# Patient Record
Sex: FEMALE | Race: WHITE | NOT HISPANIC OR LATINO | Employment: UNEMPLOYED | URBAN - METROPOLITAN AREA
[De-identification: names, ages, dates, MRNs, and addresses within clinical notes are randomized per-mention and may not be internally consistent; named-entity substitution may affect disease eponyms.]

---

## 2022-02-07 ENCOUNTER — HOSPITAL ENCOUNTER (INPATIENT)
Facility: HOSPITAL | Age: 62
LOS: 4 days | Discharge: HOME | DRG: 418 | End: 2022-02-11
Attending: EMERGENCY MEDICINE | Admitting: INTERNAL MEDICINE
Payer: COMMERCIAL

## 2022-02-07 ENCOUNTER — APPOINTMENT (EMERGENCY)
Dept: CT IMAGING | Facility: HOSPITAL | Age: 62
DRG: 418 | End: 2022-02-07
Payer: COMMERCIAL

## 2022-02-07 ENCOUNTER — OFFICE VISIT (OUTPATIENT)
Dept: FAMILY MEDICINE CLINIC | Facility: CLINIC | Age: 62
End: 2022-02-07
Payer: COMMERCIAL

## 2022-02-07 ENCOUNTER — APPOINTMENT (EMERGENCY)
Dept: ULTRASOUND IMAGING | Facility: HOSPITAL | Age: 62
DRG: 418 | End: 2022-02-07
Payer: COMMERCIAL

## 2022-02-07 VITALS
TEMPERATURE: 96.8 F | DIASTOLIC BLOOD PRESSURE: 90 MMHG | SYSTOLIC BLOOD PRESSURE: 130 MMHG | BODY MASS INDEX: 27.5 KG/M2 | HEART RATE: 76 BPM | WEIGHT: 175.2 LBS | OXYGEN SATURATION: 98 % | HEIGHT: 67 IN

## 2022-02-07 DIAGNOSIS — K80.50 CHOLEDOCHOLITHIASIS: Primary | ICD-10-CM

## 2022-02-07 DIAGNOSIS — R10.11 RIGHT UPPER QUADRANT PAIN: Primary | ICD-10-CM

## 2022-02-07 PROBLEM — M54.50 CHRONIC LOW BACK PAIN: Status: ACTIVE | Noted: 2022-02-07

## 2022-02-07 PROBLEM — I10 HYPERTENSION: Status: ACTIVE | Noted: 2022-02-07

## 2022-02-07 PROBLEM — G89.29 CHRONIC LOW BACK PAIN: Status: ACTIVE | Noted: 2022-02-07

## 2022-02-07 PROBLEM — F11.20 OPIOID DEPENDENCE (HCC): Status: ACTIVE | Noted: 2022-02-07

## 2022-02-07 LAB
ALBUMIN SERPL BCP-MCNC: 3.8 G/DL (ref 3.5–5)
ALP SERPL-CCNC: 92 U/L (ref 46–116)
ALT SERPL W P-5'-P-CCNC: 19 U/L (ref 12–78)
ANION GAP SERPL CALCULATED.3IONS-SCNC: 7 MMOL/L (ref 4–13)
AST SERPL W P-5'-P-CCNC: 17 U/L (ref 5–45)
BACTERIA UR QL AUTO: NORMAL /HPF
BASOPHILS # BLD AUTO: 0.06 THOUSANDS/ΜL (ref 0–0.1)
BASOPHILS NFR BLD AUTO: 1 % (ref 0–1)
BILIRUB SERPL-MCNC: 0.66 MG/DL (ref 0.2–1)
BILIRUB UR QL STRIP: NEGATIVE
BUN SERPL-MCNC: 5 MG/DL (ref 5–25)
CALCIUM SERPL-MCNC: 9.5 MG/DL (ref 8.3–10.1)
CHLORIDE SERPL-SCNC: 105 MMOL/L (ref 100–108)
CLARITY UR: CLEAR
CO2 SERPL-SCNC: 29 MMOL/L (ref 21–32)
COLOR UR: ABNORMAL
CREAT SERPL-MCNC: 1.05 MG/DL (ref 0.6–1.3)
EOSINOPHIL # BLD AUTO: 0.09 THOUSAND/ΜL (ref 0–0.61)
EOSINOPHIL NFR BLD AUTO: 1 % (ref 0–6)
ERYTHROCYTE [DISTWIDTH] IN BLOOD BY AUTOMATED COUNT: 12.1 % (ref 11.6–15.1)
FLUAV RNA RESP QL NAA+PROBE: NEGATIVE
FLUBV RNA RESP QL NAA+PROBE: NEGATIVE
GFR SERPL CREATININE-BSD FRML MDRD: 58 ML/MIN/1.73SQ M
GLUCOSE SERPL-MCNC: 100 MG/DL (ref 65–140)
GLUCOSE UR STRIP-MCNC: NEGATIVE MG/DL
HCT VFR BLD AUTO: 43 % (ref 34.8–46.1)
HGB BLD-MCNC: 14 G/DL (ref 11.5–15.4)
HGB UR QL STRIP.AUTO: ABNORMAL
IMM GRANULOCYTES # BLD AUTO: 0.02 THOUSAND/UL (ref 0–0.2)
IMM GRANULOCYTES NFR BLD AUTO: 0 % (ref 0–2)
KETONES UR STRIP-MCNC: NEGATIVE MG/DL
LEUKOCYTE ESTERASE UR QL STRIP: NEGATIVE
LIPASE SERPL-CCNC: 62 U/L (ref 73–393)
LYMPHOCYTES # BLD AUTO: 1.94 THOUSANDS/ΜL (ref 0.6–4.47)
LYMPHOCYTES NFR BLD AUTO: 30 % (ref 14–44)
MCH RBC QN AUTO: 29.9 PG (ref 26.8–34.3)
MCHC RBC AUTO-ENTMCNC: 32.6 G/DL (ref 31.4–37.4)
MCV RBC AUTO: 92 FL (ref 82–98)
MONOCYTES # BLD AUTO: 0.47 THOUSAND/ΜL (ref 0.17–1.22)
MONOCYTES NFR BLD AUTO: 7 % (ref 4–12)
NEUTROPHILS # BLD AUTO: 3.97 THOUSANDS/ΜL (ref 1.85–7.62)
NEUTS SEG NFR BLD AUTO: 61 % (ref 43–75)
NITRITE UR QL STRIP: NEGATIVE
NON-SQ EPI CELLS URNS QL MICRO: NORMAL /HPF
NRBC BLD AUTO-RTO: 0 /100 WBCS
PH UR STRIP.AUTO: 6 [PH]
PLATELET # BLD AUTO: 343 THOUSANDS/UL (ref 149–390)
PMV BLD AUTO: 9.8 FL (ref 8.9–12.7)
POTASSIUM SERPL-SCNC: 3.9 MMOL/L (ref 3.5–5.3)
PROT SERPL-MCNC: 7.2 G/DL (ref 6.4–8.2)
PROT UR STRIP-MCNC: NEGATIVE MG/DL
RBC # BLD AUTO: 4.69 MILLION/UL (ref 3.81–5.12)
RBC #/AREA URNS AUTO: NORMAL /HPF
RSV RNA RESP QL NAA+PROBE: NEGATIVE
SARS-COV-2 RNA RESP QL NAA+PROBE: NEGATIVE
SODIUM SERPL-SCNC: 141 MMOL/L (ref 136–145)
SP GR UR STRIP.AUTO: 1.01 (ref 1–1.03)
UROBILINOGEN UR QL STRIP.AUTO: 0.2 E.U./DL
WBC # BLD AUTO: 6.55 THOUSAND/UL (ref 4.31–10.16)
WBC #/AREA URNS AUTO: NORMAL /HPF

## 2022-02-07 PROCEDURE — 96374 THER/PROPH/DIAG INJ IV PUSH: CPT

## 2022-02-07 PROCEDURE — 1036F TOBACCO NON-USER: CPT | Performed by: FAMILY MEDICINE

## 2022-02-07 PROCEDURE — 83690 ASSAY OF LIPASE: CPT | Performed by: PHYSICIAN ASSISTANT

## 2022-02-07 PROCEDURE — 36415 COLL VENOUS BLD VENIPUNCTURE: CPT | Performed by: PHYSICIAN ASSISTANT

## 2022-02-07 PROCEDURE — 99285 EMERGENCY DEPT VISIT HI MDM: CPT | Performed by: PHYSICIAN ASSISTANT

## 2022-02-07 PROCEDURE — 99204 OFFICE O/P NEW MOD 45 MIN: CPT | Performed by: FAMILY MEDICINE

## 2022-02-07 PROCEDURE — 96375 TX/PRO/DX INJ NEW DRUG ADDON: CPT

## 2022-02-07 PROCEDURE — 81001 URINALYSIS AUTO W/SCOPE: CPT | Performed by: PHYSICIAN ASSISTANT

## 2022-02-07 PROCEDURE — 85025 COMPLETE CBC W/AUTO DIFF WBC: CPT | Performed by: PHYSICIAN ASSISTANT

## 2022-02-07 PROCEDURE — 99223 1ST HOSP IP/OBS HIGH 75: CPT | Performed by: INTERNAL MEDICINE

## 2022-02-07 PROCEDURE — 0241U HB NFCT DS VIR RESP RNA 4 TRGT: CPT | Performed by: INTERNAL MEDICINE

## 2022-02-07 PROCEDURE — 3725F SCREEN DEPRESSION PERFORMED: CPT | Performed by: FAMILY MEDICINE

## 2022-02-07 PROCEDURE — 99285 EMERGENCY DEPT VISIT HI MDM: CPT

## 2022-02-07 PROCEDURE — 74177 CT ABD & PELVIS W/CONTRAST: CPT

## 2022-02-07 PROCEDURE — 80053 COMPREHEN METABOLIC PANEL: CPT | Performed by: PHYSICIAN ASSISTANT

## 2022-02-07 PROCEDURE — 76705 ECHO EXAM OF ABDOMEN: CPT

## 2022-02-07 RX ORDER — PREGABALIN 75 MG/1
75 CAPSULE ORAL 3 TIMES DAILY
COMMUNITY

## 2022-02-07 RX ORDER — KETOROLAC TROMETHAMINE 30 MG/ML
15 INJECTION, SOLUTION INTRAMUSCULAR; INTRAVENOUS ONCE
Status: COMPLETED | OUTPATIENT
Start: 2022-02-07 | End: 2022-02-07

## 2022-02-07 RX ORDER — PREGABALIN 75 MG/1
75 CAPSULE ORAL 3 TIMES DAILY
Status: DISCONTINUED | OUTPATIENT
Start: 2022-02-07 | End: 2022-02-11 | Stop reason: HOSPADM

## 2022-02-07 RX ORDER — ONDANSETRON 2 MG/ML
4 INJECTION INTRAMUSCULAR; INTRAVENOUS ONCE
Status: COMPLETED | OUTPATIENT
Start: 2022-02-07 | End: 2022-02-07

## 2022-02-07 RX ORDER — LISINOPRIL 5 MG/1
5 TABLET ORAL DAILY
Status: DISCONTINUED | OUTPATIENT
Start: 2022-02-08 | End: 2022-02-09

## 2022-02-07 RX ORDER — SODIUM CHLORIDE 9 MG/ML
75 INJECTION, SOLUTION INTRAVENOUS CONTINUOUS
Status: DISCONTINUED | OUTPATIENT
Start: 2022-02-08 | End: 2022-02-10

## 2022-02-07 RX ORDER — ONDANSETRON 2 MG/ML
4 INJECTION INTRAMUSCULAR; INTRAVENOUS EVERY 6 HOURS PRN
Status: DISCONTINUED | OUTPATIENT
Start: 2022-02-07 | End: 2022-02-11 | Stop reason: HOSPADM

## 2022-02-07 RX ORDER — ACETAMINOPHEN 325 MG/1
650 TABLET ORAL EVERY 6 HOURS PRN
Status: DISCONTINUED | OUTPATIENT
Start: 2022-02-07 | End: 2022-02-11 | Stop reason: HOSPADM

## 2022-02-07 RX ORDER — METHOCARBAMOL 750 MG/1
750 TABLET, FILM COATED ORAL 4 TIMES DAILY
Status: DISCONTINUED | OUTPATIENT
Start: 2022-02-07 | End: 2022-02-11 | Stop reason: HOSPADM

## 2022-02-07 RX ORDER — OXYCODONE 36 MG/1
CAPSULE, EXTENDED RELEASE ORAL 3 TIMES DAILY
COMMUNITY

## 2022-02-07 RX ADMIN — METHOCARBAMOL 750 MG: 750 TABLET ORAL at 22:07

## 2022-02-07 RX ADMIN — PREGABALIN 75 MG: 75 CAPSULE ORAL at 21:35

## 2022-02-07 RX ADMIN — SODIUM CHLORIDE 75 ML/HR: 0.9 INJECTION, SOLUTION INTRAVENOUS at 23:34

## 2022-02-07 RX ADMIN — KETOROLAC TROMETHAMINE 15 MG: 30 INJECTION, SOLUTION INTRAMUSCULAR at 16:00

## 2022-02-07 RX ADMIN — ONDANSETRON 4 MG: 2 INJECTION INTRAMUSCULAR; INTRAVENOUS at 16:01

## 2022-02-07 RX ADMIN — OXYCODONE 54 MG: 18 CAPSULE, EXTENDED RELEASE ORAL at 21:35

## 2022-02-07 RX ADMIN — IOHEXOL 100 ML: 350 INJECTION, SOLUTION INTRAVENOUS at 17:06

## 2022-02-07 NOTE — ED PROVIDER NOTES
History  Chief Complaint   Patient presents with    Abdominal Pain     pt c/o generilized abd pain and nausea since 2/3       64 yo with abd pain  Onset 4 days ago  Gradual onset  Constant pain  RUQ and RLQ  Non-radiating  Worsened by eating, movement, palpation  Nausea no vomiting  Diarrhea  No chest pain or sob  No cough, fever, chills  Normal urination  History provided by:  Patient   used: No    Abdominal Pain  Pain location:  R flank  Pain quality: burning    Pain radiates to:  Does not radiate  Pain severity:  Moderate  Onset quality:  Gradual  Duration:  4 days  Timing:  Constant  Progression:  Unchanged  Chronicity:  New  Relieved by:  Nothing  Worsened by:  Eating  Ineffective treatments:  None tried  Associated symptoms: diarrhea and nausea    Associated symptoms: no chest pain, no chills, no cough, no dysuria, no fever, no hematuria, no shortness of breath, no sore throat and no vomiting        Prior to Admission Medications   Prescriptions Last Dose Informant Patient Reported? Taking?   diazepam (VALIUM) 5 mg tablet   No No   Sig: Take 1 tablet (5 mg total) by mouth every 8 (eight) hours as needed for muscle spasms (1 tablet every 8 hrs as needed for spasm)  Patient not taking: Reported on 2/7/2022    lisinopril (ZESTRIL) 5 mg tablet   Yes No   Sig: Take 5 mg by mouth daily  methocarbamol (ROBAXIN) 750 mg tablet   Yes No   Sig: Take 750 mg by mouth 4 (four) times a day  oxyCODONE (OxyCONTIN) 40 mg 12 hr tablet   Yes No   Sig: Take 160 mg by mouth 3 (three) times a day     Patient not taking: Reported on 2/7/2022    oxyCODONE ER (Xtampza ER) 36 MG C12A  Self Yes No   Sig: Take by mouth 3 (three) times a day   pregabalin (LYRICA) 75 mg capsule  Self Yes No   Sig: Take 75 mg by mouth 3 (three) times a day      Facility-Administered Medications: None       Past Medical History:   Diagnosis Date    Hyperlipidemia     Hypertension        Past Surgical History:   Procedure Laterality Date    BACK SURGERY      L3-S1    SPINAL STIMULATOR PLACEMENT  2019       History reviewed  No pertinent family history  I have reviewed and agree with the history as documented  E-Cigarette/Vaping    E-Cigarette Use Never User      E-Cigarette/Vaping Substances     Social History     Tobacco Use    Smoking status: Never Smoker    Smokeless tobacco: Never Used   Vaping Use    Vaping Use: Never used   Substance Use Topics    Alcohol use: No    Drug use: No       Review of Systems   Constitutional: Negative for chills and fever  HENT: Negative for ear pain and sore throat  Eyes: Negative for pain and visual disturbance  Respiratory: Negative for cough and shortness of breath  Cardiovascular: Negative for chest pain and palpitations  Gastrointestinal: Positive for abdominal pain, diarrhea and nausea  Negative for vomiting  Genitourinary: Negative for dysuria and hematuria  Musculoskeletal: Negative for arthralgias and back pain  Skin: Negative for color change and rash  Neurological: Negative for seizures and syncope  All other systems reviewed and are negative  Physical Exam  Physical Exam  Vitals and nursing note reviewed  Constitutional:       General: She is not in acute distress  Appearance: She is well-developed  HENT:      Head: Normocephalic and atraumatic  Eyes:      Conjunctiva/sclera: Conjunctivae normal    Cardiovascular:      Rate and Rhythm: Normal rate and regular rhythm  Heart sounds: No murmur heard  Pulmonary:      Effort: Pulmonary effort is normal  No respiratory distress  Breath sounds: Normal breath sounds  Abdominal:      Palpations: Abdomen is soft  Tenderness: There is abdominal tenderness in the right upper quadrant and right lower quadrant  There is no guarding  Negative signs include Ortega's sign and McBurney's sign  Musculoskeletal:      Cervical back: Neck supple     Skin:     General: Skin is warm and dry    Neurological:      Mental Status: She is alert           Vital Signs  ED Triage Vitals   Temperature Pulse Respirations Blood Pressure SpO2   02/07/22 1253 02/07/22 1253 02/07/22 1253 02/07/22 1253 02/07/22 1253   98 2 °F (36 8 °C) 91 17 166/84 97 %      Temp Source Heart Rate Source Patient Position - Orthostatic VS BP Location FiO2 (%)   02/07/22 1253 02/07/22 1253 02/07/22 1253 02/07/22 1253 --   Oral Monitor Sitting Left arm       Pain Score       02/07/22 1730       3           Vitals:    02/07/22 1253 02/07/22 1559 02/07/22 1715 02/07/22 1900   BP: 166/84 (!) 179/93 139/74 135/75   Pulse: 91 71 71 71   Patient Position - Orthostatic VS: Sitting Sitting Sitting Sitting         Visual Acuity      ED Medications  Medications   acetaminophen (TYLENOL) tablet 650 mg (has no administration in time range)   ondansetron (ZOFRAN) injection 4 mg (has no administration in time range)   enoxaparin (LOVENOX) subcutaneous injection 40 mg (has no administration in time range)   sodium chloride 0 9 % infusion (has no administration in time range)   lisinopril (ZESTRIL) tablet 5 mg (has no administration in time range)   pregabalin (LYRICA) capsule 75 mg (75 mg Oral Given 2/7/22 2135)   methocarbamol (ROBAXIN) tablet 750 mg (has no administration in time range)   oxyCODONE ER C12A 72 mg (has no administration in time range)   oxyCODONE ER C12A 54 mg (54 mg Oral Given 2/7/22 2135)   ondansetron (ZOFRAN) injection 4 mg (4 mg Intravenous Given 2/7/22 1601)   ketorolac (TORADOL) injection 15 mg (15 mg Intravenous Given 2/7/22 1600)   iohexol (OMNIPAQUE) 350 MG/ML injection (SINGLE-DOSE) 100 mL (100 mL Intravenous Given 2/7/22 1706)       Diagnostic Studies  Results Reviewed     Procedure Component Value Units Date/Time    COVID/FLU/RSV [[id number]] Collected: 02/07/22 2137    Lab Status: No result Specimen: Nares from Nose     Urine Microscopic [[id number]]  (Normal) Collected: 02/07/22 1618    Lab Status: Final result Specimen: Urine, Clean Catch Updated: 02/07/22 1733     RBC, UA 2-4 /hpf      WBC, UA None Seen /hpf      Epithelial Cells Occasional /hpf      Bacteria, UA Occasional /hpf     UA w Reflex to Microscopic w Reflex to Culture [[id number]]  (Abnormal) Collected: 02/07/22 1618    Lab Status: Final result Specimen: Urine, Clean Catch Updated: 02/07/22 1722     Color, UA Light Yellow     Clarity, UA Clear     Specific Gravity, UA 1 010     pH, UA 6 0     Leukocytes, UA Negative     Nitrite, UA Negative     Protein, UA Negative mg/dl      Glucose, UA Negative mg/dl      Ketones, UA Negative mg/dl      Urobilinogen, UA 0 2 E U /dl      Bilirubin, UA Negative     Blood, UA Moderate    Comprehensive metabolic panel [[id number]] Collected: 02/07/22 1603    Lab Status: Final result Specimen: Blood from Arm, Right Updated: 02/07/22 1653     Sodium 141 mmol/L      Potassium 3 9 mmol/L      Chloride 105 mmol/L      CO2 29 mmol/L      ANION GAP 7 mmol/L      BUN 5 mg/dL      Creatinine 1 05 mg/dL      Glucose 100 mg/dL      Calcium 9 5 mg/dL      AST 17 U/L      ALT 19 U/L      Alkaline Phosphatase 92 U/L      Total Protein 7 2 g/dL      Albumin 3 8 g/dL      Total Bilirubin 0 66 mg/dL      eGFR 58 ml/min/1 73sq m     Narrative:      [place] guidelines for Chronic Kidney Disease (CKD):     Stage 1 with normal or high GFR (GFR > 90 mL/min/1 73 square meters)    Stage 2 Mild CKD (GFR = 60-89 mL/min/1 73 square meters)    Stage 3A Moderate CKD (GFR = 45-59 mL/min/1 73 square meters)    Stage 3B Moderate CKD (GFR = 30-44 mL/min/1 73 square meters)    Stage 4 Severe CKD (GFR = 15-29 mL/min/1 73 square meters)    Stage 5 End Stage CKD (GFR <15 mL/min/1 73 square meters)  Note: GFR calculation is accurate only with a steady state creatinine    Lipase [[id number]]  (Abnormal) Collected: 02/07/22 1603    Lab Status: Final result Specimen: Blood from Arm, Right Updated: 02/07/22 1653     Lipase 62 u/L     CBC and differential [[id number]] Collected: 02/07/22 1603    Lab Status: Final result Specimen: Blood from Arm, Right Updated: 02/07/22 1613     WBC 6 55 Thousand/uL      RBC 4 69 Million/uL      Hemoglobin 14 0 g/dL      Hematocrit 43 0 %      MCV 92 fL      MCH 29 9 pg      MCHC 32 6 g/dL      RDW 12 1 %      MPV 9 8 fL      Platelets 377 Thousands/uL      nRBC 0 /100 WBCs      Neutrophils Relative 61 %      Immat GRANS % 0 %      Lymphocytes Relative 30 %      Monocytes Relative 7 %      Eosinophils Relative 1 %      Basophils Relative 1 %      Neutrophils Absolute 3 97 Thousands/µL      Immature Grans Absolute 0 02 Thousand/uL      Lymphocytes Absolute 1 94 Thousands/µL      Monocytes Absolute 0 47 Thousand/µL      Eosinophils Absolute 0 09 Thousand/µL      Basophils Absolute 0 06 Thousands/µL                  US right upper quadrant   Final Result by Madelyn Gar MD (02/07 1833)         1  Gallstones filling the gallbladder lumen without wall thickening or pericholecystic fluid although a positive sonographic Ortega sign is elicited  2   Biliary ductal dilatation with common bile duct measuring up to 12 mm with an 11 mm calculus in the distal common bile duct identified  The study was marked in [facility] for immediate notification  Workstation performed: [id number]         CT abdomen pelvis with contrast   Final Result by Phyllis Merino MD (02/07 5781)      Large, peripherally calcified gallstone versus gallbladder wall calcifications with mild gallbladder wall thickening, but no significant pericholecystic inflammatory change  Dilated common bile duct with abrupt cut off distally with possible high density material in the duct near the ampulla  Right upper quadrant ultrasound is recommended to further evaluate the above findings  The study was marked in [facility] for immediate notification        Workstation performed: [id number]                    Procedures  Procedures         ED Course  ED Course as of 02/07/22 2140   Mon Feb 07, 2022   1739 CT abdomen pelvis with contrast  RUQ US added                                               MDM  Number of Diagnoses or Management Options  Choledocholithiasis: new and requires workup  Diagnosis management comments: DDx including but not limited to: appendicitis, gastroenteritis, gastritis, PUD, GERD, gastroparesis, hepatitis, pancreatitis, colitis, enteritis, diverticulitis, food poisoning, mesenteric adenitis,  IBD, IBS, ileus, bowel obstruction, volvulus, internal hernia, cholecystitis, biliary colic, choledocholithiasis, pelvic pathology, renal colic, pyelonephritis, UTI  Plan: CT scan  Labs  Analgesia  Zofran  dispo pending  Amount and/or Complexity of Data Reviewed  Clinical lab tests: ordered and reviewed  Tests in the radiology section of CPT®: ordered and reviewed    Risk of Complications, Morbidity, and/or Mortality  Presenting problems: moderate  Management options: low  General comments: 62 yo female with abd pain  Found to have choledocholithiasis  Will admit for GI evaluation  Pt agrees with plan  Pain had improved at time of admission  Patient Progress  Patient progress: stable      Disposition  Final diagnoses:   Choledocholithiasis     Time reflects when diagnosis was documented in both MDM as applicable and the Disposition within this note     Time User Action Codes Description Comment    2/7/2022  6:57 PM Emeka Cintron Add [K80 50] Choledocholithiasis       ED Disposition     ED Disposition Condition Date/Time Comment    Admit Stable Mon Feb 7, 2022  6:56 PM Case was discussed with Dr Virginia Rivas and the patient's admission status was agreed to be Admission Status: inpatient status to the service of Dr Virginia Rivas   Follow-up Information    None         Patient's Medications   Discharge Prescriptions    No medications on file       No discharge procedures on file      PDMP Review     None          ED Provider  Electronically Signed by           Lela Spears PA-C  02/07/22 4446

## 2022-02-07 NOTE — PROGRESS NOTES
Assessment/Plan:    Right upper quadrant pain  Right upper quadrant pain that radiates to the epigastric region  Patient has positive Rexanne Feeling sign  likely acute cholecystitis  (history of MRI showing stones in the common bile duct in 2016)  Will send patient to emergency department for surgical evaluation    There may be component of gastritis/gastric ulcer that is worsened with NSAID use  Subjective:      Patient ID: Ibrahima Aiken is a 63 y o  female  HPI    70-year-old female patient presents with right upper quadrant pain/burning sensation in epigastrium 30 on 02/03/2022  Patient is new to the office, establishing care  Last encounter with [facility]'s visit in 2016  Of note, the MRI from 02/20/2016 shows at least 2 small calculi within the mildly dilated distal common bile duct  Patient denies any history of gallbladder surgery  States she did have back surgery which did needed to obtain access through her stomach    Sees Dr Salinas Knapp for pain management in [place]  And uses opiates as chronic medication  Patient is concerned about slowing down her abdomen due to these medication  She reports having 2 mild movements, liquidy, since the 2nd  Today she has significant decreased appetite, last time she ate was yogurt yesterday for breakfast   Patient reports pain after food    Patient has taken her Xtampza ER this morning as well as  Three 200 mg with Advil    Patient feels comfortable driving at this time  Review of Systems   Constitutional: Negative for chills and fever  HENT: Negative for congestion, rhinorrhea and sore throat  Respiratory: Negative for shortness of breath  Cardiovascular: Negative for chest pain  Gastrointestinal: Positive for abdominal pain, diarrhea and nausea  Negative for blood in stool, constipation and vomiting  Genitourinary: Negative for dysuria and hematuria  Musculoskeletal: Negative for back pain  Neurological: Positive for headaches  Related to disc issue, upper back pain   Psychiatric/Behavioral: Positive for sleep disturbance  Objective:    /90 (BP Location: Left arm, Patient Position: Sitting, Cuff Size: Standard)   Pulse 76   Temp (!) 96 8 °F (36 °C)   Ht 5' 7" (1 702 m)   Wt 79 5 kg (175 lb 3 2 oz)   SpO2 98%   BMI 27 44 kg/m²     Physical Exam  Vitals reviewed  Constitutional:       General: She is not in acute distress  Appearance: She is well-developed  She is obese  She is not ill-appearing, toxic-appearing or diaphoretic  Cardiovascular:      Rate and Rhythm: Normal rate  Pulses: Normal pulses  Heart sounds: Normal heart sounds  No murmur heard  Pulmonary:      Effort: Pulmonary effort is normal  No respiratory distress  Breath sounds: Normal breath sounds  Abdominal:      General: Abdomen is flat  Bowel sounds are normal  There is no distension  Palpations: Abdomen is soft  Tenderness: There is abdominal tenderness  There is no right CVA tenderness or left CVA tenderness  Comments: Significant tenderness at the right upper quadrant positive Ortega sign   Musculoskeletal:         General: No swelling or tenderness  Normal range of motion  Skin:     General: Skin is warm and dry  Capillary Refill: Capillary refill takes less than 2 seconds  Neurological:      General: No focal deficit present  Mental Status: She is alert and oriented to person, place, and time  Psychiatric:         Mood and Affect: Mood normal             Mozella Dandy, M D  Family Medicine    Please excuse any "sound-alike" errors that may have ocurred during the process of dictation  Parts of this note have been dictated and there may be errors present in the transcription process  Thank you

## 2022-02-07 NOTE — ASSESSMENT & PLAN NOTE
Right upper quadrant pain that radiates to the epigastric region  Patient has positive Pretty Werner sign  likely acute cholecystitis  (history of MRI showing stones in the common bile duct in 2016)  Will send patient to emergency department for surgical evaluation    There may be component of gastritis/gastric ulcer that is worsened with NSAID use

## 2022-02-08 LAB
ALBUMIN SERPL BCP-MCNC: 3.6 G/DL (ref 3.5–5)
ALP SERPL-CCNC: 90 U/L (ref 46–116)
ALT SERPL W P-5'-P-CCNC: 20 U/L (ref 12–78)
ANION GAP SERPL CALCULATED.3IONS-SCNC: 7 MMOL/L (ref 4–13)
AST SERPL W P-5'-P-CCNC: 17 U/L (ref 5–45)
BILIRUB DIRECT SERPL-MCNC: 0.17 MG/DL (ref 0–0.2)
BILIRUB SERPL-MCNC: 0.95 MG/DL (ref 0.2–1)
BUN SERPL-MCNC: 6 MG/DL (ref 5–25)
CALCIUM SERPL-MCNC: 9 MG/DL (ref 8.3–10.1)
CHLORIDE SERPL-SCNC: 104 MMOL/L (ref 100–108)
CO2 SERPL-SCNC: 31 MMOL/L (ref 21–32)
CREAT SERPL-MCNC: 1.03 MG/DL (ref 0.6–1.3)
ERYTHROCYTE [DISTWIDTH] IN BLOOD BY AUTOMATED COUNT: 12.2 % (ref 11.6–15.1)
GFR SERPL CREATININE-BSD FRML MDRD: 59 ML/MIN/1.73SQ M
GLUCOSE SERPL-MCNC: 94 MG/DL (ref 65–140)
HCT VFR BLD AUTO: 41.5 % (ref 34.8–46.1)
HGB BLD-MCNC: 13.5 G/DL (ref 11.5–15.4)
INR PPP: 1.02 (ref 0.84–1.19)
MCH RBC QN AUTO: 30 PG (ref 26.8–34.3)
MCHC RBC AUTO-ENTMCNC: 32.5 G/DL (ref 31.4–37.4)
MCV RBC AUTO: 92 FL (ref 82–98)
PLATELET # BLD AUTO: 317 THOUSANDS/UL (ref 149–390)
PMV BLD AUTO: 9.8 FL (ref 8.9–12.7)
POTASSIUM SERPL-SCNC: 3.7 MMOL/L (ref 3.5–5.3)
PROT SERPL-MCNC: 6.6 G/DL (ref 6.4–8.2)
PROTHROMBIN TIME: 13 SECONDS (ref 11.6–14.5)
RBC # BLD AUTO: 4.5 MILLION/UL (ref 3.81–5.12)
SODIUM SERPL-SCNC: 142 MMOL/L (ref 136–145)
WBC # BLD AUTO: 5.28 THOUSAND/UL (ref 4.31–10.16)

## 2022-02-08 PROCEDURE — 99223 1ST HOSP IP/OBS HIGH 75: CPT | Performed by: INTERNAL MEDICINE

## 2022-02-08 PROCEDURE — 99233 SBSQ HOSP IP/OBS HIGH 50: CPT | Performed by: STUDENT IN AN ORGANIZED HEALTH CARE EDUCATION/TRAINING PROGRAM

## 2022-02-08 PROCEDURE — 80076 HEPATIC FUNCTION PANEL: CPT | Performed by: INTERNAL MEDICINE

## 2022-02-08 PROCEDURE — 80048 BASIC METABOLIC PNL TOTAL CA: CPT | Performed by: INTERNAL MEDICINE

## 2022-02-08 PROCEDURE — 85610 PROTHROMBIN TIME: CPT | Performed by: INTERNAL MEDICINE

## 2022-02-08 PROCEDURE — NC001 PR NO CHARGE: Performed by: PHYSICIAN ASSISTANT

## 2022-02-08 PROCEDURE — 36415 COLL VENOUS BLD VENIPUNCTURE: CPT | Performed by: INTERNAL MEDICINE

## 2022-02-08 PROCEDURE — 85027 COMPLETE CBC AUTOMATED: CPT | Performed by: INTERNAL MEDICINE

## 2022-02-08 PROCEDURE — 99254 IP/OBS CNSLTJ NEW/EST MOD 60: CPT | Performed by: STUDENT IN AN ORGANIZED HEALTH CARE EDUCATION/TRAINING PROGRAM

## 2022-02-08 RX ADMIN — PREGABALIN 75 MG: 75 CAPSULE ORAL at 21:11

## 2022-02-08 RX ADMIN — METHOCARBAMOL 750 MG: 750 TABLET ORAL at 08:42

## 2022-02-08 RX ADMIN — METHOCARBAMOL 750 MG: 750 TABLET ORAL at 17:36

## 2022-02-08 RX ADMIN — OXYCODONE 54 MG: 18 CAPSULE, EXTENDED RELEASE ORAL at 17:37

## 2022-02-08 RX ADMIN — METHOCARBAMOL 750 MG: 750 TABLET ORAL at 21:11

## 2022-02-08 RX ADMIN — PREGABALIN 75 MG: 75 CAPSULE ORAL at 17:36

## 2022-02-08 RX ADMIN — OXYCODONE 72 MG: 18 CAPSULE, EXTENDED RELEASE ORAL at 08:43

## 2022-02-08 RX ADMIN — LISINOPRIL 5 MG: 5 TABLET ORAL at 08:43

## 2022-02-08 RX ADMIN — PREGABALIN 75 MG: 75 CAPSULE ORAL at 08:42

## 2022-02-08 RX ADMIN — METHOCARBAMOL 750 MG: 750 TABLET ORAL at 11:51

## 2022-02-08 NOTE — UTILIZATION REVIEW
Initial Clinical Review    Admission: Date/Time/Statement:   Admission Orders (From admission, onward)     Ordered        02/07/22 1857  Inpatient Admission  Once                      Orders Placed This Encounter   Procedures    Inpatient Admission     Standing Status:   Standing     Number of Occurrences:   1     Order Specific Question:   Level of Care     Answer:   Med Surg [16]     Order Specific Question:   Estimated length of stay     Answer:   More than 2 Midnights     Order Specific Question:   Certification     Answer:   I certify that inpatient services are medically necessary for this patient for a duration of greater than two midnights  See H&P and MD Progress Notes for additional information about the patient's course of treatment  ED Arrival Information     Expected Arrival Acuity    2/7/2022 2/7/2022 12:43 Urgent         Means of arrival Escorted by Service Admission type    Walk-In Self Hospitalist Urgent         Arrival complaint    Right upper quadrant pain        Chief Complaint   Patient presents with    Abdominal Pain     pt c/o generilized abd pain and nausea since 2/3  Initial Presentation: 64 yo female to ED from home w/ RUQ abd pain   Describes as colicky severe RUQ abd pain   Dec po intake  Found to have cholelithiasis on right upper quadrant ultrasound with severe CBD dilation at 1 5 cm, and a 11 mm calculus in the distal CBD  Admitted IP status NPO after MN , ERCP , GI consult , surgery consult ,monitor LFTs   Hx HTN on lisinopril   Opoid dependence for pain control   Cont xtampba, robaxin and lyrica for low back pain   Date: 2/8  Day 2: cont stay for ERCP   GI and surgery following   Trend LFts  + abd tenderness  2/8 Surgery Consult   Schedule for ERCP w/ GI 2/9  Plan for OR thurs for lap choley , possible open   [id number] for clears   NPO /IVF after MN   serial exams, pain control        2/8 GI Consult   Cholelithiasis/choledocholithiasis monitor cbc , cmp , plan ERCP tomorrow for stone extraction   NPO after MN   Consent for general surgery for lap choley   ED Triage Vitals   Temperature Pulse Respirations Blood Pressure SpO2   02/07/22 1253 02/07/22 1253 02/07/22 1253 02/07/22 1253 02/07/22 1253   98 2 °F (36 8 °C) 91 17 166/84 97 %      Temp Source Heart Rate Source Patient Position - Orthostatic VS BP Location FiO2 (%)   02/07/22 1253 02/07/22 1253 02/07/22 1253 02/07/22 1253 --   Oral Monitor Sitting Left arm       Pain Score       02/07/22 1730       3          Wt Readings from Last 1 Encounters:   02/07/22 79 5 kg (175 lb 3 2 oz)     Additional Vital Signs:   02/08/22 0912 99 4 °F (37 4 °C) 67 -- 137/67 -- 95 % None (Room air) Sitting   02/08/22 0843 -- -- -- 151/83 -- -- -- --   02/07/22 2341 -- 57 17 152/72 -- 96 % None (Room air) Sitting   02/07/22 2209 -- 63 -- 152/72 102 98 % None (Room air) --   02/07/22 1900 -- 71 19 135/75 -- 96 % None (Room air) Sitting   02/07/22 1715 -- 71 -- 139/74 100 98 % None (Room air) Sitting   02/07/22 1559 -- 71 -- 179/93 Abnormal  129 97 % None (Room air) Sittin     Pertinent Labs/Diagnostic Test Results:   2/7 US RUQ1  Gallstones filling the gallbladder lumen without wall thickening or pericholecystic fluid although a positive sonographic Ortega sign is elicited  2   Biliary ductal dilatation with common bile duct measuring up to 12 mm with an 11 mm calculus in the distal common bile duct identified    2/7 CT abd Large, peripherally calcified gallstone versus gallbladder wall calcifications with mild gallbladder wall thickening, but no significant pericholecystic inflammatory change    Dilated common bile duct with abrupt cut off distally with possible high density material in the duct near the ampulla    Right upper quadrant ultrasound is recommended to further evaluate the above findings      Results from last 7 days   Lab Units 02/07/22 2137   SARS-COV-2  Negative     Results from last 7 days   Lab Units 02/08/22  0574 02/07/22  1603   WBC Thousand/uL 5 28 6 55   HEMOGLOBIN g/dL 13 5 14 0   HEMATOCRIT % 41 5 43 0   PLATELETS Thousands/uL 317 343   NEUTROS ABS Thousands/µL  --  3 97     Results from last 7 days   Lab Units 02/08/22  0525 02/07/22  1603   SODIUM mmol/L 142 141   POTASSIUM mmol/L 3 7 3 9   CHLORIDE mmol/L 104 105   CO2 mmol/L 31 29   ANION GAP mmol/L 7 7   BUN mg/dL 6 5   CREATININE mg/dL 1 03 1 05   EGFR ml/min/1 73sq m 59 58   CALCIUM mg/dL 9 0 9 5     Results from last 7 days   Lab Units 02/08/22  0525 02/07/22  1603   AST U/L 17 17   ALT U/L 20 19   ALK PHOS U/L 90 92   TOTAL PROTEIN g/dL 6 6 7 2   ALBUMIN g/dL 3 6 3 8   TOTAL BILIRUBIN mg/dL 0 95 0 66   BILIRUBIN DIRECT mg/dL 0 17  --      Results from last 7 days   Lab Units 02/08/22  0525 02/07/22  1603   GLUCOSE RANDOM mg/dL 94 100     Results from last 7 days   Lab Units 02/08/22  0525   PROTIME seconds 13 0   INR  1 02     Results from last 7 days   Lab Units 02/07/22  1603   LIPASE u/L 62*     Results from last 7 days   Lab Units 02/07/22  1618   CLARITY UA  Clear   COLOR UA  Light Yellow   SPEC GRAV UA  1 010   PH UA  6 0   GLUCOSE UA mg/dl Negative   KETONES UA mg/dl Negative   BLOOD UA  Moderate*   PROTEIN UA mg/dl Negative   NITRITE UA  Negative   BILIRUBIN UA  Negative   UROBILINOGEN UA E U /dl 0 2   LEUKOCYTES UA  Negative   WBC UA /hpf None Seen   RBC UA /hpf 2-4   BACTERIA UA /hpf Occasional   EPITHELIAL CELLS WET PREP /hpf Occasional     Results from last 7 days   Lab Units 02/07/22  2137   INFLUENZA A PCR  Negative   INFLUENZA B PCR  Negative   RSV PCR  Negative     ED Treatment:   Medication Administration from 02/07/2022 1122 to 02/08/2022 7093       Date/Time Order Dose Route Action     02/07/2022 1601 ondansetron (ZOFRAN) injection 4 mg 4 mg Intravenous Given     02/07/2022 1600 ketorolac (TORADOL) injection 15 mg 15 mg Intravenous Given     02/07/2022 2334 sodium chloride 0 9 % infusion 75 mL/hr Intravenous New Bag     02/08/2022 0843 lisinopril (ZESTRIL) tablet 5 mg 5 mg Oral Given     02/08/2022 0842 pregabalin (LYRICA) capsule 75 mg 75 mg Oral Given     02/07/2022 2135 pregabalin (LYRICA) capsule 75 mg 75 mg Oral Given     02/08/2022 0842 methocarbamol (ROBAXIN) tablet 750 mg 750 mg Oral Given     02/07/2022 2207 methocarbamol (ROBAXIN) tablet 750 mg 750 mg Oral Given     02/08/2022 0843 oxyCODONE ER C12A 72 mg 72 mg Oral Given     02/07/2022 2135 oxyCODONE ER C12A 54 mg 54 mg Oral Given        Past Medical History:   Diagnosis Date    Hyperlipidemia     Hypertension      Present on Admission:   Choledocholithiasis   Chronic low back pain   Opioid dependence ([facility] [address] )   Hypertension      Admitting Diagnosis: Abdominal pain [R10 9]  Age/Sex: 63 y o  female  Admission Orders:  Scheduled Medications:  lisinopril, 5 mg, Oral, Daily  methocarbamol, 750 mg, Oral, 4x Daily  oxyCODONE ER, 54 mg, Oral, QPM  oxyCODONE ER, 72 mg, Oral, QAM  pregabalin, 75 mg, Oral, TID      Continuous IV Infusions:  sodium chloride, 75 mL/hr, Intravenous, Continuous      PRN Meds:  acetaminophen, 650 mg, Oral, Q6H PRN  ondansetron, 4 mg, Intravenous, Q6H PRN    NPO to clear liq diet       IP CONSULT TO GASTROENTEROLOGY  IP CONSULT TO ACUTE CARE SURGERY    Network Utilization Review Department  ATTENTION: Please call with any questions or concerns to [phone] and carefully listen to the prompts so that you are directed to the right person  All voicemails are confidential   [facility] all requests for admission clinical reviews, approved or denied determinations and any other requests to dedicated fax number below belonging to the campus where the patient is receiving treatment   List of dedicated fax numbers for the Facilities:  [address] DENIALS (Administrative/Medical Necessity) [phone]   [address] [place] [address] (Maternity/NICU/Pediatrics) 261 [facility],[address] 40 [facility] [address]  [phone]   [address] [facility] [place] [address] [id number] [place] [address] [address] [place] [address] [phone]

## 2022-02-08 NOTE — CONSULTS
Consult- General Surgery   Patrizia De Santiago 63 y o  female MRN: [id number]  Unit/Bed#: ED 22 Encounter: [id number]      Assessment/Plan     Patrizia De Santiago is a 63 y o  female    Choledocholithiasis  US of gallbladder showed gallstones filling the gallbladder lumen without wall thickening or pericholecystic fluid  Biliary ductal dilatation with common bile duct measuring up to 12 mm with an 11mm calculus in the distal common bile duct identified  Scheduled for ERCP with GI tomorrow given findings of choledocholithiasis  Plan for OR Thursday for laparoscopic cholecystectomy, possible open  This was discussed with the patient who is agreeable with the plan  Informed consent will be obtained by the surgeon  [facility] for clear liquids at this time  NPO/IVF at midnight and prior to surgical procedure, ok for diet post-operatively   Trend labs, monitor WBC and LFTs  Monitor vitals  Pain control PRN  Serial abdominal exams  IS post-operatively  Ambulation/OOB post-operatively  Medicine primary, GI following    History of Present Illness     HPI:  Patrizia De Santiago is a 63 y o  female who presents with acute abdominal pain and bloating for the last week with associated nausea  She states the pain originated in her RLQ but has worsened in intensity and moved to her upper abdomen over the course of the week  CT of the abdomen and pelvis showed large, peripherally calcified gallstone versus gallbladder wall calcifications with mild gallbladder wall thickening, but no significant pericholecystic inflammatory change  Dilated common bile duct with abrupt cut off distally with possible high density material in the duct near the ampulla  US of the gallbladder showed gallstones filling the gallbladder lumen without wall thickening or pericholecystic fluid  Biliary ductal dilatation with common bile duct measuring up to 12mm with an 11 mm calculus in the distal common bile duct was identified   At this time, the patient states her abdominal pain has improved while remaining on clear liquids  She denies any further nausea or vomiting  She is still passing flatus  Prior surgical history includes previous anterior approach to lumbar fusion  Review of Systems   Constitutional: Negative for activity change, appetite change, fatigue, fever and unexpected weight change  HENT: Negative for congestion, tinnitus and voice change  Eyes: Negative for photophobia, discharge and visual disturbance  Respiratory: Negative for apnea, chest tightness, shortness of breath, wheezing and stridor  Cardiovascular: Negative for chest pain and leg swelling  Gastrointestinal: Positive for abdominal pain and nausea  Negative for abdominal distention, constipation and rectal pain  Endocrine: Negative for cold intolerance, polydipsia, polyphagia and polyuria  Genitourinary: Negative for decreased urine volume, difficulty urinating, dysuria, flank pain, hematuria, pelvic pain and urgency  Musculoskeletal: Negative for back pain, neck pain and neck stiffness  Skin: Negative for color change, pallor, rash and wound  Neurological: Negative for dizziness, tremors, syncope and weakness  Historical Information   Past Medical History:   Diagnosis Date    Hyperlipidemia     Hypertension      Past Surgical History:   Procedure Laterality Date    BACK SURGERY      L3-S1    SPINAL STIMULATOR PLACEMENT  2019     Social History   Social History     Substance and Sexual Activity   Alcohol Use No     Social History     Substance and Sexual Activity   Drug Use No     Social History     Tobacco Use   Smoking Status Never Smoker   Smokeless Tobacco Never Used     Family History: History reviewed  No pertinent family history  Meds/Allergies   PTA meds:   Prior to Admission Medications   Prescriptions Last Dose Informant Patient Reported?  Taking?   diazepam (VALIUM) 5 mg tablet   No No   Sig: Take 1 tablet (5 mg total) by mouth every 8 (eight) hours as needed for muscle spasms (1 tablet every 8 hrs as needed for spasm)  Patient not taking: Reported on 2/7/2022    lisinopril (ZESTRIL) 5 mg tablet   Yes No   Sig: Take 5 mg by mouth daily  methocarbamol (ROBAXIN) 750 mg tablet   Yes No   Sig: Take 750 mg by mouth 4 (four) times a day  oxyCODONE (OxyCONTIN) 40 mg 12 hr tablet   Yes No   Sig: Take 160 mg by mouth 3 (three) times a day  Patient not taking: Reported on 2/7/2022    oxyCODONE ER (Xtampza ER) 36 MG C12A  Self Yes No   Sig: Take by mouth 3 (three) times a day   pregabalin (LYRICA) 75 mg capsule  Self Yes No   Sig: Take 75 mg by mouth 3 (three) times a day      Facility-Administered Medications: None     No Known Allergies    Objective   First Vitals:   Blood Pressure: 166/84 (02/07/22 1253)  Pulse: 91 (02/07/22 1253)  Temperature: 98 2 °F (36 8 °C) (02/07/22 1253)  Temp Source: Oral (02/07/22 1253)  Respirations: 17 (02/07/22 1253)  SpO2: 97 % (02/07/22 1253)    Current Vitals:   Blood Pressure: 137/67 (02/08/22 0912)  Pulse: 67 (02/08/22 0912)  Temperature: 99 4 °F (37 4 °C) (02/08/22 0912)  Temp Source: Oral (02/08/22 0912)  Respirations: 20 (02/08/22 0912)  SpO2: 95 % (02/08/22 0912)      Intake/Output Summary (Last 24 hours) at 2/8/2022 1407  Last data filed at 2/7/2022 2334  Gross per 24 hour   Intake 1000 ml   Output --   Net 1000 ml       Invasive Devices  Report    Peripheral Intravenous Line            Peripheral IV 02/07/22 Right Antecubital <1 day                Physical Exam  Constitutional:       General: She is not in acute distress  Appearance: She is well-developed  She is not diaphoretic  HENT:      Head: Normocephalic and atraumatic  Right Ear: External ear normal       Left Ear: External ear normal       Mouth/Throat:      Pharynx: No oropharyngeal exudate  Eyes:      Conjunctiva/sclera: Conjunctivae normal       Pupils: Pupils are equal, round, and reactive to light  Neck:      Thyroid: No thyromegaly        Trachea: No tracheal deviation  Cardiovascular:      Rate and Rhythm: Normal rate and regular rhythm  Heart sounds: Normal heart sounds  No murmur heard  No friction rub  No gallop  Pulmonary:      Effort: Pulmonary effort is normal  No respiratory distress  Breath sounds: Normal breath sounds  No wheezing or rales  Chest:      Chest wall: No tenderness  Abdominal:      General: Bowel sounds are normal  There is no distension  Palpations: Abdomen is soft  Tenderness: There is abdominal tenderness  There is no guarding or rebound  Comments: Soft, non-distended, normoactive bowel sounds, mild tenderness to palpation on right side of abdomen, negative patricia's sign, no guarding or rebound, no peritoneal signs  Well healed paramedian incision from prior spine surgery   Musculoskeletal:         General: No tenderness or deformity  Normal range of motion  Cervical back: Normal range of motion and neck supple  Skin:     General: Skin is warm and dry  Coloration: Skin is not pale  Findings: No erythema or rash  Neurological:      Mental Status: She is oriented to person, place, and time  Cranial Nerves: No cranial nerve deficit  Coordination: Coordination normal       Deep Tendon Reflexes: Reflexes are normal and symmetric  Lab Results: I have personally reviewed pertinent lab results      Recent Results (from the past 36 hour(s))   CBC and differential    Collection Time: 02/07/22  4:03 PM   Result Value Ref Range    WBC 6 55 4 31 - 10 16 Thousand/uL    RBC 4 69 3 81 - 5 12 Million/uL    Hemoglobin 14 0 11 5 - 15 4 g/dL    Hematocrit 43 0 34 8 - 46 1 %    MCV 92 82 - 98 fL    MCH 29 9 26 8 - 34 3 pg    MCHC 32 6 31 4 - 37 4 g/dL    RDW 12 1 11 6 - 15 1 %    MPV 9 8 8 9 - 12 7 fL    Platelets 800 472 - 902 Thousands/uL    nRBC 0 /100 WBCs    Neutrophils Relative 61 43 - 75 %    Immat GRANS % 0 0 - 2 %    Lymphocytes Relative 30 14 - 44 %    Monocytes Relative 7 4 - 12 %    Eosinophils Relative 1 0 - 6 %    Basophils Relative 1 0 - 1 %    Neutrophils Absolute 3 97 1 85 - 7 62 Thousands/µL    Immature Grans Absolute 0 02 0 00 - 0 20 Thousand/uL    Lymphocytes Absolute 1 94 0 60 - 4 47 Thousands/µL    Monocytes Absolute 0 47 0 17 - 1 22 Thousand/µL    Eosinophils Absolute 0 09 0 00 - 0 61 Thousand/µL    Basophils Absolute 0 06 0 00 - 0 10 Thousands/µL   Comprehensive metabolic panel    Collection Time: 02/07/22  4:03 PM   Result Value Ref Range    Sodium 141 136 - 145 mmol/L    Potassium 3 9 3 5 - 5 3 mmol/L    Chloride 105 100 - 108 mmol/L    CO2 29 21 - 32 mmol/L    ANION GAP 7 4 - 13 mmol/L    BUN 5 5 - 25 mg/dL    Creatinine 1 05 0 60 - 1 30 mg/dL    Glucose 100 65 - 140 mg/dL    Calcium 9 5 8 3 - 10 1 mg/dL    AST 17 5 - 45 U/L    ALT 19 12 - 78 U/L    Alkaline Phosphatase 92 46 - 116 U/L    Total Protein 7 2 6 4 - 8 2 g/dL    Albumin 3 8 3 5 - 5 0 g/dL    Total Bilirubin 0 66 0 20 - 1 00 mg/dL    eGFR 58 ml/min/1 73sq m   Lipase    Collection Time: 02/07/22  4:03 PM   Result Value Ref Range    Lipase 62 (L) 73 - 393 u/L   UA w Reflex to Microscopic w Reflex to Culture    Collection Time: 02/07/22  4:18 PM    Specimen: Urine, Clean Catch   Result Value Ref Range    Color, UA Light Yellow     Clarity, UA Clear     Specific Thomson, UA 1 010 1 003 - 1 030    pH, UA 6 0 4 5, 5 0, 5 5, 6 0, 6 5, 7 0, 7 5, 8 0    Leukocytes, UA Negative Negative    Nitrite, UA Negative Negative    Protein, UA Negative Negative mg/dl    Glucose, UA Negative Negative mg/dl    Ketones, UA Negative Negative mg/dl    Urobilinogen, UA 0 2 0 2, 1 0 E U /dl E U /dl    Bilirubin, UA Negative Negative    Blood, UA Moderate (A) Negative   Urine Microscopic    Collection Time: 02/07/22  4:18 PM   Result Value Ref Range    RBC, UA 2-4 None Seen, 0-1, 1-2, 2-4, 0-5 /hpf    WBC, UA None Seen None Seen, 0-1, 1-2, 0-5, 2-4 /hpf    Epithelial Cells Occasional None Seen, Occasional /hpf    Bacteria, UA Occasional None Seen, Occasional /hpf   COVID/FLU/RSV    Collection Time: 02/07/22  9:37 PM    Specimen: Nose; Nares   Result Value Ref Range    SARS-CoV-2 Negative Negative    INFLUENZA A PCR Negative Negative    INFLUENZA B PCR Negative Negative    RSV PCR Negative Negative   Hepatic function panel    Collection Time: 02/08/22  5:25 AM   Result Value Ref Range    Total Bilirubin 0 95 0 20 - 1 00 mg/dL    Bilirubin, Direct 0 17 0 00 - 0 20 mg/dL    Alkaline Phosphatase 90 46 - 116 U/L    AST 17 5 - 45 U/L    ALT 20 12 - 78 U/L    Total Protein 6 6 6 4 - 8 2 g/dL    Albumin 3 6 3 5 - 5 0 g/dL   Basic metabolic panel    Collection Time: 02/08/22  5:25 AM   Result Value Ref Range    Sodium 142 136 - 145 mmol/L    Potassium 3 7 3 5 - 5 3 mmol/L    Chloride 104 100 - 108 mmol/L    CO2 31 21 - 32 mmol/L    ANION GAP 7 4 - 13 mmol/L    BUN 6 5 - 25 mg/dL    Creatinine 1 03 0 60 - 1 30 mg/dL    Glucose 94 65 - 140 mg/dL    Calcium 9 0 8 3 - 10 1 mg/dL    eGFR 59 ml/min/1 73sq m   CBC (With Platelets)    Collection Time: 02/08/22  5:25 AM   Result Value Ref Range    WBC 5 28 4 31 - 10 16 Thousand/uL    RBC 4 50 3 81 - 5 12 Million/uL    Hemoglobin 13 5 11 5 - 15 4 g/dL    Hematocrit 41 5 34 8 - 46 1 %    MCV 92 82 - 98 fL    MCH 30 0 26 8 - 34 3 pg    MCHC 32 5 31 4 - 37 4 g/dL    RDW 12 2 11 6 - 15 1 %    Platelets 559 168 - 913 Thousands/uL    MPV 9 8 8 9 - 12 7 fL   Protime-INR    Collection Time: 02/08/22  5:25 AM   Result Value Ref Range    Protime 13 0 11 6 - 14 5 seconds    INR 1 02 0 84 - 1 19     Imaging: I have personally reviewed pertinent reports  US right upper quadrant    Result Date: 2/7/2022  Impression: 1  Gallstones filling the gallbladder lumen without wall thickening or pericholecystic fluid although a positive sonographic Ortega sign is elicited  2   Biliary ductal dilatation with common bile duct measuring up to 12 mm with an 11 mm calculus in the distal common bile duct identified   The study was marked in EPIC for immediate notification  Workstation performed: [id number]     CT abdomen pelvis with contrast    Result Date: 2/7/2022  Impression: Large, peripherally calcified gallstone versus gallbladder wall calcifications with mild gallbladder wall thickening, but no significant pericholecystic inflammatory change  Dilated common bile duct with abrupt cut off distally with possible high density material in the duct near the ampulla  Right upper quadrant ultrasound is recommended to further evaluate the above findings  The study was marked in [facility] for immediate notification  Workstation performed: [id number]       EKG, Pathology, and Other Studies: I have personally reviewed pertinent reports

## 2022-02-08 NOTE — ASSESSMENT & PLAN NOTE
· Status post multiple spinal surgeries and spinal stimulator  · On chronic opioids  · Continue home meds

## 2022-02-08 NOTE — H&P
[address] [facility]  H&P- Jessee Krause 1960, 63 y o  female MRN: [id number]  Unit/Bed#: ED 22 Encounter: [id number]  Primary Care Provider: Heriberto Lowe MD   Date and time admitted to hospital: 2/7/2022  2:23 PM    * Choledocholithiasis  Assessment & Plan  - presents with right upper quadrant abdominal pain  - found to have cholelithiasis on right upper quadrant ultrasound with severe CBD dilation at 1 5 cm, and a 11 mm calculus in the distal CBD  - she has no signs of cholecystitis on imaging and has no signs of systemic infection to suggest cholangitis at this time    - NPO after midnight; will need ERCP  - GI consultation  - General surgery consultation  - monitor LFTs      Hypertension  Assessment & Plan  - monitor on home lisinopril    Opioid dependence ([address] )  Assessment & Plan  - chronically on opioids due to low back pain  - monitor on home regimen    Chronic low back pain  Assessment & Plan  - status post surgeries and spinal stimulator  - patient also chronically on opioids    - continue home xtampba 72mg am and 54mg pm  - continue home Robaxin and Lyrica      VTE Pharmacologic Prophylaxis: VTE Score: 3 Moderate Risk (Score 3-4) - Pharmacological DVT Prophylaxis Ordered: enoxaparin (Lovenox)  Code Status: Level 1 - Full Code   Discussion with family: Patient declined call to   Anticipated Length of Stay: Patient will be admitted on an inpatient basis with an anticipated length of stay of greater than 2 midnights secondary to choledocolithiasis  Total Time for Visit, including Counseling / Coordination of Care: 45 minutes Greater than 50% of this total time spent on direct patient counseling and coordination of care      Chief Complaint: RUQ abdominal pain    History of Present Illness:  Jessee Krause is a 63 y o  female with a PMH of chronic low back pain, spinal stimulator, opioid dependence, hypertension, who presents to the [facility] ER on 02/07/2022 for evaluation of right upper quadrant abdominal pain  Patient reports several days of colicky severe right upper quadrant abdominal pain  She was seen at her PCPs office earlier today and sent to the emergency room for further evaluation  Right upper quadrant ultrasound and CT scans confirmed the presence of choledocholithiasis without signs of overt cholecystitis  She is afebrile  She has decreased p o  Intake  Nothing has made her pain better  She will be admitted to the hospitalist service for further evaluation management  She will need evaluation by GI and General surgery  All questions and concerns addressed  REVIEW OF SYSTEMS  General Denies fevers or chills  Denies generalized weakness or fatigue  HEENT Denies hearing or vision changes  Cardiovascular Denies chest pain  Denies LE swelling  Denies palpitations  Denies dyspnea on exertion  Respiratory Denies cough  Denies difficulty breathing  Denies shortness of breath  Genitourinary Denies hematuria  Denies dysuria  Denies difficulty voiding  Denies incontinence  Gastrointestinal Positive for right upper quadrant abdominal pain  Positive for decreased p o  Appetite  Negative for vomiting or diarrhea  Denies hematochezia, melena, or hematemesis  Musculoskeletal Denies arthralgias or myalgias  Denies joint swelling  Psychiatric  Denies changes in mood  Denies anxiety or depression  Neurologic Denies headache  Denies lightheadedness/dizziness  Denies numbness/tingling  Denies weakness  Endocrine Denies weight loss or weight gain  Denies excessive thirst, sweating, urination  Past Medical and Surgical History:   Past Medical History:   Diagnosis Date    Hyperlipidemia     Hypertension        Past Surgical History:   Procedure Laterality Date    BACK SURGERY      L3-S1    SPINAL STIMULATOR PLACEMENT  2019       Meds/Allergies:  Prior to Admission medications    Medication Sig Start Date End Date Taking? Authorizing Provider   diazepam (VALIUM) 5 mg tablet Take 1 tablet (5 mg total) by mouth every 8 (eight) hours as needed for muscle spasms (1 tablet every 8 hrs as needed for spasm)  Patient not taking: Reported on 2/7/2022 2/20/16   Barbie Boucher MD   lisinopril (ZESTRIL) 5 mg tablet Take 5 mg by mouth daily  Historical Provider, MD   methocarbamol (ROBAXIN) 750 mg tablet Take 750 mg by mouth 4 (four) times a day  Historical Provider, MD   oxyCODONE (OxyCONTIN) 40 mg 12 hr tablet Take 160 mg by mouth 3 (three) times a day  Patient not taking: Reported on 2/7/2022     Historical Provider, MD   oxyCODONE ER Yogesh Roles ER) 36 MG C12A Take by mouth 3 (three) times a day    Historical Provider, MD   pregabalin (LYRICA) 75 mg capsule Take 75 mg by mouth 3 (three) times a day    Historical Provider, MD     I have reviewed home medications using recent Epic encounter  Allergies: No Known Allergies    Social History:  Marital Status: /Civil Union   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, former LPN  Patient Pre-hospital Living Situation: Home  Patient Pre-hospital Level of Mobility: walks  Patient Pre-hospital Diet Restrictions: none  Substance Use History:   Social History     Substance and Sexual Activity   Alcohol Use No     Social History     Tobacco Use   Smoking Status Never Smoker   Smokeless Tobacco Never Used     Social History     Substance and Sexual Activity   Drug Use No       Family History:  History reviewed  No pertinent family history  Physical Exam:     Vitals:   Blood Pressure: 139/74 (02/07/22 1715)  Pulse: 71 (02/07/22 1715)  Temperature: 98 2 °F (36 8 °C) (02/07/22 1253)  Temp Source: Oral (02/07/22 1253)  Respirations: 17 (02/07/22 1253)  SpO2: 98 % (02/07/22 1715)    PHYSICAL EXAM:    Vitals signs reviewed  Constitutional   Awake and cooperative  NAD  Head/Neck   Normocephalic  Atraumatic  HEENT   No scleral icterus  EOMI  Heart   Regular rate and rhythm  No murmers     Lungs   Clear to auscultation bilaterally  Respirations unlaboured  Abdomen   Soft  Right upper quadrant abdominal tenderness to palpation  Nondistended  Skin   Skin color normal  No rashes  Extremities   No deformities  No peripheral edema  Neuro   Alert and oriented  No new deficits  Psych   Mood stable  Affect normal        Additional Data:     Lab Results:  Results from last 7 days   Lab Units 02/07/22  1603   WBC Thousand/uL 6 55   HEMOGLOBIN g/dL 14 0   HEMATOCRIT % 43 0   PLATELETS Thousands/uL 343   NEUTROS PCT % 61   LYMPHS PCT % 30   MONOS PCT % 7   EOS PCT % 1     Results from last 7 days   Lab Units 02/07/22  1603   SODIUM mmol/L 141   POTASSIUM mmol/L 3 9   CHLORIDE mmol/L 105   CO2 mmol/L 29   BUN mg/dL 5   CREATININE mg/dL 1 05   ANION GAP mmol/L 7   CALCIUM mg/dL 9 5   ALBUMIN g/dL 3 8   TOTAL BILIRUBIN mg/dL 0 66   ALK PHOS U/L 92   ALT U/L 19   AST U/L 17   GLUCOSE RANDOM mg/dL 100                       Imaging: Reviewed radiology reports from this admission including: abdominal/pelvic CT  US right upper quadrant   Final Result by Yeyo Bustillo MD (02/07 1833)         1  Gallstones filling the gallbladder lumen without wall thickening or pericholecystic fluid although a positive sonographic Ortega sign is elicited  2   Biliary ductal dilatation with common bile duct measuring up to 12 mm with an 11 mm calculus in the distal common bile duct identified  The study was marked in [facility] for immediate notification  Workstation performed: [id number]         CT abdomen pelvis with contrast   Final Result by Luz Potter MD (02/07 2971)      Large, peripherally calcified gallstone versus gallbladder wall calcifications with mild gallbladder wall thickening, but no significant pericholecystic inflammatory change  Dilated common bile duct with abrupt cut off distally with possible high density material in the duct near the ampulla        Right upper quadrant ultrasound is recommended to further evaluate the above findings  The study was marked in [facility] for immediate notification  Workstation performed: [id number]             ** Please Note: This note has been constructed using a voice recognition system   **

## 2022-02-08 NOTE — UTILIZATION REVIEW
Inpatient Admission Authorization Request   NOTIFICATION OF INPATIENT ADMISSION/INPATIENT AUTHORIZATION REQUEST   SERVICING FACILITY:   [address] [place] [address]  Tax ID: [id number]  NPI: [id number]  Place of Service: Inpatient [address] [facility]  [address]  Place of Service Code: 24     ATTENDING PROVIDER:  Attending Name and NPI#: Kathy Forman [[id number]]  Address: [address] [place] [address]  Phone: [phone]     UTILIZATION REVIEW CONTACT:  [facility] Utilization   Network Utilization Review Department  Phone: [phone]  Fax [phone]  Email: [email or web address]     PHYSICIAN ADVISORY SERVICES:  FOR IEIT-BA-TACR REVIEW - MEDICAL NECESSITY DENIAL  Phone: [phone]  Fax: [phone]  Email: [email or web address]     TYPE OF REQUEST:  Inpatient Status     ADMISSION INFORMATION:  ADMISSION DATE/TIME: 2/7/22  6:56 PM  PATIENT DIAGNOSIS CODE/DESCRIPTION:  Abdominal pain [R10 9]  DISCHARGE DATE/TIME: No discharge date for patient encounter  DISCHARGE DISPOSITION (IF DISCHARGED): Home/Self Care     IMPORTANT INFORMATION:  Please contact the [facility] directly with any questions or concerns regarding this request  Department voicemails are confidential     Send requests for admission clinical reviews, concurrent reviews, approvals, and administrative denials due to lack of clinical to fax [phone]

## 2022-02-08 NOTE — ASSESSMENT & PLAN NOTE
- status post surgeries and spinal stimulator  - patient also chronically on opioids    - continue home xtampba 72mg am and 54mg pm  - continue home Robaxin and Lyrica

## 2022-02-08 NOTE — CONSULTS
Consultation - [facility]  Anna Contreras 63 y o  female MRN: [id number]  Unit/Bed#: ED 22 Encounter: [id number]      Consults     Reason for Consult / Principal Problem: Choledocholithiasis/cholelithiasis    HPI: Anna Contreras is a 63y o  year old female with a PMH of chronic low back pain, spinal stimulator, opioid dependence, and hypertension who presented to the ED are due to complaints of severe right upper quadrant abdominal pain  Patient reports that she 1st began to have symptoms on the 2nd of this month  She reports of worsening right upper quadrant abdominal discomfort, nausea, and abdominal bloating  She reports her appetite has been quite poor and she has been on able to eat much due to it worsening of her abdominal pain  She notes radiation pain to the epigastric region as well as to the back and shoulder  No vomiting  No fevers or chills  No blood in the stool  Upon evaluation, she had imaging which showed evidence of cholelithiasis and choledocholithiasis with a 11 mm stone noted in the distal common bile duct  She denies any prior history of known gallstones or similar episodes of abdominal pain in the past     REVIEW OF SYSTEMS:     CONSTITUTIONAL: Denies any fever, chills, or rigors  HEENT: No earache or tinnitus  Denies hearing loss or visual disturbances  CARDIOVASCULAR: No chest pain or palpitations  RESPIRATORY: Denies any cough, hemoptysis, shortness of breath or dyspnea on exertion  GASTROINTESTINAL: As noted in the History of Present Illness  GENITOURINARY: No problems with urination  Denies any hematuria or dysuria  NEUROLOGIC: No dizziness or vertigo; +headache  MUSCULOSKELETAL: Back pain  SKIN: Denies skin rashes or itching  ENDOCRINE: Denies excessive thirst  Denies intolerance to heat or cold  PSYCHOSOCIAL: Denies depression or anxiety  Denies any recent memory loss       Historical Information   Past Medical History:   Diagnosis Date    Hyperlipidemia     Hypertension      Past Surgical History:   Procedure Laterality Date    BACK SURGERY      L3-S1    SPINAL STIMULATOR PLACEMENT  2019     Social History   Social History     Substance and Sexual Activity   Alcohol Use No     Social History     Substance and Sexual Activity   Drug Use No     Social History     Tobacco Use   Smoking Status Never Smoker   Smokeless Tobacco Never Used     History reviewed  No pertinent family history  Meds/Allergies     (Not in a hospital admission)    Current Facility-Administered Medications   Medication Dose Route Frequency    acetaminophen (TYLENOL) tablet 650 mg  650 mg Oral Q6H PRN    lisinopril (ZESTRIL) tablet 5 mg  5 mg Oral Daily    methocarbamol (ROBAXIN) tablet 750 mg  750 mg Oral 4x Daily    ondansetron (ZOFRAN) injection 4 mg  4 mg Intravenous Q6H PRN    oxyCODONE ER C12A 54 mg  54 mg Oral QPM    oxyCODONE ER C12A 72 mg  72 mg Oral QAM    pregabalin (LYRICA) capsule 75 mg  75 mg Oral TID    sodium chloride 0 9 % infusion  75 mL/hr Intravenous Continuous       No Known Allergies    Objective   Blood pressure 152/72, pulse 57, temperature 98 2 °F (36 8 °C), temperature source Oral, resp  rate 17, SpO2 96 %  Intake/Output Summary (Last 24 hours) at 2/8/2022 5700  Last data filed at 2/7/2022 2334  Gross per 24 hour   Intake 1000 ml   Output --   Net 1000 ml         PHYSICAL EXAM:      General Appearance:   Alert, cooperative, no distress, appears stated age    HEENT:   Normocephalic, atraumatic, anicteric    Neck:  Supple, symmetrical, trachea midline, no adenopathy;    thyroid: no enlargement/tenderness/nodules; no carotid  bruit or JVD    Lungs:   Clear to auscultation bilaterally; no rales, rhonchi or wheezing; respirations unlabored    Heart[de-identified]   S1 and S2 normal; regular rate and rhythm; no murmur, rub, or gallop     Abdomen:   Soft, +mild RUQ TTP, non-distended; normal bowel sounds; no masses, no organomegaly    Genitalia:   Deferred  Rectal:   Deferred    Extremities:  No cyanosis, clubbing or edema    Pulses:  2+ and symmetric all extremities    Skin:  Skin color, texture, turgor normal, no rashes or lesions    Lymph nodes:  No palpable cervical, axillary or inguinal lymphadenopathy        Lab Results:   Admission on 02/07/2022   Component Date Value    WBC 02/07/2022 6 55     RBC 02/07/2022 4 69     Hemoglobin 02/07/2022 14 0     Hematocrit 02/07/2022 43 0     MCV 02/07/2022 92     MCH 02/07/2022 29 9     MCHC 02/07/2022 32 6     RDW 02/07/2022 12 1     MPV 02/07/2022 9 8     Platelets 87/16/5494 343     nRBC 02/07/2022 0     Neutrophils Relative 02/07/2022 61     Immat GRANS % 02/07/2022 0     Lymphocytes Relative 02/07/2022 30     Monocytes Relative 02/07/2022 7     Eosinophils Relative 02/07/2022 1     Basophils Relative 02/07/2022 1     Neutrophils Absolute 02/07/2022 3 97     Immature Grans Absolute 02/07/2022 0 02     Lymphocytes Absolute 02/07/2022 1 94     Monocytes Absolute 02/07/2022 0 47     Eosinophils Absolute 02/07/2022 0 09     Basophils Absolute 02/07/2022 0 06     Sodium 02/07/2022 141     Potassium 02/07/2022 3 9     Chloride 02/07/2022 105     CO2 02/07/2022 29     ANION GAP 02/07/2022 7     BUN 02/07/2022 5     Creatinine 02/07/2022 1 05     Glucose 02/07/2022 100     Calcium 02/07/2022 9 5     AST 02/07/2022 17     ALT 02/07/2022 19     Alkaline Phosphatase 02/07/2022 92     Total Protein 02/07/2022 7 2     Albumin 02/07/2022 3 8     Total Bilirubin 02/07/2022 0 66     eGFR 02/07/2022 58     Lipase 02/07/2022 62*    Color, UA 02/07/2022 Light Yellow     Clarity, UA 02/07/2022 Clear     Specific Gravity, UA 02/07/2022 1 010     pH, UA 02/07/2022 6 0     Leukocytes, UA 02/07/2022 Negative     Nitrite, UA 02/07/2022 Negative     Protein, UA 02/07/2022 Negative     Glucose, UA 02/07/2022 Negative     Ketones, UA 02/07/2022 Negative     Urobilinogen, UA 02/07/2022 0 2     Bilirubin, UA 02/07/2022 Negative     Blood, UA 02/07/2022 Moderate*    RBC, UA 02/07/2022 2-4     WBC, UA 02/07/2022 None Seen     Epithelial Cells 02/07/2022 Occasional     Bacteria, UA 02/07/2022 Occasional     SARS-CoV-2 02/07/2022 Negative     INFLUENZA A PCR 02/07/2022 Negative     INFLUENZA B PCR 02/07/2022 Negative     RSV PCR 02/07/2022 Negative     Total Bilirubin 02/08/2022 0 95     Bilirubin, Direct 02/08/2022 0 17     Alkaline Phosphatase 02/08/2022 90     AST 02/08/2022 17     ALT 02/08/2022 20     Total Protein 02/08/2022 6 6     Albumin 02/08/2022 3 6     Sodium 02/08/2022 142     Potassium 02/08/2022 3 7     Chloride 02/08/2022 104     CO2 02/08/2022 31     ANION GAP 02/08/2022 7     BUN 02/08/2022 6     Creatinine 02/08/2022 1 03     Glucose 02/08/2022 94     Calcium 02/08/2022 9 0     eGFR 02/08/2022 59     WBC 02/08/2022 5 28     RBC 02/08/2022 4 50     Hemoglobin 02/08/2022 13 5     Hematocrit 02/08/2022 41 5     MCV 02/08/2022 92     MCH 02/08/2022 30 0     MCHC 02/08/2022 32 5     RDW 02/08/2022 12 2     Platelets 11/39/7746 317     MPV 02/08/2022 9 8     Protime 02/08/2022 13 0     INR 02/08/2022 1 02        Imaging Studies: I have personally reviewed pertinent imaging studies  ASSESSMENT & PLAN:    Cholelithiasis/choledocholithiasis    - Patient presented with complaints of worsening RUQ abdominal pain, nausea, poor appetite, and bloating   - CT A/P 2/7 showed a large calcified gallstone and dilated CBD with abrupt cut off distally with possible high density material in the duct  - RUQ US 2/7 showed gallstones without wall thickening of pericholecystic fluid and biliary ductal dilation with CBD on 12 mm and an 11 mm calculus in the distal common bile duct  - She is afebrile  LFTs and WBC count are normal   - Serial abdominal exams, supportive care  Clear liquid diet today    - Monitor patient and labs: CBC, CMP   - Will plan for ERCP tomorrow for stone extraction  NPO after midnight for procedure  - General surgery consult regarding lap isai  Thank you for this consultation  The patient will be seen and evaluated by Dr Cate Saldivar PA-C  4/4/1193,9:52 AM

## 2022-02-08 NOTE — CASE MANAGEMENT
Case Management Assessment & Discharge Planning Note    Patient name Meron Chin  Location ED 22/ED 22 MRN [id number]  : 1960 Date 2022       Current Admission Date: 2022  Current Admission Diagnosis:Choledocholithiasis   Patient Active Problem List    Diagnosis Date Noted    Right upper quadrant pain 2022    Choledocholithiasis 2022    Chronic low back pain 2022    Opioid dependence ([address] ) 2022    Hypertension 2022      LOS (days): 1  Geometric Mean LOS (GMLOS) (days):   Days to GMLOS:     OBJECTIVE:    Risk of Unplanned Readmission Score: 9         Current admission status: Inpatient       Preferred Pharmacy:   [address] [place] [address]  Phone: [phone] Fax: [phone]    Primary Care Provider: Milind Greenwood MD    Primary Insurance: BLUE CROSS  Secondary Insurance:     ASSESSMENT:  [address] [place] [address], [address] Representative - Spouse   Primary Phone: [phone] (Home)               Advance Directives  Does patient have a [address]?: No  Was patient offered paperwork?: Yes (not interested)  Does patient currently have a Health Care decision maker?: Yes, please see Health Care Proxy section  Does patient have Advance Directives?: No  Was patient offered paperwork?: Yes         Readmission Root Cause  30 Day Readmission: No    Patient Information  Admitted from[de-identified] Home  Mental Status: Alert  During Assessment patient was accompanied by: Not accompanied during assessment  Assessment information provided by[de-identified] Patient  Primary Caregiver: Self  Support Systems: Spouse/significant other  [place] of Residence: [address] [place] [address] do you live in?: [facility]  Select all that apply : Stairs  Number of steps to enter home  : 1  Do the steps have railings?: No  Type of Current Residence: 2 story home  Upon entering residence, is there a bedroom on the main floor (no further steps)?: Yes  Upon entering residence, is there a bathroom on the main floor (no further steps)?: Yes  In the last 12 months, was there a time when you were not able to pay the mortgage or rent on time?: No  In the last 12 months, how many places have you lived?: 1  In the last 12 months, was there a time when you did not have a steady place to sleep or slept in a shelter (including now)?: No  Homeless/housing insecurity resource given?: N/A  Living Arrangements: Lives w/ Spouse/significant other  Is patient a ?: No    Activities of Daily Living Prior to Admission  Functional Status: Independent  Completes ADLs independently?: Yes  Ambulates independently?: Yes  Does patient use assisted devices?: Yes  Assisted Devices (DME) used: Straight Cane  Does patient currently own DME?: Yes  What DME does the patient currently own?: [employer]  Does patient have a history of Outpatient Therapy (PT/OT)?: Yes  Does the patient have a history of Short-Term Rehab?: Yes (Acute rehab at [facility])  Does patient have a history of HHC?: No  Does patient currently have [facility]?: No         Patient Information Continued  Income Source: Unemployed  Does patient have prescription coverage?: Yes  Within the past 12 months, you worried that your food would run out before you got the money to buy more : Never true  Within the past 12 months, the food you bought just didnt last and you didnt have money to get more : Never true  Food insecurity resource given?: N/A  Does patient receive dialysis treatments?: No  Does patient have a history of substance abuse?: No  Does patient have a history of Mental Health Diagnosis?: No         Means of Transportation  Means of Transport to Appts[de-identified] Drives Self  In the past 12 months, has lack of transportation kept you from medical appointments or from getting medications?: No  In the past 12 months, has lack of transportation kept you from meetings, work, or from getting things needed for daily living?: No  Was application for public transport provided?: N/A        DISCHARGE DETAILS:    Discharge planning discussed with[de-identified] patient  Freedom of Choice: Yes  Comments - Freedom of Choice: Pt will communicate with her  herself  CM contacted family/caregiver?: No- see comments  Were Treatment Team discharge recommendations reviewed with patient/caregiver?: Yes  Did patient/caregiver verbalize understanding of patient care needs?: Yes  Were patient/caregiver advised of the risks associated with not following Treatment Team discharge recommendations?: Yes         [address] [place] [address]         Is the patient interested in [facility] at discharge?: No    DME Referral Provided  Referral made for DME?: No              Treatment Team Recommendation: Home  Discharge Destination Plan[de-identified] Home  Transport at Discharge : [place]

## 2022-02-08 NOTE — ASSESSMENT & PLAN NOTE
- presents with right upper quadrant abdominal pain  - found to have cholelithiasis on right upper quadrant ultrasound with severe CBD dilation at 1 5 cm, and a 11 mm calculus in the distal CBD  - she has no signs of cholecystitis on imaging and has no signs of systemic infection to suggest cholangitis at this time    - NPO after midnight; will need ERCP  - GI consultation  - General surgery consultation  - monitor LFTs

## 2022-02-08 NOTE — PROGRESS NOTES
[address] [facility]  Progress Note Marzena Romo 1960, 63 y o  female MRN: [id number]  Unit/Bed#: ED 22 Encounter: [id number]  Primary Care Provider: Jed Holter, MD   Date and time admitted to hospital: 2022  2:23 PM    * Choledocholithiasis  Assessment & Plan  · Visualized on ultrasound, no evidence of acute superimposed infection  · GI following, plan for ERCP today   · Surgery consulted, follow up recommendations   · Trend LFT's      Hypertension  Assessment & Plan  · Adequately controlled  · Exacerbated by pain  · Continue home dose lisinopril     Opioid dependence ([facility] [address] )  Assessment & Plan  · chronically on opioids due to low back pain  · monitor on home regimen    Chronic low back pain  Assessment & Plan  · Status post multiple spinal surgeries and spinal stimulator  · On chronic opioids  · Continue home meds          VTE Pharmacologic Prophylaxis: VTE Score: 3 Moderate Risk (Score 3-4) - Pharmacological DVT Prophylaxis Contraindicated  Sequential Compression Devices Ordered  Patient Centered Rounds: I performed bedside rounds with nursing staff today  Discussions with Specialists or Other Care Team Provider: GI    Time Spent for Care: 30 minutes  More than 50% of total time spent on counseling and coordination of care as described above  Current Length of Stay: 1 day(s)  Current Patient Status: Inpatient   Certification Statement: The patient will continue to require additional inpatient hospital stay due to ERCP  Discharge Plan: Anticipate discharge in 24-48 hrs to home  Code Status: Level 1 - Full Code    Subjective:   No chest pain or chest palpitations  No shortness of breath   Appetite is poor      Objective:     Vitals:   Temp (24hrs), Av 5 °F (36 4 °C), Min:96 8 °F (36 °C), Max:98 2 °F (36 8 °C)    Temp:  [96 8 °F (36 °C)-98 2 °F (36 8 °C)] 98 2 °F (36 8 °C)  HR:  [57-91] 57  Resp:  [17-19] 17  BP: (130-179)/(72-93) 152/72  SpO2:  [96 %-98 %] 96 %  There is no height or weight on file to calculate BMI  Input and Output Summary (last 24 hours): Intake/Output Summary (Last 24 hours) at 2/8/2022 0818  Last data filed at 2/7/2022 2334  Gross per 24 hour   Intake 1000 ml   Output --   Net 1000 ml       Physical Exam:   Physical Exam  Vitals and nursing note reviewed  Constitutional:       Appearance: Normal appearance  HENT:      Head: Normocephalic and atraumatic  Right Ear: External ear normal       Left Ear: External ear normal       Nose: No congestion or rhinorrhea  Mouth/Throat:      Mouth: Mucous membranes are dry  Pharynx: Oropharynx is clear  Eyes:      General:         Right eye: No discharge  Left eye: No discharge  Cardiovascular:      Rate and Rhythm: Normal rate and regular rhythm  Pulmonary:      Effort: Pulmonary effort is normal  No respiratory distress  Abdominal:      General: Abdomen is flat  Palpations: Abdomen is soft  Tenderness: There is abdominal tenderness  Musculoskeletal:      Cervical back: Normal range of motion and neck supple  Right lower leg: No edema  Left lower leg: No edema  Skin:     General: Skin is warm and dry  Neurological:      General: No focal deficit present  Mental Status: She is alert  Mental status is at baseline  Psychiatric:         Mood and Affect: Mood normal          Behavior: Behavior normal           Additional Data:     Labs:  Results from last 7 days   Lab Units 02/08/22  0525 02/07/22  1603 02/07/22  1603   WBC Thousand/uL 5 28   < > 6 55   HEMOGLOBIN g/dL 13 5   < > 14 0   HEMATOCRIT % 41 5   < > 43 0   PLATELETS Thousands/uL 317   < > 343   NEUTROS PCT %  --   --  61   LYMPHS PCT %  --   --  30   MONOS PCT %  --   --  7   EOS PCT %  --   --  1    < > = values in this interval not displayed       Results from last 7 days   Lab Units 02/08/22  0525   SODIUM mmol/L 142   POTASSIUM mmol/L 3 7   CHLORIDE mmol/L 104   CO2 mmol/L 31   BUN mg/dL 6   CREATININE mg/dL 1 03   ANION GAP mmol/L 7   CALCIUM mg/dL 9 0   ALBUMIN g/dL 3 6   TOTAL BILIRUBIN mg/dL 0 95   ALK PHOS U/L 90   ALT U/L 20   AST U/L 17   GLUCOSE RANDOM mg/dL 94     Results from last 7 days   Lab Units 02/08/22  0525   INR  1 02                   Lines/Drains:  Invasive Devices  Report    Peripheral Intravenous Line            Peripheral IV 02/07/22 Right Antecubital <1 day                      Imaging: Reviewed radiology reports from this admission including: ultrasound(s)    Recent Cultures (last 7 days):         Last 24 Hours Medication List:   Current Facility-Administered Medications   Medication Dose Route Frequency Provider Last Rate    acetaminophen  650 mg Oral Q6H PRN Azell Milling Starsinic, DO      lisinopril  5 mg Oral Daily Azell Milling Starsinic, DO      methocarbamol  750 mg Oral 4x Daily Azell Milling Starsinic, DO      ondansetron  4 mg Intravenous Q6H PRN Azell Milling Starsinic, DO      oxyCODONE ER  54 mg Oral QPM Azell Milling Starsinic, DO      oxyCODONE ER  72 mg Oral QAM Azell Milling Starsinic, DO      pregabalin  75 mg Oral TID Azell Milling Starsinic, DO      sodium chloride  75 mL/hr Intravenous Continuous Azell Milling Starsinic, DO 75 mL/hr (02/07/22 1076)        Today, Patient Was Seen By: Carlos Thomason MD    **Please Note: This note may have been constructed using a voice recognition system  **

## 2022-02-08 NOTE — ASSESSMENT & PLAN NOTE
· Visualized on ultrasound, no evidence of acute superimposed infection  · GI following, plan for ERCP today   · Surgery consulted, follow up recommendations   · Trend LFT's

## 2022-02-09 ENCOUNTER — TELEPHONE (OUTPATIENT)
Dept: GASTROENTEROLOGY | Facility: HOSPITAL | Age: 62
End: 2022-02-09

## 2022-02-09 ENCOUNTER — ANESTHESIA (INPATIENT)
Dept: PERIOP | Facility: HOSPITAL | Age: 62
DRG: 418 | End: 2022-02-09
Payer: COMMERCIAL

## 2022-02-09 ENCOUNTER — APPOINTMENT (INPATIENT)
Dept: RADIOLOGY | Facility: HOSPITAL | Age: 62
DRG: 418 | End: 2022-02-09
Payer: COMMERCIAL

## 2022-02-09 ENCOUNTER — APPOINTMENT (INPATIENT)
Dept: PERIOP | Facility: HOSPITAL | Age: 62
DRG: 418 | End: 2022-02-09
Payer: COMMERCIAL

## 2022-02-09 ENCOUNTER — ANESTHESIA EVENT (INPATIENT)
Dept: PERIOP | Facility: HOSPITAL | Age: 62
DRG: 418 | End: 2022-02-09
Payer: COMMERCIAL

## 2022-02-09 LAB
ALBUMIN SERPL BCP-MCNC: 3.1 G/DL (ref 3.5–5)
ALP SERPL-CCNC: 76 U/L (ref 46–116)
ALT SERPL W P-5'-P-CCNC: 28 U/L (ref 12–78)
ANION GAP SERPL CALCULATED.3IONS-SCNC: 7 MMOL/L (ref 4–13)
AST SERPL W P-5'-P-CCNC: 21 U/L (ref 5–45)
BASOPHILS # BLD AUTO: 0.05 THOUSANDS/ΜL (ref 0–0.1)
BASOPHILS NFR BLD AUTO: 1 % (ref 0–1)
BILIRUB DIRECT SERPL-MCNC: 0.19 MG/DL (ref 0–0.2)
BILIRUB SERPL-MCNC: 0.95 MG/DL (ref 0.2–1)
BUN SERPL-MCNC: 9 MG/DL (ref 5–25)
CALCIUM SERPL-MCNC: 8.5 MG/DL (ref 8.3–10.1)
CHLORIDE SERPL-SCNC: 104 MMOL/L (ref 100–108)
CO2 SERPL-SCNC: 30 MMOL/L (ref 21–32)
CREAT SERPL-MCNC: 1.28 MG/DL (ref 0.6–1.3)
EOSINOPHIL # BLD AUTO: 0.21 THOUSAND/ΜL (ref 0–0.61)
EOSINOPHIL NFR BLD AUTO: 4 % (ref 0–6)
ERYTHROCYTE [DISTWIDTH] IN BLOOD BY AUTOMATED COUNT: 12.3 % (ref 11.6–15.1)
GFR SERPL CREATININE-BSD FRML MDRD: 45 ML/MIN/1.73SQ M
GLUCOSE SERPL-MCNC: 108 MG/DL (ref 65–140)
GLUCOSE SERPL-MCNC: 93 MG/DL (ref 65–140)
HCT VFR BLD AUTO: 39 % (ref 34.8–46.1)
HGB BLD-MCNC: 12.7 G/DL (ref 11.5–15.4)
IMM GRANULOCYTES # BLD AUTO: 0.02 THOUSAND/UL (ref 0–0.2)
IMM GRANULOCYTES NFR BLD AUTO: 0 % (ref 0–2)
LYMPHOCYTES # BLD AUTO: 1.81 THOUSANDS/ΜL (ref 0.6–4.47)
LYMPHOCYTES NFR BLD AUTO: 37 % (ref 14–44)
MAGNESIUM SERPL-MCNC: 2 MG/DL (ref 1.6–2.6)
MCH RBC QN AUTO: 30.4 PG (ref 26.8–34.3)
MCHC RBC AUTO-ENTMCNC: 32.6 G/DL (ref 31.4–37.4)
MCV RBC AUTO: 93 FL (ref 82–98)
MONOCYTES # BLD AUTO: 0.47 THOUSAND/ΜL (ref 0.17–1.22)
MONOCYTES NFR BLD AUTO: 10 % (ref 4–12)
NEUTROPHILS # BLD AUTO: 2.3 THOUSANDS/ΜL (ref 1.85–7.62)
NEUTS SEG NFR BLD AUTO: 48 % (ref 43–75)
NRBC BLD AUTO-RTO: 0 /100 WBCS
PLATELET # BLD AUTO: 248 THOUSANDS/UL (ref 149–390)
PMV BLD AUTO: 10 FL (ref 8.9–12.7)
POTASSIUM SERPL-SCNC: 3.3 MMOL/L (ref 3.5–5.3)
PROT SERPL-MCNC: 5.9 G/DL (ref 6.4–8.2)
RBC # BLD AUTO: 4.18 MILLION/UL (ref 3.81–5.12)
SODIUM SERPL-SCNC: 141 MMOL/L (ref 136–145)
WBC # BLD AUTO: 4.86 THOUSAND/UL (ref 4.31–10.16)

## 2022-02-09 PROCEDURE — 80076 HEPATIC FUNCTION PANEL: CPT | Performed by: STUDENT IN AN ORGANIZED HEALTH CARE EDUCATION/TRAINING PROGRAM

## 2022-02-09 PROCEDURE — 74328 X-RAY BILE DUCT ENDOSCOPY: CPT

## 2022-02-09 PROCEDURE — 99233 SBSQ HOSP IP/OBS HIGH 50: CPT | Performed by: STUDENT IN AN ORGANIZED HEALTH CARE EDUCATION/TRAINING PROGRAM

## 2022-02-09 PROCEDURE — C1769 GUIDE WIRE: HCPCS

## 2022-02-09 PROCEDURE — 99232 SBSQ HOSP IP/OBS MODERATE 35: CPT | Performed by: STUDENT IN AN ORGANIZED HEALTH CARE EDUCATION/TRAINING PROGRAM

## 2022-02-09 PROCEDURE — 43262 ENDO CHOLANGIOPANCREATOGRAPH: CPT | Performed by: INTERNAL MEDICINE

## 2022-02-09 PROCEDURE — 85025 COMPLETE CBC W/AUTO DIFF WBC: CPT | Performed by: STUDENT IN AN ORGANIZED HEALTH CARE EDUCATION/TRAINING PROGRAM

## 2022-02-09 PROCEDURE — BF101ZZ FLUOROSCOPY OF BILE DUCTS USING LOW OSMOLAR CONTRAST: ICD-10-PCS | Performed by: INTERNAL MEDICINE

## 2022-02-09 PROCEDURE — 83735 ASSAY OF MAGNESIUM: CPT | Performed by: STUDENT IN AN ORGANIZED HEALTH CARE EDUCATION/TRAINING PROGRAM

## 2022-02-09 PROCEDURE — 80048 BASIC METABOLIC PNL TOTAL CA: CPT | Performed by: STUDENT IN AN ORGANIZED HEALTH CARE EDUCATION/TRAINING PROGRAM

## 2022-02-09 PROCEDURE — 0FC98ZZ EXTIRPATION OF MATTER FROM COMMON BILE DUCT, VIA NATURAL OR ARTIFICIAL OPENING ENDOSCOPIC: ICD-10-PCS | Performed by: INTERNAL MEDICINE

## 2022-02-09 PROCEDURE — 82948 REAGENT STRIP/BLOOD GLUCOSE: CPT

## 2022-02-09 PROCEDURE — 43264 ERCP REMOVE DUCT CALCULI: CPT | Performed by: INTERNAL MEDICINE

## 2022-02-09 RX ORDER — HYDROMORPHONE HCL/PF 1 MG/ML
0.5 SYRINGE (ML) INJECTION EVERY 4 HOURS PRN
Status: DISCONTINUED | OUTPATIENT
Start: 2022-02-09 | End: 2022-02-11 | Stop reason: HOSPADM

## 2022-02-09 RX ORDER — ONDANSETRON 2 MG/ML
4 INJECTION INTRAMUSCULAR; INTRAVENOUS ONCE AS NEEDED
Status: DISCONTINUED | OUTPATIENT
Start: 2022-02-09 | End: 2022-02-09 | Stop reason: HOSPADM

## 2022-02-09 RX ORDER — METOCLOPRAMIDE HYDROCHLORIDE 5 MG/ML
10 INJECTION INTRAMUSCULAR; INTRAVENOUS ONCE AS NEEDED
Status: DISCONTINUED | OUTPATIENT
Start: 2022-02-09 | End: 2022-02-09 | Stop reason: HOSPADM

## 2022-02-09 RX ORDER — PROMETHAZINE HYDROCHLORIDE 25 MG/ML
12.5 INJECTION, SOLUTION INTRAMUSCULAR; INTRAVENOUS ONCE AS NEEDED
Status: DISCONTINUED | OUTPATIENT
Start: 2022-02-09 | End: 2022-02-09 | Stop reason: HOSPADM

## 2022-02-09 RX ORDER — LIDOCAINE HYDROCHLORIDE 10 MG/ML
0.5 INJECTION, SOLUTION EPIDURAL; INFILTRATION; INTRACAUDAL; PERINEURAL ONCE AS NEEDED
Status: DISCONTINUED | OUTPATIENT
Start: 2022-02-09 | End: 2022-02-10 | Stop reason: HOSPADM

## 2022-02-09 RX ORDER — FENTANYL CITRATE/PF 50 MCG/ML
50 SYRINGE (ML) INJECTION
Status: DISCONTINUED | OUTPATIENT
Start: 2022-02-09 | End: 2022-02-09 | Stop reason: HOSPADM

## 2022-02-09 RX ORDER — HYDROMORPHONE HCL/PF 1 MG/ML
0.4 SYRINGE (ML) INJECTION
Status: DISCONTINUED | OUTPATIENT
Start: 2022-02-09 | End: 2022-02-09 | Stop reason: HOSPADM

## 2022-02-09 RX ORDER — SUCCINYLCHOLINE/SOD CL,ISO/PF 100 MG/5ML
SYRINGE (ML) INTRAVENOUS AS NEEDED
Status: DISCONTINUED | OUTPATIENT
Start: 2022-02-09 | End: 2022-02-09

## 2022-02-09 RX ORDER — EPHEDRINE SULFATE 50 MG/ML
INJECTION INTRAVENOUS AS NEEDED
Status: DISCONTINUED | OUTPATIENT
Start: 2022-02-09 | End: 2022-02-09

## 2022-02-09 RX ORDER — LIDOCAINE HYDROCHLORIDE 10 MG/ML
INJECTION, SOLUTION EPIDURAL; INFILTRATION; INTRACAUDAL; PERINEURAL AS NEEDED
Status: DISCONTINUED | OUTPATIENT
Start: 2022-02-09 | End: 2022-02-09

## 2022-02-09 RX ORDER — HYDROMORPHONE HCL/PF 1 MG/ML
1 SYRINGE (ML) INJECTION EVERY 4 HOURS PRN
Status: DISCONTINUED | OUTPATIENT
Start: 2022-02-09 | End: 2022-02-11 | Stop reason: HOSPADM

## 2022-02-09 RX ORDER — SODIUM CHLORIDE, SODIUM LACTATE, POTASSIUM CHLORIDE, CALCIUM CHLORIDE 600; 310; 30; 20 MG/100ML; MG/100ML; MG/100ML; MG/100ML
INJECTION, SOLUTION INTRAVENOUS CONTINUOUS PRN
Status: DISCONTINUED | OUTPATIENT
Start: 2022-02-09 | End: 2022-02-09

## 2022-02-09 RX ORDER — ALBUTEROL SULFATE 2.5 MG/3ML
2.5 SOLUTION RESPIRATORY (INHALATION) ONCE AS NEEDED
Status: DISCONTINUED | OUTPATIENT
Start: 2022-02-09 | End: 2022-02-09 | Stop reason: HOSPADM

## 2022-02-09 RX ORDER — ONDANSETRON 2 MG/ML
INJECTION INTRAMUSCULAR; INTRAVENOUS AS NEEDED
Status: DISCONTINUED | OUTPATIENT
Start: 2022-02-09 | End: 2022-02-09

## 2022-02-09 RX ORDER — FENTANYL CITRATE 50 UG/ML
INJECTION, SOLUTION INTRAMUSCULAR; INTRAVENOUS AS NEEDED
Status: DISCONTINUED | OUTPATIENT
Start: 2022-02-09 | End: 2022-02-09

## 2022-02-09 RX ORDER — PROPOFOL 10 MG/ML
INJECTION, EMULSION INTRAVENOUS AS NEEDED
Status: DISCONTINUED | OUTPATIENT
Start: 2022-02-09 | End: 2022-02-09

## 2022-02-09 RX ADMIN — EPHEDRINE SULFATE 5 MG: 50 INJECTION, SOLUTION INTRAVENOUS at 15:02

## 2022-02-09 RX ADMIN — SODIUM CHLORIDE 75 ML/HR: 0.9 INJECTION, SOLUTION INTRAVENOUS at 21:00

## 2022-02-09 RX ADMIN — METHOCARBAMOL 750 MG: 750 TABLET ORAL at 10:11

## 2022-02-09 RX ADMIN — IOHEXOL 45 ML: 240 INJECTION, SOLUTION INTRATHECAL; INTRAVASCULAR; INTRAVENOUS; ORAL at 15:16

## 2022-02-09 RX ADMIN — EPHEDRINE SULFATE 10 MG: 50 INJECTION, SOLUTION INTRAVENOUS at 15:08

## 2022-02-09 RX ADMIN — FENTANYL CITRATE 50 MCG: 50 INJECTION, SOLUTION INTRAMUSCULAR; INTRAVENOUS at 14:47

## 2022-02-09 RX ADMIN — Medication 100 MG: at 14:50

## 2022-02-09 RX ADMIN — OXYCODONE 54 MG: 18 CAPSULE, EXTENDED RELEASE ORAL at 17:51

## 2022-02-09 RX ADMIN — INDOMETHACIN 100 MG: 50 SUPPOSITORY RECTAL at 14:59

## 2022-02-09 RX ADMIN — ONDANSETRON 4 MG: 2 INJECTION INTRAMUSCULAR; INTRAVENOUS at 14:36

## 2022-02-09 RX ADMIN — SODIUM CHLORIDE 75 ML/HR: 0.9 INJECTION, SOLUTION INTRAVENOUS at 03:44

## 2022-02-09 RX ADMIN — METHOCARBAMOL 750 MG: 750 TABLET ORAL at 17:51

## 2022-02-09 RX ADMIN — LISINOPRIL 5 MG: 5 TABLET ORAL at 10:11

## 2022-02-09 RX ADMIN — METHOCARBAMOL 750 MG: 750 TABLET ORAL at 21:28

## 2022-02-09 RX ADMIN — PREGABALIN 75 MG: 75 CAPSULE ORAL at 17:51

## 2022-02-09 RX ADMIN — PROPOFOL 150 MG: 10 INJECTION, EMULSION INTRAVENOUS at 14:50

## 2022-02-09 RX ADMIN — ONDANSETRON 4 MG: 2 INJECTION INTRAMUSCULAR; INTRAVENOUS at 23:17

## 2022-02-09 RX ADMIN — OXYCODONE 72 MG: 18 CAPSULE, EXTENDED RELEASE ORAL at 10:33

## 2022-02-09 RX ADMIN — PREGABALIN 75 MG: 75 CAPSULE ORAL at 21:28

## 2022-02-09 RX ADMIN — PREGABALIN 75 MG: 75 CAPSULE ORAL at 10:11

## 2022-02-09 RX ADMIN — SODIUM CHLORIDE, SODIUM LACTATE, POTASSIUM CHLORIDE, AND CALCIUM CHLORIDE: .6; .31; .03; .02 INJECTION, SOLUTION INTRAVENOUS at 14:47

## 2022-02-09 RX ADMIN — FENTANYL CITRATE 50 MCG: 50 INJECTION, SOLUTION INTRAMUSCULAR; INTRAVENOUS at 14:50

## 2022-02-09 RX ADMIN — LIDOCAINE HYDROCHLORIDE 50 MG: 10 INJECTION, SOLUTION EPIDURAL; INFILTRATION; INTRACAUDAL; PERINEURAL at 14:50

## 2022-02-09 NOTE — PROGRESS NOTES
Progress Note - General Surgery   Néstor Clements 63 y o  female MRN: [id number]  Unit/Bed#: -01 Encounter: [id number]    Assessment/Plan  Néstor Clements is a 63 y o  female     Choledocholithiasis  AVSS, WBC 4 86, LFTs WNL    Continue with current care, scheduled for ERCP this afternoon with GI  OK for diet post-procedurally from surgical standpoint  Plan for OR tomorrow for laparoscopic cholecystectomy  NPO at midnight for procedure  Patient is agreeable to plan  IV ancef on call to OR tomorrow  Serial abdominal exams  Pain control and antiemetics PRN  Medicine primary, GI following     Subjective/Objective    Subjective: No acute events overnight    Objective:     Blood pressure 110/63, pulse (!) 54, temperature 97 8 °F (36 6 °C), resp  rate 17, height 5' 7" (1 702 m), weight 79 5 kg (175 lb 3 2 oz), SpO2 96 %  ,Body mass index is 27 44 kg/m²  Intake/Output Summary (Last 24 hours) at 2/9/2022 1115  Last data filed at 2/9/2022 0805  Gross per 24 hour   Intake 120 ml   Output --   Net 120 ml       Invasive Devices  Report    Peripheral Intravenous Line            Peripheral IV 02/07/22 Right Antecubital 1 day                Physical Exam: /63   Pulse (!) 54   Temp 97 8 °F (36 6 °C)   Resp 17   Ht 5' 7" (1 702 m)   Wt 79 5 kg (175 lb 3 2 oz)   SpO2 96%   BMI 27 44 kg/m²   General appearance: alert and oriented, in no acute distress  Lungs: clear to auscultation bilaterally  Heart: regular rate and rhythm, S1, S2 normal, no murmur, click, rub or gallop  Abdomen: soft, non-distended, normoactive bowel sounds, non-tender to palpation, healed paramedian scar  Extremities: extremities normal, warm and well-perfused; no cyanosis, clubbing, or edema    Lab, Imaging and other studies:I have personally reviewed pertinent lab results       VTE Pharmacologic Prophylaxis: Sequential compression device (Venodyne)   VTE Mechanical Prophylaxis: sequential compression device    Recent Results (from the past 36 hour(s))   Hepatic function panel    Collection Time: 02/08/22  5:25 AM   Result Value Ref Range    Total Bilirubin 0 95 0 20 - 1 00 mg/dL    Bilirubin, Direct 0 17 0 00 - 0 20 mg/dL    Alkaline Phosphatase 90 46 - 116 U/L    AST 17 5 - 45 U/L    ALT 20 12 - 78 U/L    Total Protein 6 6 6 4 - 8 2 g/dL    Albumin 3 6 3 5 - 5 0 g/dL   Basic metabolic panel    Collection Time: 02/08/22  5:25 AM   Result Value Ref Range    Sodium 142 136 - 145 mmol/L    Potassium 3 7 3 5 - 5 3 mmol/L    Chloride 104 100 - 108 mmol/L    CO2 31 21 - 32 mmol/L    ANION GAP 7 4 - 13 mmol/L    BUN 6 5 - 25 mg/dL    Creatinine 1 03 0 60 - 1 30 mg/dL    Glucose 94 65 - 140 mg/dL    Calcium 9 0 8 3 - 10 1 mg/dL    eGFR 59 ml/min/1 73sq m   CBC (With Platelets)    Collection Time: 02/08/22  5:25 AM   Result Value Ref Range    WBC 5 28 4 31 - 10 16 Thousand/uL    RBC 4 50 3 81 - 5 12 Million/uL    Hemoglobin 13 5 11 5 - 15 4 g/dL    Hematocrit 41 5 34 8 - 46 1 %    MCV 92 82 - 98 fL    MCH 30 0 26 8 - 34 3 pg    MCHC 32 5 31 4 - 37 4 g/dL    RDW 12 2 11 6 - 15 1 %    Platelets 282 158 - 262 Thousands/uL    MPV 9 8 8 9 - 12 7 fL   Protime-INR    Collection Time: 02/08/22  5:25 AM   Result Value Ref Range    Protime 13 0 11 6 - 14 5 seconds    INR 1 02 0 84 - 1 19   CBC and differential    Collection Time: 02/09/22  5:00 AM   Result Value Ref Range    WBC 4 86 4 31 - 10 16 Thousand/uL    RBC 4 18 3 81 - 5 12 Million/uL    Hemoglobin 12 7 11 5 - 15 4 g/dL    Hematocrit 39 0 34 8 - 46 1 %    MCV 93 82 - 98 fL    MCH 30 4 26 8 - 34 3 pg    MCHC 32 6 31 4 - 37 4 g/dL    RDW 12 3 11 6 - 15 1 %    MPV 10 0 8 9 - 12 7 fL    Platelets 381 007 - 197 Thousands/uL    nRBC 0 /100 WBCs    Neutrophils Relative 48 43 - 75 %    Immat GRANS % 0 0 - 2 %    Lymphocytes Relative 37 14 - 44 %    Monocytes Relative 10 4 - 12 %    Eosinophils Relative 4 0 - 6 %    Basophils Relative 1 0 - 1 %    Neutrophils Absolute 2 30 1 85 - 7 62 Thousands/µL    Immature Grans Absolute 0  02 0 00 - 0 20 Thousand/uL    Lymphocytes Absolute 1 81 0 60 - 4 47 Thousands/µL    Monocytes Absolute 0 47 0 17 - 1 22 Thousand/µL    Eosinophils Absolute 0 21 0 00 - 0 61 Thousand/µL    Basophils Absolute 0 05 0 00 - 0 10 Thousands/µL   Basic metabolic panel    Collection Time: 02/09/22  5:00 AM   Result Value Ref Range    Sodium 141 136 - 145 mmol/L    Potassium 3 3 (L) 3 5 - 5 3 mmol/L    Chloride 104 100 - 108 mmol/L    CO2 30 21 - 32 mmol/L    ANION GAP 7 4 - 13 mmol/L    BUN 9 5 - 25 mg/dL    Creatinine 1 28 0 60 - 1 30 mg/dL    Glucose 93 65 - 140 mg/dL    Calcium 8 5 8 3 - 10 1 mg/dL    eGFR 45 ml/min/1 73sq m   Hepatic function panel    Collection Time: 02/09/22  5:00 AM   Result Value Ref Range    Total Bilirubin 0 95 0 20 - 1 00 mg/dL    Bilirubin, Direct 0 19 0 00 - 0 20 mg/dL    Alkaline Phosphatase 76 46 - 116 U/L    AST 21 5 - 45 U/L    ALT 28 12 - 78 U/L    Total Protein 5 9 (L) 6 4 - 8 2 g/dL    Albumin 3 1 (L) 3 5 - 5 0 g/dL   Magnesium    Collection Time: 02/09/22  5:00 AM   Result Value Ref Range    Magnesium 2 0 1 6 - 2 6 mg/dL

## 2022-02-09 NOTE — ANESTHESIA POSTPROCEDURE EVALUATION
Post-Op Assessment Note    CV Status:  Stable  Pain Score: 0    Pain management: adequate     Mental Status:  Alert and awake   Hydration Status:  Euvolemic   PONV Controlled:  Controlled   Airway Patency:  Patent  Airway: intubated      Post Op Vitals Reviewed: Yes            No complications documented      /75 (02/09/22 1526)    Temp 97 5 °F (36 4 °C) (02/09/22 1526)    Pulse 89 (02/09/22 1526)   Resp 16 (02/09/22 1526)    SpO2 98 % (02/09/22 1526)

## 2022-02-09 NOTE — ANESTHESIA PREPROCEDURE EVALUATION
Procedure:  ERCP    Relevant Problems   CARDIO   (+) Hypertension      MUSCULOSKELETAL   (+) Chronic low back pain      Other   (+) Choledocholithiasis   (+) Opioid dependence (HCC)   (+) Right upper quadrant pain        Physical Exam    Airway    Mallampati score: II         Dental   No notable dental hx     Cardiovascular  Cardiovascular exam normal    Pulmonary  Pulmonary exam normal Breath sounds clear to auscultation,     Other Findings      Sinus rhythm  Normal ECG  No previous ECGs available  Confirmed by Kristen Simmons ([id number]) on 2/12/2016 3:03:18 PM  Anesthesia Plan  ASA Score- 2     Anesthesia Type- general with ASA Monitors  Additional Monitors:   Airway Plan:           Plan Factors-Exercise tolerance (METS): >4 METS  Chart reviewed  EKG reviewed  Imaging results reviewed  Existing labs reviewed  Patient summary reviewed  Patient is not a current smoker  Patient instructed to abstain from smoking on day of procedure  Patient did not smoke on day of surgery  Obstructive sleep apnea risk education given perioperatively  Induction- intravenous and rapid sequence induction  Postoperative Plan-   Planned trial extubation    Informed Consent- Anesthetic plan and risks discussed with patient  I personally reviewed this patient with the CRNA  Discussed and agreed on the Anesthesia Plan with the CRNA             Lab Results   Component Value Date    WBC 4 86 02/09/2022    HGB 12 7 02/09/2022    HCT 39 0 02/09/2022    MCV 93 02/09/2022     02/09/2022     Lab Results   Component Value Date    CALCIUM 8 5 02/09/2022    K 3 3 (L) 02/09/2022    CO2 30 02/09/2022     02/09/2022    BUN 9 02/09/2022    CREATININE 1 28 02/09/2022     Lab Results   Component Value Date    INR 1 02 02/08/2022    PROTIME 13 0 02/08/2022           Discussed with pt the benefits/alternatives and risks or General Anesthesia including breathing tube remaining in place if not strong enough, PONV, damage to lips and teeth, sore throat, eye injury or blindness    I, Dr Zheng Painter, the attending physician, have personally seen and evaluated the patient prior to anesthetic care  I have reviewed the pre-anesthetic record, and other medical records if appropriate to the anesthetic care  If a CRNA is involved in the case, I have reviewed the CRNA assessment, if present, and agree  The patient is in a suitable condition to proceed with my formulated anesthetic plan

## 2022-02-09 NOTE — PROGRESS NOTES
[address] [facility]  Progress Note James Pinoigham 1960, 63 y o  female MRN: [id number]  Unit/Bed#: -[facility] Encounter: [id number]  Primary Care Provider: Jacqulyne Brittle, MD   Date and time admitted to hospital: 2022  2:23 PM    * Choledocholithiasis  Assessment & Plan  · Visualized on ultrasound, no evidence of acute superimposed infection  · GI following, plan for ERCP today   · Surgery consulted, plan for lap isai on 2/10  · Follow up results, coordinate care with above specialties     Hypertension  Assessment & Plan  · Adequately controlled, low normal blood pressures today  · Discontinue lisinopril for now     Opioid dependence ([facility] [address] )  Assessment & Plan  · chronically on opioids due to low back pain  · monitor on home regimen    Chronic low back pain  Assessment & Plan  · Status post multiple spinal surgeries and spinal stimulator  · On chronic opioids  · Continue home meds          VTE Pharmacologic Prophylaxis: VTE Score: 3 Moderate Risk (Score 3-4) - Pharmacological DVT Prophylaxis Contraindicated  Sequential Compression Devices Ordered  Patient Centered Rounds: I performed bedside rounds with nursing staff today  Discussions with Specialists or Other Care Team Provider: surgery, GI     Education and Discussions with Family / Patient: Patient declined call to   Time Spent for Care: 30 minutes  More than 50% of total time spent on counseling and coordination of care as described above  Current Length of Stay: 2 day(s)  Current Patient Status: Inpatient   Certification Statement: The patient will continue to require additional inpatient hospital stay due to ERCP, lap isai  Discharge Plan: Anticipate discharge in 48 hrs to home  Code Status: Level 1 - Full Code    Subjective:   No chest pain or chest palpitations  No shortness of breath  Appetite is good         Objective:     Vitals:   Temp (24hrs), Av 8 °F (36 6 °C), Min:97 8 °F (36 6 °C), Max:97 8 °F (36 6 °C)    Temp:  [97 8 °F (36 6 °C)] 97 8 °F (36 6 °C)  HR:  [54-67] 54  Resp:  [17] 17  BP: ()/(53-71) 110/63  SpO2:  [96 %-98 %] 96 %  Body mass index is 27 44 kg/m²  Input and Output Summary (last 24 hours): Intake/Output Summary (Last 24 hours) at 2/9/2022 1042  Last data filed at 2/9/2022 0805  Gross per 24 hour   Intake 120 ml   Output --   Net 120 ml       Physical Exam:   Physical Exam  Vitals and nursing note reviewed  Constitutional:       Appearance: Normal appearance  HENT:      Head: Normocephalic and atraumatic  Right Ear: External ear normal       Left Ear: External ear normal       Nose: No congestion or rhinorrhea  Mouth/Throat:      Mouth: Mucous membranes are dry  Pharynx: Oropharynx is clear  Eyes:      General:         Right eye: No discharge  Left eye: No discharge  Cardiovascular:      Rate and Rhythm: Normal rate and regular rhythm  Pulmonary:      Effort: Pulmonary effort is normal  No respiratory distress  Abdominal:      General: Abdomen is flat  Palpations: Abdomen is soft  Musculoskeletal:      Cervical back: Normal range of motion and neck supple  Right lower leg: No edema  Left lower leg: No edema  Skin:     General: Skin is warm and dry  Neurological:      General: No focal deficit present  Mental Status: She is alert  Mental status is at baseline     Psychiatric:         Mood and Affect: Mood normal          Behavior: Behavior normal           Additional Data:     Labs:  Results from last 7 days   Lab Units 02/09/22  0500   WBC Thousand/uL 4 86   HEMOGLOBIN g/dL 12 7   HEMATOCRIT % 39 0   PLATELETS Thousands/uL 248   NEUTROS PCT % 48   LYMPHS PCT % 37   MONOS PCT % 10   EOS PCT % 4     Results from last 7 days   Lab Units 02/09/22  0500   SODIUM mmol/L 141   POTASSIUM mmol/L 3 3*   CHLORIDE mmol/L 104   CO2 mmol/L 30   BUN mg/dL 9   CREATININE mg/dL 1 28   ANION GAP mmol/L 7   CALCIUM mg/dL 8 5   ALBUMIN g/dL 3 1*   TOTAL BILIRUBIN mg/dL 0 95   ALK PHOS U/L 76   ALT U/L 28   AST U/L 21   GLUCOSE RANDOM mg/dL 93     Results from last 7 days   Lab Units 02/08/22  0525   INR  1 02                   Lines/Drains:  Invasive Devices  Report    Peripheral Intravenous Line            Peripheral IV 02/07/22 Right Antecubital 1 day                      Imaging: No pertinent imaging reviewed  Recent Cultures (last 7 days):         Last 24 Hours Medication List:   Current Facility-Administered Medications   Medication Dose Route Frequency Provider Last Rate    acetaminophen  650 mg Oral Q6H PRN Sherry Asper Starsinic, DO      HYDROmorphone  0 5 mg Intravenous Q4H PRN Celena Greenwood MD      HYDROmorphone  1 mg Intravenous Q4H PRN Celena Greenwood MD      methocarbamol  750 mg Oral 4x Daily Sherry Asper Starsinic, DO      ondansetron  4 mg Intravenous Q6H PRN Sherry Asper Starsinic, DO      oxyCODONE ER  54 mg Oral QPM Sherry Asper Starsinic, DO      oxyCODONE ER  72 mg Oral QAM Sherry Asper Starsinic, DO      pregabalin  75 mg Oral TID Sherry Asper Starsinic, DO      sodium chloride  75 mL/hr Intravenous Continuous Sherry Asper Starsinic, DO 75 mL/hr (02/09/22 5744)        Today, Patient Was Seen By: Marty Schaffer MD    **Please Note: This note may have been constructed using a voice recognition system  **

## 2022-02-09 NOTE — PLAN OF CARE
Problem: INFECTION - ADULT  Goal: Absence or prevention of progression during hospitalization  Description: INTERVENTIONS:  - Assess and monitor for signs and symptoms of infection  - Monitor lab/diagnostic results  - Monitor all insertion sites, i e  indwelling lines, tubes, and drains  - Monitor endotracheal if appropriate and nasal secretions for changes in amount and color  - [place] appropriate cooling/warming therapies per order  - Administer medications as ordered  - Instruct and encourage patient and family to use good hand hygiene technique  - Identify and instruct in appropriate isolation precautions for identified infection/condition  2/9/2022 1618 by Marva Pickett RN  Outcome: Progressing  2/9/2022 1617 by Marva Pickett RN  Outcome: Progressing  Goal: Absence of fever/infection during neutropenic period  Description: INTERVENTIONS:  - Monitor WBC    2/9/2022 1618 by Marva Pickett RN  Outcome: Progressing  2/9/2022 1617 by Marva Pickett RN  Outcome: Progressing     Problem: PAIN - ADULT  Goal: Verbalizes/displays adequate comfort level or baseline comfort level  Description: Interventions:  - Encourage patient to monitor pain and request assistance  - Assess pain using appropriate pain scale  - Administer analgesics based on type and severity of pain and evaluate response  - Implement non-pharmacological measures as appropriate and evaluate response  - Consider cultural and social influences on pain and pain management  - Notify physician/advanced practitioner if interventions unsuccessful or patient reports new pain  2/9/2022 1618 by Marva Pickett RN  Outcome: Progressing  2/9/2022 1617 by Marva Pickett RN  Outcome: Progressing     Problem: SAFETY ADULT  Goal: Patient will remain free of falls  Description: INTERVENTIONS:  - Educate patient/family on patient safety including physical limitations  - Instruct patient to call for assistance with activity   - Consult OT/PT to assist with strengthening/mobility   - Keep Call bell within reach  - Keep bed low and locked with side rails adjusted as appropriate  - Keep care items and personal belongings within reach  - Initiate and maintain comfort rounds  - Apply yellow socks and bracelet for high fall risk patients  - Consider moving patient to room near nurses station  2/9/2022 1618 by Delmy Merino RN  Outcome: Progressing  2/9/2022 1617 by Delmy Merino RN  Outcome: Progressing     Problem: DISCHARGE PLANNING  Goal: Discharge to home or other facility with appropriate resources  Description: INTERVENTIONS:  - Identify barriers to discharge w/patient and caregiver  - Arrange for needed discharge resources and transportation as appropriate  - Identify discharge learning needs (meds, wound care, etc )  - Arrange for interpretive services to assist at discharge as needed  - Refer to Case Management Department for coordinating discharge planning if the patient needs post-hospital services based on physician/advanced practitioner order or complex needs related to functional status, cognitive ability, or social support system  2/9/2022 1618 by Delmy Merino RN  Outcome: Progressing  2/9/2022 1617 by Delmy Merino RN  Outcome: Progressing     Problem: Knowledge Deficit  Goal: Patient/family/caregiver demonstrates understanding of disease process, treatment plan, medications, and discharge instructions  Description: Complete learning assessment and assess knowledge base  Interventions:  - Provide teaching at level of understanding  - Provide teaching via preferred learning methods  2/9/2022 1618 by Delmy Merino RN  Outcome: Progressing  2/9/2022 1617 by Delmy Merino RN  Outcome: Progressing     Problem: Nutrition/Hydration-ADULT  Goal: Nutrient/Hydration intake appropriate for improving, restoring or maintaining nutritional needs  Description: Monitor and assess patient's nutrition/hydration status for malnutrition   Collaborate with interdisciplinary team and initiate plan and interventions as ordered  Monitor patient's weight and dietary intake as ordered or per policy  Utilize nutrition screening tool and intervene as necessary  Determine patient's food preferences and provide high-protein, high-caloric foods as appropriate       INTERVENTIONS:  - Monitor oral intake, urinary output, labs, and treatment plans  - Assess nutrition and hydration status and recommend course of action  - Evaluate amount of meals eaten  - Assist patient with eating if necessary   - Allow adequate time for meals  - Recommend/ encourage appropriate diets, oral nutritional supplements, and vitamin/mineral supplements  - Order, calculate, and assess calorie counts as needed  - Recommend, monitor, and adjust tube feedings and TPN/PPN based on assessed needs  - Assess need for intravenous fluids  - Provide specific nutrition/hydration education as appropriate  - Include patient/family/caregiver in decisions related to nutrition  2/9/2022 1618 by Susana Kwon RN  Outcome: Progressing  2/9/2022 1617 by Susana Kwon, RN  Outcome: Progressing

## 2022-02-09 NOTE — PLAN OF CARE
Problem: SAFETY ADULT  Goal: Maintain or return to baseline ADL function  Description: INTERVENTIONS:  -  Assess patient's ability to carry out ADLs; assess patient's baseline for ADL function and identify physical deficits which impact ability to perform ADLs (bathing, care of mouth/teeth, toileting, grooming, dressing, etc )  - Assess/evaluate cause of self-care deficits   - Assess range of motion  - Assess patient's mobility; develop plan if impaired  - Assess patient's need for assistive devices and provide as appropriate  - Encourage maximum independence but intervene and supervise when necessary  - Involve family in performance of ADLs  - Assess for home care needs following discharge   - Consider OT consult to assist with ADL evaluation and planning for discharge  - Provide patient education as appropriate  Outcome: Progressing     Problem: PAIN - ADULT  Goal: Verbalizes/displays adequate comfort level or baseline comfort level  Description: Interventions:  - Encourage patient to monitor pain and request assistance  - Assess pain using appropriate pain scale  - Administer analgesics based on type and severity of pain and evaluate response  - Implement non-pharmacological measures as appropriate and evaluate response  - Consider cultural and social influences on pain and pain management  - Notify physician/advanced practitioner if interventions unsuccessful or patient reports new pain  Outcome: Progressing     Problem: DISCHARGE PLANNING  Goal: Discharge to home or other facility with appropriate resources  Description: INTERVENTIONS:  - Identify barriers to discharge w/patient and caregiver  - Arrange for needed discharge resources and transportation as appropriate  - Identify discharge learning needs (meds, wound care, etc )  - Arrange for interpretive services to assist at discharge as needed  - Refer to Case Management Department for coordinating discharge planning if the patient needs post-hospital services based on physician/advanced practitioner order or complex needs related to functional status, cognitive ability, or social support system  Outcome: Progressing     Problem: Knowledge Deficit  Goal: Patient/family/caregiver demonstrates understanding of disease process, treatment plan, medications, and discharge instructions  Description: Complete learning assessment and assess knowledge base    Interventions:  - Provide teaching at level of understanding  - Provide teaching via preferred learning methods  Outcome: Progressing

## 2022-02-09 NOTE — ASSESSMENT & PLAN NOTE
· Visualized on ultrasound, no evidence of acute superimposed infection  · GI following, plan for ERCP today   · Surgery consulted, plan for lap isai on 2/10  · Follow up results, coordinate care with above specialties

## 2022-02-10 ENCOUNTER — ANESTHESIA (INPATIENT)
Dept: PERIOP | Facility: HOSPITAL | Age: 62
DRG: 418 | End: 2022-02-10
Payer: COMMERCIAL

## 2022-02-10 ENCOUNTER — ANESTHESIA EVENT (INPATIENT)
Dept: PERIOP | Facility: HOSPITAL | Age: 62
DRG: 418 | End: 2022-02-10
Payer: COMMERCIAL

## 2022-02-10 LAB
ALBUMIN SERPL BCP-MCNC: 3.1 G/DL (ref 3.5–5)
ALP SERPL-CCNC: 80 U/L (ref 46–116)
ALT SERPL W P-5'-P-CCNC: 35 U/L (ref 12–78)
ANION GAP SERPL CALCULATED.3IONS-SCNC: 4 MMOL/L (ref 4–13)
AST SERPL W P-5'-P-CCNC: 21 U/L (ref 5–45)
BASOPHILS # BLD AUTO: 0.03 THOUSANDS/ΜL (ref 0–0.1)
BASOPHILS NFR BLD AUTO: 1 % (ref 0–1)
BILIRUB DIRECT SERPL-MCNC: 0.19 MG/DL (ref 0–0.2)
BILIRUB SERPL-MCNC: 1.06 MG/DL (ref 0.2–1)
BUN SERPL-MCNC: 9 MG/DL (ref 5–25)
CALCIUM SERPL-MCNC: 8.5 MG/DL (ref 8.3–10.1)
CHLORIDE SERPL-SCNC: 106 MMOL/L (ref 100–108)
CO2 SERPL-SCNC: 31 MMOL/L (ref 21–32)
CREAT SERPL-MCNC: 1.04 MG/DL (ref 0.6–1.3)
EOSINOPHIL # BLD AUTO: 0.13 THOUSAND/ΜL (ref 0–0.61)
EOSINOPHIL NFR BLD AUTO: 3 % (ref 0–6)
ERYTHROCYTE [DISTWIDTH] IN BLOOD BY AUTOMATED COUNT: 12.1 % (ref 11.6–15.1)
GFR SERPL CREATININE-BSD FRML MDRD: 58 ML/MIN/1.73SQ M
GLUCOSE SERPL-MCNC: 106 MG/DL (ref 65–140)
HCT VFR BLD AUTO: 39.4 % (ref 34.8–46.1)
HGB BLD-MCNC: 12.9 G/DL (ref 11.5–15.4)
IMM GRANULOCYTES # BLD AUTO: 0.01 THOUSAND/UL (ref 0–0.2)
IMM GRANULOCYTES NFR BLD AUTO: 0 % (ref 0–2)
LYMPHOCYTES # BLD AUTO: 1.3 THOUSANDS/ΜL (ref 0.6–4.47)
LYMPHOCYTES NFR BLD AUTO: 31 % (ref 14–44)
MAGNESIUM SERPL-MCNC: 2.1 MG/DL (ref 1.6–2.6)
MCH RBC QN AUTO: 30.4 PG (ref 26.8–34.3)
MCHC RBC AUTO-ENTMCNC: 32.7 G/DL (ref 31.4–37.4)
MCV RBC AUTO: 93 FL (ref 82–98)
MONOCYTES # BLD AUTO: 0.34 THOUSAND/ΜL (ref 0.17–1.22)
MONOCYTES NFR BLD AUTO: 8 % (ref 4–12)
NEUTROPHILS # BLD AUTO: 2.34 THOUSANDS/ΜL (ref 1.85–7.62)
NEUTS SEG NFR BLD AUTO: 57 % (ref 43–75)
NRBC BLD AUTO-RTO: 0 /100 WBCS
PLATELET # BLD AUTO: 241 THOUSANDS/UL (ref 149–390)
PMV BLD AUTO: 10.2 FL (ref 8.9–12.7)
POTASSIUM SERPL-SCNC: 3.6 MMOL/L (ref 3.5–5.3)
PROT SERPL-MCNC: 6.1 G/DL (ref 6.4–8.2)
RBC # BLD AUTO: 4.24 MILLION/UL (ref 3.81–5.12)
SODIUM SERPL-SCNC: 141 MMOL/L (ref 136–145)
WBC # BLD AUTO: 4.15 THOUSAND/UL (ref 4.31–10.16)

## 2022-02-10 PROCEDURE — 88342 IMHCHEM/IMCYTCHM 1ST ANTB: CPT | Performed by: PATHOLOGY

## 2022-02-10 PROCEDURE — 88304 TISSUE EXAM BY PATHOLOGIST: CPT | Performed by: PATHOLOGY

## 2022-02-10 PROCEDURE — 85025 COMPLETE CBC W/AUTO DIFF WBC: CPT | Performed by: STUDENT IN AN ORGANIZED HEALTH CARE EDUCATION/TRAINING PROGRAM

## 2022-02-10 PROCEDURE — 81261 IGH GENE REARRANGE AMP METH: CPT | Performed by: PATHOLOGY

## 2022-02-10 PROCEDURE — 80076 HEPATIC FUNCTION PANEL: CPT | Performed by: STUDENT IN AN ORGANIZED HEALTH CARE EDUCATION/TRAINING PROGRAM

## 2022-02-10 PROCEDURE — NC001 PR NO CHARGE: Performed by: PHYSICIAN ASSISTANT

## 2022-02-10 PROCEDURE — 88341 IMHCHEM/IMCYTCHM EA ADD ANTB: CPT | Performed by: PATHOLOGY

## 2022-02-10 PROCEDURE — 0DBW4ZX EXCISION OF PERITONEUM, PERCUTANEOUS ENDOSCOPIC APPROACH, DIAGNOSTIC: ICD-10-PCS | Performed by: STUDENT IN AN ORGANIZED HEALTH CARE EDUCATION/TRAINING PROGRAM

## 2022-02-10 PROCEDURE — 47562 LAPAROSCOPIC CHOLECYSTECTOMY: CPT | Performed by: PHYSICIAN ASSISTANT

## 2022-02-10 PROCEDURE — 47562 LAPAROSCOPIC CHOLECYSTECTOMY: CPT | Performed by: STUDENT IN AN ORGANIZED HEALTH CARE EDUCATION/TRAINING PROGRAM

## 2022-02-10 PROCEDURE — 83735 ASSAY OF MAGNESIUM: CPT | Performed by: STUDENT IN AN ORGANIZED HEALTH CARE EDUCATION/TRAINING PROGRAM

## 2022-02-10 PROCEDURE — 80048 BASIC METABOLIC PNL TOTAL CA: CPT | Performed by: STUDENT IN AN ORGANIZED HEALTH CARE EDUCATION/TRAINING PROGRAM

## 2022-02-10 PROCEDURE — 99233 SBSQ HOSP IP/OBS HIGH 50: CPT | Performed by: STUDENT IN AN ORGANIZED HEALTH CARE EDUCATION/TRAINING PROGRAM

## 2022-02-10 PROCEDURE — 0FT44ZZ RESECTION OF GALLBLADDER, PERCUTANEOUS ENDOSCOPIC APPROACH: ICD-10-PCS | Performed by: STUDENT IN AN ORGANIZED HEALTH CARE EDUCATION/TRAINING PROGRAM

## 2022-02-10 PROCEDURE — 88364 INSITU HYBRIDIZATION (FISH): CPT | Performed by: PATHOLOGY

## 2022-02-10 PROCEDURE — 99231 SBSQ HOSP IP/OBS SF/LOW 25: CPT | Performed by: INTERNAL MEDICINE

## 2022-02-10 PROCEDURE — 88365 INSITU HYBRIDIZATION (FISH): CPT | Performed by: PATHOLOGY

## 2022-02-10 RX ORDER — HYDROMORPHONE HCL/PF 1 MG/ML
0.5 SYRINGE (ML) INJECTION
Status: DISCONTINUED | OUTPATIENT
Start: 2022-02-10 | End: 2022-02-10 | Stop reason: HOSPADM

## 2022-02-10 RX ORDER — MAGNESIUM HYDROXIDE 1200 MG/15ML
LIQUID ORAL AS NEEDED
Status: DISCONTINUED | OUTPATIENT
Start: 2022-02-10 | End: 2022-02-10 | Stop reason: HOSPADM

## 2022-02-10 RX ORDER — SODIUM CHLORIDE, SODIUM LACTATE, POTASSIUM CHLORIDE, CALCIUM CHLORIDE 600; 310; 30; 20 MG/100ML; MG/100ML; MG/100ML; MG/100ML
125 INJECTION, SOLUTION INTRAVENOUS CONTINUOUS
Status: DISCONTINUED | OUTPATIENT
Start: 2022-02-10 | End: 2022-02-11

## 2022-02-10 RX ORDER — PROPOFOL 10 MG/ML
INJECTION, EMULSION INTRAVENOUS AS NEEDED
Status: DISCONTINUED | OUTPATIENT
Start: 2022-02-10 | End: 2022-02-10

## 2022-02-10 RX ORDER — CEFAZOLIN SODIUM 2 G/50ML
2000 SOLUTION INTRAVENOUS EVERY 8 HOURS
Status: COMPLETED | OUTPATIENT
Start: 2022-02-10 | End: 2022-02-11

## 2022-02-10 RX ORDER — SODIUM CHLORIDE, SODIUM LACTATE, POTASSIUM CHLORIDE, CALCIUM CHLORIDE 600; 310; 30; 20 MG/100ML; MG/100ML; MG/100ML; MG/100ML
125 INJECTION, SOLUTION INTRAVENOUS CONTINUOUS
Status: DISCONTINUED | OUTPATIENT
Start: 2022-02-10 | End: 2022-02-10

## 2022-02-10 RX ORDER — DEXAMETHASONE SODIUM PHOSPHATE 10 MG/ML
INJECTION, SOLUTION INTRAMUSCULAR; INTRAVENOUS AS NEEDED
Status: DISCONTINUED | OUTPATIENT
Start: 2022-02-10 | End: 2022-02-10

## 2022-02-10 RX ORDER — ONDANSETRON 2 MG/ML
INJECTION INTRAMUSCULAR; INTRAVENOUS AS NEEDED
Status: DISCONTINUED | OUTPATIENT
Start: 2022-02-10 | End: 2022-02-10

## 2022-02-10 RX ORDER — FENTANYL CITRATE/PF 50 MCG/ML
50 SYRINGE (ML) INJECTION
Status: COMPLETED | OUTPATIENT
Start: 2022-02-10 | End: 2022-02-10

## 2022-02-10 RX ORDER — ROCURONIUM BROMIDE 10 MG/ML
INJECTION, SOLUTION INTRAVENOUS AS NEEDED
Status: DISCONTINUED | OUTPATIENT
Start: 2022-02-10 | End: 2022-02-10

## 2022-02-10 RX ORDER — DIPHENHYDRAMINE HYDROCHLORIDE 50 MG/ML
12.5 INJECTION INTRAMUSCULAR; INTRAVENOUS
Status: DISCONTINUED | OUTPATIENT
Start: 2022-02-10 | End: 2022-02-11 | Stop reason: HOSPADM

## 2022-02-10 RX ORDER — NEOSTIGMINE METHYLSULFATE 1 MG/ML
INJECTION INTRAVENOUS AS NEEDED
Status: DISCONTINUED | OUTPATIENT
Start: 2022-02-10 | End: 2022-02-10

## 2022-02-10 RX ORDER — GLYCOPYRROLATE 0.2 MG/ML
INJECTION INTRAMUSCULAR; INTRAVENOUS AS NEEDED
Status: DISCONTINUED | OUTPATIENT
Start: 2022-02-10 | End: 2022-02-10

## 2022-02-10 RX ORDER — SODIUM CHLORIDE, SODIUM LACTATE, POTASSIUM CHLORIDE, CALCIUM CHLORIDE 600; 310; 30; 20 MG/100ML; MG/100ML; MG/100ML; MG/100ML
20 INJECTION, SOLUTION INTRAVENOUS CONTINUOUS
Status: DISCONTINUED | OUTPATIENT
Start: 2022-02-10 | End: 2022-02-10

## 2022-02-10 RX ORDER — MIDAZOLAM HYDROCHLORIDE 2 MG/2ML
INJECTION, SOLUTION INTRAMUSCULAR; INTRAVENOUS AS NEEDED
Status: DISCONTINUED | OUTPATIENT
Start: 2022-02-10 | End: 2022-02-10

## 2022-02-10 RX ORDER — PROPOFOL 10 MG/ML
INJECTION, EMULSION INTRAVENOUS CONTINUOUS PRN
Status: DISCONTINUED | OUTPATIENT
Start: 2022-02-10 | End: 2022-02-10

## 2022-02-10 RX ORDER — FENTANYL CITRATE 50 UG/ML
INJECTION, SOLUTION INTRAMUSCULAR; INTRAVENOUS AS NEEDED
Status: DISCONTINUED | OUTPATIENT
Start: 2022-02-10 | End: 2022-02-10

## 2022-02-10 RX ORDER — HEPARIN SODIUM 5000 [USP'U]/ML
5000 INJECTION, SOLUTION INTRAVENOUS; SUBCUTANEOUS ONCE
Status: COMPLETED | OUTPATIENT
Start: 2022-02-10 | End: 2022-02-10

## 2022-02-10 RX ORDER — LIDOCAINE HYDROCHLORIDE 10 MG/ML
INJECTION, SOLUTION EPIDURAL; INFILTRATION; INTRACAUDAL; PERINEURAL AS NEEDED
Status: DISCONTINUED | OUTPATIENT
Start: 2022-02-10 | End: 2022-02-10

## 2022-02-10 RX ORDER — CEFAZOLIN SODIUM 2 G/50ML
2000 SOLUTION INTRAVENOUS ONCE
Status: COMPLETED | OUTPATIENT
Start: 2022-02-10 | End: 2022-02-11

## 2022-02-10 RX ORDER — BUPIVACAINE HYDROCHLORIDE 2.5 MG/ML
INJECTION, SOLUTION EPIDURAL; INFILTRATION; INTRACAUDAL AS NEEDED
Status: DISCONTINUED | OUTPATIENT
Start: 2022-02-10 | End: 2022-02-10 | Stop reason: HOSPADM

## 2022-02-10 RX ADMIN — CEFAZOLIN SODIUM 2000 MG: 2 SOLUTION INTRAVENOUS at 21:04

## 2022-02-10 RX ADMIN — PROPOFOL 200 MG: 10 INJECTION, EMULSION INTRAVENOUS at 08:26

## 2022-02-10 RX ADMIN — LIDOCAINE HYDROCHLORIDE 100 MG: 10 INJECTION, SOLUTION EPIDURAL; INFILTRATION; INTRACAUDAL; PERINEURAL at 08:26

## 2022-02-10 RX ADMIN — DEXAMETHASONE SODIUM PHOSPHATE 4 MG: 10 INJECTION, SOLUTION INTRAMUSCULAR; INTRAVENOUS at 08:39

## 2022-02-10 RX ADMIN — GLYCOPYRROLATE 0.4 MG: 0.2 INJECTION, SOLUTION INTRAMUSCULAR; INTRAVENOUS at 09:47

## 2022-02-10 RX ADMIN — FENTANYL CITRATE 50 MCG: 50 INJECTION INTRAMUSCULAR; INTRAVENOUS at 10:31

## 2022-02-10 RX ADMIN — NEOSTIGMINE METHYLSULFATE 3 MG: 1 INJECTION INTRAVENOUS at 09:47

## 2022-02-10 RX ADMIN — FENTANYL CITRATE 50 MCG: 50 INJECTION INTRAMUSCULAR; INTRAVENOUS at 10:26

## 2022-02-10 RX ADMIN — OXYCODONE 54 MG: 18 CAPSULE, EXTENDED RELEASE ORAL at 19:37

## 2022-02-10 RX ADMIN — PROPOFOL 150 MCG/KG/MIN: 10 INJECTION, EMULSION INTRAVENOUS at 08:28

## 2022-02-10 RX ADMIN — HYDROMORPHONE HYDROCHLORIDE 1 MG: 1 INJECTION, SOLUTION INTRAMUSCULAR; INTRAVENOUS; SUBCUTANEOUS at 21:50

## 2022-02-10 RX ADMIN — SODIUM CHLORIDE, SODIUM LACTATE, POTASSIUM CHLORIDE, AND CALCIUM CHLORIDE 125 ML/HR: .6; .31; .03; .02 INJECTION, SOLUTION INTRAVENOUS at 14:01

## 2022-02-10 RX ADMIN — HYDROMORPHONE HYDROCHLORIDE 1 MG: 1 INJECTION, SOLUTION INTRAMUSCULAR; INTRAVENOUS; SUBCUTANEOUS at 14:01

## 2022-02-10 RX ADMIN — ROCURONIUM BROMIDE 50 MG: 10 INJECTION, SOLUTION INTRAVENOUS at 08:26

## 2022-02-10 RX ADMIN — PROPOFOL 50 MG: 10 INJECTION, EMULSION INTRAVENOUS at 09:56

## 2022-02-10 RX ADMIN — OXYCODONE 72 MG: 18 CAPSULE, EXTENDED RELEASE ORAL at 12:13

## 2022-02-10 RX ADMIN — ONDANSETRON 4 MG: 2 INJECTION INTRAMUSCULAR; INTRAVENOUS at 09:49

## 2022-02-10 RX ADMIN — PROPOFOL 50 MG: 10 INJECTION, EMULSION INTRAVENOUS at 09:54

## 2022-02-10 RX ADMIN — HEPARIN SODIUM 5000 UNITS: 5000 INJECTION INTRAVENOUS; SUBCUTANEOUS at 07:54

## 2022-02-10 RX ADMIN — HYDROMORPHONE HYDROCHLORIDE 0.5 MG: 1 INJECTION, SOLUTION INTRAMUSCULAR; INTRAVENOUS; SUBCUTANEOUS at 10:54

## 2022-02-10 RX ADMIN — MIDAZOLAM HYDROCHLORIDE 2 MG: 1 INJECTION, SOLUTION INTRAMUSCULAR; INTRAVENOUS at 08:21

## 2022-02-10 RX ADMIN — METHOCARBAMOL 750 MG: 750 TABLET ORAL at 21:04

## 2022-02-10 RX ADMIN — FENTANYL CITRATE 100 MCG: 50 INJECTION, SOLUTION INTRAMUSCULAR; INTRAVENOUS at 08:26

## 2022-02-10 RX ADMIN — SODIUM CHLORIDE, SODIUM LACTATE, POTASSIUM CHLORIDE, AND CALCIUM CHLORIDE 125 ML/HR: .6; .31; .03; .02 INJECTION, SOLUTION INTRAVENOUS at 07:42

## 2022-02-10 RX ADMIN — DIPHENHYDRAMINE HYDROCHLORIDE 12.5 MG: 50 INJECTION, SOLUTION INTRAMUSCULAR; INTRAVENOUS at 10:36

## 2022-02-10 RX ADMIN — HYDROMORPHONE HYDROCHLORIDE 0.5 MG: 1 INJECTION, SOLUTION INTRAMUSCULAR; INTRAVENOUS; SUBCUTANEOUS at 10:39

## 2022-02-10 RX ADMIN — CEFAZOLIN SODIUM 2000 MG: 2 SOLUTION INTRAVENOUS at 14:12

## 2022-02-10 RX ADMIN — METHOCARBAMOL 750 MG: 750 TABLET ORAL at 18:05

## 2022-02-10 RX ADMIN — ONDANSETRON 4 MG: 2 INJECTION INTRAMUSCULAR; INTRAVENOUS at 13:59

## 2022-02-10 RX ADMIN — PREGABALIN 75 MG: 75 CAPSULE ORAL at 18:05

## 2022-02-10 RX ADMIN — CEFAZOLIN SODIUM 2000 MG: 2 SOLUTION INTRAVENOUS at 07:53

## 2022-02-10 RX ADMIN — GLYCOPYRROLATE 0.2 MG: 0.2 INJECTION, SOLUTION INTRAMUSCULAR; INTRAVENOUS at 08:35

## 2022-02-10 RX ADMIN — PREGABALIN 75 MG: 75 CAPSULE ORAL at 21:04

## 2022-02-10 RX ADMIN — PREGABALIN 75 MG: 75 CAPSULE ORAL at 12:15

## 2022-02-10 NOTE — OP NOTE
PERATIVE REPORT  PATIENT NAME: Amira Torres    :  1960  MRN: [id number]  Pt Location: [facility] OR ROOM 02    SURGERY DATE: 2/10/2022    Surgeon(s) and Role:     * Khanh Madrid DO - Primary     * Mia Rivas PA-C - Assisting    Preop Diagnosis:  Choledocholithiasis [K80 50]    Post-Op Diagnosis Codes:     * Choledocholithiasis [K80 50]    Procedure(s) (LRB):  CHOLECYSTECTOMY LAPAROSCOPIC (N/A)    Specimen(s):  ID Type Source Tests Collected by Time Destination   1 : Gallbladder and contents Tissue Gallbladder TISSUE EXAM Khanh Madrid DO 2/10/2022 0927        Estimated Blood Loss:   Minimal    Drains:  None    Anesthesia Type:   General    Operative Indications:  Choledocholithiasis [K80 50]    Operative Findings:  Gallbladder was filled with stones and had chronic inflammation  There was a small piece of mucous like structure found in the abdomen that was sent with the pathology  Complications:   None    Procedure and Technique:  The patient was seen again in Preoperative Holding  All the risks, benefits, complications, treatment options, and expected outcomes were discussed with the patient and family at length  All questions were answered to satisfaction  There was concurrence with the proposed plan and informed consent was obtained  The site of surgery was properly noted/marked  The patient was taken to Operating Room, identified, and the procedure verified as laparoscopic cholecystectomy, possible open  The patient was placed in the supine position on the operating room table  The patient had received preoperative antibiotics and SCDs placed on the bilateral lower extremities  Anesthesia was then induced and the patient was intubated  The abdomen was then prepped and draped in the usual sterile fashion using ChloraPrep  A Time-Out was then performed with all involved present confirming the correct patient, procedure, antibiotics, and any additional concerns    A 1 5 centimeter supraumbilical incision was made in a transverse fashion using a #15 blade and the umbilical stalk was grasped and elevated  Using blunt dissection the fascia was exposed and was incised  Using a large blunt Ely clamp the peritoneum was entered under direct visualization  A 11 mm port was then placed in the fascial opening and pneumoperitoneum was established  Initial pressure upon establishing pneumoperitoneum was noted to be low  Additional 5 mm ports were placed under direct visualization in the following locations:  Subxiphoid, Right subcostal in the midclavicular line, Right subcostal and anterior axillary line  The patient was then placed in reverse Trendelenburg and left lateral decubitus position  The gallbladder was exposed and was hard and filled with stones  It had signs of chronic inflammation  There was a small mucous cyst like structure found in the abdomen which was sent with the specimen  The gallbladder fundus was then grasped and retracted cephalad  The gallbladder infundibulum was grasped and retracted cephalad and lateral   Using a combination of blunt dissection using a [place] and the suction  both the cystic duct and cystic artery were exposed and skeletonized  Critical view was then obtained  The cystic duct was clipped using 5 mm clips, 2 clips distally and 1 clip proximally  The cystic duct was then transected using laparoscopic scissors  The cystic artery was clipped using 5 mm clips, 1 clip distally and 2 clips proximally  The cystic artery was then transected using laparoscopic scissors  The gallbladder was then dissected off the liver bed using hook electrocautery  There was a small amount of bile spillage from the gallbladder during this time which was suctioned and irrigated  Hemostasis was noted  Both the cystic artery and cystic duct stumps were evaluated and no leakage of bile or blood was noted    The gallbladder was placed in the Endo-Catch bag and removed via the umbilical port  The umbilical port had to be widened to be able to remove the specimen due to size and immobility  The abdomen was desufflated and the supraumbilical fascial incision was closed with 0 PDS in a running fashion  The abdomen was then reinsufflated and the fascial incision was inspected and noted to be hemostatic without any insufflation leakage  Irrigation was instilled above the liver and in the infra hepatic area and was suctioned until clear  The remaining 5 mm ports were removed and the abdomen was desufflated  Local anesthesia infiltrated in the port sites using 0 25% Marcaine  The port sites were then closed using 4-0 Monocryl  The abdomen was then cleansed and dried and Steri-Strips, 2x2, Tegaderm were used to cover the sites  All instrument, sponge, and needle counts were noted to be correct at the conclusion of the case  The patient was brought to the PACU in stable and satisfactory condition       I was present for the entire procedure, A qualified resident physician was not available and A physician assistant was required during the procedure for retraction tissue handling,dissection and suturing    Patient Disposition:  extubated and stable      SIGNATURE: Adolfo Guy DO  DATE: February 10, 2022  TIME: 9:53 AM

## 2022-02-10 NOTE — ANESTHESIA POSTPROCEDURE EVALUATION
Post-Op Assessment Note    CV Status:  Stable  Pain Score: 0    Pain management: adequate     Mental Status:  Alert and awake   Hydration Status:  Euvolemic   PONV Controlled:  Controlled   Airway Patency:  Patent      Post Op Vitals Reviewed: Yes      Staff: CRNA         No complications documented      BP   106/72   Temp   36 2   Pulse  70   Resp   12   SpO2   96

## 2022-02-10 NOTE — ANESTHESIA PREPROCEDURE EVALUATION
Procedure:  CHOLECYSTECTOMY LAPAROSCOPIC (N/A Abdomen)    Relevant Problems   CARDIO   (+) Hypertension      MUSCULOSKELETAL   (+) Chronic low back pain      Spinal Cord stimulator    Physical Exam    Airway    Mallampati score: I         Dental   No notable dental hx     Cardiovascular  Rhythm: regular, Rate: normal,     Pulmonary  Pulmonary exam normal     Other Findings        Anesthesia Plan  ASA Score- 2     Anesthesia Type- general with ASA Monitors  Additional Monitors:   Airway Plan: ETT  Plan Factors-Exercise tolerance (METS): >4 METS  Chart reviewed  Patient summary reviewed  Patient is not a current smoker  Patient not instructed to abstain from smoking on day of procedure  Patient did not smoke on day of surgery  Induction- intravenous  Postoperative Plan- Plan for postoperative opioid use  Planned trial extubation    Informed Consent- Anesthetic plan and risks discussed with patient  I personally reviewed this patient with the CRNA  Discussed and agreed on the Anesthesia Plan with the CRNA  Roberto Augustin

## 2022-02-10 NOTE — PROGRESS NOTES
Progress Note  Eddye Gravely 63 y o  female MRN: [id number]  Unit/Bed#: -[facility] Encounter: [id number]    Subjective:  Patient reports she is having some abdominal discomfort after her lap isai this am   No nausea or vomiting  No SOB  Objective:     Vitals:   Vitals:    02/10/22 1346   BP: 138/76   Pulse: 70   Resp:    Temp:    SpO2: 94%   ,Body mass index is 27 45 kg/m²        Intake/Output Summary (Last 24 hours) at 2/10/2022 1347  Last data filed at 2/10/2022 0949  Gross per 24 hour   Intake 1660 ml   Output --   Net 1660 ml       Physical Exam:     General Appearance: Alert, appears stated age and cooperative  Lungs: Clear to auscultation bilaterally, no rales or rhonchi, no labored breathing/accessory muscle use  Heart: Regular rate and rhythm, S1, S2 normal, no murmur, click, rub or gallop  Abdomen: Soft, non-tender, non-distended; bowel sounds normal; no masses or no organomegaly, s/p surgery  Extremities: No cyanosis, edema    Invasive Devices  Report    Peripheral Intravenous Line            Peripheral IV 02/07/22 Right Antecubital 2 days                Lab Results:  Admission on 02/07/2022   Component Date Value    WBC 02/07/2022 6 55     RBC 02/07/2022 4 69     Hemoglobin 02/07/2022 14 0     Hematocrit 02/07/2022 43 0     MCV 02/07/2022 92     MCH 02/07/2022 29 9     MCHC 02/07/2022 32 6     RDW 02/07/2022 12 1     MPV 02/07/2022 9 8     Platelets 23/00/4956 343     nRBC 02/07/2022 0     Neutrophils Relative 02/07/2022 61     Immat GRANS % 02/07/2022 0     Lymphocytes Relative 02/07/2022 30     Monocytes Relative 02/07/2022 7     Eosinophils Relative 02/07/2022 1     Basophils Relative 02/07/2022 1     Neutrophils Absolute 02/07/2022 3 97     Immature Grans Absolute 02/07/2022 0 02     Lymphocytes Absolute 02/07/2022 1 94     Monocytes Absolute 02/07/2022 0 47     Eosinophils Absolute 02/07/2022 0 09     Basophils Absolute 02/07/2022 0 06     Sodium 02/07/2022 141     Potassium 02/07/2022 3 9     Chloride 02/07/2022 105     CO2 02/07/2022 29     ANION GAP 02/07/2022 7     BUN 02/07/2022 5     Creatinine 02/07/2022 1 05     Glucose 02/07/2022 100     Calcium 02/07/2022 9 5     AST 02/07/2022 17     ALT 02/07/2022 19     Alkaline Phosphatase 02/07/2022 92     Total Protein 02/07/2022 7 2     Albumin 02/07/2022 3 8     Total Bilirubin 02/07/2022 0 66     eGFR 02/07/2022 58     Lipase 02/07/2022 62*    Color, UA 02/07/2022 Light Yellow     Clarity, UA 02/07/2022 Clear     Specific Gravity, UA 02/07/2022 1 010     pH, UA 02/07/2022 6 0     Leukocytes, UA 02/07/2022 Negative     Nitrite, UA 02/07/2022 Negative     Protein, UA 02/07/2022 Negative     Glucose, UA 02/07/2022 Negative     Ketones, UA 02/07/2022 Negative     Urobilinogen, UA 02/07/2022 0 2     Bilirubin, UA 02/07/2022 Negative     Blood, UA 02/07/2022 Moderate*    RBC, UA 02/07/2022 2-4     WBC, UA 02/07/2022 None Seen     Epithelial Cells 02/07/2022 Occasional     Bacteria, UA 02/07/2022 Occasional     SARS-CoV-2 02/07/2022 Negative     INFLUENZA A PCR 02/07/2022 Negative     INFLUENZA B PCR 02/07/2022 Negative     RSV PCR 02/07/2022 Negative     Total Bilirubin 02/08/2022 0 95     Bilirubin, Direct 02/08/2022 0 17     Alkaline Phosphatase 02/08/2022 90     AST 02/08/2022 17     ALT 02/08/2022 20     Total Protein 02/08/2022 6 6     Albumin 02/08/2022 3 6     Sodium 02/08/2022 142     Potassium 02/08/2022 3 7     Chloride 02/08/2022 104     CO2 02/08/2022 31     ANION GAP 02/08/2022 7     BUN 02/08/2022 6     Creatinine 02/08/2022 1 03     Glucose 02/08/2022 94     Calcium 02/08/2022 9 0     eGFR 02/08/2022 59     WBC 02/08/2022 5 28     RBC 02/08/2022 4 50     Hemoglobin 02/08/2022 13 5     Hematocrit 02/08/2022 41 5     MCV 02/08/2022 92     MCH 02/08/2022 30 0     MCHC 02/08/2022 32 5     RDW 02/08/2022 12 2     Platelets 72/70/6487 317     MPV 02/08/2022 9 8  Protime 02/08/2022 13 0     INR 02/08/2022 1 02     WBC 02/09/2022 4 86     RBC 02/09/2022 4 18     Hemoglobin 02/09/2022 12 7     Hematocrit 02/09/2022 39 0     MCV 02/09/2022 93     MCH 02/09/2022 30 4     MCHC 02/09/2022 32 6     RDW 02/09/2022 12 3     MPV 02/09/2022 10 0     Platelets 35/57/7469 248     nRBC 02/09/2022 0     Neutrophils Relative 02/09/2022 48     Immat GRANS % 02/09/2022 0     Lymphocytes Relative 02/09/2022 37     Monocytes Relative 02/09/2022 10     Eosinophils Relative 02/09/2022 4     Basophils Relative 02/09/2022 1     Neutrophils Absolute 02/09/2022 2 30     Immature Grans Absolute 02/09/2022 0 02     Lymphocytes Absolute 02/09/2022 1 81     Monocytes Absolute 02/09/2022 0 47     Eosinophils Absolute 02/09/2022 0 21     Basophils Absolute 02/09/2022 0 05     Sodium 02/09/2022 141     Potassium 02/09/2022 3 3*    Chloride 02/09/2022 104     CO2 02/09/2022 30     ANION GAP 02/09/2022 7     BUN 02/09/2022 9     Creatinine 02/09/2022 1 28     Glucose 02/09/2022 93     Calcium 02/09/2022 8 5     eGFR 02/09/2022 45     Total Bilirubin 02/09/2022 0 95     Bilirubin, Direct 02/09/2022 0 19     Alkaline Phosphatase 02/09/2022 76     AST 02/09/2022 21     ALT 02/09/2022 28     Total Protein 02/09/2022 5 9*    Albumin 02/09/2022 3 1*    Magnesium 02/09/2022 2 0     POC Glucose 02/09/2022 108     WBC 02/10/2022 4 15*    RBC 02/10/2022 4 24     Hemoglobin 02/10/2022 12 9     Hematocrit 02/10/2022 39 4     MCV 02/10/2022 93     MCH 02/10/2022 30 4     MCHC 02/10/2022 32 7     RDW 02/10/2022 12 1     MPV 02/10/2022 10 2     Platelets 43/89/2718 241     nRBC 02/10/2022 0     Neutrophils Relative 02/10/2022 57     Immat GRANS % 02/10/2022 0     Lymphocytes Relative 02/10/2022 31     Monocytes Relative 02/10/2022 8     Eosinophils Relative 02/10/2022 3     Basophils Relative 02/10/2022 1     Neutrophils Absolute 02/10/2022 2 34     Immature Grans Absolute 02/10/2022 0 01     Lymphocytes Absolute 02/10/2022 1 30     Monocytes Absolute 02/10/2022 0 34     Eosinophils Absolute 02/10/2022 0 13     Basophils Absolute 02/10/2022 0 03     Sodium 02/10/2022 141     Potassium 02/10/2022 3 6     Chloride 02/10/2022 106     CO2 02/10/2022 31     ANION GAP 02/10/2022 4     BUN 02/10/2022 9     Creatinine 02/10/2022 1 04     Glucose 02/10/2022 106     Calcium 02/10/2022 8 5     eGFR 02/10/2022 58     Total Bilirubin 02/10/2022 1 06*    Bilirubin, Direct 02/10/2022 0 19     Alkaline Phosphatase 02/10/2022 80     AST 02/10/2022 21     ALT 02/10/2022 35     Total Protein 02/10/2022 6 1*    Albumin 02/10/2022 3 1*    Magnesium 02/10/2022 2 1        Imaging Studies:   I have personally reviewed pertinent imaging studies  ERCP    Result Date: 2/9/2022  Narrative: [id number] [facility] Operating Room [address] [place] [id number] [phone] DATE OF SERVICE: 2/09/22 PHYSICIAN(S): Gibson Zamarripa DO Proceduralist INDICATION: Choledocholithiasis POST-OP DIAGNOSIS: See the impression below  PREPROCEDURE: Informed consent was obtained for the procedure, including sedation  Risks of perforation, hemorrhage, adverse drug reaction and aspiration were discussed  The patient was placed in the left lateral decubitus position  Patient was explained about the risks and benefits of the procedure  Risks including but not limited to bleeding, infection, and perforation were explained in detail  Also explained about less than 100% sensitivity with the exam and other alternatives  DETAILS OF PROCEDURE: Patient was taken to the procedure room where a time out was performed to confirm correct patient and correct procedure  The patient underwent general anesthesia, which was administered by an anesthesia professional  The patient's blood pressure, heart rate, level of consciousness, respirations and oxygen were monitored throughout the procedure  Clinical intention was achieved  The patient experienced no blood loss  The procedure was not difficult  The patient tolerated the procedure well  ANESTHESIA INFORMATION: ASA: II Anesthesia Type: General MEDICATIONS: indomethacin (INDOCIN) rectal suppository 100 mg 100 mg iohexol (OMNIPAQUE) 240 MG/ML solution 45 mL (Totals for administrations occurring from 1446 to 1521 on 02/09/22) FINDINGS: Deeply cannulated the common bile duct after 2 attempts using a traction sphincterotome  Used 260 cm x 0 035 in straight guidewire  Cannulation was not difficult  Injected contrast  There is a back nerve stimulator and spinal metal supports that were within the field of my cholangiogram   The cholangiogram demonstrated 3 stones in the distal common bile duct, the largest of which was 10 mm in size  Performed complete 10 mm major papilla sphincterotomy using a sphincterotome  No bleeding at the procedure site  3 cholesterol non-obstructing stones in the common bile duct without sludge present  Three stones were removed with a 9/12 retrieval balloon  An occlusion cholangiogram was performed following the removal of the stone showing no evidence of additional stones  SPECIMENS: * No specimens in log *      Impression: 1  Choledocholithiasis, status post sphincterotomy and removal of 3 common bile duct stones RECOMMENDATION:  1  Resume regular diet 2  If stable in the morning then discharge  Dalton Martinez DO [facility] ERCP biliary only    Result Date: 2/10/2022  Narrative: ERCP INDICATION:  Patient initially presents to the emergency department February 7, 2022 with abdominal pain for 4 days  Nausea and vomiting  Worsening symptoms with eating, movement and palpation  Found to have cholelithiasis and choledocholithiasis   COMPARISON:  CT abdomen pelvis with contrast February 7, 2022 and right upper quadrant ultrasound February 7, 2022 IMAGES:  6 FLUOROSCOPY TIME:   159 6 seconds CONTRAST: 45 mL of iohexol (OMNIPAQUE) FINDINGS: Fluoroscopic guidance was provided for performance of ERCP  BILIARY: There is a spinal stimulator with battery pack overlying the right upper quadrant  Postoperative changes in the lumbar spine also limiting evaluation  Intervertebral disc space cages as well as bilateral pedicle screws and lateral fixation bars obscure  the field of view  The common bile duct and extrahepatic bile ducts are moderately dilated  Intrahepatic bile ducts are mildly dilated  There are 3 intraluminal filling defects are seen in the common bile duct on the initial examination  Following sphincterotomy and stone extraction, no further filling defects were seen  Impression: Fluoroscopic guidance for ERCP  Please see procedure report for full details  Workstation performed: [id number]     US right upper quadrant    Result Date: 2/7/2022  Narrative: RIGHT UPPER QUADRANT ULTRASOUND INDICATION:     Ruq pain  Biliary ductal dilatation with possible choledocholithiasis on CT earlier today COMPARISON:  CT 2/7/2022 TECHNIQUE:   Real-time ultrasound of the right upper quadrant was performed with a curvilinear transducer with both volumetric sweeps and still imaging techniques  FINDINGS: PANCREAS:  Visualized portions of the pancreas are within normal limits  AORTA AND IVC:  Visualized portions are normal for patient age  LIVER: Size:  Mildly enlarged  The liver measures 17 0 cm in the midclavicular line  Contour:  Surface contour is smooth  Parenchyma:  Echogenicity and echotexture are within normal limits  No evidence of suspicious mass  Limited imaging of the main portal vein shows it to be patent and hepatopetal   BILIARY: Large gallstone or gallstones within the gallbladder lumen with posterior shadowing  No definitive wall thickening or pericholecystic fluid appreciated    A positive sonographic Ortega's sign was elicited  No intrahepatic biliary dilatation   Common bile duct dilated at 12 mm with 11 mm calculus in the distal common bile duct  No choledocholithiasis  KIDNEY: Right kidney measures 11 4 x 4 2 x 5 3 cm  Within normal limits  ASCITES:   None  Impression: 1  Gallstones filling the gallbladder lumen without wall thickening or pericholecystic fluid although a positive sonographic Ortega sign is elicited  2   Biliary ductal dilatation with common bile duct measuring up to 12 mm with an 11 mm calculus in the distal common bile duct identified  The study was marked in [facility] for immediate notification  Workstation performed: [id number]     CT abdomen pelvis with contrast    Result Date: 2/7/2022  Narrative: CT ABDOMEN AND PELVIS WITH IV CONTRAST INDICATION:   right flank pain  COMPARISON:  None  TECHNIQUE:  CT examination of the abdomen and pelvis was performed  Axial, sagittal, and coronal 2D reformatted images were created from the source data and submitted for interpretation  Radiation dose length product (DLP) for this visit:  667 55 mGy-cm   This examination, like all CT scans performed in the [facility], was performed utilizing techniques to minimize radiation dose exposure, including the use of iterative  reconstruction and automated exposure control  IV Contrast:  100 mL of iohexol (OMNIPAQUE) Enteric Contrast:  Enteric contrast was not administered  FINDINGS: ABDOMEN LOWER CHEST:  No clinically significant abnormality identified in the visualized lower chest  LIVER/BILIARY TREE:  Liver is within normal limits  Mild intrahepatic biliary ductal dilatation  Dilated common bile duct measuring up to 1 5 cm with slight tapering the pancreatic head, but with abrupt cut off distally with possible high density material in the duct near the ampulla (series 601 image 76)  GALLBLADDER:  Large, peripherally calcified gallstone versus gallbladder wall calcifications with mild gallbladder wall thickening  SPLEEN:  Unremarkable  PANCREAS:  Atrophic, but otherwise within normal limits   ADRENAL GLANDS: Unremarkable  KIDNEYS/URETERS:  Punctate nonobstructing left renal lower pole calculus    No hydronephrosis  STOMACH AND BOWEL:  There is colonic diverticulosis without evidence of acute diverticulitis  APPENDIX:  No findings to suggest appendicitis  ABDOMINOPELVIC CAVITY:  No ascites  No pneumoperitoneum  No lymphadenopathy  VESSELS:  Unremarkable for patient's age  PELVIS REPRODUCTIVE ORGANS:  Unremarkable for patient's age  URINARY BLADDER:  Unremarkable  ABDOMINAL WALL/INGUINAL REGIONS:  Spinal cord stimulator in the right back subcutaneous tissues with lead tips in the thecal sac out of field-of-view  OSSEOUS STRUCTURES:  No acute fracture or destructive osseous lesion  Spinal degenerative changes are noted  Posterior paired [place] and pedicle screw fixation with interbody cages from L3 through S1  Impression: Large, peripherally calcified gallstone versus gallbladder wall calcifications with mild gallbladder wall thickening, but no significant pericholecystic inflammatory change  Dilated common bile duct with abrupt cut off distally with possible high density material in the duct near the ampulla  Right upper quadrant ultrasound is recommended to further evaluate the above findings  The study was marked in [facility] for immediate notification  Workstation performed: [id number]         Assessment & Plan    Cholelithiasis/choledocholithiasis     - Patient presented with complaints of worsening RUQ abdominal pain, nausea, poor appetite, and bloating   - CT A/P 2/7 showed a large calcified gallstone and dilated CBD with abrupt cut off distally with possible high density material in the duct  - RUQ US 2/7 showed gallstones without wall thickening of pericholecystic fluid and biliary ductal dilation with CBD on 12 mm and an 11 mm calculus in the distal common bile duct  - ERCP was performed on 2/9: sphincterotomy was performed and 3 common bile duct stones were removed    - She is s/p lap isai this am   - Monitor patient and labs, post-op care, etc     The patient will be seen by Dr Cole Delgado  GI will sign off, please contact with concerns/questions      Macarena Hicks PA-C  2/10/2022,1:47 PM

## 2022-02-10 NOTE — CASE MANAGEMENT
Case Management Discharge Planning Note    Patient name Meron Qubrittney  Location /-94 MRN [id number]  : 1960 Date 2/10/2022       Current Admission Date: 2022  Current Admission Diagnosis:Choledocholithiasis   Patient Active Problem List    Diagnosis Date Noted    Right upper quadrant pain 2022    Choledocholithiasis 2022    Chronic low back pain 2022    Opioid dependence ([address] ) 2022    Hypertension 2022      LOS (days): 3  Geometric Mean LOS (GMLOS) (days): 3 50  Days to GMLOS:0 7     OBJECTIVE:  Risk of Unplanned Readmission Score: 9         Current admission status: Inpatient   Preferred Pharmacy:   [address] [place] [address]  Phone: [phone] Fax: [phone]   [address] [facility]  Phone: [phone] Fax: [phone]    Primary Care Provider: Milind Greenwood MD    Primary Insurance: BLUE CROSS  Secondary Insurance:     DISCHARGE DETAILS:    Additional Comments: Patient reviewed during care coordination rounds with Dr Doreen Mercado who informed that pt will likely be medically stable for discharge in 24 hours  No CM needs identified at this time, CM department to remain available

## 2022-02-10 NOTE — ASSESSMENT & PLAN NOTE
· Visualized on ultrasound, no evidence of acute superimposed infection  · Status post ERCP on 2/9 with 3 CBD stones removed and sphincterotomy   · Now plan for cholecystectomy today, follow up post op

## 2022-02-10 NOTE — PROGRESS NOTES
[facility]  Progress Note Debra Tip 1960, 63 y o  female MRN: [id number]  Unit/Bed#: OR [place] Encounter: [id number]  Primary Care Provider: Taryn Sears MD   Date and time admitted to hospital: 2022  2:23 PM    * Choledocholithiasis  Assessment & Plan  · Visualized on ultrasound, no evidence of acute superimposed infection  · Status post ERCP on  with 3 CBD stones removed and sphincterotomy   · Now plan for cholecystectomy today, follow up post op    Hypertension  Assessment & Plan  · Adequately controlled, low normal blood pressures today  · Discontinued lisinopril for now     Opioid dependence ([facility] [address] )  Assessment & Plan  · chronically on opioids due to low back pain  · monitor on home regimen    Chronic low back pain  Assessment & Plan  · Status post multiple spinal surgeries and spinal stimulator  · On chronic opioids  · Continue home meds        VTE Pharmacologic Prophylaxis: VTE Score: 3 Moderate Risk (Score 3-4) - Pharmacological DVT Prophylaxis Ordered: heparin  Patient Centered Rounds: I performed bedside rounds with nursing staff today  Discussions with Specialists or Other Care Team Provider: GI, surgery     Education and Discussions with Family / Patient: Patient declined call to   Time Spent for Care: 30 minutes  More than 50% of total time spent on counseling and coordination of care as described above  Current Length of Stay: 3 day(s)  Current Patient Status: Inpatient   Certification Statement: The patient will continue to require additional inpatient hospital stay due to cholecystectomy   Discharge Plan: Anticipate discharge in 24-48 hrs to home  Code Status: Level 1 - Full Code    Subjective:   No chest pain or chest palpitations  No shortness of breath  Appetite is good         Objective:     Vitals:   Temp (24hrs), Av 7 °F (36 5 °C), Min:97 2 °F (36 2 °C), Max:98 7 °F (37 1 °C)    Temp:  [97 2 °F (36 2 °C)-98 7 °F (37 1 °C)] 98 7 °F (37 1 °C)  HR:  [59-89] 59  Resp:  [12-21] 12  BP: ()/(53-81) 132/71  SpO2:  [90 %-100 %] 96 %  Body mass index is 27 45 kg/m²  Input and Output Summary (last 24 hours): Intake/Output Summary (Last 24 hours) at 2/10/2022 1130  Last data filed at 2/10/2022 0949  Gross per 24 hour   Intake 1660 ml   Output --   Net 1660 ml       Physical Exam:   Physical Exam  Vitals and nursing note reviewed  Constitutional:       Appearance: Normal appearance  HENT:      Head: Normocephalic and atraumatic  Right Ear: External ear normal       Left Ear: External ear normal       Nose: No congestion or rhinorrhea  Mouth/Throat:      Mouth: Mucous membranes are dry  Pharynx: Oropharynx is clear  Eyes:      General:         Right eye: No discharge  Left eye: No discharge  Cardiovascular:      Rate and Rhythm: Normal rate and regular rhythm  Pulmonary:      Effort: Pulmonary effort is normal  No respiratory distress  Abdominal:      General: Abdomen is flat  Palpations: Abdomen is soft  Musculoskeletal:      Cervical back: Normal range of motion and neck supple  Right lower leg: No edema  Left lower leg: No edema  Skin:     General: Skin is warm and dry  Neurological:      General: No focal deficit present  Mental Status: She is alert  Mental status is at baseline     Psychiatric:         Mood and Affect: Mood normal          Behavior: Behavior normal           Additional Data:     Labs:  Results from last 7 days   Lab Units 02/10/22  0523   WBC Thousand/uL 4 15*   HEMOGLOBIN g/dL 12 9   HEMATOCRIT % 39 4   PLATELETS Thousands/uL 241   NEUTROS PCT % 57   LYMPHS PCT % 31   MONOS PCT % 8   EOS PCT % 3     Results from last 7 days   Lab Units 02/10/22  0523   SODIUM mmol/L 141   POTASSIUM mmol/L 3 6   CHLORIDE mmol/L 106   CO2 mmol/L 31   BUN mg/dL 9   CREATININE mg/dL 1 04   ANION GAP mmol/L 4   CALCIUM mg/dL 8 5   ALBUMIN g/dL 3 1*   TOTAL BILIRUBIN mg/dL 1 06*   ALK PHOS U/L 80   ALT U/L 35   AST U/L 21   GLUCOSE RANDOM mg/dL 106     Results from last 7 days   Lab Units 02/08/22  0525   INR  1 02     Results from last 7 days   Lab Units 02/09/22  1620   POC GLUCOSE mg/dl 108               Lines/Drains:  Invasive Devices  Report    Peripheral Intravenous Line            Peripheral IV 02/07/22 Right Antecubital 2 days                      Imaging: No pertinent imaging reviewed  Recent Cultures (last 7 days):         Last 24 Hours Medication List:   Current Facility-Administered Medications   Medication Dose Route Frequency Provider Last Rate    [MAR Hold] acetaminophen  650 mg Oral Q6H PRN Alyce Duarte DO      diphenhydrAMINE  12 5 mg Intravenous Q10 Min PRN Aysha Posadas MD      [facility] Hold] HYDROmorphone  0 5 mg Intravenous Q4H PRN Kita Hewitt MD      [facility] Hold] HYDROmorphone  1 mg Intravenous Q4H PRN Kita Hewitt MD      lactated ringers  125 mL/hr Intravenous Continuous Aysha Posadas  mL/hr (02/10/22 2141)    lactated ringers  20 mL/hr Intravenous Continuous Aysha Posadas MD      [facility] Hold] lidocaine (PF)  0 5 mL Infiltration Once PRN Rey Gutierrez MD      [facility] Hold] methocarbamol  750 mg Oral 4x Daily Alyce Duarte [facility] Hold] ondansetron  4 mg Intravenous Q6H PRN Alyce Duarte [facility] Hold] oxyCODONE ER  54 mg Oral QPM Alyce Duarte DO      [facility] Hold] oxyCODONE ER  72 mg Oral QAM Alyce Duarte [facility] Hold] pregabalin  75 mg Oral TID Alyce Duarte DO      sodium chloride  75 mL/hr Intravenous Continuous Alyce Duarte DO 75 mL/hr (02/09/22 2100)        Today, Patient Was Seen By: Erika Colon MD    **Please Note: This note may have been constructed using a voice recognition system  **

## 2022-02-11 VITALS
BODY MASS INDEX: 27.51 KG/M2 | HEART RATE: 66 BPM | WEIGHT: 175.27 LBS | DIASTOLIC BLOOD PRESSURE: 68 MMHG | SYSTOLIC BLOOD PRESSURE: 127 MMHG | OXYGEN SATURATION: 97 % | HEIGHT: 67 IN | RESPIRATION RATE: 18 BRPM | TEMPERATURE: 98.5 F

## 2022-02-11 PROBLEM — R74.01 TRANSAMINITIS: Status: ACTIVE | Noted: 2022-02-11

## 2022-02-11 LAB
ALBUMIN SERPL BCP-MCNC: 3 G/DL (ref 3.5–5)
ALP SERPL-CCNC: 81 U/L (ref 46–116)
ALT SERPL W P-5'-P-CCNC: 92 U/L (ref 12–78)
ANION GAP SERPL CALCULATED.3IONS-SCNC: 5 MMOL/L (ref 4–13)
AST SERPL W P-5'-P-CCNC: 103 U/L (ref 5–45)
BASOPHILS # BLD AUTO: 0.02 THOUSANDS/ΜL (ref 0–0.1)
BASOPHILS NFR BLD AUTO: 0 % (ref 0–1)
BILIRUB DIRECT SERPL-MCNC: 0.09 MG/DL (ref 0–0.2)
BILIRUB SERPL-MCNC: 0.61 MG/DL (ref 0.2–1)
BUN SERPL-MCNC: 5 MG/DL (ref 5–25)
CALCIUM SERPL-MCNC: 8.7 MG/DL (ref 8.3–10.1)
CHLORIDE SERPL-SCNC: 104 MMOL/L (ref 100–108)
CO2 SERPL-SCNC: 31 MMOL/L (ref 21–32)
CREAT SERPL-MCNC: 1.02 MG/DL (ref 0.6–1.3)
EOSINOPHIL # BLD AUTO: 0.04 THOUSAND/ΜL (ref 0–0.61)
EOSINOPHIL NFR BLD AUTO: 1 % (ref 0–6)
ERYTHROCYTE [DISTWIDTH] IN BLOOD BY AUTOMATED COUNT: 11.9 % (ref 11.6–15.1)
GFR SERPL CREATININE-BSD FRML MDRD: 60 ML/MIN/1.73SQ M
GLUCOSE SERPL-MCNC: 106 MG/DL (ref 65–140)
HCT VFR BLD AUTO: 36.8 % (ref 34.8–46.1)
HGB BLD-MCNC: 12.4 G/DL (ref 11.5–15.4)
IMM GRANULOCYTES # BLD AUTO: 0.03 THOUSAND/UL (ref 0–0.2)
IMM GRANULOCYTES NFR BLD AUTO: 0 % (ref 0–2)
LYMPHOCYTES # BLD AUTO: 1.26 THOUSANDS/ΜL (ref 0.6–4.47)
LYMPHOCYTES NFR BLD AUTO: 17 % (ref 14–44)
MAGNESIUM SERPL-MCNC: 1.9 MG/DL (ref 1.6–2.6)
MCH RBC QN AUTO: 30.6 PG (ref 26.8–34.3)
MCHC RBC AUTO-ENTMCNC: 33.7 G/DL (ref 31.4–37.4)
MCV RBC AUTO: 91 FL (ref 82–98)
MONOCYTES # BLD AUTO: 0.58 THOUSAND/ΜL (ref 0.17–1.22)
MONOCYTES NFR BLD AUTO: 8 % (ref 4–12)
NEUTROPHILS # BLD AUTO: 5.33 THOUSANDS/ΜL (ref 1.85–7.62)
NEUTS SEG NFR BLD AUTO: 74 % (ref 43–75)
NRBC BLD AUTO-RTO: 0 /100 WBCS
PLATELET # BLD AUTO: 241 THOUSANDS/UL (ref 149–390)
PMV BLD AUTO: 10.5 FL (ref 8.9–12.7)
POTASSIUM SERPL-SCNC: 3.7 MMOL/L (ref 3.5–5.3)
PROT SERPL-MCNC: 6.1 G/DL (ref 6.4–8.2)
RBC # BLD AUTO: 4.05 MILLION/UL (ref 3.81–5.12)
SODIUM SERPL-SCNC: 140 MMOL/L (ref 136–145)
WBC # BLD AUTO: 7.26 THOUSAND/UL (ref 4.31–10.16)

## 2022-02-11 PROCEDURE — 80076 HEPATIC FUNCTION PANEL: CPT | Performed by: INTERNAL MEDICINE

## 2022-02-11 PROCEDURE — 99024 POSTOP FOLLOW-UP VISIT: CPT | Performed by: PHYSICIAN ASSISTANT

## 2022-02-11 PROCEDURE — 83735 ASSAY OF MAGNESIUM: CPT | Performed by: INTERNAL MEDICINE

## 2022-02-11 PROCEDURE — 80048 BASIC METABOLIC PNL TOTAL CA: CPT | Performed by: INTERNAL MEDICINE

## 2022-02-11 PROCEDURE — 99239 HOSP IP/OBS DSCHRG MGMT >30: CPT | Performed by: STUDENT IN AN ORGANIZED HEALTH CARE EDUCATION/TRAINING PROGRAM

## 2022-02-11 PROCEDURE — 85025 COMPLETE CBC W/AUTO DIFF WBC: CPT | Performed by: INTERNAL MEDICINE

## 2022-02-11 RX ADMIN — PREGABALIN 75 MG: 75 CAPSULE ORAL at 16:07

## 2022-02-11 RX ADMIN — PREGABALIN 75 MG: 75 CAPSULE ORAL at 08:02

## 2022-02-11 RX ADMIN — OXYCODONE 72 MG: 36 CAPSULE, EXTENDED RELEASE ORAL at 08:02

## 2022-02-11 RX ADMIN — METHOCARBAMOL 750 MG: 750 TABLET ORAL at 08:02

## 2022-02-11 RX ADMIN — METHOCARBAMOL 750 MG: 750 TABLET ORAL at 13:14

## 2022-02-11 RX ADMIN — CEFAZOLIN SODIUM 2000 MG: 2 SOLUTION INTRAVENOUS at 04:24

## 2022-02-11 RX ADMIN — SODIUM CHLORIDE, SODIUM LACTATE, POTASSIUM CHLORIDE, AND CALCIUM CHLORIDE 125 ML/HR: .6; .31; .03; .02 INJECTION, SOLUTION INTRAVENOUS at 08:04

## 2022-02-11 RX ADMIN — HYDROMORPHONE HYDROCHLORIDE 1 MG: 1 INJECTION, SOLUTION INTRAMUSCULAR; INTRAVENOUS; SUBCUTANEOUS at 14:49

## 2022-02-11 NOTE — PLAN OF CARE
Problem: GASTROINTESTINAL - ADULT  Goal: Minimal or absence of nausea and/or vomiting  Description: INTERVENTIONS:  - Administer IV fluids if ordered to ensure adequate hydration  - Administer ordered antiemetic medications as needed  - Provide nonpharmacologic comfort measures as appropriate  - Advance diet as tolerated, if ordered  - Consider nutrition services referral to assist patient with adequate nutrition and appropriate food choices  Outcome: Progressing     Problem: MOBILITY - ADULT  Goal: Maintain or return to baseline ADL function  Description: INTERVENTIONS:  -  Assess patient's ability to carry out ADLs; assess patient's baseline for ADL function and identify physical deficits which impact ability to perform ADLs (bathing, care of mouth/teeth, toileting, grooming, dressing, etc )  - Assess/evaluate cause of self-care deficits   - Assess range of motion  - Assess patient's mobility; develop plan if impaired  - Assess patient's need for assistive devices and provide as appropriate  - Encourage maximum independence but intervene and supervise when necessary  - Involve family in performance of ADLs  - Assess for home care needs following discharge   - Consider OT consult to assist with ADL evaluation and planning for discharge  - Provide patient education as appropriate  Outcome: Progressing     Problem: PAIN - ADULT  Goal: Verbalizes/displays adequate comfort level or baseline comfort level  Description: Interventions:  - Encourage patient to monitor pain and request assistance  - Assess pain using appropriate pain scale  - Administer analgesics based on type and severity of pain and evaluate response  - Implement non-pharmacological measures as appropriate and evaluate response  - Consider cultural and social influences on pain and pain management  - Notify physician/advanced practitioner if interventions unsuccessful or patient reports new pain  Outcome: Progressing     Problem: INFECTION - ADULT  Goal: Absence or prevention of progression during hospitalization  Description: INTERVENTIONS:  - Assess and monitor for signs and symptoms of infection  - Monitor lab/diagnostic results  - Monitor all insertion sites, i e  indwelling lines, tubes, and drains  - [place] appropriate cooling/warming therapies per order  - Administer medications as ordered  - Instruct and encourage patient and family to use good hand hygiene technique  - Identify and instruct in appropriate isolation precautions for identified infection/condition  Outcome: Progressing     Problem: SAFETY ADULT  Goal: Patient will remain free of falls  Description: INTERVENTIONS:  - Educate patient/family on patient safety including physical limitations  - Instruct patient to call for assistance with activity   - Consult OT/PT to assist with strengthening/mobility   - Keep Call bell within reach  - Keep bed low and locked with side rails adjusted as appropriate  - Keep care items and personal belongings within reach  - Initiate and maintain comfort rounds  - Make Fall Risk Sign visible to staff  - patient ambulates independently with a cane    - Apply yellow socks and bracelet for high fall risk patients  - Consider moving patient to room near nurses station  Outcome: Progressing

## 2022-02-11 NOTE — DISCHARGE SUMMARY
[facility]  Discharge- Darral Abo 1960, 63 y o  female MRN: [id number]  Unit/Bed#: -01 Encounter: [id number]  Primary Care Provider: Jo Massey MD   Date and time admitted to hospital: 2/7/2022  2:23 PM    * Choledocholithiasis  Assessment & Plan  · Visualized on ultrasound, no evidence of acute superimposed infection  · Also noted to have cholelithiasis   · Status post ERCP and lap isai  · Gallbladder with abnormal appearance, sent for pathology evaluation, follow up results as an outpatient  · Now stable for discharge     Hypertension  Assessment & Plan  · Adequately controlled, Stable for discharge on home regimen     Transaminitis  Assessment & Plan  · Mild transaminitis, likely reactive in setting of lap isai  · Follow up as an outpatient     [facility]  Assessment & Plan  · chronically on opioids due to low back pain  · monitor on home regimen    Chronic low back pain  Assessment & Plan  · Status post multiple spinal surgeries and spinal stimulator  · On chronic opioids  · Continue home meds      Medical Problems             Resolved Problems  Date Reviewed: 2/9/2022    None              Discharging Physician / Practitioner: Gallito Mesa MD  PCP: Jo Massey MD  Admission Date:   Admission Orders (From admission, onward)     Ordered        02/07/22 Rowena Riggins [facility]  Inpatient Admission  Once                      Discharge Date: 02/11/22    Consultations During Hospital Stay:  IP CONSULT TO GASTROENTEROLOGY  IP CONSULT TO ACUTE CARE SURGERY  ·     Procedures Performed:   · imaging    Significant Findings / Test Results:   · ERCP, lap isai    Incidental Findings:   Principal Problem:    Choledocholithiasis  Active Problems:    Hypertension    Chronic low back pain    Opioid dependence ([facility] )    Transaminitis  Resolved Problems:    * No resolved hospital problems   *    ·      Test Results Pending at Discharge (will require follow up):   · Gallbladder pathology results     Outpatient Tests Requested:  · Follow up with PCP     Complications:  None     Reason for Admission: abdominal pain     Hospital Course:   Nirmal Patricio is a 63 y o  female patient who originally presented to the hospital on 2/7/2022 due to abdominal pain secondary to cholelithiasis with choledocholithiasis now status post ERCP and lap isai  Stable for discharge  Condition at Discharge: good    Discharge Day Visit / Exam:   * Please refer to separate progress note for these details *    Discussion with Family: Patient declined call to   Discharge instructions/Information to patient and family:   See after visit summary for information provided to patient and family  Provisions for Follow-Up Care:  See after visit summary for information related to follow-up care and any pertinent home health orders  Disposition:   Home    Planned Readmission: none      Discharge Statement:  I spent 30+ minutes discharging the patient  This time was spent on the day of discharge  I had direct contact with the patient on the day of discharge  Greater than 50% of the total time was spent examining patient, answering all patient questions, arranging and discussing plan of care with patient as well as directly providing post-discharge instructions  Additional time then spent on discharge activities  Discharge Medications:  See after visit summary for reconciled discharge medications provided to patient and/or family        **Please Note: This note may have been constructed using a voice recognition system**

## 2022-02-11 NOTE — ASSESSMENT & PLAN NOTE
· Visualized on ultrasound, no evidence of acute superimposed infection  · Also noted to have cholelithiasis   · Status post ERCP and lap isai  · Gallbladder with abnormal appearance, sent for pathology evaluation, follow up results as an outpatient  · Now stable for discharge

## 2022-02-11 NOTE — PLAN OF CARE
Problem: PAIN - ADULT  Goal: Verbalizes/displays adequate comfort level or baseline comfort level  Description: Interventions:  - Encourage patient to monitor pain and request assistance  - Assess pain using appropriate pain scale  - Administer analgesics based on type and severity of pain and evaluate response  - Implement non-pharmacological measures as appropriate and evaluate response  - Consider cultural and social influences on pain and pain management  - Notify physician/advanced practitioner if interventions unsuccessful or patient reports new pain  Outcome: Progressing     Problem: SAFETY ADULT  Goal: Maintain or return to baseline ADL function  Description: INTERVENTIONS:  -  Assess patient's ability to carry out ADLs; assess patient's baseline for ADL function and identify physical deficits which impact ability to perform ADLs (bathing, care of mouth/teeth, toileting, grooming, dressing, etc )  - Assess/evaluate cause of self-care deficits   - Assess range of motion  - Assess patient's mobility; develop plan if impaired  - Assess patient's need for assistive devices and provide as appropriate  - Encourage maximum independence but intervene and supervise when necessary  - Involve family in performance of ADLs  - Assess for home care needs following discharge   - Consider OT consult to assist with ADL evaluation and planning for discharge  - Provide patient education as appropriate  Outcome: Progressing     Problem: DISCHARGE PLANNING  Goal: Discharge to home or other facility with appropriate resources  Description: INTERVENTIONS:  - Identify barriers to discharge w/patient and caregiver  - Arrange for needed discharge resources and transportation as appropriate  - Identify discharge learning needs (meds, wound care, etc )  - Arrange for interpretive services to assist at discharge as needed  - Refer to Case Management Department for coordinating discharge planning if the patient needs post-hospital services based on physician/advanced practitioner order or complex needs related to functional status, cognitive ability, or social support system  Outcome: Progressing     Problem: Knowledge Deficit  Goal: Patient/family/caregiver demonstrates understanding of disease process, treatment plan, medications, and discharge instructions  Description: Complete learning assessment and assess knowledge base    Interventions:  - Provide teaching at level of understanding  - Provide teaching via preferred learning methods  Outcome: Progressing

## 2022-02-11 NOTE — DISCHARGE INSTRUCTIONS
Follow up: Following discharge from the hospital call the office in 1-2 days to set up a post operative appointment to be seen in 2 weeks in the Surgery office: [phone]  Follow up with your PCP in the next 1-2 weeks  Please see your PCP before your surgery follow up visit  Call the office if you have increased pain not relieved with pain medicine  Call the office if you have a fever,redness, the wound opens up, you have pus draining from your incision  Laparoscopic Cholecystectomy    WHAT YOU SHOULD KNOW:    Cholecystitis is inflammation of your gallbladder  Your gallbladder stores bile, which helps break down the fat that you eat  It also helps remove certain chemicals from your body  You may have a sudden, severe attack (acute cholecystitis) or several mild attacks (chronic cholecystitis)  Laparoscopic cholecystectomy is surgery to remove your gallbladder  During this surgery, small incisions are made in your abdomen  A small scope and special tools are inserted through these incisions  Your gallbladder is removed from it normal location and taken out of your abdomen  The incisions are closed with sutures and a small amount of glue is applied over top incisions to help reinforce the incisions to optimize healing  AFTER YOU LEAVE: Following discharge from the hospital, you may have some questions about your procedure, your activities or your general condition  These instructions may answer some of your questions and help you adjust during the first few days following your operation  You can expect to be sore and tender mostly around the incisions  This pain should last approximally 5 days and gradually improve daily  Incisions:  - Your doctor may have chosen to use a type of adhesive glue, to close your incision  The glue is used to cover the incision, assist in closure, and prevent contamination so to optimize healing   This adhesive glue will darken and may appear as a purple film over the incision  Do not pick at the glue, it will peel away on its own within one to two weeks  - You may apply ice to the incisions to help with pain  Avoid heat as this may make the glue tacky   - It is normal to have some bruising, swelling or mild discoloration around the incision  If increasing redness or pain develops, call our office immediately  - Do not apply any creams, lotions, or ointments  Bathing:   - You may shower daily with soap and water the day after the procedure  It is OK to GENTLY wash the incision with soap and water then pat dry  DO NOT SCRUB  Do NOT soak incision in a tub, pool, or hot tub for 2 weeks  Diet:   - Resume your normal diet unless specified otherwise  We recommend you slowly advance your diet  Try to start with softer bland foods and gradually advance as tolerated  Be sure to consume plenty of water  Avoid alcohol  Activity/Restrictions:   - The evening following the procedure you should rest as much as possible, sitting, lying or reclining  you should be sure someone remains with you until the next morning  Gradually increase your activity daily  Walking 3-4 times daily is good and stairs are ok  Listen to your body  If you start to get tired or sore then rest    - No strenuous activity or exercise for 3-4 weeks  - No heavy lifting, pushing or pulling    - No driving for 5 days or while taking narcotics for pain  Return or work: You may return to work or other activities as soon as your pain is controlled and you feel comfortable  For many people, this is 5 to 7 days after surgery  If your job requires heavy lifting you will need to be on light duty for 2-3 weeks  Call the office if you need a work note or paperwork to be completed      Medication:   - If you were given a prescription for Percocet, Norco, or Vicodin for pain be sure to eat prior to taking as these medications as they may cause nausea and vomiting on an empty stomach     - If you do not want to take stronger medications for pain, you may take Tylenol, Aleve OR Ibuprofen  - DO NOT take Tylenol  (acetaminophen) with these medication for a fever or for further pain control as these medications already contain Tylenol in them  Take one or the other  Do not exceed more than 4000 mg of acetaminophen in 24 hours or 3000 mg if you have liver disease  - If you were given an antibiotic take it until it is finished  Diet: Eat low-fat foods for 4 to 6 weeks while your body learns to digest fat without a gallbladder  Slowly increase the amount of fat that you eat  We recommend you slowly advance your diet  Try to start with softer bland foods and gradually advance as tolerated  Be sure to consume plenty of water  Avoid alcohol  Contact your healthcare provider if:   · You have a fever over 101°F (38°C) or chills  · You have pain or nausea that is not relieved by medicine  · You have redness and swelling around your incisions, or blood or pus is leaking from your incisions  · You are constipated or have diarrhea  · Your skin or eyes are yellow, or your bowel movements are pale  · You have questions or concerns about your surgery, condition, or care  Seek care immediately or call 911 if:   · You cannot stop vomiting  · Your bowel movements are black or bloody  · You have pain in your abdomen and it is swollen or hard  · Your arm or leg feels warm, tender, and painful  It may look swollen and red  · You feel lightheaded, short of breath, and have chest pain  · You cough up blood

## 2022-02-11 NOTE — PROGRESS NOTES
Progress Note -Surgery NARCISO Philippe 63 y o  female MRN: [id number]  Unit/Bed#: -[place] Encounter: [id number]      Assessment   63y F POD #1, s/p laparoscopic cholecystectomy for choledocholithiasis  - s/p ERCP 2/9/2022 with removal of 3 stones from the distal CBD  - afebrile, vital signs stable, no leukocytosis WBC 7 26   - AST and ALT with increased today , ALT 92; ALP and Tbili 81, 0 6 respectively and stable/trending down  - patient tolerating diet and passing flatus  Plan   Advance diet as tolerated  If patient tolerated she can likely be discharged  AST/ALT elevation may be late bump 2/2 ERCP and in setting of cholecystectomy  Tbili trending down  Discussed with GI  OK with outpatient lab to check LFTs  Follow up in the surgery office with Dr Caren Tubbs in 2 weeks  Patient may remove dressings tomorrow  Instructions discussed with patient and included in d/c paperwork  ______________________________________________________________________  Subjective:   Patient feeling well  She is tolerating clear liquid diet  No nausea or vomiting  She is passing flatus but no bowel movement  She is ambulating voiding without difficulty  She denies any fever or chills  She denies any chest pain or shortness of breath  She denies any lower extremity cramping or tenderness    Objective:      Vitals:  /68   Pulse 66   Temp 98 5 °F (36 9 °C)   Resp 18   Ht 5' 7" (1 702 m)   Wt 79 5 kg (175 lb 4 3 oz)   LMP  (LMP Unknown)   SpO2 97%   BMI 27 45 kg/m²     I/Os:  I/O last 3 completed shifts: In: 1880 [P O :1380; I V :500]  Out: 1700 [Urine:1700]    I/O this shift:   In: 900 [I V :900]  Out: -     Invasive Devices  Report    Peripheral Intravenous Line            Peripheral IV 02/07/22 Right Antecubital 3 days                Medications:  Current Facility-Administered Medications   Medication Dose Route Frequency    acetaminophen (TYLENOL) tablet 650 mg  650 mg Oral Q6H PRN    diphenhydrAMINE (BENADRYL) injection 12 5 mg  12 5 mg Intravenous Q10 Min PRN    HYDROmorphone (DILAUDID) injection 0 5 mg  0 5 mg Intravenous Q4H PRN    HYDROmorphone (DILAUDID) injection 1 mg  1 mg Intravenous Q4H PRN    methocarbamol (ROBAXIN) tablet 750 mg  750 mg Oral 4x Daily    ondansetron (ZOFRAN) injection 4 mg  4 mg Intravenous Q6H PRN    oxyCODONE ER C12A 18 mg  18 mg Oral QPM    And    oxyCODONE ER C12A 36 mg  36 mg Oral QPM    oxyCODONE ER C12A 72 mg  72 mg Oral After Breakfast    pregabalin (LYRICA) capsule 75 mg  75 mg Oral TID                 Lab Results and Cultures:   CBC with diff:   Lab Results   Component Value Date    WBC 7 26 02/11/2022    HGB 12 4 02/11/2022    HCT 36 8 02/11/2022    MCV 91 02/11/2022     02/11/2022    MCH 30 6 02/11/2022    MCHC 33 7 02/11/2022    RDW 11 9 02/11/2022    MPV 10 5 02/11/2022    NRBC 0 02/11/2022       BMP/CMP:  Lab Results   Component Value Date    K 3 7 02/11/2022     02/11/2022    CO2 31 02/11/2022    BUN 5 02/11/2022    CREATININE 1 02 02/11/2022    CALCIUM 8 7 02/11/2022     (H) 02/11/2022    ALT 92 (H) 02/11/2022    ALKPHOS 81 02/11/2022    EGFR 60 02/11/2022       Lipid Panel:   No results found for: CHOL    Coags:   Lab Results   Component Value Date    INR 1 02 02/08/2022        Urinalysis:   Lab Results   Component Value Date    COLORU Light Yellow 02/07/2022    CLARITYU Clear 02/07/2022    SPECGRAV 1 010 02/07/2022    PHUR 6 0 02/07/2022    LEUKOCYTESUR Negative 02/07/2022    NITRITE Negative 02/07/2022    GLUCOSEU Negative 02/07/2022    KETONESU Negative 02/07/2022    BILIRUBINUR Negative 02/07/2022    BLOODU Moderate (A) 02/07/2022        Urine Culture: No results found for: URINECX   Wound Culure: No results found for: WOUNDCULT  Blood Culture: No results found for: BLOODCX      Physical Exam:  General Appearance:    Alert and orientated x 3, cooperative, no distress, appears stated age   Lungs:     Clear to auscultation bilaterally, respirations unlabored, no wheezes    Heart:    Regular rate and rhythm, S1 and S2 normal, no murmur   Abdomen:    Normoactive BS, soft, appropriate incisional tenderness, non rigid, no masses, no palpated organomegaly  Wound/Dressing:  C/d/i, no drainage, no erythema, no fluctuance   Extremities:  Extremities normal, no calf tenderness, no cyanosis or edema   Pulses:   2+ and symmetric all extremities   Skin:   Skin color, texture, turgor normal, no rashes  , no jaundice   Neurologic:   CNII-XII intact, normal strength, affect appropriate       Imaging:  ERCP    Result Date: 2/9/2022  Impression: 1  Choledocholithiasis, status post sphincterotomy and removal of 3 common bile duct stones RECOMMENDATION:  1  Resume regular diet 2  If stable in the morning then discharge  Marija Ruiz DO [facility] ERCP biliary only    Result Date: 2/10/2022  Impression: Fluoroscopic guidance for ERCP  Please see procedure report for full details  Workstation performed: [id number]     US right upper quadrant    Result Date: 2/7/2022  Impression: 1  Gallstones filling the gallbladder lumen without wall thickening or pericholecystic fluid although a positive sonographic Ortega sign is elicited  2   Biliary ductal dilatation with common bile duct measuring up to 12 mm with an 11 mm calculus in the distal common bile duct identified  The study was marked in [facility] for immediate notification  Workstation performed: [id number]     CT abdomen pelvis with contrast    Result Date: 2/7/2022  Impression: Large, peripherally calcified gallstone versus gallbladder wall calcifications with mild gallbladder wall thickening, but no significant pericholecystic inflammatory change  Dilated common bile duct with abrupt cut off distally with possible high density material in the duct near the ampulla  Right upper quadrant ultrasound is recommended to further evaluate the above findings  The study was marked in [facility] for immediate notification  Workstation performed: [id number]     Veronica Valerio PA-C   2/11/2022

## 2022-02-14 ENCOUNTER — TELEPHONE (OUTPATIENT)
Dept: SURGERY | Facility: CLINIC | Age: 62
End: 2022-02-14

## 2022-02-14 ENCOUNTER — TRANSITIONAL CARE MANAGEMENT (OUTPATIENT)
Dept: FAMILY MEDICINE CLINIC | Facility: CLINIC | Age: 62
End: 2022-02-14

## 2022-02-14 NOTE — TELEPHONE ENCOUNTER
Patient had lap isai by Dr Jeremy Cartwright on 2/10/22  She is following pain management and received permission to ask for Oxycodone 40 mg 3 time daily  Please advise  Patient pharmacy is [facility] [facility]

## 2022-02-14 NOTE — TELEPHONE ENCOUNTER
Hello,    That is a very high dose of narcotic medication  I am not comfortable prescribing that for her  Pain management or her Primary care can prescribe that if they see fit  Her pain should be improving overall at this time      Thank you

## 2022-02-14 NOTE — UTILIZATION REVIEW
Notification of Discharge   This is a Notification of Discharge from our facility [address]  Please be advised that this patient has been discharge from our facility  Below you will find the admission and discharge date and time including the patients disposition  UTILIZATION REVIEW CONTACT:  Sonya Bentley  Utilization   Network Utilization Review Department  Phone: [phone] x carefully listen to the prompts  All voicemails are confidential   Email: [email or web address]     PHYSICIAN ADVISORY SERVICES:  FOR LMSN-IS-GTHZ REVIEW - MEDICAL NECESSITY DENIAL  Phone: [phone]  Fax: [phone]  Email: [email or web address]     PRESENTATION DATE: 2/7/2022  2:23 PM  OBERVATION ADMISSION DATE:   INPATIENT ADMISSION DATE: 2/7/22  6:56 PM   DISCHARGE DATE: 2/11/2022  4:42 PM  DISPOSITION: Home/Self Care Home/Self Care      IMPORTANT INFORMATION:  Send all requests for admission clinical reviews, approved or denied determinations and any other requests to dedicated fax number below belonging to the campus where the patient is receiving treatment   List of dedicated fax numbers:  [address] DENIALS (Administrative/Medical Necessity) [phone]   [address] (Maternity/NICU/Pediatrics) [phone]   Juany Day [phone]   [address] [place] [address] [phone]   [address] [place] [address] [phone]   [address] [place] [address] [address] [place] [address],[address] [phone]   [facility]  [phone]   [address] [place] [address]  [address] [facility] 1000 [facility] [phone]

## 2022-02-25 ENCOUNTER — TELEMEDICINE (OUTPATIENT)
Dept: FAMILY MEDICINE CLINIC | Facility: CLINIC | Age: 62
End: 2022-02-25

## 2022-02-25 ENCOUNTER — OFFICE VISIT (OUTPATIENT)
Dept: SURGERY | Facility: CLINIC | Age: 62
End: 2022-02-25

## 2022-02-25 VITALS
SYSTOLIC BLOOD PRESSURE: 110 MMHG | HEIGHT: 67 IN | BODY MASS INDEX: 27.15 KG/M2 | DIASTOLIC BLOOD PRESSURE: 82 MMHG | HEART RATE: 66 BPM | WEIGHT: 173 LBS

## 2022-02-25 DIAGNOSIS — R74.01 TRANSAMINITIS: ICD-10-CM

## 2022-02-25 DIAGNOSIS — R10.11 RIGHT UPPER QUADRANT PAIN: ICD-10-CM

## 2022-02-25 DIAGNOSIS — K80.50 CHOLEDOCHOLITHIASIS: Primary | ICD-10-CM

## 2022-02-25 PROCEDURE — 3008F BODY MASS INDEX DOCD: CPT | Performed by: FAMILY MEDICINE

## 2022-02-25 PROCEDURE — 99024 POSTOP FOLLOW-UP VISIT: CPT | Performed by: STUDENT IN AN ORGANIZED HEALTH CARE EDUCATION/TRAINING PROGRAM

## 2022-02-25 PROCEDURE — 99495 TRANSJ CARE MGMT MOD F2F 14D: CPT | Performed by: FAMILY MEDICINE

## 2022-02-25 PROCEDURE — 1111F DSCHRG MED/CURRENT MED MERGE: CPT | Performed by: FAMILY MEDICINE

## 2022-02-25 NOTE — PROGRESS NOTES
Assessment/Plan:  51-year-old female status post laparoscopic cholecystectomy on 02/10/2022  -patient is tolerating a diet well and having normal bowel function  -she denies any abdominal pain at this time  -laparoscopic incisions healing well without erythema induration or drainage  -pathology reviewed, noted to be negative for any malignancy  -recommend no heavy lifting greater than 15-20 lb until 03/10/2022  -follow-up in office as needed    Pathology Results:  Final Diagnosis   A  Gallbladder, cholecystectomy:  -  Chronic cholecystitis with dense mural fibrosis mixed chronic inflammation (porcelain gallbladder) and cholelithiasis  Electronically signed by Vani Mistry MD on 2/25/2022 at 12:50 PM   Comments: This is an appended report  These results have been appended to a previously preliminary verified report  Preliminary Diagnosis   A  Gallbladder, cholecystectomy:  -  Chronic cholecystitis with dense mural fibrosis mixed chronic inflammation (porcelain gallbladder) and cholelithiasis  -  Atypical B cell rich lymphoid infiltrate, cannot exclude reactive versus neoplastic, pending additional evaluation (see note)       Preliminary result electronically signed by Vani Mistry MD on 2/21/2022 at  8:09 AM   Note    The sample shows a densely fibrotic gallbladder with prominent chronic inflammation and scanty reactive epithelium    Immunohistochemical stains performed with appropriate controls show the inflammatory infiltrate to contain abundant CD20 positive B-cells which are negative for CD5, BCL6, and cyclin D1 with coexpression of BCL2 and partial expression of CD23 and background CD3 positive T-cells;  highlights increased plasma cells with in situ hybridization analysis showing polytypic expression of kappa and lambda light chains without clonality        The findings are atypical and show a dense irregular B-cell predominant infiltrate which favored to represent reactive changes, particularly in the setting of negative genetic evaluation for B-cell gene rearrangement (detailed below)  Correlation with clinical impression is recommended      - B-cell Gene Rearrangement by PCR([facility]#[id number] , evaluated by PARRIS Nicholson ):        Results  NEGATIVE - NO CLONAL B-CELL GENE REARRANGEMENT IS DETECTED  I reviewed the pathology report and discussed the findings with the patient and their accompanying family or caregiver, if present  All questions were answered to satisfaction and the patient along with family or caregiver, if present, voiced understanding  1  Choledocholithiasis    2  Right upper quadrant pain    3  Transaminitis               Subjective:      Patient ID: Jamila Mendiola is a 63 y o  female  Triage Notes:    Patient is a 61-year-old female who presents to office for evaluation status post laparoscopic cholecystectomy  She has been tolerating a diet well and having normal bowel function  She denies any abdominal pain  The following portions of the patient's history were reviewed and updated as appropriate: allergies, current medications, past family history, past medical history, past social history, past surgical history and problem list     Review of Systems   Constitutional: Negative for chills, fatigue and fever  HENT: Negative for congestion, hearing loss, rhinorrhea and sore throat  Eyes: Negative for pain and discharge  Respiratory: Negative for cough, chest tightness and shortness of breath  Cardiovascular: Negative for chest pain and palpitations  Gastrointestinal: Negative for abdominal pain, constipation, diarrhea, nausea and vomiting  Endocrine: Negative for cold intolerance and heat intolerance  Genitourinary: Negative for difficulty urinating and dysuria  Musculoskeletal: Positive for back pain  Negative for neck pain  Skin: Negative for color change and rash     Allergic/Immunologic: Negative for environmental allergies and food allergies  Neurological: Negative for seizures and headaches  Hematological: Negative for adenopathy  Does not bruise/bleed easily  Psychiatric/Behavioral: Negative for confusion and hallucinations  Objective:      /82   Pulse 66   Ht 5' 7" (1 702 m)   Wt 78 5 kg (173 lb)   LMP  (LMP Unknown)   BMI 27 10 kg/m²     Below is the patient's most recent value for Albumin, ALT, AST, BUN, Calcium, Chloride, Cholesterol, CO2, Creatinine, GFR, Glucose, HDL, Hematocrit, Hemoglobin, Hemoglobin A1C, LDL, Magnesium, Phosphorus, Platelets, Potassium, PSA, Sodium, Triglycerides, and WBC  Lab Results   Component Value Date    ALT 92 (H) 02/11/2022     (H) 02/11/2022    BUN 5 02/11/2022    CALCIUM 8 7 02/11/2022     02/11/2022    CO2 31 02/11/2022    CREATININE 1 02 02/11/2022    HCT 36 8 02/11/2022    HGB 12 4 02/11/2022    MG 1 9 02/11/2022     02/11/2022    K 3 7 02/11/2022    WBC 7 26 02/11/2022     Note: for a comprehensive list of the patient's lab results, access the Results Review activity  Physical Exam  Constitutional:       Appearance: Normal appearance  HENT:      Head: Normocephalic and atraumatic  Nose: Nose normal    Eyes:      General: No scleral icterus  Conjunctiva/sclera: Conjunctivae normal    Cardiovascular:      Rate and Rhythm: Normal rate  Pulmonary:      Effort: Pulmonary effort is normal    Abdominal:      General: There is no distension  Palpations: Abdomen is soft  Tenderness: There is no abdominal tenderness  Comments: Laparoscopic incisions healing well without erythema induration or drainage   Musculoskeletal:         General: No signs of injury  Skin:     General: Skin is warm  Coloration: Skin is not jaundiced  Neurological:      General: No focal deficit present  Mental Status: She is alert and oriented to person, place, and time     Psychiatric:         Mood and Affect: Mood normal  Behavior: Behavior normal

## 2022-02-25 NOTE — ASSESSMENT & PLAN NOTE
Right upper quadrant pain secondary to choledocholithiasis status post ERCP and lap isai  Patient is recovering well after surgery  Denies any significant pain  Tolerating oral intake and hydration  About 90% back to her baseline    Patient does have appointment with General surgery today for postop follow-up  Patient understands that there was abnormal finding on the evaluation of her gallbladder and we are waiting further testing and results  Informed patient that although this may be secondary to inflammation, there may be a chance that the finding may represent neoplastic (cancer related)    Patient continues to follow pain medicine for her chronic back pain    Will have her return in 2 weeks for annual physical

## 2022-02-25 NOTE — PROGRESS NOTES
Virtual Regular Visit    Verification of patient location:    Patient is located in the following state in which I hold an active license [place]      Assessment/Plan:    Problem List Items Addressed This Visit        Other    Right upper quadrant pain - Primary     Right upper quadrant pain secondary to choledocholithiasis status post ERCP and lap isai  Patient is recovering well after surgery  Denies any significant pain  Tolerating oral intake and hydration  About 90% back to her baseline    Patient does have appointment with General surgery today for postop follow-up  Patient understands that there was abnormal finding on the evaluation of her gallbladder and we are waiting further testing and results  Informed patient that although this may be secondary to inflammation, there may be a chance that the finding may represent neoplastic (cancer related)    Patient continues to follow pain medicine for her chronic back pain    Will have her return in 2 weeks for annual physical                    Reason for visit is   Chief Complaint   Patient presents with    Virtual Regular Visit      BMI Counseling: There is no height or weight on file to calculate BMI  The BMI is above normal  Nutrition recommendations include 3-5 servings of fruits/vegetables daily and reducing fast food intake  Exercise recommendations include exercising 3-5 times per week  Encounter provider Elaine Nielson MD    Provider located at [address] [place] [id number] [phone]      Recent Visits  No visits were found meeting these conditions  Showing recent visits within past 7 days and meeting all other requirements  Today's Visits  Date Type Provider Dept   02/25/22 Telemedicine MD Mylse Infante   Showing today's visits and meeting all other requirements  Future Appointments  No visits were found meeting these conditions    Showing future appointments within next 150 days and meeting all other requirements       The patient was identified by name and date of birth  Constance Patel was informed that this is a telemedicine visit and that the visit is being conducted through [facility] and patient was informed this is a secure, HIPAA-complaint platform  She agrees to proceed     My office door was closed  No one else was in the room  She acknowledged consent and understanding of privacy and security of the video platform  The patient has agreed to participate and understands they can discontinue the visit at any time  Patient is aware this is a billable service  TCM Call (since 1/25/2022)     Date and time call was made  2/14/2022 11:16 AM    Hospital care reviewed  Records reviewed        Patient was hospitialized at  [facility]        Date of Admission  02/07/22    Date of discharge  02/11/22    Diagnosis  Choledocholithiasis    Disposition  Home    Were the patients medications reviewed and updated  Yes    Current Symptoms  Upper abdominal pain; Middle abdominal pain; Lower abdominal pain      TCM Call (since 1/25/2022)     Post hospital issues  Reduced activity    Should patient be enrolled in anticoag monitoring? No    Scheduled for follow up? Yes    Did you obtain your prescribed medications  Yes    Do you need help managing your prescriptions or medications  No    Is transportation to your appointment needed  No    I have advised the patient to call PCP with any new or worsening symptoms  Soila Godfrey MA    Living Arrangements  Spouse or Significiant other    Support System  Friends;  Family    The type of support provided  Physical; Emotional    Do you have social support  Yes, as much as I need    Are you recieving any outpatient services  No    Are you recieving home care services  No    Are you using any community resources  No    Current waiver services  No    Have you fallen in the last 12 months  No    Interperter language line needed  No    Counseling  Patient    Comments  Pt stated she'd call back to reschedule after speaking to pain marcio Hobson  Mercy Mendez is a 63 y o  female here for transitional care visit   HPI   61-year-old female patient presents for transition care visit after choledocholithiasis hospitalization at [facility] [place] on 02/0 7/22      Patient was hospitalized from 02/07/2022 to 02/11/2022 for total 4 days  She received ERCP with lap choly  There is evidence of abnormal gallbladder and specimen sent for evaluation  Awaiting additional testing  As of EMR review on 02/25/2022, additional genetic evaluation for B-cell clonality is not present  Patient starting to feel back to her normal self, about 90% back to baseline  Patient has appointment with General surgery for at 1:00 p m  Today  Patient is currently staying away from fatty and fried food but has been tolerating oral intake  Is hydrating well  But denies any fever, nausea vomiting, no constipation or diarrhea  Denies any pain in the abdomen, the surgical site is well healing, dry, denies any discharge  Patient's pain is well controlled  "back to normal pain I had before I had this condition"  Patient takes Ingrid Doing 2 in the morning and 48mg at night  Past Medical History:   Diagnosis Date    Hyperlipidemia     Hypertension        Past Surgical History:   Procedure Laterality Date    BACK SURGERY      L3-S1    CHOLECYSTECTOMY LAPAROSCOPIC N/A 2/10/2022    Procedure: CHOLECYSTECTOMY LAPAROSCOPIC;  Surgeon: Sobia Shaikh DO;  [facility];  Service: General    SPINAL STIMULATOR PLACEMENT  2019       Current Outpatient Medications   Medication Sig Dispense Refill    diazepam (VALIUM) 5 mg tablet Take 1 tablet (5 mg total) by mouth every 8 (eight) hours as needed for muscle spasms (1 tablet every 8 hrs as needed for spasm)   (Patient not taking: Reported on 2/7/2022 ) 20 tablet 0    lisinopril (ZESTRIL) 5 mg tablet Take 5 mg by mouth daily   methocarbamol (ROBAXIN) 750 mg tablet Take 750 mg by mouth 4 (four) times a day   oxyCODONE ER (Xtampza ER) 36 MG C12A Take by mouth 3 (three) times a day      pregabalin (LYRICA) 75 mg capsule Take 75 mg by mouth 3 (three) times a day       No current facility-administered medications for this visit  No Known Allergies    Review of Systems   as noted above      Video Exam    There were no vitals filed for this visit  Physical Exam  Constitutional:       Appearance: Normal appearance  Pulmonary:      Effort: Pulmonary effort is normal    Neurological:      Mental Status: She is alert  Psychiatric:         Mood and Affect: Mood normal           I spent 20 minutes directly with the patient during this visit    VIRTUAL VISIT DISCLAIMER      Macey Whitmore verbally agrees to participate in [employer]  Pt is aware that [employer] could be limited without vital signs or the ability to perform a full hands-on physical Peofrancesco Smith understands she or the provider may request at any time to terminate the video visit and request the patient to seek care or treatment in person

## 2022-06-01 ENCOUNTER — APPOINTMENT (EMERGENCY)
Dept: CT IMAGING | Facility: HOSPITAL | Age: 62
End: 2022-06-01
Payer: COMMERCIAL

## 2022-06-01 ENCOUNTER — HOSPITAL ENCOUNTER (EMERGENCY)
Facility: HOSPITAL | Age: 62
Discharge: HOME | End: 2022-06-01
Attending: EMERGENCY MEDICINE
Payer: COMMERCIAL

## 2022-06-01 VITALS
HEART RATE: 61 BPM | RESPIRATION RATE: 18 BRPM | SYSTOLIC BLOOD PRESSURE: 125 MMHG | OXYGEN SATURATION: 97 % | DIASTOLIC BLOOD PRESSURE: 81 MMHG | TEMPERATURE: 98.6 F | WEIGHT: 175 LBS | BODY MASS INDEX: 27.41 KG/M2

## 2022-06-01 DIAGNOSIS — R10.13 EPIGASTRIC ABDOMINAL PAIN: Primary | ICD-10-CM

## 2022-06-01 LAB
ALBUMIN SERPL BCP-MCNC: 3.5 G/DL (ref 3.5–5)
ALP SERPL-CCNC: 90 U/L (ref 46–116)
ALT SERPL W P-5'-P-CCNC: 20 U/L (ref 12–78)
ANION GAP SERPL CALCULATED.3IONS-SCNC: 5 MMOL/L (ref 4–13)
AST SERPL W P-5'-P-CCNC: 14 U/L (ref 5–45)
BASOPHILS # BLD AUTO: 0.03 THOUSANDS/ΜL (ref 0–0.1)
BASOPHILS NFR BLD AUTO: 0 % (ref 0–1)
BILIRUB SERPL-MCNC: 0.49 MG/DL (ref 0.2–1)
BILIRUB UR QL STRIP: NEGATIVE
BUN SERPL-MCNC: 13 MG/DL (ref 5–25)
CALCIUM SERPL-MCNC: 8.7 MG/DL (ref 8.3–10.1)
CHLORIDE SERPL-SCNC: 103 MMOL/L (ref 100–108)
CLARITY UR: CLEAR
CO2 SERPL-SCNC: 30 MMOL/L (ref 21–32)
COLOR UR: YELLOW
CREAT SERPL-MCNC: 1.25 MG/DL (ref 0.6–1.3)
EOSINOPHIL # BLD AUTO: 0.13 THOUSAND/ΜL (ref 0–0.61)
EOSINOPHIL NFR BLD AUTO: 2 % (ref 0–6)
ERYTHROCYTE [DISTWIDTH] IN BLOOD BY AUTOMATED COUNT: 12.5 % (ref 11.6–15.1)
GFR SERPL CREATININE-BSD FRML MDRD: 47 ML/MIN/1.73SQ M
GLUCOSE SERPL-MCNC: 108 MG/DL (ref 65–140)
GLUCOSE UR STRIP-MCNC: NEGATIVE MG/DL
HCT VFR BLD AUTO: 41.5 % (ref 34.8–46.1)
HGB BLD-MCNC: 14.1 G/DL (ref 11.5–15.4)
HGB UR QL STRIP.AUTO: NEGATIVE
IMM GRANULOCYTES # BLD AUTO: 0.01 THOUSAND/UL (ref 0–0.2)
IMM GRANULOCYTES NFR BLD AUTO: 0 % (ref 0–2)
KETONES UR STRIP-MCNC: NEGATIVE MG/DL
LEUKOCYTE ESTERASE UR QL STRIP: NEGATIVE
LIPASE SERPL-CCNC: 45 U/L (ref 73–393)
LYMPHOCYTES # BLD AUTO: 2.19 THOUSANDS/ΜL (ref 0.6–4.47)
LYMPHOCYTES NFR BLD AUTO: 27 % (ref 14–44)
MCH RBC QN AUTO: 30.7 PG (ref 26.8–34.3)
MCHC RBC AUTO-ENTMCNC: 34 G/DL (ref 31.4–37.4)
MCV RBC AUTO: 90 FL (ref 82–98)
MONOCYTES # BLD AUTO: 0.49 THOUSAND/ΜL (ref 0.17–1.22)
MONOCYTES NFR BLD AUTO: 6 % (ref 4–12)
NEUTROPHILS # BLD AUTO: 5.14 THOUSANDS/ΜL (ref 1.85–7.62)
NEUTS SEG NFR BLD AUTO: 65 % (ref 43–75)
NITRITE UR QL STRIP: NEGATIVE
NRBC BLD AUTO-RTO: 0 /100 WBCS
PH UR STRIP.AUTO: 6 [PH]
PLATELET # BLD AUTO: 315 THOUSANDS/UL (ref 149–390)
PMV BLD AUTO: 10 FL (ref 8.9–12.7)
POTASSIUM SERPL-SCNC: 3.7 MMOL/L (ref 3.5–5.3)
PROT SERPL-MCNC: 6.7 G/DL (ref 6.4–8.2)
PROT UR STRIP-MCNC: NEGATIVE MG/DL
RBC # BLD AUTO: 4.6 MILLION/UL (ref 3.81–5.12)
SODIUM SERPL-SCNC: 138 MMOL/L (ref 136–145)
SP GR UR STRIP.AUTO: 1.01 (ref 1–1.03)
UROBILINOGEN UR QL STRIP.AUTO: 0.2 E.U./DL
WBC # BLD AUTO: 7.99 THOUSAND/UL (ref 4.31–10.16)

## 2022-06-01 PROCEDURE — 96375 TX/PRO/DX INJ NEW DRUG ADDON: CPT

## 2022-06-01 PROCEDURE — 36415 COLL VENOUS BLD VENIPUNCTURE: CPT | Performed by: EMERGENCY MEDICINE

## 2022-06-01 PROCEDURE — 74176 CT ABD & PELVIS W/O CONTRAST: CPT

## 2022-06-01 PROCEDURE — 99284 EMERGENCY DEPT VISIT MOD MDM: CPT | Performed by: EMERGENCY MEDICINE

## 2022-06-01 PROCEDURE — 96374 THER/PROPH/DIAG INJ IV PUSH: CPT

## 2022-06-01 PROCEDURE — 81003 URINALYSIS AUTO W/O SCOPE: CPT | Performed by: EMERGENCY MEDICINE

## 2022-06-01 PROCEDURE — 80053 COMPREHEN METABOLIC PANEL: CPT | Performed by: EMERGENCY MEDICINE

## 2022-06-01 PROCEDURE — 83690 ASSAY OF LIPASE: CPT | Performed by: EMERGENCY MEDICINE

## 2022-06-01 PROCEDURE — 96361 HYDRATE IV INFUSION ADD-ON: CPT

## 2022-06-01 PROCEDURE — 99284 EMERGENCY DEPT VISIT MOD MDM: CPT

## 2022-06-01 PROCEDURE — G1004 CDSM NDSC: HCPCS

## 2022-06-01 PROCEDURE — 85025 COMPLETE CBC W/AUTO DIFF WBC: CPT | Performed by: EMERGENCY MEDICINE

## 2022-06-01 RX ORDER — PANTOPRAZOLE SODIUM 40 MG/1
40 TABLET, DELAYED RELEASE ORAL DAILY
Qty: 15 TABLET | Refills: 0 | Status: SHIPPED | OUTPATIENT
Start: 2022-06-01 | End: 2022-06-06 | Stop reason: SDUPTHER

## 2022-06-01 RX ORDER — ONDANSETRON 4 MG/1
4 TABLET, ORALLY DISINTEGRATING ORAL EVERY 6 HOURS PRN
Qty: 20 TABLET | Refills: 0 | Status: SHIPPED | OUTPATIENT
Start: 2022-06-01

## 2022-06-01 RX ORDER — ONDANSETRON 2 MG/ML
4 INJECTION INTRAMUSCULAR; INTRAVENOUS ONCE
Status: COMPLETED | OUTPATIENT
Start: 2022-06-01 | End: 2022-06-01

## 2022-06-01 RX ORDER — KETOROLAC TROMETHAMINE 30 MG/ML
15 INJECTION, SOLUTION INTRAMUSCULAR; INTRAVENOUS ONCE
Status: COMPLETED | OUTPATIENT
Start: 2022-06-01 | End: 2022-06-01

## 2022-06-01 RX ADMIN — KETOROLAC TROMETHAMINE 15 MG: 30 INJECTION, SOLUTION INTRAMUSCULAR at 20:01

## 2022-06-01 RX ADMIN — SODIUM CHLORIDE 1000 ML: 0.9 INJECTION, SOLUTION INTRAVENOUS at 19:56

## 2022-06-01 RX ADMIN — ONDANSETRON 4 MG: 2 INJECTION INTRAMUSCULAR; INTRAVENOUS at 19:59

## 2022-06-02 NOTE — ED PROVIDER NOTES
History  Chief Complaint   Patient presents with    Abdominal Pain     Patient c/o generalized abdominal pain  Patient c/o nausea  62 y/o female presents to the ED for epigastric abd pain x few days  She states that pain worsened today  States that it is a constant burning/ tightening sensation  States that it is worse with eating  States that she has nausea but denies vomiting  Has tried gasx and nexium without relief  She states that she has not seen GI or had a scope done in years  No other complaints  History provided by:  Patient  Abdominal Pain  Pain location:  Epigastric  Pain quality: burning    Pain severity:  Moderate  Onset quality:  Sudden  Timing:  Constant  Progression:  Worsening  Chronicity:  New  Context: eating and previous surgery    Context: not sick contacts    Relieved by:  None tried  Worsened by:  Nothing  Ineffective treatments:  None tried  Associated symptoms: nausea    Associated symptoms: no chest pain, no chills, no cough, no diarrhea, no dysuria, no fever, no hematuria, no shortness of breath, no sore throat and no vomiting        Prior to Admission Medications   Prescriptions Last Dose Informant Patient Reported? Taking?   diazepam (VALIUM) 5 mg tablet   No No   Sig: Take 1 tablet (5 mg total) by mouth every 8 (eight) hours as needed for muscle spasms (1 tablet every 8 hrs as needed for spasm)  Patient not taking: Reported on 2/7/2022    lisinopril (ZESTRIL) 5 mg tablet   Yes No   Sig: Take 5 mg by mouth daily  methocarbamol (ROBAXIN) 750 mg tablet   Yes No   Sig: Take 750 mg by mouth 4 (four) times a day     oxyCODONE ER (Xtampza ER) 36 MG C12A  Self Yes No   Sig: Take by mouth 3 (three) times a day   pregabalin (LYRICA) 75 mg capsule  Self Yes No   Sig: Take 75 mg by mouth 3 (three) times a day      Facility-Administered Medications: None       Past Medical History:   Diagnosis Date    Hyperlipidemia     Hypertension        Past Surgical History:   Procedure Laterality Date    BACK SURGERY      L3-S1    CHOLECYSTECTOMY LAPAROSCOPIC N/A 2/10/2022    Procedure: CHOLECYSTECTOMY LAPAROSCOPIC;  Surgeon: Sumit Cruz DO;  Location: [facility];  Service: General    SPINAL STIMULATOR PLACEMENT  2019       History reviewed  No pertinent family history  I have reviewed and agree with the history as documented  E-Cigarette/Vaping    E-Cigarette Use Never User      E-Cigarette/Vaping Substances    Nicotine No     THC No     CBD No     Flavoring No     Other No     Unknown No      Social History     Tobacco Use    Smoking status: Never Smoker    Smokeless tobacco: Never Used   Vaping Use    Vaping Use: Never used   Substance Use Topics    Alcohol use: Never    Drug use: No       Review of Systems   Constitutional: Negative for chills and fever  HENT: Negative for congestion, ear pain and sore throat  Eyes: Negative for pain and visual disturbance  Respiratory: Negative for cough, shortness of breath and wheezing  Cardiovascular: Negative for chest pain and leg swelling  Gastrointestinal: Positive for abdominal pain and nausea  Negative for diarrhea and vomiting  Genitourinary: Negative for dysuria, frequency, hematuria and urgency  Musculoskeletal: Negative for neck pain and neck stiffness  Skin: Negative for rash and wound  Neurological: Negative for weakness, numbness and headaches  Psychiatric/Behavioral: Negative for agitation and confusion  All other systems reviewed and are negative  Physical Exam  Physical Exam  Vitals and nursing note reviewed  Constitutional:       Appearance: She is well-developed  HENT:      Head: Normocephalic and atraumatic  Eyes:      Pupils: Pupils are equal, round, and reactive to light  Cardiovascular:      Rate and Rhythm: Normal rate and regular rhythm  Pulmonary:      Effort: Pulmonary effort is normal       Breath sounds: Normal breath sounds     Abdominal:      General: Bowel sounds are normal       Palpations: Abdomen is soft  Tenderness: There is abdominal tenderness in the epigastric area  There is no guarding or rebound  Musculoskeletal:         General: Normal range of motion  Cervical back: Normal range of motion and neck supple  Skin:     General: Skin is warm and dry  Neurological:      General: No focal deficit present  Mental Status: She is alert and oriented to person, place, and time        Comments: No focal deficits         Vital Signs  ED Triage Vitals [06/01/22 1836]   Temperature Pulse Respirations Blood Pressure SpO2   98 6 °F (37 °C) 97 18 150/81 97 %      Temp Source Heart Rate Source Patient Position - Orthostatic VS BP Location FiO2 (%)   Oral Monitor Sitting Left arm --      Pain Score       9           Vitals:    06/01/22 1836 06/01/22 2100   BP: 150/81    Pulse: 97 59   Patient Position - Orthostatic VS: Sitting          Visual Acuity      ED Medications  Medications   sodium chloride 0 9 % bolus 1,000 mL (1,000 mL Intravenous New Bag 6/1/22 1956)   ondansetron (ZOFRAN) injection 4 mg (4 mg Intravenous Given 6/1/22 1959)   ketorolac (TORADOL) injection 15 mg (15 mg Intravenous Given 6/1/22 2001)       Diagnostic Studies  Results Reviewed     Procedure Component Value Units Date/Time    Comprehensive metabolic panel [[id number]] Collected: 06/01/22 1951    Lab Status: Final result Specimen: Blood from Arm, Right Updated: 06/01/22 2022     Sodium 138 mmol/L      Potassium 3 7 mmol/L      Chloride 103 mmol/L      CO2 30 mmol/L      ANION GAP 5 mmol/L      BUN 13 mg/dL      Creatinine 1 25 mg/dL      Glucose 108 mg/dL      Calcium 8 7 mg/dL      AST 14 U/L      ALT 20 U/L      Alkaline Phosphatase 90 U/L      Total Protein 6 7 g/dL      Albumin 3 5 g/dL      Total Bilirubin 0 49 mg/dL      eGFR 47 ml/min/1 73sq m     Narrative:      [place] guidelines for Chronic Kidney Disease (CKD):     Stage 1 with normal or high GFR (GFR > 90 mL/min/1 73 square meters)    Stage 2 Mild CKD (GFR = 60-89 mL/min/1 73 square meters)    Stage 3A Moderate CKD (GFR = 45-59 mL/min/1 73 square meters)    Stage 3B Moderate CKD (GFR = 30-44 mL/min/1 73 square meters)    Stage 4 Severe CKD (GFR = 15-29 mL/min/1 73 square meters)    Stage 5 End Stage CKD (GFR <15 mL/min/1 73 square meters)  Note: GFR calculation is accurate only with a steady state creatinine    Lipase [[id number]]  (Abnormal) Collected: 06/01/22 1951    Lab Status: Final result Specimen: Blood from Arm, Right Updated: 06/01/22 2022     Lipase 45 u/L     UA w Reflex to Microscopic w Reflex to Culture [[id number]] Collected: 06/01/22 1953    Lab Status: Final result Specimen: Urine, Clean Catch Updated: 06/01/22 2001     Color, UA Yellow     Clarity, UA Clear     Specific Gravity, UA 1 010     pH, UA 6 0     Leukocytes, UA Negative     Nitrite, UA Negative     Protein, UA Negative mg/dl      Glucose, UA Negative mg/dl      Ketones, UA Negative mg/dl      Urobilinogen, UA 0 2 E U /dl      Bilirubin, UA Negative     Blood, UA Negative    CBC and differential [[id number]] Collected: 06/01/22 1951    Lab Status: Final result Specimen: Blood from Arm, Right Updated: 06/01/22 1957     WBC 7 99 Thousand/uL      RBC 4 60 Million/uL      Hemoglobin 14 1 g/dL      Hematocrit 41 5 %      MCV 90 fL      MCH 30 7 pg      MCHC 34 0 g/dL      RDW 12 5 %      MPV 10 0 fL      Platelets 355 Thousands/uL      nRBC 0 /100 WBCs      Neutrophils Relative 65 %      Immat GRANS % 0 %      Lymphocytes Relative 27 %      Monocytes Relative 6 %      Eosinophils Relative 2 %      Basophils Relative 0 %      Neutrophils Absolute 5 14 Thousands/µL      Immature Grans Absolute 0 01 Thousand/uL      Lymphocytes Absolute 2 19 Thousands/µL      Monocytes Absolute 0 49 Thousand/µL      Eosinophils Absolute 0 13 Thousand/µL      Basophils Absolute 0 03 Thousands/µL                  CT abdomen pelvis wo contrast   Final Result by Claudette Leak, DO (06/01 2027)      Interval development of pneumobilia which is likely due to recent cholecystectomy  Nonobstructing left-sided punctate intrarenal calculi  No evidence of hydronephrosis  Workstation performed: [id number]                    Procedures  Procedures         ED Course  ED Course as of 06/01/22 2128 Wed Jun 01, 2022   1930 Epigastric abd pain x few days worse today- constant burning tighting  Worse with eating  Nausea  Radiates to rest of abd  Has tried gasx and nexium and hasnt helped  2104 Spoke with Dr Regina Varela who reviewed patient's chart- states that there isnt any surgical issues at this time  MDM  Number of Diagnoses or Management Options  Epigastric abdominal pain: new and requires workup  Diagnosis management comments: Patient with epigastric abd pain- will get labs, ct scan, and give toradol/ zofran and reassess  Patient reevaluated and feels improved  Patient updated on results of tests  Discharge instructions given including medications, follow-up, and return precautions  Patient demonstrates verbal understanding and agrees with plan         Amount and/or Complexity of Data Reviewed  Clinical lab tests: ordered and reviewed  Tests in the radiology section of CPT®: ordered and reviewed  Tests in the medicine section of CPT®: ordered and reviewed  Discussion of test results with the performing providers: yes  Decide to obtain previous medical records or to obtain history from someone other than the patient: yes  Obtain history from someone other than the patient: yes  Review and summarize past medical records: yes  Discuss the patient with other providers: yes  Independent visualization of images, tracings, or specimens: yes    Patient Progress  Patient progress: improved      Disposition  Final diagnoses:   Epigastric abdominal pain     Time reflects when diagnosis was documented in both MDM as applicable and the Disposition within this note     Time User Action Codes Description Comment    6/1/2022  9:14 PM Gabriela REHMAN Add [R10 13] Epigastric abdominal pain       ED Disposition     ED Disposition   Discharge    Condition   Stable    Date/Time   Wed Jun 1, 2022  9:14 PM    Comment   Codi Mildred discharge to home/self care  Follow-up Information     Follow up With Specialties Details Why Contact Info Additional [facility] Call in 1 day for follow up as soon as possible [address] [place] [address] Amarilys Castro [address] [facility], [address] [place], [place], [address]    [address] [facility] Emergency Department Emergency Medicine Go to  immediately for any new or worsening symptoms Loretta Ramos [address] [place] [address] Emergency Department, [address] [place] [address], [address]          Patient's Medications   Discharge Prescriptions    ONDANSETRON (ZOFRAN-ODT) 4 MG DISINTEGRATING TABLET    Take 1 tablet (4 mg total) by mouth every 6 (six) hours as needed for nausea or vomiting       Start Date: 6/1/2022  End Date: --       Order Dose: 4 mg       Quantity: 20 tablet    Refills: 0    PANTOPRAZOLE (PROTONIX) 40 MG TABLET    Take 1 tablet (40 mg total) by mouth daily       Start Date: 6/1/2022  End Date: --       Order Dose: 40 mg       Quantity: 15 tablet    Refills: 0       No discharge procedures on file      PDMP Review     None          ED Provider  Electronically Signed by           Rosmeyr Crowder DO  06/01/22 2273

## 2022-06-03 RX ORDER — LISINOPRIL AND HYDROCHLOROTHIAZIDE 12.5; 1 MG/1; MG/1
TABLET ORAL
COMMUNITY

## 2022-06-03 RX ORDER — MIRTAZAPINE 30 MG/1
TABLET, FILM COATED ORAL
COMMUNITY

## 2022-06-03 RX ORDER — METHOCARBAMOL 750 MG/1
TABLET, FILM COATED ORAL
COMMUNITY

## 2022-06-03 RX ORDER — AMOXICILLIN 875 MG/1
1 TABLET, COATED ORAL EVERY 12 HOURS
COMMUNITY
Start: 2015-05-23

## 2022-06-03 RX ORDER — OXYCODONE HCL 40 MG/1
TABLET, FILM COATED, EXTENDED RELEASE ORAL
COMMUNITY

## 2022-06-03 RX ORDER — DIAZEPAM 5 MG/1
TABLET ORAL
COMMUNITY

## 2022-06-06 ENCOUNTER — OFFICE VISIT (OUTPATIENT)
Dept: GASTROENTEROLOGY | Facility: CLINIC | Age: 62
End: 2022-06-06
Payer: COMMERCIAL

## 2022-06-06 VITALS
HEART RATE: 80 BPM | HEIGHT: 67 IN | SYSTOLIC BLOOD PRESSURE: 140 MMHG | DIASTOLIC BLOOD PRESSURE: 86 MMHG | WEIGHT: 170.2 LBS | BODY MASS INDEX: 26.71 KG/M2

## 2022-06-06 DIAGNOSIS — R10.13 EPIGASTRIC ABDOMINAL PAIN: ICD-10-CM

## 2022-06-06 DIAGNOSIS — R11.0 NAUSEA: Primary | ICD-10-CM

## 2022-06-06 DIAGNOSIS — R68.81 EARLY SATIETY: ICD-10-CM

## 2022-06-06 DIAGNOSIS — R10.84 GENERALIZED ABDOMINAL PAIN: ICD-10-CM

## 2022-06-06 PROCEDURE — 99214 OFFICE O/P EST MOD 30 MIN: CPT | Performed by: PHYSICIAN ASSISTANT

## 2022-06-06 PROCEDURE — 3008F BODY MASS INDEX DOCD: CPT | Performed by: PHYSICIAN ASSISTANT

## 2022-06-06 PROCEDURE — 1036F TOBACCO NON-USER: CPT | Performed by: PHYSICIAN ASSISTANT

## 2022-06-06 RX ORDER — PANTOPRAZOLE SODIUM 40 MG/1
40 TABLET, DELAYED RELEASE ORAL DAILY
Qty: 60 TABLET | Refills: 0 | Status: SHIPPED | OUTPATIENT
Start: 2022-06-06 | End: 2022-08-07

## 2022-06-06 RX ORDER — OXYCODONE 18 MG/1
CAPSULE, EXTENDED RELEASE ORAL
COMMUNITY
Start: 2022-05-25

## 2022-06-06 NOTE — PROGRESS NOTES
[facility]  Ariana Lm 63 y o  female MRN: [id number]       CC: Early satiety, nausea and epigastric pain     HPI: Yaz Villa is a 65-year-old female with history of chronic low back pain after an injury in the 1990 status post spinal stimulator on opioids, as well as hypertension  Patient is known to the GI department as she was admitted in February with cholecystitis and choledocholithiasis  She underwent ERCP with February 9th revealing removal of 3 CBD stones  Patient reports that overall she was having symptoms for 6 months prior to her hospitalization February  Patient reports that she was feeling well after she was discharged, until earlier this week  Patient decided to go to the emergency room for burning epigastric pain with nausea and abdominal fullness  She denies change in the color of her bowel movements or dark urine  Her bowel movements are otherwise regular  CT revealing interval development of pneumobilia and a nonobstructing kidney stone  Her LFTs normalized  No leukocytosis  Emergency room discharge her on Zofran and Protonix  She reports that her discomfort is manageable at this point  Patient has never had a colonoscopy  She denies family history of colon cancer to her knowledge  Review of Systems:    CONSTITUTIONAL: Denies any fever, chills, or rigors  Good appetite, and no recent weight loss  HEENT: No earache or tinnitus  Denies hearing loss or visual disturbances  CARDIOVASCULAR: No chest pain or palpitations  RESPIRATORY: Denies any cough, hemoptysis, shortness of breath or dyspnea on exertion  GASTROINTESTINAL: As noted in the History of Present Illness  GENITOURINARY: No problems with urination  Denies any hematuria or dysuria  NEUROLOGIC: No dizziness or vertigo, denies headaches  MUSCULOSKELETAL: Denies any muscle or joint pain  SKIN: Denies skin rashes or itching     ENDOCRINE: Denies excessive thirst  Denies intolerance to heat or cold   PSYCHOSOCIAL: Denies depression or anxiety  Denies any recent memory loss  Current Outpatient Medications   Medication Sig Dispense Refill    lisinopril (ZESTRIL) 5 mg tablet Take 5 mg by mouth daily   lisinopril-hydrochlorothiazide (PRINZIDE,ZESTORETIC) 10-12 5 MG per tablet Take by mouth      methocarbamol (ROBAXIN) 750 mg tablet Take 750 mg by mouth 4 (four) times a day   ondansetron (ZOFRAN-ODT) 4 mg disintegrating tablet Take 1 tablet (4 mg total) by mouth every 6 (six) hours as needed for nausea or vomiting 20 tablet 0    pantoprazole (PROTONIX) 40 mg tablet Take 1 tablet (40 mg total) by mouth daily 60 tablet 0    pregabalin (LYRICA) 75 mg capsule Take 75 mg by mouth 3 (three) times a day      Xtampza ER 18 MG C12A TAKE 1 CAPSULE BY MOUTH IN THE MORNING FOR CHRONIC PAIN   amoxicillin (AMOXIL) 875 mg tablet Take 1 tablet by mouth every 12 (twelve) hours (Patient not taking: Reported on 6/6/2022)      diazepam (VALIUM) 5 mg tablet Take 1 tablet (5 mg total) by mouth every 8 (eight) hours as needed for muscle spasms (1 tablet every 8 hrs as needed for spasm)  (Patient not taking: Reported on 6/6/2022) 20 tablet 0    diazepam (VALIUM) 5 mg tablet Take by mouth (Patient not taking: Reported on 6/6/2022)      methocarbamol (ROBAXIN) 750 mg tablet Take by mouth (Patient not taking: Reported on 6/6/2022)      mirtazapine (REMERON) 30 mg tablet Take by mouth (Patient not taking: Reported on 6/6/2022)      oxyCODONE (OxyCONTIN) 40 mg 12 hr tablet Take by mouth (Patient not taking: Reported on 6/6/2022)      oxyCODONE ER (Xtampza ER) 36 MG C12A Take by mouth 3 (three) times a day (Patient not taking: Reported on 6/6/2022)       No current facility-administered medications for this visit       Past Medical History:   Diagnosis Date    Hyperlipidemia     Hypertension      Past Surgical History:   Procedure Laterality Date    BACK SURGERY      L3-S1    CHOLECYSTECTOMY LAPAROSCOPIC N/A 2/10/2022    Procedure: CHOLECYSTECTOMY LAPAROSCOPIC;  Surgeon: Felton Stratton DO;  Location: [facility];  Service: General    SPINAL STIMULATOR PLACEMENT  2019     Social History     Socioeconomic History    Marital status: /Civil Union     Spouse name: None    Number of children: None    Years of education: None    Highest education level: None   Occupational History    None   Tobacco Use    Smoking status: Never Smoker    Smokeless tobacco: Never Used   Vaping Use    Vaping Use: Some days    Substances: Nicotine, THC, Flavoring   Substance and Sexual Activity    Alcohol use: Never    Drug use: Yes     Types: Marijuana     Comment: medical    Sexual activity: None   Other Topics Concern    None   Social History Narrative    None     Social Determinants of Health     Financial Resource Strain: Not on file   Food Insecurity: No Food Insecurity    Worried About Running Out of Food in the Last Year: Never true    Abby of Food in the Last Year: Never true   Transportation Needs: No Transportation Needs    Lack of Transportation (Medical): No    Lack of Transportation (Non-Medical): No   Physical Activity: Not on file   Stress: Not on file   Social Connections: Not on file   Intimate Partner Violence: Not on file   Housing Stability: Low Risk     Unable to Pay for Housing in the Last Year: No    Number of Places Lived in the Last Year: 1    Unstable Housing in the Last Year: No     History reviewed  No pertinent family history  PHYSICAL EXAM:    Vitals:    06/06/22 0923   BP: 140/86   Pulse: 80   Weight: 77 2 kg (170 lb 3 2 oz)   Height: 5' 7" (1 702 m)     General Appearance:   Alert and oriented x 3   Cooperative, and in no respiratory distress   HEENT:   Normocephalic, atraumatic, anicteric      Neck:  Supple, symmetrical, trachea midline   Lungs:   Clear to auscultation bilaterally    Heart[de-identified]   Regular rate and rhythm   Abdomen:   Soft, non-tender, non-distended; normal bowel sounds; no masses, no organomegaly    Genitalia:   Deferred    Rectal:   Deferred    Extremities:  No cyanosis, clubbing or edema    Pulses:  2+ and symmetric all extremities    Skin:  Skin color, texture, turgor normal, no rashes or lesions    Lymph nodes:  No palpable cervical or supraclavicular lymphadenopathy        Lab Results:   Results from last 6 Months   Lab Units 06/01/22 1951   WBC Thousand/uL 7 99   HEMOGLOBIN g/dL 14 1   HEMATOCRIT % 41 5   PLATELETS Thousands/uL 315   NEUTROS PCT % 65   LYMPHS PCT % 27   MONOS PCT % 6   EOS PCT % 2     Results from last 6 Months   Lab Units 06/01/22 1951   POTASSIUM mmol/L 3 7   CHLORIDE mmol/L 103   CO2 mmol/L 30   BUN mg/dL 13   CREATININE mg/dL 1 25   CALCIUM mg/dL 8 7   ALK PHOS U/L 90   ALT U/L 20   AST U/L 14     Results from last 6 Months   Lab Units 02/08/22  0525   INR  1 02     Results from last 6 Months   Lab Units 06/01/22 1951   LIPASE u/L 45*       Imaging Studies: I have personally reviewed pertinent imaging studies  CT abdomen pelvis wo contrast    Result Date: 6/1/2022  Impression: Interval development of pneumobilia which is likely due to recent cholecystectomy  Nonobstructing left-sided punctate intrarenal calculi  No evidence of hydronephrosis  Workstation performed: [id number]       ASSESSMENT and PLAN:      1)   Early satiety, epigastric pain; recent choledocholithiasis and cholecystectomy -  Patient's LFTs have completely normalized since her last hospitalization in February  Her symptoms are consistent with recurrent choledocholithiasis or bile leak since her LFTs are normal   She reports she was having symptoms on and off for 6 months prior to her surgery  She is on chronic opioids for a back injury from the 1990s    She is unable to have an MRI secondary to spinal stimulator   -   Check TSH and hemoglobin A1c  -   We will plan for right upper quadrant ultrasound and gastric emptying study, she will try to be off of her opioids for at least 1 day prior as she cannot go longer than that  -  Continue PPI, refilled  -  Continue Zofran  -  Further recommendations based on above  -  Once her upper abdominal symptoms improved, patient is interested in scheduling a colonoscopy for screening purposes this year as she has never had 1          Follow up after testing

## 2022-06-10 ENCOUNTER — HOSPITAL ENCOUNTER (OUTPATIENT)
Dept: ULTRASOUND IMAGING | Facility: HOSPITAL | Age: 62
Discharge: HOME | End: 2022-06-10
Payer: COMMERCIAL

## 2022-06-10 DIAGNOSIS — R11.0 NAUSEA: ICD-10-CM

## 2022-06-10 DIAGNOSIS — R10.13 EPIGASTRIC ABDOMINAL PAIN: ICD-10-CM

## 2022-06-10 PROCEDURE — 76705 ECHO EXAM OF ABDOMEN: CPT

## 2022-06-16 ENCOUNTER — TELEPHONE (OUTPATIENT)
Dept: GASTROENTEROLOGY | Facility: CLINIC | Age: 62
End: 2022-06-16

## 2022-06-16 NOTE — TELEPHONE ENCOUNTER
----- Message from Rachel Mccartney PA-C sent at 6/14/2022 10:59 PM EDT -----  Please let patient know that her gallbladder removal appears as expected  Proceed with gastric emptying test  Thanks!

## 2022-06-29 NOTE — ED NOTES
Provider at bedside        Arsenio Contreras  02/07/22 4023 Received signed and completed form from Physician's Office. Complete form was faxed to [facility] at [phone]. A copy was also emailed encrypted to the patient at [email or web address]

## 2022-07-07 ENCOUNTER — TELEPHONE (OUTPATIENT)
Dept: GASTROENTEROLOGY | Facility: CLINIC | Age: 62
End: 2022-07-07

## 2022-07-07 ENCOUNTER — HOSPITAL ENCOUNTER (OUTPATIENT)
Dept: NUCLEAR MEDICINE | Facility: HOSPITAL | Age: 62
Discharge: HOME | End: 2022-07-07
Payer: COMMERCIAL

## 2022-07-07 DIAGNOSIS — R11.0 NAUSEA: ICD-10-CM

## 2022-07-07 DIAGNOSIS — R68.81 EARLY SATIETY: ICD-10-CM

## 2022-07-07 PROCEDURE — A9541 TC99M SULFUR COLLOID: HCPCS

## 2022-07-07 PROCEDURE — G1004 CDSM NDSC: HCPCS

## 2022-07-07 PROCEDURE — 78264 GASTRIC EMPTYING IMG STUDY: CPT

## 2022-07-07 NOTE — TELEPHONE ENCOUNTER
Called and spoke with the patient in regards to her gastric emptying study that it came back normal as per Jule Koyanagi  Pt voiced understanding

## 2022-07-07 NOTE — TELEPHONE ENCOUNTER
----- Message from Cyrena Merlin, PA-C sent at 7/7/2022 11:36 AM EDT -----  Please let patient know the gastric emptying study is normal   Thank you

## 2022-08-02 ENCOUNTER — OFFICE VISIT (OUTPATIENT)
Dept: GASTROENTEROLOGY | Facility: CLINIC | Age: 62
End: 2022-08-02
Payer: COMMERCIAL

## 2022-08-02 VITALS
DIASTOLIC BLOOD PRESSURE: 84 MMHG | WEIGHT: 174.6 LBS | HEIGHT: 66 IN | HEART RATE: 68 BPM | SYSTOLIC BLOOD PRESSURE: 122 MMHG | BODY MASS INDEX: 28.06 KG/M2

## 2022-08-02 DIAGNOSIS — Z12.11 SCREENING FOR MALIGNANT NEOPLASM OF COLON: Primary | ICD-10-CM

## 2022-08-02 DIAGNOSIS — K29.70 GASTRITIS WITHOUT BLEEDING, UNSPECIFIED CHRONICITY, UNSPECIFIED GASTRITIS TYPE: ICD-10-CM

## 2022-08-02 PROCEDURE — 99214 OFFICE O/P EST MOD 30 MIN: CPT | Performed by: PHYSICIAN ASSISTANT

## 2022-08-02 NOTE — PATIENT INSTRUCTIONS
Scheduled date of colonoscopy (as of today):10/6/22  Physician performing colonoscopy: Arsalan  Location of colonoscopy:[facility]  Bowel prep reviewed with patient:miralax/mag citrate  Instructions reviewed with patient by:Omaira ANGELA  Clearances:  none

## 2022-08-02 NOTE — PROGRESS NOTES
[facility]  Patrizia De Santiago 63 y o  female MRN: [id number]       CC: Follow-up    HPI: Meryl Merino is a 61-year-old female with history of chronic low back pain after an injury in 1990 status post spinal stimulator on opioids, hypertension and cholecystitis/choledocholithiasis status post cholecystectomy and ERCP in February  Patient came for follow-up in June for early satiety, nausea and epigastric pain  She had normal LFTs  Therefore, not consistent with bile leak or recurrent choledocholithiasis  She had repeat right upper quadrant ultrasound that was expected findings post cholecystectomy, a gastric emptying study that was normal   Patient reports that she got better after Protonix course and Zofran  She completed the prescription  She feels ready to schedule her for screening colonoscopy now given that her upper abdominal symptoms have improved  She denies change in bowel habits, signs of overt GI bleeding or unintentional weight loss  There is no family history of colon cancer to her knowledge  Review of Systems:    CONSTITUTIONAL: Denies any fever, chills, or rigors  Good appetite, and no recent weight loss  HEENT: No earache or tinnitus  Denies hearing loss or visual disturbances  CARDIOVASCULAR: No chest pain or palpitations  RESPIRATORY: Denies any cough, hemoptysis, shortness of breath or dyspnea on exertion  GASTROINTESTINAL: As noted in the History of Present Illness  GENITOURINARY: No problems with urination  Denies any hematuria or dysuria  NEUROLOGIC: No dizziness or vertigo, denies headaches  MUSCULOSKELETAL: Denies any muscle or joint pain  SKIN: Denies skin rashes or itching  ENDOCRINE: Denies excessive thirst  Denies intolerance to heat or cold  PSYCHOSOCIAL: Denies depression or anxiety  Denies any recent memory loss         Current Outpatient Medications   Medication Sig Dispense Refill    lisinopril-hydrochlorothiazide (PRINZIDE,ZESTORETIC) 10-12 5 MG per tablet Take by mouth      methocarbamol (ROBAXIN) 750 mg tablet Take by mouth      ondansetron (ZOFRAN-ODT) 4 mg disintegrating tablet Take 1 tablet (4 mg total) by mouth every 6 (six) hours as needed for nausea or vomiting 20 tablet 0    oxyCODONE ER (Xtampza ER) 36 MG C12A Take by mouth 3 (three) times a day      pantoprazole (PROTONIX) 40 mg tablet Take 1 tablet (40 mg total) by mouth daily 60 tablet 0    pregabalin (LYRICA) 75 mg capsule Take 75 mg by mouth 3 (three) times a day      Xtampza ER 18 MG C12A TAKE 1 CAPSULE BY MOUTH IN THE MORNING FOR CHRONIC PAIN   amoxicillin (AMOXIL) 875 mg tablet Take 1 tablet by mouth every 12 (twelve) hours (Patient not taking: Reported on 8/2/2022)      diazepam (VALIUM) 5 mg tablet Take 1 tablet (5 mg total) by mouth every 8 (eight) hours as needed for muscle spasms (1 tablet every 8 hrs as needed for spasm)  (Patient not taking: Reported on 8/2/2022) 20 tablet 0    diazepam (VALIUM) 5 mg tablet Take by mouth (Patient not taking: Reported on 8/2/2022)      lisinopril (ZESTRIL) 5 mg tablet Take 5 mg by mouth daily  (Patient not taking: Reported on 8/2/2022)      methocarbamol (ROBAXIN) 750 mg tablet Take 750 mg by mouth 4 (four) times a day  (Patient not taking: Reported on 8/2/2022)      mirtazapine (REMERON) 30 mg tablet Take by mouth (Patient not taking: Reported on 8/2/2022)      oxyCODONE (OxyCONTIN) 40 mg 12 hr tablet Take by mouth (Patient not taking: Reported on 8/2/2022)       No current facility-administered medications for this visit       Past Medical History:   Diagnosis Date    Hyperlipidemia     Hypertension      Past Surgical History:   Procedure Laterality Date    BACK SURGERY      L3-S1    CHOLECYSTECTOMY LAPAROSCOPIC N/A 2/10/2022    Procedure: CHOLECYSTECTOMY LAPAROSCOPIC;  Surgeon: Kolby Rios DO;  Location: [facility];  Service: General    SPINAL STIMULATOR PLACEMENT  2019     Social History     Socioeconomic History    Marital status: /Civil Union     Spouse name: None    Number of children: None    Years of education: None    Highest education level: None   Occupational History    None   Tobacco Use    Smoking status: Never Smoker    Smokeless tobacco: Never Used   Vaping Use    Vaping Use: Some days    Substances: Nicotine, THC, Flavoring   Substance and Sexual Activity    Alcohol use: Never    Drug use: Yes     Types: Marijuana     Comment: medical    Sexual activity: None   Other Topics Concern    None   Social History Narrative    None     Social Determinants of Health     Financial Resource Strain: Not on file   Food Insecurity: No Food Insecurity    Worried About Running Out of Food in the Last Year: Never true    Abby of Food in the Last Year: Never true   Transportation Needs: No Transportation Needs    Lack of Transportation (Medical): No    Lack of Transportation (Non-Medical): No   Physical Activity: Not on file   Stress: Not on file   Social Connections: Not on file   Intimate Partner Violence: Not on file   Housing Stability: Low Risk     Unable to Pay for Housing in the Last Year: No    Number of Places Lived in the Last Year: 1    Unstable Housing in the Last Year: No     History reviewed  No pertinent family history  PHYSICAL EXAM:    Vitals:    08/02/22 1008   BP: 122/84   Pulse: 68   Weight: 79 2 kg (174 lb 9 6 oz)   Height: 5' 6" (1 676 m)     General Appearance:   Alert and oriented x 3   Cooperative, and in no respiratory distress   HEENT:   Normocephalic, atraumatic, anicteric      Neck:  Supple, symmetrical, trachea midline   Lungs:   Clear to auscultation bilaterally    Heart[de-identified]   Regular rate and rhythm   Abdomen:   Soft, non-tender, non-distended; normal bowel sounds; no masses, no organomegaly    Genitalia:   Deferred    Rectal:   Deferred    Extremities:  No cyanosis, clubbing or edema    Pulses:  2+ and symmetric all extremities    Skin:  Skin color, texture, turgor normal, no rashes or lesions    Lymph nodes:  No palpable cervical or supraclavicular lymphadenopathy        Lab Results:   Results from last 6 Months   Lab Units 06/01/22 1951   WBC Thousand/uL 7 99   HEMOGLOBIN g/dL 14 1   HEMATOCRIT % 41 5   PLATELETS Thousands/uL 315   NEUTROS PCT % 65   LYMPHS PCT % 27   MONOS PCT % 6   EOS PCT % 2     Results from last 6 Months   Lab Units 06/01/22 1951   POTASSIUM mmol/L 3 7   CHLORIDE mmol/L 103   CO2 mmol/L 30   BUN mg/dL 13   CREATININE mg/dL 1 25   CALCIUM mg/dL 8 7   ALK PHOS U/L 90   ALT U/L 20   AST U/L 14     Results from last 6 Months   Lab Units 02/08/22  0525   INR  1 02     Results from last 6 Months   Lab Units 06/01/22 1951   LIPASE u/L 45*       Imaging Studies: I have personally reviewed pertinent imaging studies  NM gastric emptying    Result Date: 7/7/2022  Impression: Normal rate of gastric emptying  Workstation performed: [id number]       ASSESSMENT and PLAN:      1) Screening colonoscopy - This will be patient's first screening colonoscopy  She denies family history of CRC  We will schedule her for screening colonoscopy  All questions answered  2) Early satiety, nausea and epigastric pain - Resolved that her PPI course  May have been reflux related  She is now off of PPI, and recurrence of symptoms  Follow up as needed or for future screening colonoscopy as recommended

## 2022-10-03 ENCOUNTER — TELEPHONE (OUTPATIENT)
Dept: GASTROENTEROLOGY | Facility: CLINIC | Age: 62
End: 2022-10-03

## 2022-10-03 NOTE — TELEPHONE ENCOUNTER
Patients GI provider:  Dr Eduardo Mcguire    Number to return call: [phone]    Reason for call: Pt calling to cancel her procedure  Pt will call back at a later time to reschedule  Pt requesting a call back to confirm her procedure has been cancelled      Scheduled procedure/appointment date if applicable: Procedure: 68/00/2901

## 2023-03-20 ENCOUNTER — OFFICE VISIT (OUTPATIENT)
Dept: FAMILY MEDICINE CLINIC | Facility: CLINIC | Age: 63
End: 2023-03-20

## 2023-03-20 VITALS
HEIGHT: 66 IN | HEART RATE: 86 BPM | BODY MASS INDEX: 26.84 KG/M2 | DIASTOLIC BLOOD PRESSURE: 70 MMHG | TEMPERATURE: 97.6 F | SYSTOLIC BLOOD PRESSURE: 116 MMHG | OXYGEN SATURATION: 97 % | WEIGHT: 167 LBS

## 2023-03-20 DIAGNOSIS — Z12.31 ENCOUNTER FOR SCREENING MAMMOGRAM FOR BREAST CANCER: ICD-10-CM

## 2023-03-20 DIAGNOSIS — R10.13 EPIGASTRIC ABDOMINAL PAIN: Primary | ICD-10-CM

## 2023-03-20 RX ORDER — PANTOPRAZOLE SODIUM 40 MG/1
40 TABLET, DELAYED RELEASE ORAL DAILY
Qty: 90 TABLET | Refills: 0 | Status: SHIPPED | OUTPATIENT
Start: 2023-03-20 | End: 2023-06-18

## 2023-03-20 RX ORDER — SUCRALFATE 1 G/1
1 TABLET ORAL 4 TIMES DAILY
Qty: 20 TABLET | Refills: 0 | Status: SHIPPED | OUTPATIENT
Start: 2023-03-20 | End: 2023-03-25

## 2023-03-20 NOTE — PROGRESS NOTES
Name: Nirmal Patricio      : 1960      MRN: [id number]  Encounter Provider: Marielle Contreras MD  Encounter Date: 3/20/2023   Encounter department: [address] [place] [address]     1  Epigastric abdominal pain  -     sucralfate (CARAFATE) 1 g tablet; Take 1 tablet (1 g total) by mouth 4 (four) times a day for 5 days  -     pantoprazole (PROTONIX) 40 mg tablet; Take 1 tablet (40 mg total) by mouth daily    2  Encounter for screening mammogram for breast cancer  -     Mammo screening bilateral w 3d & cad; Future; Expected date: 2023      Patient's abdominal pain likely secondary to acid reflux symptoms  Please start Protonix 40 mg daily for the next months  If acid reflux symptom worsens, you can use Protonix as needed 2 weeks at a time  Carafate for the next 5 days to help stomach recover  If there is significant symptom despite medication may consider x-ray for evaluation of hiatal hernia  Discussed lifestyle modification which may help with symptoms  Follow-up in 1 months for annual physical and to review blood work    BMI Counseling: Body mass index is 26 95 kg/m²  The BMI is above normal  Nutrition recommendations include reducing portion sizes, 3-5 servings of fruits/vegetables daily and consuming healthier snacks  Exercise recommendations include moderate aerobic physical activity for 150 minutes/week  Subjective     HPI     82-year-old female patient presents for evaluation of burning sensation in her stomach  Symptom has been ongoing for the last for 5 days  She was last seen in person on  for upper quadrant pain which resulted in a cholecystectomy  Her pain does not feel similar to episodes of cholecystectomy, is focused at the epigastric area radiating to the left  Patient has been able to maintain adequate oral intake and hydration  Does have baseline constipation secondary to opiate medication use    Denies any hemoptysis, does have mild nausea without vomiting  Review of Systems   Constitutional: Positive for fatigue (improved)  Negative for chills and fever  HENT: Negative for congestion, rhinorrhea and sore throat  Pt had COVID-19 3 month ago   Respiratory: Negative for shortness of breath  Cardiovascular: Negative for chest pain  Gastrointestinal: Positive for abdominal pain (some indigition ) and nausea  Negative for constipation, diarrhea and vomiting  Uses stool softener if she does not go in 3 days   Neurological: Negative for headaches  Psychiatric/Behavioral: Negative for sleep disturbance  Past Medical History:   Diagnosis Date   • Hyperlipidemia    • Hypertension      Past Surgical History:   Procedure Laterality Date   • BACK SURGERY      L3-S1   • CHOLECYSTECTOMY LAPAROSCOPIC N/A 2/10/2022    Procedure: CHOLECYSTECTOMY LAPAROSCOPIC;  Surgeon: Marguerite Carrington DO;  Location: [facility];  Service: General   • SPINAL STIMULATOR PLACEMENT       No family history on file    Social History     Socioeconomic History   • Marital status: /Civil Union     Spouse name: None   • Number of children: None   • Years of education: None   • Highest education level: None   Occupational History   • None   Tobacco Use   • Smoking status: Former     Types: Cigarettes     Quit date: 2018     Years since quittin 2   • Smokeless tobacco: Never   Vaping Use   • Vaping Use: Some days   • Substances: Nicotine, THC, Flavoring   Substance and Sexual Activity   • Alcohol use: Never   • Drug use: Yes     Types: Marijuana     Comment: medical   • Sexual activity: None   Other Topics Concern   • None   Social History Narrative   • None     Social Determinants of Health     Financial Resource Strain: Not on file   Food Insecurity: Not on file   Transportation Needs: Not on file   Physical Activity: Not on file   Stress: Not on file   Social Connections: Not on file   Intimate Partner Violence: Not on file   Housing Stability: Not on file     Current Outpatient Medications on File Prior to Visit   Medication Sig   • lisinopril-hydrochlorothiazide (PRINZIDE,ZESTORETIC) 10-12 5 MG per tablet Take by mouth   • methocarbamol (ROBAXIN) 750 mg tablet Take by mouth   • ondansetron (ZOFRAN-ODT) 4 mg disintegrating tablet Take 1 tablet (4 mg total) by mouth every 6 (six) hours as needed for nausea or vomiting   • oxyCODONE ER (Xtampza ER) 36 MG C12A Take by mouth 3 (three) times a day   • pregabalin (LYRICA) 75 mg capsule Take 75 mg by mouth 3 (three) times a day   • Xtampza ER 18 MG C12A TAKE 1 CAPSULE BY MOUTH IN THE MORNING FOR CHRONIC PAIN  • amoxicillin (AMOXIL) 875 mg tablet Take 1 tablet by mouth every 12 (twelve) hours (Patient not taking: Reported on 8/2/2022)   • diazepam (VALIUM) 5 mg tablet Take 1 tablet (5 mg total) by mouth every 8 (eight) hours as needed for muscle spasms (1 tablet every 8 hrs as needed for spasm)  (Patient not taking: Reported on 8/2/2022)   • diazepam (VALIUM) 5 mg tablet Take by mouth (Patient not taking: Reported on 8/2/2022)   • lisinopril (ZESTRIL) 5 mg tablet Take 5 mg by mouth daily  (Patient not taking: Reported on 8/2/2022)   • methocarbamol (ROBAXIN) 750 mg tablet Take 750 mg by mouth 4 (four) times a day  (Patient not taking: Reported on 8/2/2022)   • mirtazapine (REMERON) 30 mg tablet Take by mouth (Patient not taking: Reported on 8/2/2022)   • oxyCODONE (OxyCONTIN) 40 mg 12 hr tablet Take by mouth (Patient not taking: Reported on 8/2/2022)   • [DISCONTINUED] pantoprazole (PROTONIX) 40 mg tablet TAKE 1 TABLET BY MOUTH EVERY DAY     No Known Allergies    There is no immunization history on file for this patient  Objective     /70 (BP Location: Right arm, Patient Position: Sitting, Cuff Size: Standard)   Pulse 86   Temp 97 6 °F (36 4 °C)   Ht 5' 6" (1 676 m)   Wt 75 8 kg (167 lb)   LMP  (LMP Unknown)   SpO2 97%   BMI 26 95 kg/m²     Physical Exam  Vitals reviewed  Constitutional:       General: She is not in acute distress  Appearance: She is well-developed  She is not ill-appearing, toxic-appearing or diaphoretic  Cardiovascular:      Rate and Rhythm: Normal rate and regular rhythm  Pulses: Normal pulses  Heart sounds: Normal heart sounds  No murmur heard  Pulmonary:      Effort: Pulmonary effort is normal  No respiratory distress  Breath sounds: Normal breath sounds  No stridor  Abdominal:      General: Abdomen is flat  Bowel sounds are normal  There is no distension  Palpations: Abdomen is soft  There is no shifting dullness or mass  Tenderness: There is no abdominal tenderness  Hernia: No hernia is present  Musculoskeletal:         General: No swelling, tenderness, deformity or signs of injury  Normal range of motion  Skin:     General: Skin is warm and dry  Capillary Refill: Capillary refill takes less than 2 seconds  Coloration: Skin is not jaundiced  Neurological:      General: No focal deficit present  Mental Status: She is alert and oriented to person, place, and time     Psychiatric:         Mood and Affect: Mood normal             Brando Sultana MD

## 2023-05-06 ENCOUNTER — HOSPITAL ENCOUNTER (EMERGENCY)
Facility: HOSPITAL | Age: 63
Discharge: HOME | End: 2023-05-06
Attending: EMERGENCY MEDICINE

## 2023-05-06 ENCOUNTER — APPOINTMENT (EMERGENCY)
Dept: CT IMAGING | Facility: HOSPITAL | Age: 63
End: 2023-05-06

## 2023-05-06 VITALS
RESPIRATION RATE: 18 BRPM | DIASTOLIC BLOOD PRESSURE: 77 MMHG | OXYGEN SATURATION: 97 % | SYSTOLIC BLOOD PRESSURE: 149 MMHG | HEART RATE: 103 BPM | TEMPERATURE: 97.9 F

## 2023-05-06 DIAGNOSIS — R10.13 EPIGASTRIC PAIN: Primary | ICD-10-CM

## 2023-05-06 LAB
2HR DELTA HS TROPONIN: 2 NG/L
ALBUMIN SERPL BCP-MCNC: 4 G/DL (ref 3.5–5)
ALP SERPL-CCNC: 83 U/L (ref 34–104)
ALT SERPL W P-5'-P-CCNC: 14 U/L (ref 7–52)
ANION GAP SERPL CALCULATED.3IONS-SCNC: 6 MMOL/L (ref 4–13)
AST SERPL W P-5'-P-CCNC: 17 U/L (ref 13–39)
BACTERIA UR QL AUTO: NORMAL /HPF
BASOPHILS # BLD AUTO: 0.07 THOUSANDS/ÂΜL (ref 0–0.1)
BASOPHILS NFR BLD AUTO: 1 % (ref 0–1)
BILIRUB SERPL-MCNC: 0.53 MG/DL (ref 0.2–1)
BILIRUB UR QL STRIP: NEGATIVE
BUN SERPL-MCNC: 9 MG/DL (ref 5–25)
CALCIUM SERPL-MCNC: 9.4 MG/DL (ref 8.4–10.2)
CARDIAC TROPONIN I PNL SERPL HS: 13 NG/L
CARDIAC TROPONIN I PNL SERPL HS: 15 NG/L
CHLORIDE SERPL-SCNC: 105 MMOL/L (ref 96–108)
CLARITY UR: CLEAR
CO2 SERPL-SCNC: 28 MMOL/L (ref 21–32)
COLOR UR: COLORLESS
CREAT SERPL-MCNC: 0.99 MG/DL (ref 0.6–1.3)
EOSINOPHIL # BLD AUTO: 0.14 THOUSAND/ÂΜL (ref 0–0.61)
EOSINOPHIL NFR BLD AUTO: 2 % (ref 0–6)
ERYTHROCYTE [DISTWIDTH] IN BLOOD BY AUTOMATED COUNT: 12.8 % (ref 11.6–15.1)
GFR SERPL CREATININE-BSD FRML MDRD: 62 ML/MIN/1.73SQ M
GLUCOSE SERPL-MCNC: 111 MG/DL (ref 65–140)
GLUCOSE UR STRIP-MCNC: NEGATIVE MG/DL
HCT VFR BLD AUTO: 41 % (ref 34.8–46.1)
HGB BLD-MCNC: 13.9 G/DL (ref 11.5–15.4)
HGB UR QL STRIP.AUTO: NEGATIVE
IMM GRANULOCYTES # BLD AUTO: 0.02 THOUSAND/UL (ref 0–0.2)
IMM GRANULOCYTES NFR BLD AUTO: 0 % (ref 0–2)
KETONES UR STRIP-MCNC: NEGATIVE MG/DL
LEUKOCYTE ESTERASE UR QL STRIP: ABNORMAL
LIPASE SERPL-CCNC: <6 U/L (ref 11–82)
LYMPHOCYTES # BLD AUTO: 1.3 THOUSANDS/ÂΜL (ref 0.6–4.47)
LYMPHOCYTES NFR BLD AUTO: 19 % (ref 14–44)
MAGNESIUM SERPL-MCNC: 1.9 MG/DL (ref 1.9–2.7)
MCH RBC QN AUTO: 30.3 PG (ref 26.8–34.3)
MCHC RBC AUTO-ENTMCNC: 33.9 G/DL (ref 31.4–37.4)
MCV RBC AUTO: 90 FL (ref 82–98)
MONOCYTES # BLD AUTO: 0.41 THOUSAND/ÂΜL (ref 0.17–1.22)
MONOCYTES NFR BLD AUTO: 6 % (ref 4–12)
NEUTROPHILS # BLD AUTO: 5.07 THOUSANDS/ÂΜL (ref 1.85–7.62)
NEUTS SEG NFR BLD AUTO: 72 % (ref 43–75)
NITRITE UR QL STRIP: NEGATIVE
NON-SQ EPI CELLS URNS QL MICRO: NORMAL /HPF
NRBC BLD AUTO-RTO: 0 /100 WBCS
PH UR STRIP.AUTO: 7 [PH]
PLATELET # BLD AUTO: 280 THOUSANDS/UL (ref 149–390)
PMV BLD AUTO: 10.1 FL (ref 8.9–12.7)
POTASSIUM SERPL-SCNC: 3.3 MMOL/L (ref 3.5–5.3)
PROT SERPL-MCNC: 6.7 G/DL (ref 6.4–8.4)
PROT UR STRIP-MCNC: NEGATIVE MG/DL
RBC # BLD AUTO: 4.58 MILLION/UL (ref 3.81–5.12)
RBC #/AREA URNS AUTO: NORMAL /HPF
SODIUM SERPL-SCNC: 139 MMOL/L (ref 135–147)
SP GR UR STRIP.AUTO: 1.03 (ref 1–1.03)
UROBILINOGEN UR STRIP-ACNC: <2 MG/DL
WBC # BLD AUTO: 7.01 THOUSAND/UL (ref 4.31–10.16)
WBC #/AREA URNS AUTO: NORMAL /HPF

## 2023-05-06 RX ORDER — METOCLOPRAMIDE 5 MG/1
5 TABLET ORAL EVERY 6 HOURS
Qty: 20 TABLET | Refills: 0 | Status: SHIPPED | OUTPATIENT
Start: 2023-05-06 | End: 2023-05-11

## 2023-05-06 RX ORDER — MORPHINE SULFATE 4 MG/ML
4 INJECTION, SOLUTION INTRAMUSCULAR; INTRAVENOUS ONCE
Status: COMPLETED | OUTPATIENT
Start: 2023-05-06 | End: 2023-05-06

## 2023-05-06 RX ORDER — DICYCLOMINE HCL 20 MG
20 TABLET ORAL 2 TIMES DAILY
Qty: 20 TABLET | Refills: 0 | Status: SHIPPED | OUTPATIENT
Start: 2023-05-06

## 2023-05-06 RX ORDER — ONDANSETRON 2 MG/ML
4 INJECTION INTRAMUSCULAR; INTRAVENOUS ONCE
Status: COMPLETED | OUTPATIENT
Start: 2023-05-06 | End: 2023-05-06

## 2023-05-06 RX ADMIN — IOHEXOL 100 ML: 350 INJECTION, SOLUTION INTRAVENOUS at 17:56

## 2023-05-06 RX ADMIN — ONDANSETRON 4 MG: 2 INJECTION INTRAMUSCULAR; INTRAVENOUS at 17:13

## 2023-05-06 RX ADMIN — MORPHINE SULFATE 4 MG: 4 INJECTION INTRAVENOUS at 17:04

## 2023-05-06 RX ADMIN — SODIUM CHLORIDE 1000 ML: 0.9 INJECTION, SOLUTION INTRAVENOUS at 17:13

## 2023-05-06 NOTE — ED PROVIDER NOTES
History  Chief Complaint   Patient presents with   • Abdominal Pain     Per pt she has been having abd pain for two weeks  (+) d/v  History provided by:  Patient  Epigastric Pain  Pain location:  Epigastric  Pain quality: aching    Pain radiates to:  Does not radiate  Pain radiates to the back: yes    Pain severity:  Moderate  Onset quality:  Gradual  Duration:  1 week  Timing:  Constant  Progression:  Worsening  Chronicity:  New  Context: at rest    Relieved by:  Nothing  Worsened by:  Nothing tried  Ineffective treatments:  None tried  Associated symptoms: abdominal pain, anorexia, fatigue, nausea, shortness of breath ( abd bloating makes her feel SOB, no Cp) and vomiting (had vomiting and diarrhea last week but had a GI bug and that resolved, but now developed epigastric pain with continued nausea/anorexia and epigastric pain with cramping and the bloating makes her feel SOB)    Associated symptoms: no cough, no diaphoresis, no dizziness and no fever    Risk factors: hypertension    Risk factors: no coronary artery disease, no diabetes mellitus, no smoking and no surgery        Prior to Admission Medications   Prescriptions Last Dose Informant Patient Reported? Taking? Xtampza ER 18 MG C12A   Yes No   Sig: TAKE 1 CAPSULE BY MOUTH IN THE MORNING FOR CHRONIC PAIN    amoxicillin (AMOXIL) 875 mg tablet   Yes No   Sig: Take 1 tablet by mouth every 12 (twelve) hours   Patient not taking: Reported on 8/2/2022   diazepam (VALIUM) 5 mg tablet   No No   Sig: Take 1 tablet (5 mg total) by mouth every 8 (eight) hours as needed for muscle spasms (1 tablet every 8 hrs as needed for spasm)  Patient not taking: Reported on 8/2/2022   diazepam (VALIUM) 5 mg tablet   Yes No   Sig: Take by mouth   Patient not taking: Reported on 8/2/2022   lisinopril (ZESTRIL) 5 mg tablet   Yes No   Sig: Take 5 mg by mouth daily     Patient not taking: Reported on 8/2/2022   lisinopril-hydrochlorothiazide (PRINZIDE,ZESTORETIC) 10-12 5 MG per tablet   Yes No   Sig: Take by mouth   methocarbamol (ROBAXIN) 750 mg tablet   Yes No   Sig: Take 750 mg by mouth 4 (four) times a day  Patient not taking: Reported on 2022   methocarbamol (ROBAXIN) 750 mg tablet   Yes No   Sig: Take by mouth   mirtazapine (REMERON) 30 mg tablet   Yes No   Sig: Take by mouth   Patient not taking: Reported on 2022   ondansetron (ZOFRAN-ODT) 4 mg disintegrating tablet   No No   Sig: Take 1 tablet (4 mg total) by mouth every 6 (six) hours as needed for nausea or vomiting   oxyCODONE (OxyCONTIN) 40 mg 12 hr tablet   Yes No   Sig: Take by mouth   Patient not taking: Reported on 2022   oxyCODONE ER (Xtampza ER) 36 MG C12A   Yes No   Sig: Take by mouth 3 (three) times a day   pantoprazole (PROTONIX) 40 mg tablet   No No   Sig: Take 1 tablet (40 mg total) by mouth daily   pregabalin (LYRICA) 75 mg capsule   Yes No   Sig: Take 75 mg by mouth 3 (three) times a day   sucralfate (CARAFATE) 1 g tablet   No No   Sig: Take 1 tablet (1 g total) by mouth 4 (four) times a day for 5 days      Facility-Administered Medications: None       Past Medical History:   Diagnosis Date   • Hyperlipidemia    • Hypertension        Past Surgical History:   Procedure Laterality Date   • BACK SURGERY      L3-S1   • CHOLECYSTECTOMY LAPAROSCOPIC N/A 2/10/2022    Procedure: CHOLECYSTECTOMY LAPAROSCOPIC;  Surgeon: Reji Riley DO;  Location: [facility];  Service: General   • SPINAL STIMULATOR PLACEMENT  2019       History reviewed  No pertinent family history  I have reviewed and agree with the history as documented      E-Cigarette/Vaping   • E-Cigarette Use Current Some Day User      E-Cigarette/Vaping Substances   • Nicotine Yes    • THC Yes    • CBD No    • Flavoring Yes    • Other No    • Unknown No      Social History     Tobacco Use   • Smoking status: Former     Types: Cigarettes     Quit date: 2018     Years since quittin 3   • Smokeless tobacco: Never   Vaping Use   • Vaping Use: Some days   • Substances: Nicotine, THC, Flavoring   Substance Use Topics   • Alcohol use: Never   • Drug use: Yes     Types: Marijuana     Comment: medical       Review of Systems   Constitutional: Positive for fatigue  Negative for diaphoresis and fever  Respiratory: Positive for shortness of breath ( abd bloating makes her feel SOB, no Cp)  Negative for cough  Gastrointestinal: Positive for abdominal pain, anorexia, nausea and vomiting (had vomiting and diarrhea last week but had a GI bug and that resolved, but now developed epigastric pain with continued nausea/anorexia and epigastric pain with cramping and the bloating makes her feel SOB)  Neurological: Negative for dizziness  All other systems reviewed and are negative  Physical Exam  Physical Exam  Vitals and nursing note reviewed  Constitutional:       General: She is not in acute distress  Appearance: She is well-developed  She is not diaphoretic  HENT:      Head: Normocephalic and atraumatic  Nose: Nose normal    Eyes:      Conjunctiva/sclera: Conjunctivae normal    Cardiovascular:      Rate and Rhythm: Normal rate and regular rhythm  Heart sounds: Normal heart sounds  Pulmonary:      Effort: Pulmonary effort is normal  No respiratory distress  Breath sounds: Normal breath sounds  Abdominal:      Palpations: Abdomen is soft  Tenderness: There is abdominal tenderness in the epigastric area  There is no guarding or rebound  Musculoskeletal:         General: Normal range of motion  Cervical back: Normal range of motion and neck supple  Skin:     General: Skin is warm and dry  Neurological:      Mental Status: She is alert and oriented to person, place, and time           Vital Signs  ED Triage Vitals   Temperature Pulse Respirations Blood Pressure SpO2   05/06/23 1628 05/06/23 1626 05/06/23 1626 05/06/23 1626 05/06/23 1626   97 9 °F (36 6 °C) 103 19 (!) 175/94 96 %      Temp Source Heart Rate Source Patient Position - Orthostatic VS BP Location FiO2 (%)   05/06/23 1626 05/06/23 1626 05/06/23 1818 05/06/23 1626 --   Oral Monitor Lying Left arm       Pain Score       05/06/23 1704       8           Vitals:    05/06/23 1626 05/06/23 1818   BP: (!) 175/94 149/77   Pulse: 103    Patient Position - Orthostatic VS:  Lying         Visual Acuity      ED Medications  Medications   sodium chloride 0 9 % bolus 1,000 mL (0 mL Intravenous Stopped 5/6/23 1816)   ondansetron (ZOFRAN) injection 4 mg (4 mg Intravenous Given 5/6/23 1713)   morphine injection 4 mg (4 mg Intravenous Given 5/6/23 1704)   iohexol (OMNIPAQUE) 350 MG/ML injection (MULTI-DOSE) 100 mL (100 mL Intravenous Given 5/6/23 1756)       Diagnostic Studies  Results Reviewed     Procedure Component Value Units Date/Time    HS Troponin I 2hr [[id number]]  (Normal) Collected: 05/06/23 1903    Lab Status: Final result Specimen: Blood from Arm, Right Updated: 05/06/23 1935     hs TnI 2hr 15 ng/L      Delta 2hr hsTnI 2 ng/L     Urine Microscopic [[id number]]  (Normal) Collected: 05/06/23 1834    Lab Status: Final result Specimen: Urine, Clean Catch Updated: 05/06/23 1907     RBC, UA 1-2 /hpf      WBC, UA 1-2 /hpf      Epithelial Cells Occasional /hpf      Bacteria, UA None Seen /hpf     UA w Reflex to Microscopic w Reflex to Culture [[id number]]  (Abnormal) Collected: 05/06/23 1834    Lab Status: Final result Specimen: Urine, Clean Catch Updated: 05/06/23 1901     Color, UA Colorless     Clarity, UA Clear     Specific Gravity, UA 1 032     pH, UA 7 0     Leukocytes, UA Trace     Nitrite, UA Negative     Protein, UA Negative mg/dl      Glucose, UA Negative mg/dl      Ketones, UA Negative mg/dl      Urobilinogen, UA <2 0 mg/dl      Bilirubin, UA Negative     Occult Blood, UA Negative    Comprehensive metabolic panel [[id number]]  (Abnormal) Collected: 05/06/23 1658    Lab Status: Final result Specimen: Blood from Arm, Right Updated: 05/06/23 1738     Sodium 139 mmol/L Potassium 3 3 mmol/L      Chloride 105 mmol/L      CO2 28 mmol/L      ANION GAP 6 mmol/L      BUN 9 mg/dL      Creatinine 0 99 mg/dL      Glucose 111 mg/dL      Calcium 9 4 mg/dL      AST 17 U/L      ALT 14 U/L      Alkaline Phosphatase 83 U/L      Total Protein 6 7 g/dL      Albumin 4 0 g/dL      Total Bilirubin 0 53 mg/dL      eGFR 62 ml/min/1 73sq m     Narrative:      National Kid[employer] Foundation guidelines for Chronic Kidney Disease (CKD):   •  Stage 1 with normal or high GFR (GFR > 90 mL/min/1 73 square meters)  •  Stage 2 Mild CKD (GFR = 60-89 mL/min/1 73 square meters)  •  Stage 3A Moderate CKD (GFR = 45-59 mL/min/1 73 square meters)  •  Stage 3B Moderate CKD (GFR = 30-44 mL/min/1 73 square meters)  •  Stage 4 Severe CKD (GFR = 15-29 mL/min/1 73 square meters)  •  Stage 5 End Stage CKD (GFR <15 mL/min/1 73 square meters)  Note: GFR calculation is accurate only with a steady state creatinine    Lipase [[id number]]  (Abnormal) Collected: 05/06/23 1658    Lab Status: Final result Specimen: Blood from Arm, Right Updated: 05/06/23 1738     Lipase <6 u/L     Magnesium [[id number]]  (Normal) Collected: 05/06/23 1658    Lab Status: Final result Specimen: Blood from Arm, Right Updated: 05/06/23 1738     Magnesium 1 9 mg/dL     HS Troponin 0hr (reflex protocol) [[id number]]  (Normal) Collected: 05/06/23 1658    Lab Status: Final result Specimen: Blood from Arm, Right Updated: 05/06/23 1737     hs TnI 0hr 13 ng/L     CBC and differential [[id number]] Collected: 05/06/23 1658    Lab Status: Final result Specimen: Blood from Arm, Right Updated: 05/06/23 1705     WBC 7 01 Thousand/uL      RBC 4 58 Million/uL      Hemoglobin 13 9 g/dL      Hematocrit 41 0 %      MCV 90 fL      MCH 30 3 pg      MCHC 33 9 g/dL      RDW 12 8 %      MPV 10 1 fL      Platelets 677 Thousands/uL      nRBC 0 /100 WBCs      Neutrophils Relative 72 %      Immat GRANS % 0 %      Lymphocytes Relative 19 %      Monocytes Relative 6 %      Eosinophils Relative 2 %      Basophils Relative 1 %      Neutrophils Absolute 5 07 Thousands/µL      Immature Grans Absolute 0 02 Thousand/uL      Lymphocytes Absolute 1 30 Thousands/µL      Monocytes Absolute 0 41 Thousand/µL      Eosinophils Absolute 0 14 Thousand/µL      Basophils Absolute 0 07 Thousands/µL                  CT abdomen pelvis with contrast   Final Result by Garland Belle MD (05/06 1927)      1  No acute inflammatory changes in the abdomen or pelvis   2  0 3 cm left kidney lower pole nonobstructing calculus  No hydronephrosis, no bowel obstruction   3  Hypodense likely cystic nodules within the pelvis as above, unchanged from 2/7/2022               Workstation performed: [id number]                    Procedures  ECG 12 Lead Documentation Only    Date/Time: 5/6/2023 4:46 PM  Performed by: Sumit Goldberg MD  Authorized by: Sumit Goldberg MD     Indications / Diagnosis:  Epigastric pain  ECG reviewed by me, the ED Provider: yes    Patient location:  ED  Rate:     ECG rate:  86    ECG rate assessment: normal    Rhythm:     Rhythm: sinus rhythm    ST segments:     ST segments:  Normal  T waves:     T waves: normal               ED Course                               SBIRT 22yo+    Flowsheet Row Most Recent Value   Initial Alcohol Screen: US AUDIT-C     1  How often do you have a drink containing alcohol? 0 Filed at: 05/06/2023 1628   2  How many drinks containing alcohol do you have on a typical day you are drinking? 0 Filed at: 05/06/2023 1628   3b  FEMALE Any Age, or MALE 65+: How often do you have 4 or more drinks on one occassion? 0 Filed at: 05/06/2023 1628   Audit-C Score 0 Filed at: 05/06/2023 1628   YUN: How many times in the past year have you    Used an illegal drug or used a prescription medication for non-medical reasons?  Never Filed at: 05/06/2023 1628                    Medical Decision Making  37-year-old female coming in with epigastric abdominal pain intermittent cramping and bloating for the past week or so  The week before she had GI bug with nausea vomiting and diarrhea  That resolved but since she still had epigastric discomfort  She has no chest pain or shortness of breath or fever or chills  Will evaluate for abdominal pain will check CBC for white count CMP and lipase for liver function test and to rule out pancreatitis  We will get EKG and troponins to rule out ACS for atypical chest pain for her epigastric discomfort  We will get CT scan of abdomen and will treat her symptoms  Epigastric pain: acute illness or injury  Amount and/or Complexity of Data Reviewed  Labs: ordered  Radiology: ordered  ECG/medicine tests: ordered and independent interpretation performed  Risk  Prescription drug management  Disposition  Final diagnoses:   Epigastric pain     Time reflects when diagnosis was documented in both MDM as applicable and the Disposition within this note     Time User Action Codes Description Comment    5/6/2023  8:37 PM Rosanna Seymour Add [R10 13] Epigastric pain       ED Disposition     ED Disposition   Discharge    Condition   Stable    Date/Time   Sat May 6, 2023  7:48 PM    Comment   Radha Phillips discharge to home/self care                 Follow-up Information     Follow up With Specialties Details Why Contact Info    Yolanda Rios MD Family Medicine Go to  If symptoms worsen [address]  [phone]            Discharge Medication List as of 5/6/2023  8:39 PM      START taking these medications    Details   dicyclomine (BENTYL) 20 mg tablet Take 1 tablet (20 mg total) by mouth 2 (two) times a day, Starting Sat 5/6/2023, Normal      metoclopramide (Reglan) 5 mg tablet Take 1 tablet (5 mg total) by mouth every 6 (six) hours for 20 doses, Starting Sat 5/6/2023, Until Thu 5/11/2023, Normal         CONTINUE these medications which have NOT CHANGED    Details   amoxicillin (AMOXIL) 875 mg tablet Take 1 tablet by mouth every 12 (twelve) hours, Starting Sat 5/23/2015, Historical Med      !! diazepam (VALIUM) 5 mg tablet Take 1 tablet (5 mg total) by mouth every 8 (eight) hours as needed for muscle spasms (1 tablet every 8 hrs as needed for spasm)  , Starting 2/20/2016, Until Discontinued, Print      !! diazepam (VALIUM) 5 mg tablet Take by mouth, Historical Med      lisinopril (ZESTRIL) 5 mg tablet Take 5 mg by mouth daily  , Until Discontinued, Historical Med      lisinopril-hydrochlorothiazide (PRINZIDE,ZESTORETIC) 10-12 5 MG per tablet Take by mouth, Historical Med      !! methocarbamol (ROBAXIN) 750 mg tablet Take 750 mg by mouth 4 (four) times a day , Until Discontinued, Historical Med      !! methocarbamol (ROBAXIN) 750 mg tablet Take by mouth, Historical Med      mirtazapine (REMERON) 30 mg tablet Take by mouth, Historical Med      ondansetron (ZOFRAN-ODT) 4 mg disintegrating tablet Take 1 tablet (4 mg total) by mouth every 6 (six) hours as needed for nausea or vomiting, Starting Wed 6/1/2022, Print      oxyCODONE (OxyCONTIN) 40 mg 12 hr tablet Take by mouth, Historical Med      !! oxyCODONE ER (Xtampza ER) 36 MG C12A Take by mouth 3 (three) times a day, Historical Med      pantoprazole (PROTONIX) 40 mg tablet Take 1 tablet (40 mg total) by mouth daily, Starting Mon 3/20/2023, Until Sun 6/18/2023, Normal      pregabalin (LYRICA) 75 mg capsule Take 75 mg by mouth 3 (three) times a day, Historical Med      sucralfate (CARAFATE) 1 g tablet Take 1 tablet (1 g total) by mouth 4 (four) times a day for 5 days, Starting Mon 3/20/2023, Until Sat 3/25/2023, Normal      !! Xtampza ER 18 MG C12A TAKE 1 CAPSULE BY MOUTH IN THE MORNING FOR CHRONIC PAIN , Historical Med       !! - Potential duplicate medications found  Please discuss with provider  No discharge procedures on file      PDMP Review     None          ED Provider  Electronically Signed by           Alycia Monsalve MD  05/09/23 3162

## 2023-05-07 LAB
ATRIAL RATE: 86 BPM
P AXIS: 71 DEGREES
PR INTERVAL: 154 MS
QRS AXIS: 89 DEGREES
QRSD INTERVAL: 80 MS
QT INTERVAL: 372 MS
QTC INTERVAL: 445 MS
T WAVE AXIS: 61 DEGREES
VENTRICULAR RATE: 86 BPM

## 2023-06-21 DIAGNOSIS — R10.13 EPIGASTRIC ABDOMINAL PAIN: ICD-10-CM

## 2023-06-21 RX ORDER — PANTOPRAZOLE SODIUM 40 MG/1
TABLET, DELAYED RELEASE ORAL
Qty: 90 TABLET | Refills: 0 | Status: SHIPPED | OUTPATIENT
Start: 2023-06-21

## 2023-08-24 DIAGNOSIS — R10.13 EPIGASTRIC ABDOMINAL PAIN: ICD-10-CM

## 2023-08-24 RX ORDER — PANTOPRAZOLE SODIUM 40 MG/1
TABLET, DELAYED RELEASE ORAL
Qty: 30 TABLET | Refills: 1 | Status: SHIPPED | OUTPATIENT
Start: 2023-08-24

## 2023-10-12 DIAGNOSIS — R10.13 EPIGASTRIC ABDOMINAL PAIN: ICD-10-CM

## 2023-10-12 RX ORDER — PANTOPRAZOLE SODIUM 40 MG/1
TABLET, DELAYED RELEASE ORAL
Qty: 90 TABLET | Refills: 1 | Status: SHIPPED | OUTPATIENT
Start: 2023-10-12

## 2024-03-15 DIAGNOSIS — R10.13 EPIGASTRIC ABDOMINAL PAIN: ICD-10-CM

## 2024-03-15 RX ORDER — PANTOPRAZOLE SODIUM 40 MG/1
TABLET, DELAYED RELEASE ORAL
Qty: 60 TABLET | Refills: 0 | Status: SHIPPED | OUTPATIENT
Start: 2024-03-15

## 2024-04-04 ENCOUNTER — OFFICE VISIT (OUTPATIENT)
Dept: FAMILY MEDICINE CLINIC | Facility: CLINIC | Age: 64
End: 2024-04-04
Payer: COMMERCIAL

## 2024-04-04 VITALS
WEIGHT: 158 LBS | TEMPERATURE: 97.7 F | HEART RATE: 90 BPM | HEIGHT: 66 IN | BODY MASS INDEX: 25.39 KG/M2 | DIASTOLIC BLOOD PRESSURE: 70 MMHG | SYSTOLIC BLOOD PRESSURE: 110 MMHG | OXYGEN SATURATION: 97 %

## 2024-04-04 DIAGNOSIS — Z13.220 LIPID SCREENING: ICD-10-CM

## 2024-04-04 DIAGNOSIS — Z12.31 ENCOUNTER FOR SCREENING MAMMOGRAM FOR BREAST CANCER: ICD-10-CM

## 2024-04-04 DIAGNOSIS — Z00.00 ANNUAL PHYSICAL EXAM: Primary | ICD-10-CM

## 2024-04-04 DIAGNOSIS — R82.90 CLOUDY URINE: ICD-10-CM

## 2024-04-04 DIAGNOSIS — I10 HYPERTENSION, UNSPECIFIED TYPE: ICD-10-CM

## 2024-04-04 DIAGNOSIS — R30.9 PAINFUL URINATION: ICD-10-CM

## 2024-04-04 DIAGNOSIS — Z12.11 SCREEN FOR COLON CANCER: ICD-10-CM

## 2024-04-04 LAB
SL AMB  POCT GLUCOSE, UA: ABNORMAL
SL AMB LEUKOCYTE ESTERASE,UA: 70
SL AMB POCT BILIRUBIN,UA: 1
SL AMB POCT BLOOD,UA: 25
SL AMB POCT CLARITY,UA: ABNORMAL
SL AMB POCT COLOR,UA: ABNORMAL
SL AMB POCT KETONES,UA: ABNORMAL
SL AMB POCT NITRITE,UA: ABNORMAL
SL AMB POCT PH,UA: 6
SL AMB POCT SPECIFIC GRAVITY,UA: 1.02
SL AMB POCT URINE PROTEIN: ABNORMAL
SL AMB POCT UROBILINOGEN: 0.2

## 2024-04-04 PROCEDURE — 81003 URINALYSIS AUTO W/O SCOPE: CPT | Performed by: FAMILY MEDICINE

## 2024-04-04 PROCEDURE — 87186 SC STD MICRODIL/AGAR DIL: CPT | Performed by: FAMILY MEDICINE

## 2024-04-04 PROCEDURE — 99396 PREV VISIT EST AGE 40-64: CPT | Performed by: FAMILY MEDICINE

## 2024-04-04 PROCEDURE — 87086 URINE CULTURE/COLONY COUNT: CPT | Performed by: FAMILY MEDICINE

## 2024-04-04 PROCEDURE — 99213 OFFICE O/P EST LOW 20 MIN: CPT | Performed by: FAMILY MEDICINE

## 2024-04-04 PROCEDURE — 87077 CULTURE AEROBIC IDENTIFY: CPT | Performed by: FAMILY MEDICINE

## 2024-04-04 RX ORDER — DIPHENOXYLATE HYDROCHLORIDE AND ATROPINE SULFATE 2.5; .025 MG/1; MG/1
1 TABLET ORAL DAILY
COMMUNITY

## 2024-04-04 RX ORDER — SULFAMETHOXAZOLE AND TRIMETHOPRIM 800; 160 MG/1; MG/1
1 TABLET ORAL EVERY 12 HOURS SCHEDULED
Qty: 14 TABLET | Refills: 0 | Status: SHIPPED | OUTPATIENT
Start: 2024-04-04 | End: 2024-04-11

## 2024-04-04 NOTE — PROGRESS NOTES
ADULT ANNUAL PHYSICAL  [facility]    NAME: Celia Rivera  AGE: 63 y.o. SEX: female  : 1960     DATE: 2024     Assessment and Plan:     Problem List Items Addressed This Visit          Cardiovascular and Mediastinum    Hypertension    Relevant Orders    Comprehensive metabolic panel    CBC and differential     Other Visit Diagnoses       Annual physical exam    -  Primary    Relevant Orders    Comprehensive metabolic panel    CBC and differential    Encounter for screening mammogram for breast cancer        Relevant Orders    Mammo screening bilateral w 3d & cad    Screen for colon cancer        Relevant Orders    Ambulatory Referral to Gastroenterology    Painful urination        Relevant Medications    sulfamethoxazole-trimethoprim (BACTRIM DS) 800-160 mg per tablet    Other Relevant Orders    POCT urine dip auto non-scope (Completed)    Urine culture    Cloudy urine        Relevant Medications    sulfamethoxazole-trimethoprim (BACTRIM DS) 800-160 mg per tablet    Other Relevant Orders    POCT urine dip auto non-scope (Completed)    Urine culture    Lipid screening        Relevant Orders    Lipid panel              Immunizations and preventive care screenings were discussed with patient today. Appropriate education was printed on patient's after visit summary.    Counseling:  Alcohol/drug use: discussed moderation in alcohol intake, the recommendations for healthy alcohol use, and avoidance of illicit drug use.  Dental Health: discussed importance of regular tooth brushing, flossing, and dental visits.  Injury prevention: discussed safety/seat belts, safety helmets, smoke detectors, carbon dioxide detectors, and smoking near bedding or upholstery.  Sexual health: discussed sexually transmitted diseases, partner selection, use of condoms, avoidance of unintended pregnancy, and contraceptive alternatives.  Exercise: the importance of regular  exercise/physical activity was discussed. Recommend exercise 3-5 times per week for at least 30 minutes.          No follow-ups on file.     Chief Complaint:     Chief Complaint   Patient presents with    Annual Exam     Annual Exam    Urinary Tract Infection     Painful urination x 3 days       History of Present Illness:     Adult Annual Physical   Patient here for a comprehensive physical exam. The patient reports problems - reports overall she is doing well however does have some UTI symptoms.  Patient does have a history of UTI.  Last time was approximately 5 years ago.  Current symptoms started about 3 days ago, increased urgency and difficulty going and burning sensation .    Chronic pain managed by specialist.     Diet and Physical Activity  Diet/Nutrition: well balanced diet and consuming 3-5 servings of fruits/vegetables daily.   Exercise:  chores in the house, isometric exercises, limited due to chronic pain .      Depression Screening  PHQ-2/9 Depression Screening    Little interest or pleasure in doing things: 0 - not at all  Feeling down, depressed, or hopeless: 0 - not at all  PHQ-2 Score: 0  PHQ-2 Interpretation: Negative depression screen       General Health  Sleep: sleeps well.   Hearing: normal - bilateral.  Vision: no vision problems.   Dental: regular dental visits and brushes teeth twice daily.       /GYN Health  Follows with gynecology? no   Patient is: postmenopausal  Last menstrual period: n/a  Contraceptive method: menopause.  No abnormal vaginal bleeding    Advanced Care Planning  Do you have an advanced directive? no  Do you have a durable medical power of ? no  ACP document given to the patient? no     Review of Systems:     Review of Systems   Constitutional:  Negative for chills and fever.   HENT:  Negative for congestion, rhinorrhea and sore throat.    Respiratory:  Negative for chest tightness and shortness of breath.    Cardiovascular:  Negative for chest pain.    Gastrointestinal:  Negative for abdominal pain.   Genitourinary:  Positive for difficulty urinating, dysuria, frequency and urgency. Negative for hematuria.   Musculoskeletal:  Positive for arthralgias and back pain.   Neurological:  Negative for dizziness, light-headedness and headaches.   Psychiatric/Behavioral:  Negative for sleep disturbance.       Past Medical History:     Past Medical History:   Diagnosis Date    Hyperlipidemia     Hypertension       Past Surgical History:     Past Surgical History:   Procedure Laterality Date    BACK SURGERY      L3-S1    CHOLECYSTECTOMY LAPAROSCOPIC N/A 2/10/2022    Procedure: CHOLECYSTECTOMY LAPAROSCOPIC;  Surgeon: Eulalio Gross DO;  Location: [facility];  Service: General    SPINAL STIMULATOR PLACEMENT        Social History:     Social History     Socioeconomic History    Marital status: /Civil Union     Spouse name: None    Number of children: None    Years of education: None    Highest education level: None   Occupational History    None   Tobacco Use    Smoking status: Former     Current packs/day: 0.00     Types: Cigarettes     Quit date: 2018     Years since quittin.2    Smokeless tobacco: Never   Vaping Use    Vaping status: Some Days    Substances: Nicotine, THC, Flavoring   Substance and Sexual Activity    Alcohol use: Never    Drug use: Yes     Types: Marijuana     Comment: medical card    Sexual activity: None   Other Topics Concern    None   Social History Narrative    None     Social Determinants of Health     Financial Resource Strain: Not on file   Food Insecurity: No Food Insecurity (2022)    Hunger Vital Sign     Worried About Running Out of Food in the Last Year: Never true     Ran Out of Food in the Last Year: Never true   Transportation Needs: No Transportation Needs (2022)    PRAPARE - Transportation     Lack of Transportation (Medical): No     Lack of Transportation (Non-Medical): No   Physical Activity: Not on file  "  Stress: Not on file   Social Connections: Not on file   Intimate Partner Violence: Not on file   Housing Stability: Low Risk  (2/8/2022)    Housing Stability Vital Sign     Unable to Pay for Housing in the Last Year: No     Number of Places Lived in the Last Year: 1     Unstable Housing in the Last Year: No      Family History:     History reviewed. No pertinent family history.   Current Medications:     Current Outpatient Medications   Medication Sig Dispense Refill    methocarbamol (ROBAXIN) 750 mg tablet Take by mouth      multivitamin (THERAGRAN) TABS Take 1 tablet by mouth daily      oxyCODONE ER (Xtampza ER) 36 MG C12A Take by mouth 3 (three) times a day      pantoprazole (PROTONIX) 40 mg tablet TAKE 1 TABLET BY MOUTH EVERY DAY 60 tablet 0    pregabalin (LYRICA) 75 mg capsule Take 75 mg by mouth 3 (three) times a day      sulfamethoxazole-trimethoprim (BACTRIM DS) 800-160 mg per tablet Take 1 tablet by mouth every 12 (twelve) hours for 7 days 14 tablet 0    Xtampza ER 18 MG C12A TAKE 1 CAPSULE BY MOUTH IN THE MORNING FOR CHRONIC PAIN.      amoxicillin (AMOXIL) 875 mg tablet Take 1 tablet by mouth every 12 (twelve) hours (Patient not taking: Reported on 8/2/2022)      dicyclomine (BENTYL) 20 mg tablet Take 1 tablet (20 mg total) by mouth 2 (two) times a day 20 tablet 0    lisinopril (ZESTRIL) 5 mg tablet Take 5 mg by mouth daily. (Patient not taking: Reported on 8/2/2022)      lisinopril-hydrochlorothiazide (PRINZIDE,ZESTORETIC) 10-12.5 MG per tablet Take by mouth (Patient not taking: Reported on 4/4/2024)       No current facility-administered medications for this visit.      Allergies:     No Known Allergies   Physical Exam:     /70 (BP Location: Left arm, Patient Position: Sitting, Cuff Size: Standard)   Pulse 90   Temp 97.7 °F (36.5 °C)   Ht 5' 6\" (1.676 m)   Wt 71.7 kg (158 lb)   LMP  (LMP Unknown)   SpO2 97%   BMI 25.50 kg/m²     Physical Exam  Vitals reviewed.   Constitutional:       " English General: She is not in acute distress.     Appearance: Normal appearance. She is well-developed. She is not ill-appearing, toxic-appearing or diaphoretic.   HENT:      Head: Normocephalic and atraumatic.   Eyes:      Conjunctiva/sclera: Conjunctivae normal.   Cardiovascular:      Rate and Rhythm: Normal rate and regular rhythm.      Heart sounds: No murmur heard.  Pulmonary:      Effort: Pulmonary effort is normal. No respiratory distress.      Breath sounds: Normal breath sounds.   Abdominal:      Palpations: Abdomen is soft.      Tenderness: There is no abdominal tenderness.   Musculoskeletal:         General: No swelling or deformity.      Cervical back: Neck supple.   Skin:     General: Skin is warm and dry.      Capillary Refill: Capillary refill takes less than 2 seconds.   Neurological:      Mental Status: She is alert.   Psychiatric:         Mood and Affect: Mood normal.          Harvey Jordan MD  [facility]

## 2024-04-07 LAB — BACTERIA UR CULT: ABNORMAL

## 2024-05-11 DIAGNOSIS — R10.13 EPIGASTRIC ABDOMINAL PAIN: ICD-10-CM

## 2024-05-13 RX ORDER — PANTOPRAZOLE SODIUM 40 MG/1
TABLET, DELAYED RELEASE ORAL
Qty: 90 TABLET | Refills: 1 | Status: SHIPPED | OUTPATIENT
Start: 2024-05-13

## 2024-05-14 ENCOUNTER — TELEPHONE (OUTPATIENT)
Age: 64
End: 2024-05-14

## 2024-05-14 NOTE — TELEPHONE ENCOUNTER
Pt called in stating that she was seen in the office last month and some letters were suppose to be filled out for her insurance. Pt is asking for a callback from the office. Please advise.

## 2024-05-15 NOTE — TELEPHONE ENCOUNTER
Called to clarify. It's for her  [employer] insurance. PCP has to go online and fill out disability through their portal. Sometimes it's difficult to log on and Celia understands that. Asked for CB when it is completed so they can finish on their end.

## 2024-05-29 ENCOUNTER — TELEPHONE (OUTPATIENT)
Age: 64
End: 2024-05-29

## 2024-05-29 NOTE — TELEPHONE ENCOUNTER
Noel, from Dr. Cast's office, the patient's pain management physician, called to have the patient's most recent lab results faxed to the office for the patient's appointment this afternoon.  Patient is due to arrive there in the next 15 mins.  Please fax most recent results to - [phone].  Thank you!

## 2024-06-13 ENCOUNTER — OFFICE VISIT (OUTPATIENT)
Dept: FAMILY MEDICINE CLINIC | Facility: CLINIC | Age: 64
End: 2024-06-13
Payer: COMMERCIAL

## 2024-06-13 VITALS
WEIGHT: 162 LBS | OXYGEN SATURATION: 98 % | HEIGHT: 66 IN | BODY MASS INDEX: 26.03 KG/M2 | SYSTOLIC BLOOD PRESSURE: 136 MMHG | TEMPERATURE: 97.4 F | DIASTOLIC BLOOD PRESSURE: 86 MMHG | HEART RATE: 80 BPM

## 2024-06-13 DIAGNOSIS — R10.33 PERIUMBILICAL ABDOMINAL PAIN: ICD-10-CM

## 2024-06-13 DIAGNOSIS — K59.01 SLOW TRANSIT CONSTIPATION: ICD-10-CM

## 2024-06-13 DIAGNOSIS — R14.0 ABDOMINAL BLOATING: Primary | ICD-10-CM

## 2024-06-13 DIAGNOSIS — F11.20 OPIOID DEPENDENCE, UNCOMPLICATED (HCC): ICD-10-CM

## 2024-06-13 PROCEDURE — 99214 OFFICE O/P EST MOD 30 MIN: CPT | Performed by: FAMILY MEDICINE

## 2024-06-13 RX ORDER — SENNA AND DOCUSATE SODIUM 50; 8.6 MG/1; MG/1
1 TABLET, FILM COATED ORAL DAILY
Qty: 7 TABLET | Refills: 0 | Status: SHIPPED | OUTPATIENT
Start: 2024-06-13

## 2024-06-13 NOTE — PROGRESS NOTES
"Ambulatory Visit  Name: Celia Rivera      : 1960      MRN: [id number]  Encounter Provider: Harvey Jordan MD  Encounter Date: 2024   Encounter department: [facility]    Assessment & Plan   1. Abdominal bloating  -     senna-docusate sodium (SENOKOT-S) 8.6-50 mg per tablet; Take 1 tablet by mouth daily  -     CT abdomen pelvis wo contrast; Future; Expected date: 2024  2. Periumbilical abdominal pain  -     senna-docusate sodium (SENOKOT-S) 8.6-50 mg per tablet; Take 1 tablet by mouth daily  -     CT abdomen pelvis wo contrast; Future; Expected date: 2024  3. Slow transit constipation  -     CT abdomen pelvis wo contrast; Future; Expected date: 2024  4. Opioid dependence, uncomplicated (HCC)    Patient's symptom may be secondary to opiate induced constipation, her typical medications such as Bentyl and Protonix does not seem to be improving symptoms  No specific areas of tenderness or rebound on physical exam  Will have patient use         History of Present Illness     HPI    Susan is a 63-year-old female patient presenting for abdominal bloating and discomfort.  Patient reports this happened in the past which usually improves with Protonix and Bentyl however she has been using this medication without symptom improvement symptoms.  Symptoms started last Friday, patient has on and off abdominal pressure, and pain.  Patient did have decreased appetite, was able to keep down some scrambled eggs this morning.  Denies any association with diarrhea or constipation.  Pain could be up to 8 out of 10 at worst.  In office patient reports pain is \"not that bad\".  Patient did have have any atypical diet.   BM about once every 3 days. Does take medication for constipation.  Pt does use xtampza 3.   No recent change in steroid dosing.  Patient reports there was some urine dribbling.   Pt has tried miralax without improvement.     Patient is concerned as her symptoms seems to " be worsening compared to Friday    Review of Systems   Constitutional:  Negative for chills and fever.   HENT:  Negative for congestion, rhinorrhea and sore throat.    Respiratory:  Negative for shortness of breath.    Cardiovascular:  Negative for chest pain.   Gastrointestinal:  Positive for abdominal distention, abdominal pain and constipation. Negative for diarrhea, nausea and vomiting.   Genitourinary:  Negative for dysuria and hematuria.   Musculoskeletal:  Positive for back pain.   Neurological:  Negative for dizziness, light-headedness and headaches.   Psychiatric/Behavioral:  Negative for sleep disturbance.        Past Medical History:   Diagnosis Date    Hyperlipidemia     Hypertension      Past Surgical History:   Procedure Laterality Date    BACK SURGERY      L3-S1    CHOLECYSTECTOMY LAPAROSCOPIC N/A 2/10/2022    Procedure: CHOLECYSTECTOMY LAPAROSCOPIC;  Surgeon: Eulalio Gross DO;  Location: [facility] OR;  Service: General    SPINAL STIMULATOR PLACEMENT  2019     History reviewed. No pertinent family history.  Social History     Tobacco Use    Smoking status: Former     Current packs/day: 0.00     Types: Cigarettes     Quit date: 2018     Years since quittin.4    Smokeless tobacco: Never   Vaping Use    Vaping status: Some Days    Substances: Nicotine, THC, Flavoring   Substance and Sexual Activity    Alcohol use: Never    Drug use: Not Currently     Types: Marijuana    Sexual activity: Not on file     Current Outpatient Medications on File Prior to Visit   Medication Sig    amoxicillin (AMOXIL) 875 mg tablet Take 1 tablet by mouth every 12 (twelve) hours    dicyclomine (BENTYL) 20 mg tablet Take 1 tablet (20 mg total) by mouth 2 (two) times a day    methocarbamol (ROBAXIN) 750 mg tablet Take by mouth    multivitamin (THERAGRAN) TABS Take 1 tablet by mouth daily    oxyCODONE ER (Xtampza ER) 36 MG C12A Take by mouth 3 (three) times a day    pantoprazole (PROTONIX) 40 mg tablet TAKE 1 TABLET BY  "MOUTH EVERY DAY    pregabalin (LYRICA) 75 mg capsule Take 75 mg by mouth 3 (three) times a day    Xtampza ER 18 MG C12A TAKE 1 CAPSULE BY MOUTH IN THE MORNING FOR CHRONIC PAIN.    lisinopril (ZESTRIL) 5 mg tablet Take 5 mg by mouth daily. (Patient not taking: Reported on 8/2/2022)    lisinopril-hydrochlorothiazide (PRINZIDE,ZESTORETIC) 10-12.5 MG per tablet Take by mouth (Patient not taking: Reported on 4/4/2024)     No Known Allergies    There is no immunization history on file for this patient.  Objective     /86 (BP Location: Left arm, Patient Position: Sitting, Cuff Size: Standard)   Pulse 80   Temp (!) 97.4 °F (36.3 °C)   Ht 5' 6\" (1.676 m)   Wt 73.5 kg (162 lb)   LMP  (LMP Unknown)   SpO2 98%   BMI 26.15 kg/m²     Physical Exam  Vitals reviewed.   Constitutional:       General: She is not in acute distress.     Appearance: Normal appearance. She is not ill-appearing, toxic-appearing or diaphoretic.   Cardiovascular:      Rate and Rhythm: Normal rate.      Pulses: Normal pulses.      Heart sounds: No murmur heard.  Pulmonary:      Effort: Pulmonary effort is normal. No respiratory distress.      Breath sounds: Normal breath sounds.   Abdominal:      General: Abdomen is flat. There is no distension.      Palpations: Abdomen is soft. There is no mass.      Tenderness: There is no abdominal tenderness. There is no guarding.      Hernia: No hernia is present.      Comments: Decreased bowel sound   Musculoskeletal:         General: No swelling or deformity.   Skin:     General: Skin is warm and dry.      Capillary Refill: Capillary refill takes less than 2 seconds.      Coloration: Skin is not jaundiced.   Neurological:      General: No focal deficit present.      Mental Status: She is alert and oriented to person, place, and time.   Psychiatric:         Mood and Affect: Mood normal.           "

## 2024-06-22 ENCOUNTER — APPOINTMENT (OUTPATIENT)
Dept: LAB | Facility: MEDICAL CENTER | Age: 64
End: 2024-06-22
Payer: COMMERCIAL

## 2024-06-22 DIAGNOSIS — Z13.220 LIPID SCREENING: ICD-10-CM

## 2024-06-22 DIAGNOSIS — Z79.891 LONG TERM CURRENT USE OF OPIATE ANALGESIC: ICD-10-CM

## 2024-06-22 DIAGNOSIS — Z00.00 ANNUAL PHYSICAL EXAM: ICD-10-CM

## 2024-06-22 DIAGNOSIS — I10 HYPERTENSION, UNSPECIFIED TYPE: ICD-10-CM

## 2024-06-22 LAB
ALBUMIN SERPL BCG-MCNC: 4 G/DL (ref 3.5–5)
ALP SERPL-CCNC: 107 U/L (ref 34–104)
ALT SERPL W P-5'-P-CCNC: 10 U/L (ref 7–52)
ANION GAP SERPL CALCULATED.3IONS-SCNC: 9 MMOL/L (ref 4–13)
AST SERPL W P-5'-P-CCNC: 14 U/L (ref 13–39)
BASOPHILS # BLD AUTO: 0.05 THOUSANDS/ÂΜL (ref 0–0.1)
BASOPHILS NFR BLD AUTO: 1 % (ref 0–1)
BILIRUB SERPL-MCNC: 0.4 MG/DL (ref 0.2–1)
BUN SERPL-MCNC: 10 MG/DL (ref 5–25)
CALCIUM SERPL-MCNC: 9.2 MG/DL (ref 8.4–10.2)
CHLORIDE SERPL-SCNC: 101 MMOL/L (ref 96–108)
CHOLEST SERPL-MCNC: 162 MG/DL
CO2 SERPL-SCNC: 30 MMOL/L (ref 21–32)
CREAT SERPL-MCNC: 0.93 MG/DL (ref 0.6–1.3)
EOSINOPHIL # BLD AUTO: 0.26 THOUSAND/ÂΜL (ref 0–0.61)
EOSINOPHIL NFR BLD AUTO: 5 % (ref 0–6)
ERYTHROCYTE [DISTWIDTH] IN BLOOD BY AUTOMATED COUNT: 11.9 % (ref 11.6–15.1)
GFR SERPL CREATININE-BSD FRML MDRD: 66 ML/MIN/1.73SQ M
GLUCOSE P FAST SERPL-MCNC: 93 MG/DL (ref 65–99)
HCT VFR BLD AUTO: 42.2 % (ref 34.8–46.1)
HDLC SERPL-MCNC: 50 MG/DL
HGB BLD-MCNC: 13.9 G/DL (ref 11.5–15.4)
IMM GRANULOCYTES # BLD AUTO: 0.01 THOUSAND/UL (ref 0–0.2)
IMM GRANULOCYTES NFR BLD AUTO: 0 % (ref 0–2)
LDLC SERPL CALC-MCNC: 83 MG/DL (ref 0–100)
LYMPHOCYTES # BLD AUTO: 1.55 THOUSANDS/ÂΜL (ref 0.6–4.47)
LYMPHOCYTES NFR BLD AUTO: 28 % (ref 14–44)
MCH RBC QN AUTO: 30.8 PG (ref 26.8–34.3)
MCHC RBC AUTO-ENTMCNC: 32.9 G/DL (ref 31.4–37.4)
MCV RBC AUTO: 94 FL (ref 82–98)
MONOCYTES # BLD AUTO: 0.39 THOUSAND/ÂΜL (ref 0.17–1.22)
MONOCYTES NFR BLD AUTO: 7 % (ref 4–12)
NEUTROPHILS # BLD AUTO: 3.23 THOUSANDS/ÂΜL (ref 1.85–7.62)
NEUTS SEG NFR BLD AUTO: 59 % (ref 43–75)
NONHDLC SERPL-MCNC: 112 MG/DL
NRBC BLD AUTO-RTO: 0 /100 WBCS
PLATELET # BLD AUTO: 297 THOUSANDS/UL (ref 149–390)
PMV BLD AUTO: 10.6 FL (ref 8.9–12.7)
POTASSIUM SERPL-SCNC: 3.8 MMOL/L (ref 3.5–5.3)
PROT SERPL-MCNC: 6.7 G/DL (ref 6.4–8.4)
RBC # BLD AUTO: 4.51 MILLION/UL (ref 3.81–5.12)
SODIUM SERPL-SCNC: 140 MMOL/L (ref 135–147)
TRIGL SERPL-MCNC: 143 MG/DL
WBC # BLD AUTO: 5.49 THOUSAND/UL (ref 4.31–10.16)

## 2024-06-22 PROCEDURE — 80053 COMPREHEN METABOLIC PANEL: CPT

## 2024-06-22 PROCEDURE — 80369 SKELETAL MUSCLE RELAXANT 1/2: CPT | Performed by: ANESTHESIOLOGY

## 2024-06-22 PROCEDURE — 80061 LIPID PANEL: CPT

## 2024-06-22 PROCEDURE — G0480 DRUG TEST DEF 1-7 CLASSES: HCPCS

## 2024-06-22 PROCEDURE — 36415 COLL VENOUS BLD VENIPUNCTURE: CPT

## 2024-06-22 PROCEDURE — 80365 DRUG SCREENING OXYCODONE: CPT

## 2024-06-22 PROCEDURE — 80372 DRUG SCREENING TAPENTADOL: CPT

## 2024-06-22 PROCEDURE — 80307 DRUG TEST PRSMV CHEM ANLYZR: CPT

## 2024-06-22 PROCEDURE — 80361 OPIATES 1 OR MORE: CPT

## 2024-06-22 PROCEDURE — 80337 TRICYCLIC & CYCLICALS 6/MORE: CPT

## 2024-06-22 PROCEDURE — 85025 COMPLETE CBC W/AUTO DIFF WBC: CPT

## 2024-06-26 LAB
6MAM UR QL SCN: NEGATIVE NG/ML
ACCEPTABLE CREAT UR QL: 34 MG/DL
AMPHET UR QL SCN: NEGATIVE NG/ML
BARBITURATES UR QL SCN: NEGATIVE NG/ML
BENZODIAZ UR QL SCN: NEGATIVE NG/ML
BUPRENORPHINE UR QL CFM: NEGATIVE NG/ML
CANNABINOIDS UR QL SCN: NEGATIVE NG/ML
CARISOPRODOL UR QL: NEGATIVE NG/ML
COCAINE+BZE UR QL SCN: NEGATIVE NG/ML
CODEINE UR QL CFM: NOT DETECTED NG/MG CREAT
DHC UR CFM-MCNC: NOT DETECTED NG/MG CREAT
ETHYL GLUCURONIDE UR QL SCN: NEGATIVE NG/ML
FENTANYL UR QL SCN: NEGATIVE NG/ML
GABAPENTIN SERPLBLD QL SCN: NEGATIVE UG/ML
HYDROCODONE UR QL CFM: NOT DETECTED NG/MG CREAT
HYDROMORPHONE UR QL CFM: NOT DETECTED NG/MG CREAT
METHADONE UR QL SCN: NEGATIVE NG/ML
MORPHINE UR QL CFM: NOT DETECTED NG/MG CREAT
NITRITE UR QL STRIP: NEGATIVE UG/ML
NORCODEINE/CREAT UR CFM: NOT DETECTED NG/MG CREAT
NORHYDROCODONE UR CFM-MCNC: NOT DETECTED NG/MG CREAT
NORMORPHINE: NOT DETECTED NG/MG CREAT
NOROXYCODONE UR CFM-MCNC: NORMAL NG/MG CREAT
NOROXYMORPHONE/CREAT UR CFM: 4262 NG/MG CREAT
OPIATES UR QL SCN: NEGATIVE NG/ML
OXYCODONE UR QL CFM: NORMAL NG/MG CREAT
OXYCODONE+OXYMORPHONE UR QL SCN: ABNORMAL NG/ML
OXYMORPHONE UR QL CFM: NORMAL NG/MG CREAT
PCP UR QL SCN: NEGATIVE NG/ML
PROPOXYPH UR QL SCN: NEGATIVE NG/ML
SPECIMEN PH ACCEPTABLE UR: 6.8 (ref 4.5–8.9)
TAPENTADOL UR QL SCN: NEGATIVE NG/ML
TRAMADOL UR QL SCN: NEGATIVE NG/ML

## 2024-07-05 LAB
MISCELLANEOUS LAB TEST RESULT: NORMAL
MISCELLANEOUS LAB TEST RESULT: NORMAL

## 2024-09-04 NOTE — CASE MANAGEMENT
Case Management Discharge Planning Note    Patient name Amadeo Batista /-20 MRN [id number]  : 1960 Date 2022       Current Admission Date: 2022  Current Admission Diagnosis:Choledocholithiasis   Patient Active Problem List    Diagnosis Date Noted    Right upper quadrant pain 2022    Choledocholithiasis 2022    Chronic low back pain 2022    Opioid dependence ([address] ) 2022    Hypertension 2022      LOS (days): 2  Geometric Mean LOS (GMLOS) (days):   Days to GMLOS:     OBJECTIVE:  Risk of Unplanned Readmission Score: 9         Current admission status: Inpatient   Preferred Pharmacy:   [address] [place] [address]  Phone: [phone] Fax: [phone]    [address] [address] [address] [facility]  Phone: [phone] Fax: [phone]    Primary Care Provider: John Malik MD    Primary Insurance: BLUE CROSS  Secondary Insurance:     DISCHARGE DETAILS:    Additional Comments: Patient reviewed during care coordination rounds with Dr Zane Bo who informed that pt will be getting ERCP today and then gallbladder removal tomorrow  Pt will likely require an additional 72 hours inpatient  No CM needs identified at this time, CM department to remain available  No

## 2024-09-27 ENCOUNTER — RA CDI HCC (OUTPATIENT)
Dept: OTHER | Facility: HOSPITAL | Age: 64
End: 2024-09-27

## 2024-09-27 NOTE — PROGRESS NOTES
HCC coding opportunities       Chart reviewed, no opportunity found: CHART REVIEWED, NO OPPORTUNITY FOUND        Patients Insurance        Commercial Insurance: [employer]

## 2024-10-11 ENCOUNTER — APPOINTMENT (OUTPATIENT)
Dept: LAB | Facility: MEDICAL CENTER | Age: 64
End: 2024-10-11
Payer: COMMERCIAL

## 2024-10-11 DIAGNOSIS — Z79.891 ENCOUNTER FOR LONG-TERM METHADONE USE: ICD-10-CM

## 2024-10-11 PROCEDURE — G0480 DRUG TEST DEF 1-7 CLASSES: HCPCS

## 2024-10-11 PROCEDURE — 80365 DRUG SCREENING OXYCODONE: CPT

## 2024-10-11 PROCEDURE — 80361 OPIATES 1 OR MORE: CPT

## 2024-10-11 PROCEDURE — 80307 DRUG TEST PRSMV CHEM ANLYZR: CPT

## 2024-10-15 LAB
6MAM UR QL SCN: NEGATIVE NG/ML
ACCEPTABLE CREAT UR QL: 203 MG/DL
AMPHET UR QL SCN: NEGATIVE NG/ML
BARBITURATES UR QL SCN: NEGATIVE NG/ML
BENZODIAZ UR QL SCN: NEGATIVE NG/ML
BUPRENORPHINE UR QL CFM: NEGATIVE NG/ML
CANNABINOIDS UR QL SCN: NEGATIVE NG/ML
CARISOPRODOL UR QL: NEGATIVE NG/ML
COCAINE+BZE UR QL SCN: NEGATIVE NG/ML
CODEINE UR QL CFM: NOT DETECTED NG/MG CREAT
DHC UR CFM-MCNC: NOT DETECTED NG/MG CREAT
ETHYL GLUCURONIDE UR QL SCN: NEGATIVE NG/ML
FENTANYL UR QL SCN: NEGATIVE NG/ML
GABAPENTIN SERPLBLD QL SCN: NEGATIVE UG/ML
HYDROCODONE UR QL CFM: NOT DETECTED NG/MG CREAT
HYDROMORPHONE UR QL CFM: NOT DETECTED NG/MG CREAT
METHADONE UR QL SCN: NEGATIVE NG/ML
MORPHINE UR QL CFM: NOT DETECTED NG/MG CREAT
NITRITE UR QL STRIP: NEGATIVE UG/ML
NORCODEINE/CREAT UR CFM: NOT DETECTED NG/MG CREAT
NORHYDROCODONE UR CFM-MCNC: NOT DETECTED NG/MG CREAT
NORMORPHINE: NOT DETECTED NG/MG CREAT
NOROXYCODONE UR CFM-MCNC: >4926 NG/MG CREAT
NOROXYMORPHONE/CREAT UR CFM: 3548 NG/MG CREAT
OPIATE CLASS: NEGATIVE
OPIATES UR QL SCN: NORMAL NG/ML
OXYCODONE CLASS: ABNORMAL
OXYCODONE UR QL CFM: 2351 NG/MG CREAT
OXYCODONE+OXYMORPHONE UR QL SCN: NORMAL NG/ML
OXYMORPHONE UR QL CFM: >4926 NG/MG CREAT
PCP UR QL SCN: NEGATIVE NG/ML
PROPOXYPH UR QL SCN: NEGATIVE NG/ML
SPECIMEN PH ACCEPTABLE UR: 6.6 (ref 4.5–8.9)
TAPENTADOL UR QL SCN: NEGATIVE NG/ML
TRAMADOL UR QL SCN: NEGATIVE NG/ML

## 2024-10-24 ENCOUNTER — TELEPHONE (OUTPATIENT)
Age: 64
End: 2024-10-24

## 2024-11-05 DIAGNOSIS — R10.13 EPIGASTRIC ABDOMINAL PAIN: ICD-10-CM

## 2024-11-06 RX ORDER — PANTOPRAZOLE SODIUM 40 MG/1
TABLET, DELAYED RELEASE ORAL
Qty: 90 TABLET | Refills: 1 | Status: SHIPPED | OUTPATIENT
Start: 2024-11-06

## 2025-03-01 ENCOUNTER — APPOINTMENT (OUTPATIENT)
Dept: LAB | Facility: MEDICAL CENTER | Age: 65
End: 2025-03-01
Payer: COMMERCIAL

## 2025-03-01 DIAGNOSIS — Z79.891 LONG TERM (CURRENT) USE OF OPIATE ANALGESIC: ICD-10-CM

## 2025-03-01 PROCEDURE — 80334 ANTIDEPRESSANTS CLASS 6/MORE: CPT

## 2025-03-01 PROCEDURE — 80361 OPIATES 1 OR MORE: CPT

## 2025-03-01 PROCEDURE — 36415 COLL VENOUS BLD VENIPUNCTURE: CPT

## 2025-03-01 PROCEDURE — 80337 TRICYCLIC & CYCLICALS 6/MORE: CPT | Performed by: ANESTHESIOLOGY

## 2025-03-01 PROCEDURE — 80307 DRUG TEST PRSMV CHEM ANLYZR: CPT

## 2025-03-01 PROCEDURE — 80365 DRUG SCREENING OXYCODONE: CPT

## 2025-03-01 PROCEDURE — 80338 ANTIDEPRESSANT NOT SPECIFIED: CPT | Performed by: ANESTHESIOLOGY

## 2025-03-06 LAB
6MAM UR QL SCN: NEGATIVE NG/ML
ACCEPTABLE CREAT UR QL: 20 MG/DL
AMPHET UR QL SCN: NEGATIVE NG/ML
BARBITURATES UR QL SCN: NEGATIVE NG/ML
BENZODIAZ UR QL SCN: NEGATIVE NG/ML
BUPRENORPHINE UR QL CFM: NEGATIVE NG/ML
CANNABINOIDS UR QL SCN: NEGATIVE NG/ML
CARISOPRODOL UR QL: NEGATIVE NG/ML
COCAINE+BZE UR QL SCN: NEGATIVE NG/ML
CODEINE UR QL CFM: NOT DETECTED NG/MG CREAT
DHC UR CFM-MCNC: NOT DETECTED NG/MG CREAT
ETHYL GLUCURONIDE UR QL SCN: NEGATIVE NG/ML
FENTANYL UR QL SCN: NEGATIVE NG/ML
GABAPENTIN SERPLBLD QL SCN: NEGATIVE UG/ML
HYDROCODONE UR QL CFM: NOT DETECTED NG/MG CREAT
HYDROMORPHONE UR QL CFM: NOT DETECTED NG/MG CREAT
METHADONE UR QL SCN: NEGATIVE NG/ML
MORPHINE UR QL CFM: NOT DETECTED NG/MG CREAT
NITRITE UR QL STRIP: NEGATIVE UG/ML
NORCODEINE/CREAT UR CFM: NOT DETECTED NG/MG CREAT
NORHYDROCODONE UR CFM-MCNC: NOT DETECTED NG/MG CREAT
NORMORPHINE: NOT DETECTED NG/MG CREAT
NOROXYCODONE UR CFM-MCNC: ABNORMAL NG/MG CREAT
NOROXYMORPHONE/CREAT UR CFM: 4235 NG/MG CREAT
OPIATE CLASS: NEGATIVE
OPIATES UR QL SCN: NORMAL NG/ML
OXYCODONE CLASS: ABNORMAL
OXYCODONE UR QL CFM: ABNORMAL NG/MG CREAT
OXYCODONE+OXYMORPHONE UR QL SCN: NORMAL NG/ML
OXYMORPHONE UR QL CFM: ABNORMAL NG/MG CREAT
PCP UR QL SCN: NEGATIVE NG/ML
PROPOXYPH UR QL SCN: NEGATIVE NG/ML
SPECIMEN PH ACCEPTABLE UR: 5.6 (ref 4.5–8.9)
TAPENTADOL UR QL SCN: NEGATIVE NG/ML
TRAMADOL UR QL SCN: NEGATIVE NG/ML

## 2025-03-10 LAB — MISCELLANEOUS LAB TEST RESULT: NORMAL

## 2025-03-25 LAB — MISCELLANEOUS LAB TEST RESULT: NORMAL

## 2025-04-04 ENCOUNTER — APPOINTMENT (EMERGENCY)
Dept: CT IMAGING | Facility: HOSPITAL | Age: 65
End: 2025-04-04
Payer: COMMERCIAL

## 2025-04-04 ENCOUNTER — HOSPITAL ENCOUNTER (INPATIENT)
Facility: HOSPITAL | Age: 65
LOS: 7 days | Discharge: HOME | End: 2025-04-11
Admitting: INTERNAL MEDICINE
Payer: COMMERCIAL

## 2025-04-04 DIAGNOSIS — D64.9 ANEMIA: Primary | ICD-10-CM

## 2025-04-04 DIAGNOSIS — K75.0 HEPATIC ABSCESS: ICD-10-CM

## 2025-04-04 DIAGNOSIS — B95.5 STREPTOCOCCAL BACTEREMIA: ICD-10-CM

## 2025-04-04 DIAGNOSIS — R10.13 EPIGASTRIC ABDOMINAL PAIN: ICD-10-CM

## 2025-04-04 DIAGNOSIS — K75.0 LIVER ABSCESS: ICD-10-CM

## 2025-04-04 DIAGNOSIS — R78.81 STREPTOCOCCAL BACTEREMIA: ICD-10-CM

## 2025-04-04 DIAGNOSIS — E87.6 HYPOKALEMIA: ICD-10-CM

## 2025-04-04 PROBLEM — K21.9 GERD (GASTROESOPHAGEAL REFLUX DISEASE): Status: ACTIVE | Noted: 2025-04-04

## 2025-04-04 LAB
2HR DELTA HS TROPONIN: 4 NG/L
4HR DELTA HS TROPONIN: 1 NG/L
ALBUMIN SERPL BCG-MCNC: 3 G/DL (ref 3.5–5)
ALP SERPL-CCNC: 164 U/L (ref 34–104)
ALT SERPL W P-5'-P-CCNC: 20 U/L (ref 7–52)
AMPHETAMINES SERPL QL SCN: NEGATIVE
ANION GAP SERPL CALCULATED.3IONS-SCNC: 9 MMOL/L (ref 4–13)
APTT PPP: 37 SECONDS (ref 23–34)
AST SERPL W P-5'-P-CCNC: 29 U/L (ref 13–39)
ATRIAL RATE: 74 BPM
BACTERIA UR QL AUTO: ABNORMAL /HPF
BARBITURATES UR QL: NEGATIVE
BASOPHILS # BLD AUTO: 0.03 THOUSANDS/ÂΜL (ref 0–0.1)
BASOPHILS NFR BLD AUTO: 0 % (ref 0–1)
BENZODIAZ UR QL: NEGATIVE
BILIRUB SERPL-MCNC: 0.78 MG/DL (ref 0.2–1)
BILIRUB UR QL STRIP: NEGATIVE
BNP SERPL-MCNC: 221 PG/ML (ref 0–100)
BUN SERPL-MCNC: 11 MG/DL (ref 5–25)
CALCIUM ALBUM COR SERPL-MCNC: 8.4 MG/DL (ref 8.3–10.1)
CALCIUM SERPL-MCNC: 7.6 MG/DL (ref 8.4–10.2)
CARDIAC TROPONIN I PNL SERPL HS: 17 NG/L (ref ?–50)
CARDIAC TROPONIN I PNL SERPL HS: 18 NG/L (ref ?–50)
CARDIAC TROPONIN I PNL SERPL HS: 21 NG/L (ref ?–50)
CHLORIDE SERPL-SCNC: 103 MMOL/L (ref 96–108)
CLARITY UR: ABNORMAL
CO2 SERPL-SCNC: 22 MMOL/L (ref 21–32)
COCAINE UR QL: NEGATIVE
COLOR UR: YELLOW
CREAT SERPL-MCNC: 1.08 MG/DL (ref 0.6–1.3)
D DIMER PPP FEU-MCNC: 2.99 UG/ML FEU
EOSINOPHIL # BLD AUTO: 0.01 THOUSAND/ÂΜL (ref 0–0.61)
EOSINOPHIL NFR BLD AUTO: 0 % (ref 0–6)
ERYTHROCYTE [DISTWIDTH] IN BLOOD BY AUTOMATED COUNT: 11.7 % (ref 11.6–15.1)
FENTANYL UR QL SCN: NEGATIVE
FLUAV RNA RESP QL NAA+PROBE: NEGATIVE
FLUBV RNA RESP QL NAA+PROBE: NEGATIVE
GFR SERPL CREATININE-BSD FRML MDRD: 55 ML/MIN/1.73SQ M
GLUCOSE SERPL-MCNC: 165 MG/DL (ref 65–140)
GLUCOSE UR STRIP-MCNC: NEGATIVE MG/DL
HCT VFR BLD AUTO: 32.2 % (ref 34.8–46.1)
HGB BLD-MCNC: 10.7 G/DL (ref 11.5–15.4)
HGB UR QL STRIP.AUTO: ABNORMAL
HYDROCODONE UR QL SCN: POSITIVE
IMM GRANULOCYTES # BLD AUTO: 0.08 THOUSAND/UL (ref 0–0.2)
IMM GRANULOCYTES NFR BLD AUTO: 1 % (ref 0–2)
INR PPP: 1.18 (ref 0.85–1.19)
KETONES UR STRIP-MCNC: NEGATIVE MG/DL
LACTATE SERPL-SCNC: 1.4 MMOL/L (ref 0.5–2)
LEUKOCYTE ESTERASE UR QL STRIP: ABNORMAL
LYMPHOCYTES # BLD AUTO: 0.3 THOUSANDS/ÂΜL (ref 0.6–4.47)
LYMPHOCYTES NFR BLD AUTO: 3 % (ref 14–44)
MAGNESIUM SERPL-MCNC: 1.9 MG/DL (ref 1.9–2.7)
MCH RBC QN AUTO: 29.6 PG (ref 26.8–34.3)
MCHC RBC AUTO-ENTMCNC: 33.2 G/DL (ref 31.4–37.4)
MCV RBC AUTO: 89 FL (ref 82–98)
METHADONE UR QL: NEGATIVE
MONOCYTES # BLD AUTO: 1.14 THOUSAND/ÂΜL (ref 0.17–1.22)
MONOCYTES NFR BLD AUTO: 10 % (ref 4–12)
NEUTROPHILS # BLD AUTO: 10.46 THOUSANDS/ÂΜL (ref 1.85–7.62)
NEUTS SEG NFR BLD AUTO: 86 % (ref 43–75)
NITRITE UR QL STRIP: NEGATIVE
NON-SQ EPI CELLS URNS QL MICRO: ABNORMAL /HPF
NRBC BLD AUTO-RTO: 0 /100 WBCS
OPIATES UR QL SCN: POSITIVE
OXYCODONE+OXYMORPHONE UR QL SCN: POSITIVE
P AXIS: 75 DEGREES
PCP UR QL: NEGATIVE
PH UR STRIP.AUTO: 6 [PH]
PLATELET # BLD AUTO: 288 THOUSANDS/UL (ref 149–390)
PMV BLD AUTO: 9.5 FL (ref 8.9–12.7)
POTASSIUM SERPL-SCNC: 3.2 MMOL/L (ref 3.5–5.3)
PR INTERVAL: 176 MS
PROCALCITONIN SERPL-MCNC: 2.04 NG/ML
PROT SERPL-MCNC: 6.3 G/DL (ref 6.4–8.4)
PROT UR STRIP-MCNC: ABNORMAL MG/DL
PROTHROMBIN TIME: 15.7 SECONDS (ref 12.3–15)
QRS AXIS: 85 DEGREES
QRSD INTERVAL: 90 MS
QT INTERVAL: 412 MS
QTC INTERVAL: 457 MS
RBC # BLD AUTO: 3.62 MILLION/UL (ref 3.81–5.12)
RBC #/AREA URNS AUTO: ABNORMAL /HPF
RSV RNA RESP QL NAA+PROBE: NEGATIVE
SARS-COV-2 RNA RESP QL NAA+PROBE: NEGATIVE
SODIUM SERPL-SCNC: 134 MMOL/L (ref 135–147)
SP GR UR STRIP.AUTO: 1.01 (ref 1–1.03)
T WAVE AXIS: 46 DEGREES
THC UR QL: NEGATIVE
TSH SERPL DL<=0.05 MIU/L-ACNC: 2.16 UIU/ML (ref 0.45–4.5)
UROBILINOGEN UR STRIP-ACNC: <2 MG/DL
VENTRICULAR RATE: 74 BPM
WBC # BLD AUTO: 12.02 THOUSAND/UL (ref 4.31–10.16)
WBC #/AREA URNS AUTO: ABNORMAL /HPF

## 2025-04-04 PROCEDURE — 84484 ASSAY OF TROPONIN QUANT: CPT

## 2025-04-04 PROCEDURE — 96374 THER/PROPH/DIAG INJ IV PUSH: CPT

## 2025-04-04 PROCEDURE — 87077 CULTURE AEROBIC IDENTIFY: CPT

## 2025-04-04 PROCEDURE — 81001 URINALYSIS AUTO W/SCOPE: CPT

## 2025-04-04 PROCEDURE — 74177 CT ABD & PELVIS W/CONTRAST: CPT

## 2025-04-04 PROCEDURE — NC001 PR NO CHARGE: Performed by: RADIOLOGY

## 2025-04-04 PROCEDURE — 83735 ASSAY OF MAGNESIUM: CPT

## 2025-04-04 PROCEDURE — 85610 PROTHROMBIN TIME: CPT

## 2025-04-04 PROCEDURE — 85379 FIBRIN DEGRADATION QUANT: CPT

## 2025-04-04 PROCEDURE — 70450 CT HEAD/BRAIN W/O DYE: CPT

## 2025-04-04 PROCEDURE — 80053 COMPREHEN METABOLIC PANEL: CPT

## 2025-04-04 PROCEDURE — 83605 ASSAY OF LACTIC ACID: CPT

## 2025-04-04 PROCEDURE — 99223 1ST HOSP IP/OBS HIGH 75: CPT | Performed by: INTERNAL MEDICINE

## 2025-04-04 PROCEDURE — 36415 COLL VENOUS BLD VENIPUNCTURE: CPT

## 2025-04-04 PROCEDURE — 85025 COMPLETE CBC W/AUTO DIFF WBC: CPT

## 2025-04-04 PROCEDURE — 87181 SC STD AGAR DILUTION PER AGT: CPT

## 2025-04-04 PROCEDURE — 83880 ASSAY OF NATRIURETIC PEPTIDE: CPT

## 2025-04-04 PROCEDURE — 71275 CT ANGIOGRAPHY CHEST: CPT

## 2025-04-04 PROCEDURE — 80307 DRUG TEST PRSMV CHEM ANLYZR: CPT

## 2025-04-04 PROCEDURE — 99285 EMERGENCY DEPT VISIT HI MDM: CPT

## 2025-04-04 PROCEDURE — 87040 BLOOD CULTURE FOR BACTERIA: CPT

## 2025-04-04 PROCEDURE — 87154 CUL TYP ID BLD PTHGN 6+ TRGT: CPT

## 2025-04-04 PROCEDURE — 93005 ELECTROCARDIOGRAM TRACING: CPT

## 2025-04-04 PROCEDURE — 0241U HB NFCT DS VIR RESP RNA 4 TRGT: CPT

## 2025-04-04 PROCEDURE — 84443 ASSAY THYROID STIM HORMONE: CPT

## 2025-04-04 PROCEDURE — 93010 ELECTROCARDIOGRAM REPORT: CPT | Performed by: INTERNAL MEDICINE

## 2025-04-04 PROCEDURE — 84145 PROCALCITONIN (PCT): CPT

## 2025-04-04 PROCEDURE — 85730 THROMBOPLASTIN TIME PARTIAL: CPT

## 2025-04-04 RX ORDER — POTASSIUM CHLORIDE 1500 MG/1
40 TABLET, EXTENDED RELEASE ORAL ONCE
Status: COMPLETED | OUTPATIENT
Start: 2025-04-04 | End: 2025-04-04

## 2025-04-04 RX ORDER — ACETAMINOPHEN 325 MG/1
650 TABLET ORAL EVERY 6 HOURS PRN
Status: DISCONTINUED | OUTPATIENT
Start: 2025-04-04 | End: 2025-04-11 | Stop reason: HOSPADM

## 2025-04-04 RX ORDER — SODIUM CHLORIDE 9 MG/ML
75 INJECTION, SOLUTION INTRAVENOUS CONTINUOUS
Status: DISCONTINUED | OUTPATIENT
Start: 2025-04-04 | End: 2025-04-07

## 2025-04-04 RX ORDER — ACETAMINOPHEN 160 MG/5ML
1 SUSPENSION, ORAL (FINAL DOSE FORM) ORAL ONCE
Status: COMPLETED | OUTPATIENT
Start: 2025-04-04 | End: 2025-04-04

## 2025-04-04 RX ADMIN — POTASSIUM CHLORIDE 40 MEQ: 1500 TABLET, EXTENDED RELEASE ORAL at 15:40

## 2025-04-04 RX ADMIN — IOHEXOL 100 ML: 350 INJECTION, SOLUTION INTRAVENOUS at 17:02

## 2025-04-04 RX ADMIN — SODIUM CHLORIDE 75 ML/HR: 0.9 INJECTION, SOLUTION INTRAVENOUS at 20:23

## 2025-04-04 RX ADMIN — PIPERACILLIN AND TAZOBACTAM 4.5 G: 36; 4.5 INJECTION, POWDER, FOR SOLUTION INTRAVENOUS at 18:02

## 2025-04-04 NOTE — H&P
H&P - Hospitalist   Name: Celia Rivera 64 y.o. female I MRN: [id number]  Unit/Bed#: ED 23 I Date of Admission: 4/4/2025   Date of Service: 4/4/2025 I Hospital Day: 0     Assessment & Plan  Liver abscess  64-year-old female with history of hypertension, previous cholecystectomy who initially presented with generalized weakness intermittent fevers  Noted to have a liver abscess.  8.5 cm  Case discussed with IR, general surgery,  N.p.o. after midnight  Planning for IR drainage tomorrow  No blood thinners  Will place on the  ceftriaxone,  metronidazole   Will likely need prolonged antibiotic course and outpatient follow-up with ID  Will consult ID for further evaluation  Will also consult IR as well as general surgery    Hypertension  Blood pressure controlled  Continue lisinopril and HCTZ  GERD (gastroesophageal reflux disease)    Continue PPI      VTE Pharmacologic Prophylaxis:   Moderate Risk (Score 3-4) - Pharmacological DVT Prophylaxis Ordered: heparin.  Code Status: full code  Discussion with family: Patient declined call to .     Anticipated Length of Stay: Patient will be admitted on an inpatient basis with an anticipated length of stay of greater than 2 midnights secondary to liver abscess.    History of Present Illness   Chief Complaint: Abdominal pain  Celia Rivera is a 64 y.o. female with past medical history significant hypertension, GERD initially presented with generalized weakness and intermittent fevers.  She otherwise denies any acute complaints.  Denies chest pain, abdominal pain, nausea or vomiting, diarrhea, dysuria, headache or any other complaints.    Review of Systems   Constitutional:  Negative for activity change, appetite change, chills, diaphoresis, fever and unexpected weight change.   HENT:  Negative for congestion, facial swelling and rhinorrhea.    Eyes:  Negative for photophobia and visual disturbance.   Respiratory:  Negative for cough, shortness of breath and  wheezing.    Cardiovascular:  Negative for chest pain and palpitations.   Gastrointestinal:  Negative for abdominal pain, blood in stool, constipation, diarrhea, nausea and vomiting.   Genitourinary:  Negative for decreased urine volume, difficulty urinating, dysuria, flank pain, frequency, hematuria and urgency.   Musculoskeletal:  Negative for arthralgias, back pain, joint swelling and myalgias.   Neurological:  Negative for dizziness, syncope, facial asymmetry, light-headedness, numbness and headaches.   Psychiatric/Behavioral:  Negative for confusion and decreased concentration. The patient is not nervous/anxious.        Historical Information   Past Medical History:   Diagnosis Date    Hyperlipidemia     Hypertension      Past Surgical History:   Procedure Laterality Date    BACK SURGERY      L3-S1    CHOLECYSTECTOMY LAPAROSCOPIC N/A 2/10/2022    Procedure: CHOLECYSTECTOMY LAPAROSCOPIC;  Surgeon: Eulalio Gross DO;  Location: [facility];  Service: General    SPINAL STIMULATOR PLACEMENT       Social History     Tobacco Use    Smoking status: Former     Current packs/day: 0.00     Types: Cigarettes     Quit date: 2018     Years since quittin.2    Smokeless tobacco: Never   Vaping Use    Vaping status: Some Days    Substances: Nicotine, THC, Flavoring   Substance and Sexual Activity    Alcohol use: Never    Drug use: Not Currently     Types: Marijuana    Sexual activity: Not on file     E-Cigarette/Vaping    E-Cigarette Use Current Some Day User      E-Cigarette/Vaping Substances    Nicotine Yes     THC Yes     CBD No     Flavoring Yes     Other No     Unknown No      History reviewed. No pertinent family history.  Social History:  Marital Status: /Civil Union     Patient Pre-hospital Living Situation: Home  Patient Pre-hospital Level of Mobility: walks  Patient Pre-hospital Diet Restrictions: none    Meds/Allergies   I have reviewed home medications with patient personally.  Prior to  Admission medications    Medication Sig Start Date End Date Taking? Authorizing Provider   amoxicillin (AMOXIL) 875 mg tablet Take 1 tablet by mouth every 12 (twelve) hours 5/23/15   Historical Provider, MD   dicyclomine (BENTYL) 20 mg tablet Take 1 tablet (20 mg total) by mouth 2 (two) times a day 5/6/23   Janie Casper MD   lisinopril (ZESTRIL) 5 mg tablet Take 5 mg by mouth daily.  Patient not taking: Reported on 8/2/2022    Historical Provider, MD   lisinopril-hydrochlorothiazide (PRINZIDE,ZESTORETIC) 10-12.5 MG per tablet Take by mouth  Patient not taking: Reported on 4/4/2024    Historical Provider, MD   methocarbamol (ROBAXIN) 750 mg tablet Take by mouth    Historical Provider, MD   multivitamin (THERAGRAN) TABS Take 1 tablet by mouth daily    Historical Provider, MD   oxyCODONE ER (Xtampza ER) 36 MG C12A Take by mouth 3 (three) times a day    Historical Provider, MD   pantoprazole (PROTONIX) 40 mg tablet TAKE 1 TABLET BY MOUTH EVERY DAY 11/6/24   Harvey Jordan MD   pregabalin (LYRICA) 75 mg capsule Take 75 mg by mouth 3 (three) times a day    Historical Provider, MD   senna-docusate sodium (SENOKOT-S) 8.6-50 mg per tablet Take 1 tablet by mouth daily 6/13/24   Harvey Jordan MD   Xtampza ER 18 MG C12A TAKE 1 CAPSULE BY MOUTH IN THE MORNING FOR CHRONIC PAIN. 5/25/22   Historical Provider, MD     No Known Allergies    Objective :  Temp:  [98.9 °F (37.2 °C)] 98.9 °F (37.2 °C)  HR:  [60-95] 65  BP: (103-122)/(55-66) 109/55  Resp:  [12-20] 20  SpO2:  [95 %-99 %] 98 %  O2 Device: None (Room air)    Physical Exam  Constitutional:       General: She is not in acute distress.     Appearance: She is well-developed. She is not diaphoretic.   HENT:      Head: Normocephalic and atraumatic.      Nose: Nose normal.      Mouth/Throat:      Pharynx: No oropharyngeal exudate.   Eyes:      General: No scleral icterus.        Right eye: No discharge.         Left eye: No discharge.      Conjunctiva/sclera:  "Conjunctivae normal.   Neck:      Thyroid: No thyromegaly.      Vascular: No JVD.   Cardiovascular:      Rate and Rhythm: Normal rate and regular rhythm.      Heart sounds: Normal heart sounds. No murmur heard.     No friction rub. No gallop.   Pulmonary:      Effort: Pulmonary effort is normal. No respiratory distress.      Breath sounds: Normal breath sounds. No wheezing or rales.   Chest:      Chest wall: No tenderness.   Abdominal:      General: Bowel sounds are normal. There is no distension.      Palpations: Abdomen is soft.      Tenderness: There is no abdominal tenderness. There is no guarding or rebound.   Musculoskeletal:         General: No tenderness or deformity. Normal range of motion.      Cervical back: Normal range of motion and neck supple.   Skin:     General: Skin is warm and dry.      Findings: No erythema or rash.   Neurological:      Mental Status: She is alert. Mental status is at baseline.      Cranial Nerves: No cranial nerve deficit.      Sensory: No sensory deficit.      Motor: No abnormal muscle tone.      Coordination: Coordination normal.          Lines/Drains:            Lab Results: I have reviewed the following results:  Results from last 7 days   Lab Units 04/04/25  1437   WBC Thousand/uL 12.02*   HEMOGLOBIN g/dL 10.7*   HEMATOCRIT % 32.2*   PLATELETS Thousands/uL 288   SEGS PCT % 86*   LYMPHO PCT % 3*   MONO PCT % 10   EOS PCT % 0     Results from last 7 days   Lab Units 04/04/25  1437   SODIUM mmol/L 134*   POTASSIUM mmol/L 3.2*   CHLORIDE mmol/L 103   CO2 mmol/L 22   BUN mg/dL 11   CREATININE mg/dL 1.08   ANION GAP mmol/L 9   CALCIUM mg/dL 7.6*   ALBUMIN g/dL 3.0*   TOTAL BILIRUBIN mg/dL 0.78   ALK PHOS U/L 164*   ALT U/L 20   AST U/L 29   GLUCOSE RANDOM mg/dL 165*     Results from last 7 days   Lab Units 04/04/25  1437   INR  1.18         No results found for: \"HGBA1C\"  Results from last 7 days   Lab Units 04/04/25  1437   LACTIC ACID mmol/L 1.4   PROCALCITONIN ng/ml 2.04* "       Imaging Results Review: I personally reviewed the following image studies/reports in PACS and discussed pertinent findings with Radiology: chest xray. My interpretation of the radiology images/reports is:  .  Other Study Results Review: EKG was reviewed.     Administrative Statements   I have spent a total time of 76 minutes in caring for this patient on the day of the visit/encounter including Diagnostic results.    ** Please Note: This note has been constructed using a voice recognition system. **

## 2025-04-04 NOTE — ED PROVIDER NOTES
Time reflects when diagnosis was documented in both MDM as applicable and the Disposition within this note       Time User Action Codes Description Comment    4/4/2025  3:24 PM Eusebio Huddleston Add [D64.9] Anemia     4/4/2025  3:24 PM Eusebio Huddleston Add [E87.6] Hypokalemia     4/4/2025  6:09 PM Eusebio Huddleston Add [K75.0] Hepatic abscess     4/4/2025  6:23 PM Regulo Rojas Add [K75.0] Liver abscess           ED Disposition       ED Disposition   Admit    Condition   Stable    Date/Time   Fri Apr 4, 2025  6:09 PM    Comment   Case was discussed with nini and the patient's admission status was agreed to be Admission Status: inpatient status to the service of Dr. Rojas .               Assessment & Plan       Medical Decision Making  64y F p/w fever of unknown origin    Ddx is broad and includes but is not limited to PE, pna, thyroid abnormality, intraabdominal pathology, UTI, biliary pathology, viral syndrome    Received APAP PTA    Plan: bloodwork, imaging, observe in ED    Evaluation reveals anemia, hypokalemia.  Received p.o. potassium in the ED.  Dimer elevated, imaging obtained without signs of pulmonary embolism however does have hepatic abscess.  Discussed with GI, interventional radiology, and general surgery.  Zosyn was ordered and IR is aware, will be able to perform drainage in the morning.  Patient remains hemodynamically stable in the emergency department.  Did receive APAP prior to arrival however was diaphoretic upon arrival so was likely febrile.  Discussed with internal medicine, will admit to their service.    Amount and/or Complexity of Data Reviewed  Labs: ordered. Decision-making details documented in ED Course.  Radiology: ordered.    Risk  Prescription drug management.  Decision regarding hospitalization.        ED Course as of 04/04/25 1845   Fri Apr 04, 2025   1523 Protime-INR(!)   1524 CBC and differential(!)  Mild anemia, no recent bloodwork   1534 D-Dimer, Quant(!): 2.99  CTA PE  ordered   1730 ECG interpretation by me.       Medications   acetaminophen (TYLENOL) tablet 650 mg (has no administration in time range)   sodium chloride 0.9 % infusion (has no administration in time range)   acetaminophen (FOR EMS ONLY) (TYLENOL) oral suspension 650 mg (0 mg Does not apply Given to EMS 4/4/25 1421)   potassium chloride (Klor-Con M20) CR tablet 40 mEq (40 mEq Oral Given 4/4/25 1540)   iohexol (OMNIPAQUE) 350 MG/ML injection (MULTI-DOSE) 100 mL (100 mL Intravenous Given 4/4/25 1702)   piperacillin-tazobactam (ZOSYN) IVPB 4.5 g (4.5 g Intravenous New Bag 4/4/25 1802)       ED Risk Strat Scores                            SBIRT 20yo+      Flowsheet Row Most Recent Value   Initial Alcohol Screen: US AUDIT-C     1. How often do you have a drink containing alcohol? 0 Filed at: 04/04/2025 1423   2. How many drinks containing alcohol do you have on a typical day you are drinking?  0 Filed at: 04/04/2025 1423   3b. FEMALE Any Age, or MALE 65+: How often do you have 4 or more drinks on one occassion? 0 Filed at: 04/04/2025 1423   Audit-C Score 0 Filed at: 04/04/2025 1423   YUN: How many times in the past year have you...    Used an illegal drug or used a prescription medication for non-medical reasons? Never Filed at: 04/04/2025 1423                            History of Present Illness       Chief Complaint   Patient presents with    Weakness - Generalized     Pt arrives ems from home, states feeling unwell for a few days and today started with lightheadedness, denies room spinning and blurry vision       Past Medical History:   Diagnosis Date    Hyperlipidemia     Hypertension       Past Surgical History:   Procedure Laterality Date    BACK SURGERY      L3-S1    CHOLECYSTECTOMY LAPAROSCOPIC N/A 2/10/2022    Procedure: CHOLECYSTECTOMY LAPAROSCOPIC;  Surgeon: Eulalio Gross DO;  Location: [facility];  Service: General    SPINAL STIMULATOR PLACEMENT  2019      History reviewed. No pertinent family history.    Social History     Tobacco Use    Smoking status: Former     Current packs/day: 0.00     Types: Cigarettes     Quit date: 2018     Years since quittin.2    Smokeless tobacco: Never   Vaping Use    Vaping status: Some Days    Substances: Nicotine, THC, Flavoring   Substance Use Topics    Alcohol use: Never    Drug use: Not Currently     Types: Marijuana      E-Cigarette/Vaping    E-Cigarette Use Current Some Day User       E-Cigarette/Vaping Substances    Nicotine Yes     THC Yes     CBD No     Flavoring Yes     Other No     Unknown No       I have reviewed and agree with the history as documented.     Patient is a 64-year-old female with past medical history significant for dyslipidemia, hypertension chronic opioid dependence presenting to the emergency department for evaluation of fever, lightheadedness, fatigue.  Patient reports she had a recent URI-like illness 3 weeks ago with cough.  This resolved.  Over the last 5 days she has had unexplained fatigue.  She notes it has been worse for the last 24 hours.  She reports multiple near falls over the last 24 hours because of her unsteadiness on her feet.  She denies any dizziness but does endorse lightheadedness that started around 11 AM.  She notes shortness of breath that has been ongoing for 4 to 5 days.  She notes inspiratory pain on the right side that is new.  Denies abdominal pain, nausea, vomiting, diarrhea, frequency, dysuria, urgency.  Denies sick contacts.  Does report history of back surgery but denies any new low back pain.  EMS gave 650 mg APAP, 1500 cc IVF, noted a blood glucose of 153. Otherwise patient denies any focal or localizing symptoms. She denies GI blood loss.        Review of Systems   Constitutional:  Positive for fever. Negative for chills.   HENT:  Negative for ear pain and sore throat.    Eyes:  Negative for pain and visual disturbance.   Respiratory:  Negative for cough and shortness of breath.    Cardiovascular:  Negative for  chest pain and palpitations.   Gastrointestinal:  Negative for abdominal pain and vomiting.   Genitourinary:  Negative for dysuria and hematuria.   Musculoskeletal:  Negative for arthralgias and back pain.   Skin:  Negative for color change and rash.   Neurological:  Positive for weakness. Negative for seizures and syncope.   All other systems reviewed and are negative.          Objective       ED Triage Vitals   Temperature Pulse Blood Pressure Respirations SpO2 Patient Position - Orthostatic VS   04/04/25 1425 04/04/25 1422 04/04/25 1422 04/04/25 1422 04/04/25 1422 04/04/25 1422   98.9 °F (37.2 °C) 95 122/66 18 96 % Lying      Temp Source Heart Rate Source BP Location FiO2 (%) Pain Score    04/04/25 1425 04/04/25 1422 04/04/25 1422 -- --    Oral Monitor Left arm        Vitals      Date and Time Temp Pulse SpO2 Resp BP Pain Score FACES Pain Rating User   04/04/25 1814 98.9 °F (37.2 °C) 65 98 % 20 109/55 -- -- CA   04/04/25 1645 98.9 °F (37.2 °C) 60 99 % 12 109/55 -- -- CA   04/04/25 1545 98.9 °F (37.2 °C) 69 95 % 20 103/58 -- -- CA   04/04/25 1425 98.9 °F (37.2 °C) -- -- -- -- -- -- TE   04/04/25 1422 -- 95 96 % 18 122/66 -- -- TE            Physical Exam  Vitals and nursing note reviewed.   Constitutional:       General: She is not in acute distress.     Appearance: Normal appearance. She is not ill-appearing, toxic-appearing or diaphoretic.   HENT:      Head: Normocephalic and atraumatic.   Eyes:      General: No scleral icterus.        Right eye: No discharge.         Left eye: No discharge.      Extraocular Movements: Extraocular movements intact.      Conjunctiva/sclera: Conjunctivae normal.   Neck:      Comments: Normal ROM, no meningismus  Cardiovascular:      Rate and Rhythm: Normal rate.      Pulses: Normal pulses.      Heart sounds: Normal heart sounds. No murmur heard.     No friction rub. No gallop.   Pulmonary:      Effort: Pulmonary effort is normal. No respiratory distress.      Breath sounds: Normal  breath sounds. No stridor. No wheezing, rhonchi or rales.      Comments: CTAB, no wheezing, rales, rhonchi  Abdominal:      General: Abdomen is flat. Bowel sounds are normal. There is no distension.      Palpations: Abdomen is soft.      Tenderness: There is no abdominal tenderness. There is no guarding or rebound.      Comments: NTTP, no rigidity, guarding, rebound   Musculoskeletal:         General: No swelling. Normal range of motion.      Cervical back: Normal range of motion. No rigidity.      Right lower leg: No edema.      Left lower leg: No edema.   Skin:     General: Skin is warm and dry.      Capillary Refill: Capillary refill takes less than 2 seconds.      Coloration: Skin is not jaundiced.      Findings: No bruising or lesion.   Neurological:      General: No focal deficit present.      Mental Status: She is alert and oriented to person, place, and time. Mental status is at baseline.   Psychiatric:         Mood and Affect: Mood normal.         Behavior: Behavior normal.         Thought Content: Thought content normal.         Judgment: Judgment normal.         Results Reviewed       Procedure Component Value Units Date/Time    TSH, 3rd generation with Free T4 reflex [[id number]]  (Normal) Collected: 04/04/25 1437    Lab Status: Final result Specimen: Blood from Arm, Right Updated: 04/04/25 1730     TSH 3RD GENERATON 2.160 uIU/mL     HS Troponin I 2hr [[id number]]  (Normal) Collected: 04/04/25 1638    Lab Status: Final result Specimen: Blood from Arm, Right Updated: 04/04/25 1709     hs TnI 2hr 21 ng/L      Delta 2hr hsTnI 4 ng/L     Rapid drug screen, urine [[id number]]  (Abnormal) Collected: 04/04/25 1633    Lab Status: Final result Specimen: Urine, Clean Catch Updated: 04/04/25 1657     Amph/Meth UR Negative     Barbiturate Ur Negative     Benzodiazepine Urine Negative     Cocaine Urine Negative     Methadone Urine Negative     Opiate Urine Positive     PCP Ur Negative     THC Urine Negative      Oxycodone Urine Positive     Fentanyl Urine Negative     HYDROCODONE URINE Positive    Narrative:      Presumptive report. If requested, specimen will be sent to reference lab for confirmation.  FOR MEDICAL PURPOSES ONLY.   IF CONFIRMATION NEEDED PLEASE CONTACT THE LAB WITHIN 5 DAYS.    Drug Screen Cutoff Levels:  AMPHETAMINE/METHAMPHETAMINES  1000 ng/mL  BARBITURATES     200 ng/mL  BENZODIAZEPINES     200 ng/mL  COCAINE      300 ng/mL  METHADONE      300 ng/mL  OPIATES      300 ng/mL  PHENCYCLIDINE     25 ng/mL  THC       50 ng/mL  OXYCODONE      100 ng/mL  FENTANYL      5 ng/mL  HYDROCODONE     300 ng/mL    Urine Microscopic [[id number]]  (Abnormal) Collected: 04/04/25 1633    Lab Status: Final result Specimen: Urine, Clean Catch Updated: 04/04/25 1646     RBC, UA 4-10 /hpf      WBC, UA 4-10 /hpf      Epithelial Cells Occasional /hpf      Bacteria, UA Occasional /hpf     B-Type Natriuretic Peptide(BNP) [[id number]]  (Abnormal) Collected: 04/04/25 1437    Lab Status: Final result Specimen: Blood from Arm, Right Updated: 04/04/25 1646      pg/mL     UA w Reflex to Microscopic w Reflex to Culture [[id number]]  (Abnormal) Collected: 04/04/25 1633    Lab Status: Final result Specimen: Urine, Clean Catch Updated: 04/04/25 1644     Color, UA Yellow     Clarity, UA Turbid     Specific Gravity, UA 1.010     pH, UA 6.0     Leukocytes, UA Trace     Nitrite, UA Negative     Protein, UA 30 (1+) mg/dl      Glucose, UA Negative mg/dl      Ketones, UA Negative mg/dl      Urobilinogen, UA <2.0 mg/dl      Bilirubin, UA Negative     Occult Blood, UA Small    Procalcitonin [[id number]]  (Abnormal) Collected: 04/04/25 1437    Lab Status: Final result Specimen: Blood from Arm, Right Updated: 04/04/25 1629     Procalcitonin 2.04 ng/ml     HS Troponin I 4hr [[id number]]     Lab Status: No result Specimen: Blood     HS Troponin 0hr (reflex protocol) [[id number]]  (Normal) Collected: 04/04/25 1437    Lab Status: Final result Specimen:  Blood from Arm, Right Updated: 04/04/25 1548     hs TnI 0hr 17 ng/L     FLU/RSV/COVID - if FLU/RSV clinically relevant (2hr TAT) [[id number]]  (Normal) Collected: 04/04/25 1437    Lab Status: Final result Specimen: Nares from Nose Updated: 04/04/25 1528     SARS-CoV-2 Negative     INFLUENZA A PCR Negative     INFLUENZA B PCR Negative     RSV PCR Negative    Narrative:      This test has been performed using the CoV-2/Flu/RSV plus assay on the [employer] platform. This test has been validated by the  and verified by the performing laboratory.     This test is designed to amplify and detect the following: nucleocapsid (N), envelope (E), and RNA-dependent RNA polymerase (RdRP) genes of the SARS-CoV-2 genome; matrix (M), basic polymerase (PB2), and acidic protein (PA) segments of the influenza A genome; matrix (M) and non-structural protein (NS) segments of the influenza B genome, and the nucleocapsid genes of RSV A and RSV B.     Positive results are indicative of the presence of Flu A, Flu B, RSV, and/or SARS-CoV-2 RNA. Positive results for SARS-CoV-2 or suspected novel influenza should be reported to state, local, or federal health departments according to local reporting requirements.      All results should be assessed in conjunction with clinical presentation and other laboratory markers for clinical management.     FOR PEDIATRIC PATIENTS - copy/paste COVID Guidelines URL to browser: [email or web address]       D-dimer, quantitative [[id number]]  (Abnormal) Collected: 04/04/25 1437    Lab Status: Final result Specimen: Blood from Arm, Right Updated: 04/04/25 1527     D-Dimer, Quant 2.99 ug/ml FEU     Narrative:      In the evaluation for possible pulmonary embolism, in the appropriate (Well's Score of 4 or less) patient, the age adjusted d-dimer cutoff for this patient can be calculated as:    Age x 0.01 (in ug/mL) for Age-adjusted D-dimer  exclusion threshold for a patient over 50 years.    Lactic acid [[id number]]  (Normal) Collected: 04/04/25 1437    Lab Status: Final result Specimen: Blood from Arm, Right Updated: 04/04/25 1522     LACTIC ACID 1.4 mmol/L     Narrative:      Result may be elevated if tourniquet was used during collection.    Protime-INR [[id number]]  (Abnormal) Collected: 04/04/25 1437    Lab Status: Final result Specimen: Blood from Arm, Right Updated: 04/04/25 1511     Protime 15.7 seconds      INR 1.18    Narrative:      INR Therapeutic Range    Indication                                             INR Range      Atrial Fibrillation                                               2.0-3.0  Hypercoagulable State                                    2.0.2.3  Left Ventricular Asist Device                            2.0-3.0  Mechanical Heart Valve                                  -    Aortic(with afib, MI, embolism, HF, LA enlargement,    and/or coagulopathy)                                     2.0-3.0 (2.5-3.5)     Mitral                                                             2.5-3.5  Prosthetic/Bioprosthetic Heart Valve               2.0-3.0  Venous thromboembolism (VTE: VT, PE        2.0-3.0    APTT [[id number]]  (Abnormal) Collected: 04/04/25 1437    Lab Status: Final result Specimen: Blood from Arm, Right Updated: 04/04/25 1511     PTT 37 seconds     Comprehensive metabolic panel [[id number]]  (Abnormal) Collected: 04/04/25 1437    Lab Status: Final result Specimen: Blood from Arm, Right Updated: 04/04/25 1508     Sodium 134 mmol/L      Potassium 3.2 mmol/L      Chloride 103 mmol/L      CO2 22 mmol/L      ANION GAP 9 mmol/L      BUN 11 mg/dL      Creatinine 1.08 mg/dL      Glucose 165 mg/dL      Calcium 7.6 mg/dL      Corrected Calcium 8.4 mg/dL      AST 29 U/L      ALT 20 U/L      Alkaline Phosphatase 164 U/L      Total Protein 6.3 g/dL      Albumin 3.0 g/dL      Total Bilirubin 0.78 mg/dL      eGFR 55 ml/min/1.73sq m      Narrative:      National Kid[employer] Disease Foundation guidelines for Chronic Kidney Disease (CKD):     Stage 1 with normal or high GFR (GFR > 90 mL/min/1.73 square meters)    Stage 2 Mild CKD (GFR = 60-89 mL/min/1.73 square meters)    Stage 3A Moderate CKD (GFR = 45-59 mL/min/1.73 square meters)    Stage 3B Moderate CKD (GFR = 30-44 mL/min/1.73 square meters)    Stage 4 Severe CKD (GFR = 15-29 mL/min/1.73 square meters)    Stage 5 End Stage CKD (GFR <15 mL/min/1.73 square meters)  Note: GFR calculation is accurate only with a steady state creatinine    Magnesium [[id number]]  (Normal) Collected: 04/04/25 1437    Lab Status: Final result Specimen: Blood from Arm, Right Updated: 04/04/25 1508     Magnesium 1.9 mg/dL     Blood culture #1 [[id number]] Collected: 04/04/25 1456    Lab Status: In process Specimen: Blood from Arm, Left Updated: 04/04/25 1501    CBC and differential [[id number]]  (Abnormal) Collected: 04/04/25 1437    Lab Status: Final result Specimen: Blood from Arm, Right Updated: 04/04/25 1451     WBC 12.02 Thousand/uL      RBC 3.62 Million/uL      Hemoglobin 10.7 g/dL      Hematocrit 32.2 %      MCV 89 fL      MCH 29.6 pg      MCHC 33.2 g/dL      RDW 11.7 %      MPV 9.5 fL      Platelets 288 Thousands/uL      nRBC 0 /100 WBCs      Segmented % 86 %      Immature Grans % 1 %      Lymphocytes % 3 %      Monocytes % 10 %      Eosinophils Relative 0 %      Basophils Relative 0 %      Absolute Neutrophils 10.46 Thousands/µL      Absolute Immature Grans 0.08 Thousand/uL      Absolute Lymphocytes 0.30 Thousands/µL      Absolute Monocytes 1.14 Thousand/µL      Eosinophils Absolute 0.01 Thousand/µL      Basophils Absolute 0.03 Thousands/µL     Blood culture #2 [[id number]] Collected: 04/04/25 1437    Lab Status: In process Specimen: Blood from Arm, Right Updated: 04/04/25 1444            CT pe study w abdomen pelvis w contrast   Final Interpretation by Ash Busby MD (04/04 1632)      1.  No pulmonary embolism or  additional acute abnormality in the chest.   2.  Approximately 8.5 cm right hepatic lobe lesion with heterogeneous rim enhancement and necrotic appearing central portion, concerning for abscess. Necrotic malignancy felt less likely.   3.  Common bile duct dilatation and left pneumobilia, stable compared to CT 5/6/2023, and therefore likely related to prior cholecystectomy and sphincterotomy, although cholangitis remains possible given hepatic abscess.   4.  Liquid stool throughout the colon, suggesting a diarrheal illness.      The study was marked in EPIC for immediate notification.      Workstation performed: [id number]         CT head without contrast   Final Interpretation by Marcus Hall MD (04/04 1711)      No acute intracranial abnormality.                  Workstation performed: [id number]         IR drainage tube placement    (Results Pending)       Procedures    ED Medication and Procedure Management   Prior to Admission Medications   Prescriptions Last Dose Informant Patient Reported? Taking?   Xtampza ER 18 MG C12A   Yes No   Sig: TAKE 1 CAPSULE BY MOUTH IN THE MORNING FOR CHRONIC PAIN.   amoxicillin (AMOXIL) 875 mg tablet   Yes No   Sig: Take 1 tablet by mouth every 12 (twelve) hours   dicyclomine (BENTYL) 20 mg tablet   No No   Sig: Take 1 tablet (20 mg total) by mouth 2 (two) times a day   lisinopril (ZESTRIL) 5 mg tablet   Yes No   Sig: Take 5 mg by mouth daily.   Patient not taking: Reported on 8/2/2022   lisinopril-hydrochlorothiazide (PRINZIDE,ZESTORETIC) 10-12.5 MG per tablet   Yes No   Sig: Take by mouth   Patient not taking: Reported on 4/4/2024   methocarbamol (ROBAXIN) 750 mg tablet   Yes No   Sig: Take by mouth   multivitamin (THERAGRAN) TABS   Yes No   Sig: Take 1 tablet by mouth daily   oxyCODONE ER (Xtampza ER) 36 MG C12A   Yes No   Sig: Take by mouth 3 (three) times a day   pantoprazole (PROTONIX) 40 mg tablet   No No   Sig: TAKE 1 TABLET BY MOUTH EVERY DAY   pregabalin (LYRICA) 75 mg  capsule  Self Yes No   Sig: Take 75 mg by mouth 3 (three) times a day   senna-docusate sodium (SENOKOT-S) 8.6-50 mg per tablet   No No   Sig: Take 1 tablet by mouth daily      Facility-Administered Medications: None     Patient's Medications   Discharge Prescriptions    No medications on file     No discharge procedures on file.  ED SEPSIS DOCUMENTATION   Time reflects when diagnosis was documented in both MDM as applicable and the Disposition within this note       Time User Action Codes Description Comment    4/4/2025  3:24 PM Eusebio Huddleston Add [D64.9] Anemia     4/4/2025  3:24 PM Eusebio Huddleston Add [E87.6] Hypokalemia     4/4/2025  6:09 PM Eusebio Huddleston Add [K75.0] Hepatic abscess     4/4/2025  6:23 PM Regulo Rojas Add [K75.0] Liver abscess                  Eusebio Huddleston DO  04/04/25 1845

## 2025-04-04 NOTE — TELEMEDICINE
e-Consult ([facility])  - Interventional Radiology  Celia Rivera 64 y.o. female MRN: [id number]  Unit/Bed#: ED 23 Encounter: [id number]          Interventional Radiology has been consulted to evaluate Celia Rivera        Inpatient Consult to IR  Consult performed by: Marilyn Merchant MD  Consult ordered by: Eusebio Huddleston, DO        What procedure(s) would you like IR to perform? hepatic abscess drainage   Clinical history and reason for the procedure request? Weakness x5d, febrile PTA to ED. large hepatic abscess on CT.       04/04/25    Assessment/Recommendation:   Possible liver abscess.    IR drain.    Plan to do tmr.    Hold all thinners.    NPO order placed.        11-20 minutes, >50% of the total time devoted to medical consultative verbal/EMR discussion between providers. Written report will be generated in the EMR.     Thank you for allowing Interventional Radiology to participate in the care of Celia Rivera. Please don't hesitate to call or TigerText us with any questions.     Marilyn Merchant MD

## 2025-04-04 NOTE — ASSESSMENT & PLAN NOTE
64-year-old female with history of hypertension, previous cholecystectomy who initially presented with generalized weakness intermittent fevers  Noted to have a liver abscess.  8.5 cm  Case discussed with IR, general surgery,  N.p.o. after midnight  Planning for IR drainage tomorrow  No blood thinners  Will place on the  ceftriaxone,  metronidazole   Will likely need prolonged antibiotic course and outpatient follow-up with ID  Will consult ID for further evaluation  Will also consult IR as well as general surgery

## 2025-04-05 ENCOUNTER — APPOINTMENT (INPATIENT)
Dept: NON INVASIVE DIAGNOSTICS | Facility: HOSPITAL | Age: 65
End: 2025-04-05
Attending: RADIOLOGY
Payer: COMMERCIAL

## 2025-04-05 PROBLEM — D64.9 ANEMIA: Status: ACTIVE | Noted: 2025-04-05

## 2025-04-05 PROBLEM — K80.70 CHOLELITHIASIS WITH CHOLEDOCHOLITHIASIS: Status: ACTIVE | Noted: 2025-04-05

## 2025-04-05 PROBLEM — R79.89 ELEVATED LFTS: Status: ACTIVE | Noted: 2025-04-05

## 2025-04-05 LAB
ALBUMIN SERPL BCG-MCNC: 2.8 G/DL (ref 3.5–5)
ALP SERPL-CCNC: 155 U/L (ref 34–104)
ALT SERPL W P-5'-P-CCNC: 27 U/L (ref 7–52)
ANION GAP SERPL CALCULATED.3IONS-SCNC: 7 MMOL/L (ref 4–13)
AST SERPL W P-5'-P-CCNC: 49 U/L (ref 13–39)
ATRIAL RATE: 62 BPM
ATRIAL RATE: 63 BPM
BASOPHILS # BLD AUTO: 0.02 THOUSANDS/ÂΜL (ref 0–0.1)
BASOPHILS NFR BLD AUTO: 0 % (ref 0–1)
BILIRUB DIRECT SERPL-MCNC: 0.2 MG/DL (ref 0–0.2)
BILIRUB SERPL-MCNC: 0.63 MG/DL (ref 0.2–1)
BUN SERPL-MCNC: 10 MG/DL (ref 5–25)
CALCIUM SERPL-MCNC: 7.8 MG/DL (ref 8.4–10.2)
CHLORIDE SERPL-SCNC: 105 MMOL/L (ref 96–108)
CO2 SERPL-SCNC: 24 MMOL/L (ref 21–32)
CREAT SERPL-MCNC: 0.85 MG/DL (ref 0.6–1.3)
EOSINOPHIL # BLD AUTO: 0.02 THOUSAND/ÂΜL (ref 0–0.61)
EOSINOPHIL NFR BLD AUTO: 0 % (ref 0–6)
ERYTHROCYTE [DISTWIDTH] IN BLOOD BY AUTOMATED COUNT: 11.9 % (ref 11.6–15.1)
GFR SERPL CREATININE-BSD FRML MDRD: 73 ML/MIN/1.73SQ M
GLUCOSE SERPL-MCNC: 132 MG/DL (ref 65–140)
HCT VFR BLD AUTO: 31.7 % (ref 34.8–46.1)
HGB BLD-MCNC: 10.2 G/DL (ref 11.5–15.4)
IMM GRANULOCYTES # BLD AUTO: 0.07 THOUSAND/UL (ref 0–0.2)
IMM GRANULOCYTES NFR BLD AUTO: 1 % (ref 0–2)
INR PPP: 1.17 (ref 0.85–1.19)
LYMPHOCYTES # BLD AUTO: 0.37 THOUSANDS/ÂΜL (ref 0.6–4.47)
LYMPHOCYTES NFR BLD AUTO: 4 % (ref 14–44)
MAGNESIUM SERPL-MCNC: 2 MG/DL (ref 1.9–2.7)
MCH RBC QN AUTO: 29 PG (ref 26.8–34.3)
MCHC RBC AUTO-ENTMCNC: 32.2 G/DL (ref 31.4–37.4)
MCV RBC AUTO: 90 FL (ref 82–98)
MONOCYTES # BLD AUTO: 0.66 THOUSAND/ÂΜL (ref 0.17–1.22)
MONOCYTES NFR BLD AUTO: 7 % (ref 4–12)
NEUTROPHILS # BLD AUTO: 8.42 THOUSANDS/ÂΜL (ref 1.85–7.62)
NEUTS SEG NFR BLD AUTO: 88 % (ref 43–75)
NRBC BLD AUTO-RTO: 0 /100 WBCS
P AXIS: 65 DEGREES
P AXIS: 80 DEGREES
PLATELET # BLD AUTO: 292 THOUSANDS/UL (ref 149–390)
PMV BLD AUTO: 10 FL (ref 8.9–12.7)
POTASSIUM SERPL-SCNC: 4 MMOL/L (ref 3.5–5.3)
PR INTERVAL: 164 MS
PR INTERVAL: 166 MS
PROT SERPL-MCNC: 5.9 G/DL (ref 6.4–8.4)
PROTHROMBIN TIME: 15.6 SECONDS (ref 12.3–15)
QRS AXIS: 81 DEGREES
QRS AXIS: 82 DEGREES
QRSD INTERVAL: 94 MS
QRSD INTERVAL: 94 MS
QT INTERVAL: 460 MS
QT INTERVAL: 464 MS
QTC INTERVAL: 470 MS
QTC INTERVAL: 470 MS
RBC # BLD AUTO: 3.52 MILLION/UL (ref 3.81–5.12)
SODIUM SERPL-SCNC: 136 MMOL/L (ref 135–147)
STREPTOCOCCUS DNA BLD POS NAA+NON-PROBE: DETECTED
T WAVE AXIS: 94 DEGREES
T WAVE AXIS: 98 DEGREES
VENTRICULAR RATE: 62 BPM
VENTRICULAR RATE: 63 BPM
WBC # BLD AUTO: 9.56 THOUSAND/UL (ref 4.31–10.16)

## 2025-04-05 PROCEDURE — C1729 CATH, DRAINAGE: HCPCS

## 2025-04-05 PROCEDURE — 87077 CULTURE AEROBIC IDENTIFY: CPT | Performed by: RADIOLOGY

## 2025-04-05 PROCEDURE — C1769 GUIDE WIRE: HCPCS

## 2025-04-05 PROCEDURE — 87070 CULTURE OTHR SPECIMN AEROBIC: CPT | Performed by: RADIOLOGY

## 2025-04-05 PROCEDURE — 80048 BASIC METABOLIC PNL TOTAL CA: CPT | Performed by: INTERNAL MEDICINE

## 2025-04-05 PROCEDURE — 87181 SC STD AGAR DILUTION PER AGT: CPT | Performed by: RADIOLOGY

## 2025-04-05 PROCEDURE — 99222 1ST HOSP IP/OBS MODERATE 55: CPT | Performed by: STUDENT IN AN ORGANIZED HEALTH CARE EDUCATION/TRAINING PROGRAM

## 2025-04-05 PROCEDURE — 99223 1ST HOSP IP/OBS HIGH 75: CPT | Performed by: INTERNAL MEDICINE

## 2025-04-05 PROCEDURE — 83735 ASSAY OF MAGNESIUM: CPT | Performed by: INTERNAL MEDICINE

## 2025-04-05 PROCEDURE — 49405 IMAGE CATH FLUID COLXN VISC: CPT | Performed by: RADIOLOGY

## 2025-04-05 PROCEDURE — 99152 MOD SED SAME PHYS/QHP 5/>YRS: CPT | Performed by: RADIOLOGY

## 2025-04-05 PROCEDURE — 87205 SMEAR GRAM STAIN: CPT | Performed by: RADIOLOGY

## 2025-04-05 PROCEDURE — 99152 MOD SED SAME PHYS/QHP 5/>YRS: CPT

## 2025-04-05 PROCEDURE — 99153 MOD SED SAME PHYS/QHP EA: CPT

## 2025-04-05 PROCEDURE — 93010 ELECTROCARDIOGRAM REPORT: CPT | Performed by: INTERNAL MEDICINE

## 2025-04-05 PROCEDURE — 85610 PROTHROMBIN TIME: CPT | Performed by: INTERNAL MEDICINE

## 2025-04-05 PROCEDURE — G0545 PR INHERENT VISIT TO INPT: HCPCS | Performed by: INTERNAL MEDICINE

## 2025-04-05 PROCEDURE — 85025 COMPLETE CBC W/AUTO DIFF WBC: CPT | Performed by: INTERNAL MEDICINE

## 2025-04-05 PROCEDURE — 0F9030Z DRAINAGE OF LIVER WITH DRAINAGE DEVICE, PERCUTANEOUS APPROACH: ICD-10-PCS | Performed by: RADIOLOGY

## 2025-04-05 PROCEDURE — NC001 PR NO CHARGE: Performed by: STUDENT IN AN ORGANIZED HEALTH CARE EDUCATION/TRAINING PROGRAM

## 2025-04-05 PROCEDURE — 10030 IMG GID FLU COLL DRG SFT TIS: CPT

## 2025-04-05 PROCEDURE — 99233 SBSQ HOSP IP/OBS HIGH 50: CPT | Performed by: STUDENT IN AN ORGANIZED HEALTH CARE EDUCATION/TRAINING PROGRAM

## 2025-04-05 PROCEDURE — 80076 HEPATIC FUNCTION PANEL: CPT | Performed by: INTERNAL MEDICINE

## 2025-04-05 RX ORDER — METRONIDAZOLE 500 MG/100ML
500 INJECTION, SOLUTION INTRAVENOUS EVERY 12 HOURS
Status: DISCONTINUED | OUTPATIENT
Start: 2025-04-05 | End: 2025-04-05

## 2025-04-05 RX ORDER — ACETAMINOPHEN 10 MG/ML
1000 INJECTION, SOLUTION INTRAVENOUS ONCE
Status: COMPLETED | OUTPATIENT
Start: 2025-04-05 | End: 2025-04-05

## 2025-04-05 RX ORDER — METRONIDAZOLE 500 MG/1
500 TABLET ORAL EVERY 12 HOURS SCHEDULED
Status: DISCONTINUED | OUTPATIENT
Start: 2025-04-05 | End: 2025-04-06

## 2025-04-05 RX ORDER — FENTANYL CITRATE 50 UG/ML
INJECTION, SOLUTION INTRAMUSCULAR; INTRAVENOUS AS NEEDED
Status: COMPLETED | OUTPATIENT
Start: 2025-04-05 | End: 2025-04-05

## 2025-04-05 RX ORDER — LIDOCAINE WITH 8.4% SOD BICARB 0.9%(10ML)
SYRINGE (ML) INJECTION AS NEEDED
Status: COMPLETED | OUTPATIENT
Start: 2025-04-05 | End: 2025-04-05

## 2025-04-05 RX ORDER — NALOXONE HYDROCHLORIDE 1 MG/ML
2 INJECTION INTRAMUSCULAR; INTRAVENOUS; SUBCUTANEOUS ONCE
Status: COMPLETED | OUTPATIENT
Start: 2025-04-05 | End: 2025-04-05

## 2025-04-05 RX ORDER — MIDAZOLAM HYDROCHLORIDE 2 MG/2ML
INJECTION, SOLUTION INTRAMUSCULAR; INTRAVENOUS AS NEEDED
Status: COMPLETED | OUTPATIENT
Start: 2025-04-05 | End: 2025-04-05

## 2025-04-05 RX ADMIN — ACETAMINOPHEN 1000 MG: 1000 INJECTION, SOLUTION INTRAVENOUS at 01:48

## 2025-04-05 RX ADMIN — METRONIDAZOLE 500 MG: 500 INJECTION, SOLUTION INTRAVENOUS at 06:30

## 2025-04-05 RX ADMIN — METRONIDAZOLE 500 MG: 500 TABLET ORAL at 18:14

## 2025-04-05 RX ADMIN — Medication 10 ML: at 13:02

## 2025-04-05 RX ADMIN — MIDAZOLAM HYDROCHLORIDE 1 MG: 1 INJECTION, SOLUTION INTRAMUSCULAR; INTRAVENOUS at 12:55

## 2025-04-05 RX ADMIN — IOHEXOL 10 ML: 350 INJECTION, SOLUTION INTRAVENOUS at 13:26

## 2025-04-05 RX ADMIN — ACETAMINOPHEN 650 MG: 325 TABLET, FILM COATED ORAL at 15:32

## 2025-04-05 RX ADMIN — MIDAZOLAM HYDROCHLORIDE 1 MG: 1 INJECTION, SOLUTION INTRAMUSCULAR; INTRAVENOUS at 13:01

## 2025-04-05 RX ADMIN — SODIUM CHLORIDE 75 ML/HR: 0.9 INJECTION, SOLUTION INTRAVENOUS at 10:10

## 2025-04-05 RX ADMIN — FENTANYL CITRATE 50 MCG: 50 INJECTION, SOLUTION INTRAMUSCULAR; INTRAVENOUS at 12:55

## 2025-04-05 RX ADMIN — NALOXONE HYDROCHLORIDE 2 MG: 1 INJECTION PARENTERAL at 16:07

## 2025-04-05 RX ADMIN — FENTANYL CITRATE 50 MCG: 50 INJECTION, SOLUTION INTRAMUSCULAR; INTRAVENOUS at 13:02

## 2025-04-05 RX ADMIN — CEFTRIAXONE SODIUM 1000 MG: 10 INJECTION, POWDER, FOR SOLUTION INTRAVENOUS at 05:22

## 2025-04-05 RX ADMIN — ACETAMINOPHEN 650 MG: 325 TABLET, FILM COATED ORAL at 21:25

## 2025-04-05 NOTE — ASSESSMENT & PLAN NOTE
Patient presented with fever/chills and nonspecific generalized weakness, without abdominal pain.  She is found to have a large liver abscess on abdomen/pelvis CT.  Given size of liver abscess, I suspect that it has been present for quite a while, although not before May 2023 when she last had abdomen/pelvis CT without evidence of liver abnormality.  Source of liver abscess is most likely colonic.  Abdomen/pelvis CT was benign, other than liver abscess.  Patient had 1 colonoscopy many years ago with unclear findings, benign according to patient but records are not available.  She will need another colonoscopy.  Fortunately, despite presence of liver abscess, patient is clinically and systemically well, without evidence of sepsis or systemic toxicity.  Admission blood cultures have no growth thus far.  Patient has no history of MDRO.  Given size of liver abscess, doubt that it will resolve with antibiotic alone.  Patient will need drainage of this abscess.  Continue ceftriaxone/Flagyl for now.  Monitor temperature/WBC.  Follow-up on admission blood cultures.  Recommend percutaneous drainage of liver abscess.  Recommend colonoscopy in the next few weeks.

## 2025-04-05 NOTE — ASSESSMENT & PLAN NOTE
Baseline hemoglobin is around 13-14 range.  Currently hemoglobin 10.7 on presentation currently 10.2.  No signs of active overt bleeding noted.  Monitor CBC.

## 2025-04-05 NOTE — PLAN OF CARE
Problem: PAIN - ADULT  Goal: Verbalizes/displays adequate comfort level or baseline comfort level  Description: Interventions:- Encourage patient to monitor pain and request assistance- Assess pain using appropriate pain scale- Administer analgesics based on type and severity of pain and evaluate response- Implement non-pharmacological measures as appropriate and evaluate response- Consider cultural and social influences on pain and pain management- Notify physician/advanced practitioner if interventions unsuccessful or patient reports new pain  Outcome: Progressing     Problem: INFECTION - ADULT  Goal: Absence or prevention of progression during hospitalization  Description: INTERVENTIONS:- Assess and monitor for signs and symptoms of infection- Monitor lab/diagnostic results- Monitor all insertion sites, i.e. indwelling lines, tubes, and drains- Monitor endotracheal if appropriate and nasal secretions for changes in amount and color- [place] appropriate cooling/warming therapies per order- Administer medications as ordered- Instruct and encourage patient and family to use good hand hygiene technique- Identify and instruct in appropriate isolation precautions for identified infection/condition  Outcome: Progressing  Goal: Absence of fever/infection during neutropenic period  Description: INTERVENTIONS:- Monitor WBC  Outcome: Progressing     Problem: GASTROINTESTINAL - ADULT  Goal: Minimal or absence of nausea and/or vomiting  Description: INTERVENTIONS:- Administer IV fluids if ordered to ensure adequate hydration- Maintain NPO status until nausea and vomiting are resolved- Nasogastric tube if ordered- Administer ordered antiemetic medications as needed- Provide nonpharmacologic comfort measures as appropriate- Advance diet as tolerated, if ordered- Consider nutrition services referral to assist patient with adequate nutrition and appropriate food choices  Outcome: Progressing  Goal: Maintains or returns to  baseline bowel function  Description: INTERVENTIONS:- Assess bowel function- Encourage oral fluids to ensure adequate hydration- Administer IV fluids if ordered to ensure adequate hydration- Administer ordered medications as needed- Encourage mobilization and activity- Consider nutritional services referral to assist patient with adequate nutrition and appropriate food choices  Outcome: Progressing  Goal: Maintains adequate nutritional intake  Description: INTERVENTIONS:- Monitor percentage of each meal consumed- Identify factors contributing to decreased intake, treat as appropriate- Assist with meals as needed- Monitor I&O, weight, and lab values if indicated- Obtain nutrition services referral as needed  Outcome: Progressing

## 2025-04-05 NOTE — ASSESSMENT & PLAN NOTE
LFTs are elevated, with normal bilirubin.  In addition, abdominal exam is benign.  Elevated LFTs are most likely secondary to liver abscess.  Doubt biliary obstruction with normal bilirubin and benign abdominal exam.  Antibiotic plan as in above.  Monitor LFT.  Serial abdominal exams.

## 2025-04-05 NOTE — QUICK NOTE
Floor call: Patient appears lethargic, drowsy and difficult to keep aroused after she took 2 capsules that her  brought from home.  Unclear however suspected Lyrica and/or Extempza.  Currently patient is picking up appropriately and answering questions however unable to clarify what medications she took from her  and unable to clarify the dosing of pain medication at home.  Will order 1 dose of intranasal Narcan for now and continue to monitor.  Hemodynamically she is otherwise stable.  Hold analgesics/sedative medications for now.

## 2025-04-05 NOTE — PLAN OF CARE
Problem: PAIN - ADULT  Goal: Verbalizes/displays adequate comfort level or baseline comfort level  Description: Interventions:- Encourage patient to monitor pain and request assistance- Assess pain using appropriate pain scale- Administer analgesics based on type and severity of pain and evaluate response- Implement non-pharmacological measures as appropriate and evaluate response- Consider cultural and social influences on pain and pain management- Notify physician/advanced practitioner if interventions unsuccessful or patient reports new pain  Outcome: Progressing     Problem: INFECTION - ADULT  Goal: Absence or prevention of progression during hospitalization  Description: INTERVENTIONS:- Assess and monitor for signs and symptoms of infection- Monitor lab/diagnostic results- Monitor all insertion sites, i.e. indwelling lines, tubes, and drains- Monitor endotracheal if appropriate and nasal secretions for changes in amount and color- [place] appropriate cooling/warming therapies per order- Administer medications as ordered- Instruct and encourage patient and family to use good hand hygiene technique- Identify and instruct in appropriate isolation precautions for identified infection/condition  Outcome: Progressing  Goal: Absence of fever/infection during neutropenic period  Description: INTERVENTIONS:- Monitor WBC  Outcome: Progressing     Problem: SAFETY ADULT  Goal: Patient will remain free of falls  Description: INTERVENTIONS:- Educate patient/family on patient safety including physical limitations- Instruct patient to call for assistance with activity - Consult OT/PT to assist with strengthening/mobility - Keep Call bell within reach- Keep bed low and locked with side rails adjusted as appropriate- Keep care items and personal belongings within reach- Initiate and maintain comfort rounds- Make Fall Risk Sign visible to staff- Offer Toileting every 2 Hours, in advance of need- Initiate/Maintain bedalarm-  Obtain necessary fall risk management equipment: cane- Apply yellow socks and bracelet for high fall risk patients- Consider moving patient to room near nurses station  Outcome: Progressing     Problem: GASTROINTESTINAL - ADULT  Goal: Minimal or absence of nausea and/or vomiting  Description: INTERVENTIONS:- Administer IV fluids if ordered to ensure adequate hydration- Maintain NPO status until nausea and vomiting are resolved- Nasogastric tube if ordered- Administer ordered antiemetic medications as needed- Provide nonpharmacologic comfort measures as appropriate- Advance diet as tolerated, if ordered- Consider nutrition services referral to assist patient with adequate nutrition and appropriate food choices  Outcome: Progressing  Goal: Maintains or returns to baseline bowel function  Description: INTERVENTIONS:- Assess bowel function- Encourage oral fluids to ensure adequate hydration- Administer IV fluids if ordered to ensure adequate hydration- Administer ordered medications as needed- Encourage mobilization and activity- Consider nutritional services referral to assist patient with adequate nutrition and appropriate food choices  Outcome: Progressing  Goal: Maintains adequate nutritional intake  Description: INTERVENTIONS:- Monitor percentage of each meal consumed- Identify factors contributing to decreased intake, treat as appropriate- Assist with meals as needed- Monitor I&O, weight, and lab values if indicated- Obtain nutrition services referral as needed  Outcome: Progressing

## 2025-04-05 NOTE — ASSESSMENT & PLAN NOTE
Elevated LFTs without hyperbilirubinemia.  Patient denies abdominal pain, nausea, vomiting.  Plan as above.  Continue to trend CMP.

## 2025-04-05 NOTE — ASSESSMENT & PLAN NOTE
Patient has laparoscopic cholecystectomy and ERCP with CBD stone instruction in 2022.  CT and was reviewed reviewed noted with stable postop changes.  Plan as above.

## 2025-04-05 NOTE — CONSULTS
Consultation - General Surgery   Celia Rivera 64 y.o. female MRN: [id number]  Unit/Bed#: -01 Encounter: [id number]    ASSESSMENT/PLAN:    Liver Abscess  63 y/o F with liver abscess  -See HPI  -CBC and CMP from 4/4/2025 reviewed  -CT scan of the abdomen and pelvis report and images reviewed  -White blood cell count downtrending to 9.56 from 12  - IR consult appreciated  - ID consult appreciated  -Patient is status post laparoscopic cholecystectomy and ERCP in 2022  - IR planning to drain abscess  -Unsure source, potentially bacteria from ERCP since patient had a sphincterotomy, also consider GI lesion  - Agree with colonoscopy as an outpatient  - No plans for acute surgical intervention at this time, please reconsult as needed    _______________________________________________________________  Physician Requesting Consult: Stephane SANTO MD    Additional consultants: IR, ID    Reason for Consult / Principal Problem: liver abscess    HPI: Celia Rivera is a 64 y.o. year old female who initially presented due to general malaise and feeling unwell.  She presented to the emergency department for evaluation.  She has been tolerating a diet and having bowel function.  Overall she has just been feeling rundown.  She noted a slight fever at home.  Denies any abdominal pain.    Historical Information   Past Medical History:   Diagnosis Date    Hyperlipidemia     Hypertension      Past Surgical History:   Procedure Laterality Date    BACK SURGERY      L3-S1    CHOLECYSTECTOMY LAPAROSCOPIC N/A 2/10/2022    Procedure: CHOLECYSTECTOMY LAPAROSCOPIC;  Surgeon: Eulalio Gross DO;  Location: [facility] OR;  Service: General    SPINAL STIMULATOR PLACEMENT  2019     Social History   Social History     Substance and Sexual Activity   Alcohol Use Never     Social History     Substance and Sexual Activity   Drug Use Not Currently    Types: Marijuana     Social History     Tobacco Use   Smoking Status Former    Current  packs/day: 0.00    Types: Cigarettes    Quit date: 2018    Years since quittin.2   Smokeless Tobacco Never     Family History: Family history non-contributory}    Meds/Allergies   Home meds:   Prior to Admission medications    Medication Sig Start Date End Date Taking? Authorizing Provider   multivitamin (THERAGRAN) TABS Take 1 tablet by mouth daily   Yes Historical Provider, MD   oxyCODONE ER (Xtampza ER) 36 MG C12A Take by mouth 3 (three) times a day   Yes Historical Provider, MD   pregabalin (LYRICA) 75 mg capsule Take 75 mg by mouth 3 (three) times a day   Yes Historical Provider, MD   senna-docusate sodium (SENOKOT-S) 8.6-50 mg per tablet Take 1 tablet by mouth daily 24  Yes Harvey Jordan MD   amoxicillin (AMOXIL) 875 mg tablet Take 1 tablet by mouth every 12 (twelve) hours  Patient not taking: Reported on 2025 5/23/15   Historical Provider, MD   dicyclomine (BENTYL) 20 mg tablet Take 1 tablet (20 mg total) by mouth 2 (two) times a day  Patient not taking: Reported on 2025   Janie Casper MD   lisinopril (ZESTRIL) 5 mg tablet Take 5 mg by mouth daily.  Patient not taking: Reported on 2025    Historical Provider, MD   lisinopril-hydrochlorothiazide (PRINZIDE,ZESTORETIC) 10-12.5 MG per tablet Take by mouth  Patient not taking: Reported on 2025    Historical Provider, MD   methocarbamol (ROBAXIN) 750 mg tablet Take by mouth  Patient not taking: Reported on 2025    Historical Provider, MD   pantoprazole (PROTONIX) 40 mg tablet TAKE 1 TABLET BY MOUTH EVERY DAY  Patient not taking: Reported on 2025   Harvey Jordan MD   Xtampza ER 18 MG C12A TAKE 1 CAPSULE BY MOUTH IN THE MORNING FOR CHRONIC PAIN. 22   Historical Provider, MD     Scheduled Meds:  Current Facility-Administered Medications   Medication Dose Route Frequency Provider Last Rate    acetaminophen  650 mg Oral Q6H PRN Regulo Rojas MD      cefTRIAXone  1,000 mg Intravenous Q24H Rufus  "Lena, DO Stopped (04/05/25 0600)    fentaNYL   Intravenous PRN Marilyn Merchant MD      lidocaine 1% buffered   Infiltration PRN Marilyn Merchant MD      metroNIDAZOLE  500 mg Oral Q12H KEVIN Nunez MD      midazolam    PRN Marilyn Merchant MD      sodium chloride  75 mL/hr Intravenous Continuous Regulo Rojas MD 75 mL/hr (04/05/25 1010)     Continuous Infusions:sodium chloride, 75 mL/hr, Last Rate: 75 mL/hr (04/05/25 1010)      PRN Meds:    acetaminophen    fentaNYL    lidocaine 1% buffered    midazolam    ALLERGIES: No Known Allergies    Review of Systems:  Constitutional: Denies weight change, fever, chills, night sweats; Positive fatigue, malaise  HEENT: Denies headaches, hearing change, vision change, nasal congestion, sore throat  Cardiovascular: Denies chest pain, shortness of breath, dyspnea on exertion  Respiratory: Denies cough, dyspnea  Gastrointestinal: Denies nausea, vomiting, abdominal pain; Positive bowel movement, flatus  Genitourinary: Denies dysuria, hematuria  Musculoskeletal: Denies arthralgias, myalgias  Neuro: Denies weakness, numbness, loss of consciousness  Heme: Denies easy bruising, bleeding  Endocrine: Denies polyuria, polydipsia      Objective   Vitals:  Blood pressure 140/81, pulse 76, temperature 98 °F (36.7 °C), resp. rate 18, height 5' 7\" (1.702 m), weight 86.6 kg (190 lb 14.7 oz), SpO2 98%.  Body mass index is 29.9 kg/m².      I/Os:  I/O         04/03 0701 04/04 0700 04/04 0701 04/05 0700 04/05 0701 04/06 0700    P.O.  240 0    Total Intake(mL/kg)  240 (2.8) 0 (0)    Urine (mL/kg/hr)  400     Total Output  400     Net  -160 0           Unmeasured Urine Occurrence  1 x             Invasive Lines/Tubes:  Invasive Devices       Peripheral Intravenous Line  Duration             Peripheral IV 04/04/25 Proximal;Right;Ventral (anterior) Forearm <1 day                    Physical Exam  Constitutional:       Appearance: Normal appearance.   HENT:      Head: Normocephalic and atraumatic.      Nose: " "Nose normal.   Eyes:      General: No scleral icterus.     Conjunctiva/sclera: Conjunctivae normal.   Cardiovascular:      Rate and Rhythm: Normal rate  Pulmonary:      Effort: Pulmonary effort is normal.   Abdominal:      General: There is no distension.      Palpations: Abdomen is soft.      Tenderness: There is no abdominal tenderness.   Musculoskeletal:         General: No signs of injury.   Skin:     General: Skin is warm.      Coloration: Skin is not jaundiced.   Neurological:      General: No focal deficit present.      Mental Status: Patient is alert and oriented to person, place, and time.   Psychiatric:         Mood and Affect: Mood normal.         Behavior: Behavior normal.    Lab Results and Cultures:   CBC:   Results from last 7 days   Lab Units 04/05/25  0438 04/04/25  1437   WBC Thousand/uL 9.56 12.02*   HEMOGLOBIN g/dL 10.2* 10.7*   HEMATOCRIT % 31.7* 32.2*   PLATELETS Thousands/uL 292 288     BMP/CMP:  Results from last 7 days   Lab Units 04/05/25  0438 04/04/25  1437   POTASSIUM mmol/L 4.0 3.2*   CHLORIDE mmol/L 105 103   CO2 mmol/L 24 22   BUN mg/dL 10 11   CREATININE mg/dL 0.85 1.08   CALCIUM mg/dL 7.8* 7.6*     Coags:   Results from last 7 days   Lab Units 04/05/25  0438 04/04/25  1437   INR  1.17 1.18   PTT seconds  --  37*     Lipid panel:     HgbA1c: No results found for: \"HGBA1C\"    Urinalysis:   Lab Results   Component Value Date    COLORU Yellow 04/04/2025    CLARITYU Turbid 04/04/2025    SPECGRAV 1.010 04/04/2025    PHUR 6.0 04/04/2025    LEUKOCYTESUR Trace (A) 04/04/2025    NITRITE Negative 04/04/2025    GLUCOSEU Negative 04/04/2025    KETONESU Negative 04/04/2025    BILIRUBINUR Negative 04/04/2025    BLOODU Small (A) 04/04/2025   ,   Urine Culture:   Lab Results   Component Value Date    URINECX 20,000-29,000 cfu/ml Enterococcus faecalis (A) 04/04/2024     Wound Culure: No results found for: \"WOUNDCULT\"  Blood Culture:   Lab Results   Component Value Date    BLOODCX Received in " Microbiology Lab. Culture in Progress. 04/04/2025       Imaging Studies: Results Review Statement: I personally reviewed the following image studies in PACS and associated radiology reports: CT abdomen/pelvis. My interpretation of the radiology images/reports is: liver abscess.  VTE Prophylaxis: Reason for no pharmacologic prophylaxis IR eval/drainage    Eulalio Gross, DO  4/5/2025

## 2025-04-05 NOTE — ASSESSMENT & PLAN NOTE
Patient is status post laparoscopic cholecystectomy in ERCP with CBD stone extraction in 2022 when she presented with abdominal pain.  Abdomen/pelvis CT with stable postop changes.  LFTs are elevated but no hyperbilirubinemia, as in above.    Prior records reviewed in detail.  Discussed with patient in detail regarding the above plan.

## 2025-04-05 NOTE — NURSING NOTE
"Received patient from nurse at 1500 and was told in report that patients  gave her home medications \"about an hour ago\". Patients  told me he gave her 2 capsules of \"whatever was in that bottle\". In the bottle was Xtempza 36mg capsules. Pharmacist Shelbi ojeda, BILLIE Parra aware.  Patient appears lethargic, drowsy, and wandering.   "

## 2025-04-05 NOTE — CONSULTS
Consultation - Infectious Disease   Name: Celia Rivera 64 y.o. female I MRN: [id number]  Unit/Bed#: -01 I Date of Admission: 4/4/2025   Date of Service: 4/5/2025 I Hospital Day: 1   Inpatient consult to Infectious Diseases  Consult performed by: Corby Nunez MD  Consult ordered by: Regulo Rojas MD        Physician Requesting Evaluation: Stephane SANTO MD   Reason for Evaluation / Principal Problem: Liver abscess.    Assessment & Plan  Liver abscess  Patient presented with fever/chills and nonspecific generalized weakness, without abdominal pain.  She is found to have a large liver abscess on abdomen/pelvis CT.  Given size of liver abscess, I suspect that it has been present for quite a while, although not before May 2023 when she last had abdomen/pelvis CT without evidence of liver abnormality.  Source of liver abscess is most likely colonic.  Abdomen/pelvis CT was benign, other than liver abscess.  Patient had 1 colonoscopy many years ago with unclear findings, benign according to patient but records are not available.  She will need another colonoscopy.  Fortunately, despite presence of liver abscess, patient is clinically and systemically well, without evidence of sepsis or systemic toxicity.  Admission blood cultures have no growth thus far.  Patient has no history of MDRO.  Given size of liver abscess, doubt that it will resolve with antibiotic alone.  Patient will need drainage of this abscess.  Continue ceftriaxone/Flagyl for now.  Monitor temperature/WBC.  Follow-up on admission blood cultures.  Recommend percutaneous drainage of liver abscess.  Recommend colonoscopy in the next few weeks.  Elevated LFTs  LFTs are elevated, with normal bilirubin.  In addition, abdominal exam is benign.  Elevated LFTs are most likely secondary to liver abscess.  Doubt biliary obstruction with normal bilirubin and benign abdominal exam.  Antibiotic plan as in above.  Monitor LFT.  Serial abdominal  "exams.  Cholelithiasis with choledocholithiasis  Patient is status post laparoscopic cholecystectomy in ERCP with CBD stone extraction in 2022 when she presented with abdominal pain.  Abdomen/pelvis CT with stable postop changes.  LFTs are elevated but no hyperbilirubinemia, as in above.    Prior records reviewed in detail.  Discussed with patient in detail regarding the above plan.  I have discussed with Dr. Parra from primary service regarding the above plan to continue current antibiotic regimen.  He agrees with the plan.    Antibiotics:  Ceftriaxone/Flagyl # 2    History of Present Illness   Celia Rivera is a 64 y.o. year old female, relatively healthy, status post cholecystectomy, who presented to ER yesterday with a few day history of generalized weakness and intermittent chills.  On presentation, patient had mild leukocytosis but no fever, although she subsequently developed fever.  Abdomen/pelvis CT showed a large liver abscess.  Patient was started on ceftriaxone/Flagyl after admission.  We are asked to evaluate the patient.    Per old record review, patient was admitted in February 2022 for abdominal pain and was found to have cholelithiasis and choledocholithiasis.  She had lap isai and ERCP with evacuation of CBD stone during that admission.  Patient had to presentation to the ER in June 2022 in May 20, 2020 for abdominal pain.  Abdomen/pelvis CT during both of those visits were benign, showing postop changes.    Patient denies any abdominal pain since.  She states that since her cholecystectomy, she has had problems with \"absorption\" but no abdominal pain.  She also denies chronic fever or chills.  Patient had colonoscopy \"many years\" ago.  According to her, there was no significant findings.  Colonoscopy was not done within [facility] and record/finding of colonoscopy is not available for review.    A complete review of systems is negative other than that noted in the HPI.    Medical " History Review: I have reviewed the patient's PMH, PSH, Social History, Family History, Meds, and Allergies     Objective :  Temp:  [97.6 °F (36.4 °C)-100.7 °F (38.2 °C)] 98 °F (36.7 °C)  HR:  [] 62  BP: (100-142)/(55-94) 101/58  Resp:  [12-20] 19  SpO2:  [95 %-100 %] 95 %  O2 Device: None (Room air)    Physical Exam:     General: Awake, alert, cooperative, no distress.   Neck:  Supple. No mass.  No lymphadenopathy.   Lungs: Expansion symmetric, no rales, no wheezing, respirations unlabored.   Heart:  Regular rate and rhythm, S1 and S2 normal, no murmur.   Abdomen: Soft, nondistended, non-tender, bowel sounds active all four quadrants, no masses, no organomegaly.   Extremities: No edema. No erythema/warmth. No ulcer. Nontender to palpation.   Skin:  No rash.   Neuro: Moves all extremities.        Lab Results: I have reviewed the following results:  Results from last 7 days   Lab Units 04/05/25  0438 04/04/25  1437   WBC Thousand/uL 9.56 12.02*   HEMOGLOBIN g/dL 10.2* 10.7*   PLATELETS Thousands/uL 292 288     Results from last 7 days   Lab Units 04/05/25  0438 04/04/25  1437   SODIUM mmol/L 136 134*   POTASSIUM mmol/L 4.0 3.2*   CHLORIDE mmol/L 105 103   CO2 mmol/L 24 22   BUN mg/dL 10 11   CREATININE mg/dL 0.85 1.08   EGFR ml/min/1.73sq m 73 55   CALCIUM mg/dL 7.8* 7.6*   AST U/L 49* 29   ALT U/L 27 20   ALK PHOS U/L 155* 164*   ALBUMIN g/dL 2.8* 3.0*     Results from last 7 days   Lab Units 04/04/25  1456 04/04/25  1437   BLOOD CULTURE  Received in Microbiology Lab. Culture in Progress. Received in Microbiology Lab. Culture in Progress.     Results from last 7 days   Lab Units 04/04/25  1437   PROCALCITONIN ng/ml 2.04*             Results from last 7 days   Lab Units 04/04/25  1437   D-DIMER QUANTITATIVE ug/ml FEU 2.99*       Imaging Results Review: I personally reviewed the following image studies in PACS and associated radiology reports: CT chest, CT abdomen/pelvis, and CT head. My interpretation of the  radiology images/reports is: Chest CT is without infiltrate.  Abdomen soft pelvis CT with a 0.5 cm hepatic abscess with stable CBD dilatation.  Head CT is without acute changes.

## 2025-04-05 NOTE — UTILIZATION REVIEW
NOTIFICATION OF INPATIENT ADMISSION   AUTHORIZATION REQUEST   SERVICING FACILITY:   [place] [address]  Tax ID: [id number]  NPI: [id number] ATTENDING PROVIDER:  Attending Name and NPI#: Stephane SANTO Md [[id number]]  Address: [address] [place] [address]  Phone: [phone]     ADMISSION INFORMATION:  Place of Service: Inpatient [facility]  Place of Service Code: 21  Inpatient Admission Date/Time: 4/4/25  6:11 PM  Discharge Date/Time: No discharge date for patient encounter.  Admitting Diagnosis Code/Description:  Hypokalemia [E87.6]  Hepatic abscess [K75.0]  Liver abscess [K75.0]  Weakness [R53.1]  Anemia [D64.9]     UTILIZATION REVIEW CONTACT:  Felicity Bentley, Utilization   Network Utilization Review Department  Phone: [phone]  Fax [phone]  Email: [email or web address]  Contact for approvals/pending authorizations, clinical reviews, and discharge.     PHYSICIAN ADVISORY SERVICES:  Medical Necessity Denial & Sksw-kt-Lcpt Review  Phone: [phone]  Fax: [phone]  Email: [email or web address]     DISCHARGE SUPPORT TEAM:  For Patients Discharge Needs & Updates  Phone: [phone] opt. 2 Fax: [phone]  Email: [email or web address]

## 2025-04-05 NOTE — ASSESSMENT & PLAN NOTE
64-year-old female with history of hypertension, previous cholecystectomy  in 2022 who initially presented with generalized weakness intermittent fevers  Noted to have a liver abscess.  8.5 cm  Case discussed with IR, general surgery,  Patient has currently remained NPO.  Plan for IR guided drain  IR planning for liver abscess drain today.  Continue IV ceftriaxone and Flagyl per ID recommendation..  Will need outpatient colonoscopy.  Will follow-up with further ID recommendation

## 2025-04-05 NOTE — PROGRESS NOTES
Progress Note - Hospitalist   Name: Celia Rivera 64 y.o. female I MRN: [id number]  Unit/Bed#: -01 I Date of Admission: 4/4/2025   Date of Service: 4/5/2025 I Hospital Day: 1    Assessment & Plan  Liver abscess  64-year-old female with history of hypertension, previous cholecystectomy  in 2022 who initially presented with generalized weakness intermittent fevers  Noted to have a liver abscess.  8.5 cm  Case discussed with IR, general surgery,  Patient has currently remained NPO.  Plan for IR guided drain  IR planning for liver abscess drain today.  Continue IV ceftriaxone and Flagyl per ID recommendation..  Will need outpatient colonoscopy.  Will follow-up with further ID recommendation    Hypertension  Blood pressure currently well-controlled.  Stable.  Hold home dose of lisinopril, reports either.  GERD (gastroesophageal reflux disease)  Stable.  Continue PPI.  Cholelithiasis with choledocholithiasis  Patient has laparoscopic cholecystectomy and ERCP with CBD stone instruction in 2022.  CT and was reviewed reviewed noted with stable postop changes.  Plan as above.  Elevated LFTs  Elevated LFTs without hyperbilirubinemia.  Patient denies abdominal pain, nausea, vomiting.  Plan as above.  Continue to trend CMP.  Anemia  Baseline hemoglobin is around 13-14 range.  Currently hemoglobin 10.7 on presentation currently 10.2.  No signs of active overt bleeding noted.  Monitor CBC.    VTE Pharmacologic Prophylaxis:   Low Risk (Score 0-2) - Encourage Ambulation.    Mobility:   Basic Mobility Inpatient Raw Score: 23  JH-HLM Goal: 7: Walk 25 feet or more  JH-HLM Achieved: 7: Walk 25 feet or more      Patient Centered Rounds: I performed bedside rounds with nursing staff today.   Discussions with Specialists or Other Care Team Provider: Care discussed with infectious, IR    Education and Discussions with Family / Patient: Patient declined call to .     Current Length of Stay: 1 day(s)  Current Patient  Status: Inpatient   Certification Statement: The patient will continue to require additional inpatient hospital stay due to liver abscess needs IR drain and antibiotics.  Discharge Plan: Anticipate discharge in 48-72 hrs to discharge location to be determined pending rehab evaluations.    Code Status: Level 1 - Full Code    Subjective   Seen during a.m. rounds.  Patient appears comfortable not in distress.  Does complain of having some cough however denies abdominal pain, chest pain, dyspnea, nausea, vomiting, any other new complaints.  No other events reported.  Patient takes Xtampza ER at home and prefers to continue while inpt.     Objective :  Temp:  [97.6 °F (36.4 °C)-100.7 °F (38.2 °C)] 98 °F (36.7 °C)  HR:  [] 62  BP: (100-142)/(55-94) 101/58  Resp:  [12-20] 19  SpO2:  [95 %-100 %] 95 %  O2 Device: None (Room air)    Body mass index is 29.9 kg/m².     Input and Output Summary (last 24 hours):     Intake/Output Summary (Last 24 hours) at 4/5/2025 0933  Last data filed at 4/5/2025 0817  Gross per 24 hour   Intake 240 ml   Output 400 ml   Net -160 ml       Physical Exam  Constitutional:       General: She is not in acute distress.     Appearance: Normal appearance. She is not ill-appearing, toxic-appearing or diaphoretic.   Cardiovascular:      Rate and Rhythm: Normal rate.      Pulses: Normal pulses.   Pulmonary:      Effort: Pulmonary effort is normal. No respiratory distress.      Breath sounds: Normal breath sounds. No wheezing.   Abdominal:      General: Bowel sounds are normal. There is no distension.      Palpations: Abdomen is soft.      Tenderness: There is no abdominal tenderness.   Musculoskeletal:      Right lower leg: No edema.      Left lower leg: No edema.   Neurological:      Mental Status: She is alert and oriented to person, place, and time. Mental status is at baseline.   Psychiatric:         Mood and Affect: Mood normal.         Behavior: Behavior normal.            Lines/Drains:              Lab Results: I have reviewed the following results:   Results from last 7 days   Lab Units 04/05/25  0438   WBC Thousand/uL 9.56   HEMOGLOBIN g/dL 10.2*   HEMATOCRIT % 31.7*   PLATELETS Thousands/uL 292   SEGS PCT % 88*   LYMPHO PCT % 4*   MONO PCT % 7   EOS PCT % 0     Results from last 7 days   Lab Units 04/05/25  0438   SODIUM mmol/L 136   POTASSIUM mmol/L 4.0   CHLORIDE mmol/L 105   CO2 mmol/L 24   BUN mg/dL 10   CREATININE mg/dL 0.85   ANION GAP mmol/L 7   CALCIUM mg/dL 7.8*   ALBUMIN g/dL 2.8*   TOTAL BILIRUBIN mg/dL 0.63   ALK PHOS U/L 155*   ALT U/L 27   AST U/L 49*   GLUCOSE RANDOM mg/dL 132     Results from last 7 days   Lab Units 04/05/25  0438   INR  1.17             Results from last 7 days   Lab Units 04/04/25  1437   LACTIC ACID mmol/L 1.4   PROCALCITONIN ng/ml 2.04*       Recent Cultures (last 7 days):   Results from last 7 days   Lab Units 04/04/25  1456 04/04/25  1437   BLOOD CULTURE  Received in Microbiology Lab. Culture in Progress. Received in Microbiology Lab. Culture in Progress.       Imaging Results Review: I reviewed radiology reports from this admission including: CT abdomen/pelvis.  Other Study Results Review: EKG was reviewed.  EKG was personally reviewed and my interpretation is: NSR..    Last 24 Hours Medication List:     Current Facility-Administered Medications:     acetaminophen (TYLENOL) tablet 650 mg, Q6H PRN    cefTRIAXone (ROCEPHIN) 1,000 mg in dextrose 5 % 50 mL IVPB, Q24H, Last Rate: Stopped (04/05/25 0600)    metroNIDAZOLE (FLAGYL) IVPB (premix) 500 mg 100 mL, Q12H, Last Rate: 500 mg (04/05/25 0630)    sodium chloride 0.9 % infusion, Continuous, Last Rate: 75 mL/hr (04/04/25 2023)    Administrative Statements   Today, Patient Was Seen By: Stephane Parra MD      **Please Note: This note may have been constructed using a voice recognition system.**

## 2025-04-06 PROBLEM — A41.9 SEPSIS (HCC): Status: ACTIVE | Noted: 2025-04-06

## 2025-04-06 PROBLEM — R78.81 STREPTOCOCCAL BACTEREMIA: Status: ACTIVE | Noted: 2025-04-06

## 2025-04-06 PROBLEM — B95.5 STREPTOCOCCAL BACTEREMIA: Status: ACTIVE | Noted: 2025-04-06

## 2025-04-06 LAB
ALBUMIN SERPL BCG-MCNC: 2.8 G/DL (ref 3.5–5)
ALP SERPL-CCNC: 227 U/L (ref 34–104)
ALT SERPL W P-5'-P-CCNC: 33 U/L (ref 7–52)
ANION GAP SERPL CALCULATED.3IONS-SCNC: 7 MMOL/L (ref 4–13)
AST SERPL W P-5'-P-CCNC: 45 U/L (ref 13–39)
BILIRUB SERPL-MCNC: 0.74 MG/DL (ref 0.2–1)
BUN SERPL-MCNC: 14 MG/DL (ref 5–25)
CALCIUM ALBUM COR SERPL-MCNC: 9 MG/DL (ref 8.3–10.1)
CALCIUM SERPL-MCNC: 8 MG/DL (ref 8.4–10.2)
CHLORIDE SERPL-SCNC: 106 MMOL/L (ref 96–108)
CO2 SERPL-SCNC: 24 MMOL/L (ref 21–32)
CREAT SERPL-MCNC: 0.84 MG/DL (ref 0.6–1.3)
ERYTHROCYTE [DISTWIDTH] IN BLOOD BY AUTOMATED COUNT: 12 % (ref 11.6–15.1)
GFR SERPL CREATININE-BSD FRML MDRD: 74 ML/MIN/1.73SQ M
GLUCOSE SERPL-MCNC: 159 MG/DL (ref 65–140)
HCT VFR BLD AUTO: 30.9 % (ref 34.8–46.1)
HGB BLD-MCNC: 10.2 G/DL (ref 11.5–15.4)
LACTATE SERPL-SCNC: 1.3 MMOL/L (ref 0.5–2)
MCH RBC QN AUTO: 29.5 PG (ref 26.8–34.3)
MCHC RBC AUTO-ENTMCNC: 33 G/DL (ref 31.4–37.4)
MCV RBC AUTO: 89 FL (ref 82–98)
PLATELET # BLD AUTO: 344 THOUSANDS/UL (ref 149–390)
PMV BLD AUTO: 9.9 FL (ref 8.9–12.7)
POTASSIUM SERPL-SCNC: 4 MMOL/L (ref 3.5–5.3)
PROT SERPL-MCNC: 6.1 G/DL (ref 6.4–8.4)
RBC # BLD AUTO: 3.46 MILLION/UL (ref 3.81–5.12)
SODIUM SERPL-SCNC: 137 MMOL/L (ref 135–147)
WBC # BLD AUTO: 19.23 THOUSAND/UL (ref 4.31–10.16)

## 2025-04-06 PROCEDURE — G0545 PR INHERENT VISIT TO INPT: HCPCS | Performed by: INTERNAL MEDICINE

## 2025-04-06 PROCEDURE — 99233 SBSQ HOSP IP/OBS HIGH 50: CPT | Performed by: INTERNAL MEDICINE

## 2025-04-06 PROCEDURE — 83605 ASSAY OF LACTIC ACID: CPT | Performed by: STUDENT IN AN ORGANIZED HEALTH CARE EDUCATION/TRAINING PROGRAM

## 2025-04-06 PROCEDURE — 85027 COMPLETE CBC AUTOMATED: CPT | Performed by: STUDENT IN AN ORGANIZED HEALTH CARE EDUCATION/TRAINING PROGRAM

## 2025-04-06 PROCEDURE — 80053 COMPREHEN METABOLIC PANEL: CPT | Performed by: STUDENT IN AN ORGANIZED HEALTH CARE EDUCATION/TRAINING PROGRAM

## 2025-04-06 PROCEDURE — 99233 SBSQ HOSP IP/OBS HIGH 50: CPT | Performed by: STUDENT IN AN ORGANIZED HEALTH CARE EDUCATION/TRAINING PROGRAM

## 2025-04-06 RX ORDER — ALBUMIN (HUMAN) 12.5 G/50ML
50 SOLUTION INTRAVENOUS ONCE
Status: COMPLETED | OUTPATIENT
Start: 2025-04-06 | End: 2025-04-06

## 2025-04-06 RX ORDER — LISINOPRIL 5 MG/1
5 TABLET ORAL DAILY
Status: DISCONTINUED | OUTPATIENT
Start: 2025-04-06 | End: 2025-04-11 | Stop reason: HOSPADM

## 2025-04-06 RX ADMIN — OXYCODONE 36 MG: 36 CAPSULE, EXTENDED RELEASE ORAL at 22:22

## 2025-04-06 RX ADMIN — OXYCODONE 36 MG: 36 CAPSULE, EXTENDED RELEASE ORAL at 12:58

## 2025-04-06 RX ADMIN — SODIUM CHLORIDE 1000 ML: 0.9 INJECTION, SOLUTION INTRAVENOUS at 10:48

## 2025-04-06 RX ADMIN — CEFTRIAXONE SODIUM 1000 MG: 10 INJECTION, POWDER, FOR SOLUTION INTRAVENOUS at 04:16

## 2025-04-06 RX ADMIN — CEFTRIAXONE SODIUM 2000 MG: 10 INJECTION, POWDER, FOR SOLUTION INTRAVENOUS at 16:38

## 2025-04-06 RX ADMIN — ALBUMIN (HUMAN) 50 G: 0.25 INJECTION, SOLUTION INTRAVENOUS at 08:05

## 2025-04-06 RX ADMIN — METRONIDAZOLE 500 MG: 500 TABLET ORAL at 08:03

## 2025-04-06 RX ADMIN — SODIUM CHLORIDE 1000 ML: 0.9 INJECTION, SOLUTION INTRAVENOUS at 08:08

## 2025-04-06 NOTE — PLAN OF CARE
Problem: PAIN - ADULT  Goal: Verbalizes/displays adequate comfort level or baseline comfort level  Description: Interventions:- Encourage patient to monitor pain and request assistance- Assess pain using appropriate pain scale- Administer analgesics based on type and severity of pain and evaluate response- Implement non-pharmacological measures as appropriate and evaluate response- Consider cultural and social influences on pain and pain management- Notify physician/advanced practitioner if interventions unsuccessful or patient reports new pain  Outcome: Progressing     Problem: INFECTION - ADULT  Goal: Absence or prevention of progression during hospitalization  Description: INTERVENTIONS:- Assess and monitor for signs and symptoms of infection- Monitor lab/diagnostic results- Monitor all insertion sites, i.e. indwelling lines, tubes, and drains- Monitor endotracheal if appropriate and nasal secretions for changes in amount and color- [place] appropriate cooling/warming therapies per order- Administer medications as ordered- Instruct and encourage patient and family to use good hand hygiene technique- Identify and instruct in appropriate isolation precautions for identified infection/condition  Outcome: Progressing  Goal: Absence of fever/infection during neutropenic period  Description: INTERVENTIONS:- Monitor WBC  Outcome: Progressing     Problem: SAFETY ADULT  Goal: Patient will remain free of falls  Description: INTERVENTIONS:- Educate patient/family on patient safety including physical limitations- Instruct patient to call for assistance with activity - Consult OT/PT to assist with strengthening/mobility - Keep Call bell within reach- Keep bed low and locked with side rails adjusted as appropriate- Keep care items and personal belongings within reach- Initiate and maintain comfort rounds- Make Fall Risk Sign visible to staff- Offer Toileting every 3 Hours, in advance of need- Initiate/Maintain bed alarm-  Obtain necessary fall risk management equipment: - Apply yellow socks and bracelet for high fall risk patients- Consider moving patient to room near nurses station  Outcome: Progressing     Problem: GASTROINTESTINAL - ADULT  Goal: Minimal or absence of nausea and/or vomiting  Description: INTERVENTIONS:- Administer IV fluids if ordered to ensure adequate hydration- Maintain NPO status until nausea and vomiting are resolved- Nasogastric tube if ordered- Administer ordered antiemetic medications as needed- Provide nonpharmacologic comfort measures as appropriate- Advance diet as tolerated, if ordered- Consider nutrition services referral to assist patient with adequate nutrition and appropriate food choices  Outcome: Progressing  Goal: Maintains or returns to baseline bowel function  Description: INTERVENTIONS:- Assess bowel function- Encourage oral fluids to ensure adequate hydration- Administer IV fluids if ordered to ensure adequate hydration- Administer ordered medications as needed- Encourage mobilization and activity- Consider nutritional services referral to assist patient with adequate nutrition and appropriate food choices  Outcome: Progressing  Goal: Maintains adequate nutritional intake  Description: INTERVENTIONS:- Monitor percentage of each meal consumed- Identify factors contributing to decreased intake, treat as appropriate- Assist with meals as needed- Monitor I&O, weight, and lab values if indicated- Obtain nutrition services referral as needed  Outcome: Progressing     Problem: Prexisting or High Potential for Compromised Skin Integrity  Goal: Skin integrity is maintained or improved  Description: INTERVENTIONS:- Identify patients at risk for skin breakdown- Assess and monitor skin integrity- Assess and monitor nutrition and hydration status- Monitor labs - Assess for incontinence - Turn and reposition patient- Assist with mobility/ambulation- Relieve pressure over bony prominences- Avoid friction  and shearing- Provide appropriate hygiene as needed including keeping skin clean and dry- Evaluate need for skin moisturizer/barrier cream- Collaborate with interdisciplinary team - Patient/family teaching- Consider wound care consult   Outcome: Progressing

## 2025-04-06 NOTE — ASSESSMENT & PLAN NOTE
PDMP reviewed, patient is on xtampza and pregabalin.  Patient does report having chronic back pain chronically on Xtampza 72 mg in am and 54mg at night.   Yesterday patient was somnolent after her  came home medication requiring dose of Narcan.  Currently blood pressure soft therefore will start with 36 mg twice daily dose for now.  Hold pregabalin for now.  Patient and  at the bedside are in agreement with above plan.

## 2025-04-06 NOTE — ASSESSMENT & PLAN NOTE
Episode of soft blood pressure noted.  Holding home dose of lisinopril  Blood pressure improved after IV fluid bolus and albumin.  Monitor vitals per unit protocol.

## 2025-04-06 NOTE — PROGRESS NOTES
Progress Note - Hospitalist   Name: Celia Rivera 64 y.o. female I MRN: [id number]  Unit/Bed#: -01 I Date of Admission: 4/4/2025   Date of Service: 4/6/2025 I Hospital Day: 2    Assessment & Plan  Liver abscess  64-year-old female with history of hypertension, previous cholecystectomy  in 2022 who initially presented with generalized weakness intermittent fevers  Noted to have a liver abscess.  8.5 cm  Status post IR drain placed on 04/05/2025    Post IR drain placement patient developed sepsis with fever, tachycardia, leukocytosis secondary to liver abscess.  Currently she is afebrile and hemodynamically stable.  2 sets of blood culture growing gram-positive cocci in chains.  Abscess cultures growing gram-positive cocci in pairs and chains.  Lactic acid within normal limits.  Currently on IV ceftriaxone.  Ordered 2 L of IV fluid bolus.  Follow-up with ID recommendation.  Sepsis (HCC)  Developed sepsis after IR drain placement yesterday.  Lactic acid within manage.  Ordered 2 L of fluid bolus today.  Clinically acutely nontoxic-appearing.  Currently she is much more awake, alert, oriented x 4.  Denies abdominal pain.  Tolerating antibiotics well.  2/2 blood cultures growing gram-positive cocci in chains.    Currently she is hemodynamically stable and acutely nontoxic-appearing.  Continue IV ceftriaxone.  Continue with IV fluids and IV albumin  Follow-up with ID recommendation.  Hypertension  Episode of soft blood pressure noted.  Holding home dose of lisinopril  Blood pressure improved after IV fluid bolus and albumin.  Monitor vitals per unit protocol.  GERD (gastroesophageal reflux disease)  Stable.  Continue PPI.  Cholelithiasis with choledocholithiasis  Patient has laparoscopic cholecystectomy and ERCP with CBD stone instruction in 2022.  CT and was reviewed reviewed noted with stable postop changes.  Plan as above.  Elevated LFTs  Elevated LFTs without hyperbilirubinemia.  Patient denies abdominal pain,  nausea, vomiting.  Plan as above.  Continue to trend CMP.  Anemia  Baseline hemoglobin is around 13-14 range.  Currently hemoglobin 10.7 on presentation currently 10.2.  No signs of active overt bleeding noted.  Monitor CBC.  Opioid dependence (HCC)  PDMP reviewed, patient is on xtampza and pregabalin.  Patient does report having chronic back pain chronically on Xtampza 72 mg in am and 54mg at night.   Yesterday patient was somnolent after her  came home medication requiring dose of Narcan.  Currently blood pressure soft therefore will start with 36 mg twice daily dose for now.  Hold pregabalin for now.  Patient and  at the bedside are in agreement with above plan.    VTE Pharmacologic Prophylaxis:   Moderate Risk (Score 3-4) - Pharmacological DVT Prophylaxis Ordered: enoxaparin (Lovenox).    Mobility:   Basic Mobility Inpatient Raw Score: 23  JH-HLM Goal: 7: Walk 25 feet or more  JH-HLM Achieved: 6: Walk 10 steps or more      Patient Centered Rounds: I performed bedside rounds with nursing staff today.   Discussions with Specialists or Other Care Team Provider: ID    Education and Discussions with Family / Patient: Updated  () at bedside.    Current Length of Stay: 2 day(s)  Current Patient Status: Inpatient   Certification Statement: The patient will continue to require additional inpatient hospital stay due to liver abscess developed sepsis  Discharge Plan: Anticipate discharge in 48-72 hrs to home.    Code Status: Level 1 - Full Code    Subjective   Patient developed sepsis after IR drain placement yesterday.  Also noted somnolent after patient received home pain medication by  at bedside requiring dose of Narcan due to somnolence.  As per nursing report patient did well after Narcan and was ambulating in her room.  This morning she is much more awake, alert, oriented x 4.  Currently she denies chest pain, dyspnea, fever, chills, nausea, vomiting, any other new comments.   Requesting to resume home analgesics however agreeable to start with lower dose due to blood pressure.  No other events reported.    Objective :  Temp:  [98 °F (36.7 °C)-102.1 °F (38.9 °C)] 98 °F (36.7 °C)  HR:  [] 81  BP: ()/(53-82) 104/62  Resp:  [18-20] 18  SpO2:  [71 %-100 %] 98 %  O2 Device: Nasal cannula  Nasal Cannula O2 Flow Rate (L/min):  [2 L/min] 2 L/min  FiO2 (%):  [21] 21    Body mass index is 29.9 kg/m².     Input and Output Summary (last 24 hours):     Intake/Output Summary (Last 24 hours) at 4/6/2025 1238  Last data filed at 4/6/2025 0101  Gross per 24 hour   Intake 2416.25 ml   Output 200 ml   Net 2216.25 ml       Physical Exam  Constitutional:       General: She is not in acute distress.     Appearance: Normal appearance. She is not ill-appearing, toxic-appearing or diaphoretic.   Cardiovascular:      Rate and Rhythm: Normal rate.      Pulses: Normal pulses.   Pulmonary:      Effort: Pulmonary effort is normal. No respiratory distress.      Breath sounds: Normal breath sounds. No wheezing.   Abdominal:      General: Bowel sounds are normal. There is no distension.      Palpations: Abdomen is soft.      Tenderness: There is no abdominal tenderness.      Comments: IR drain noted with seropurulent discharge   Musculoskeletal:      Right lower leg: No edema.      Left lower leg: No edema.   Neurological:      Mental Status: She is alert and oriented to person, place, and time. Mental status is at baseline.   Psychiatric:         Mood and Affect: Mood normal.         Behavior: Behavior normal.           Lines/Drains:  Lines/Drains/Airways       Active Status       Name Placement date Placement time Site Days    Abscess Drain Abdomen 04/05/25  1309  Abdomen  less than 1                            Lab Results: I have reviewed the following results:   Results from last 7 days   Lab Units 04/06/25  0356 04/05/25  0438   WBC Thousand/uL 19.23* 9.56   HEMOGLOBIN g/dL 10.2* 10.2*   HEMATOCRIT %  30.9* 31.7*   PLATELETS Thousands/uL 344 292   SEGS PCT %  --  88*   LYMPHO PCT %  --  4*   MONO PCT %  --  7   EOS PCT %  --  0     Results from last 7 days   Lab Units 04/06/25  0356   SODIUM mmol/L 137   POTASSIUM mmol/L 4.0   CHLORIDE mmol/L 106   CO2 mmol/L 24   BUN mg/dL 14   CREATININE mg/dL 0.84   ANION GAP mmol/L 7   CALCIUM mg/dL 8.0*   ALBUMIN g/dL 2.8*   TOTAL BILIRUBIN mg/dL 0.74   ALK PHOS U/L 227*   ALT U/L 33   AST U/L 45*   GLUCOSE RANDOM mg/dL 159*     Results from last 7 days   Lab Units 04/05/25  0438   INR  1.17             Results from last 7 days   Lab Units 04/06/25  0814 04/04/25  1437   LACTIC ACID mmol/L 1.3 1.4   PROCALCITONIN ng/ml  --  2.04*       Recent Cultures (last 7 days):   Results from last 7 days   Lab Units 04/05/25  1319 04/04/25  1456 04/04/25  1437   GRAM STAIN RESULT  3+ Polys*  3+ Gram positive cocci in pairs, chains and clusters* Gram positive cocci in chains* Gram positive cocci in chains*             Last 24 Hours Medication List:     Current Facility-Administered Medications:     acetaminophen (TYLENOL) tablet 650 mg, Q6H PRN    cefTRIAXone (ROCEPHIN) 1,000 mg in dextrose 5 % 50 mL IVPB, Q24H, Last Rate: 1,000 mg (04/06/25 0416)    [Held by provider] lisinopril (ZESTRIL) tablet 5 mg, Daily    metroNIDAZOLE (FLAGYL) tablet 500 mg, Q12H KEVIN    oxyCODONE ER (Xtampza) extended release capsule 36 mg, Q12H KEVIN    [COMPLETED] sodium chloride 0.9 % bolus 1,000 mL, Once, Last Rate: 1,000 mL (04/06/25 0808) **FOLLOWED BY** sodium chloride 0.9 % bolus 1,000 mL, Once, Last Rate: 1,000 mL (04/06/25 1048)    sodium chloride 0.9 % infusion, Continuous, Last Rate: 75 mL/hr (04/06/25 1225)    Administrative Statements   Today, Patient Was Seen By: Stephane Parra MD      **Please Note: This note may have been constructed using a voice recognition system.**

## 2025-04-06 NOTE — UTILIZATION REVIEW
Initial Clinical Review    Admission: Date/Time/Statement:   Admission Orders (From admission, onward)       Ordered        04/04/25 1811  INPATIENT ADMISSION  Once                          Orders Placed This Encounter   Procedures    INPATIENT ADMISSION     Standing Status:   Standing     Number of Occurrences:   1     Level of Care:   Med Surg [16]     Estimated length of stay:   More than 2 Midnights     Certification:   I certify that inpatient services are medically necessary for this patient for a duration of greater than two midnights. See H&P and MD Progress Notes for additional information about the patient's course of treatment.     ED Arrival Information       Expected   -    Arrival   4/4/2025 14:15    Acuity   Emergent              Means of arrival   Ambulance    Escorted by   [facility]    Service   Hospitalist    Admission type   Emergency              Arrival complaint   -             Chief Complaint   Patient presents with    Weakness - Generalized     Pt arrives ems from home, states feeling unwell for a few days and today started with lightheadedness, denies room spinning and blurry vision       Initial Presentation: 64 y.o. female with PMH of HTN, GERD, presents to the ED from home with complaint of generalized weakness, intermittent fever and shortness of breath.  She reports multiple near falls over the last 24 hours because of her unsteadiness on her feet. She notes inspiratory pain on the right side that is new. D-dimer elevated at 2.99.  ED CT showed no PE, but revealed approximately 8.5 cm right hepatic lobe lesion, concern for abscess.  ED labs also revealed WBC of 12, procal 2.4. Potassium 3.2, sodium 134, calcium 7.6.  Patient received IV Zosyn and PO potassium in the ED.  Exam:  AOx3. Lungs CTAB.  No abdominal tenderness, rigidity, guarding or rebound.      4/4 Inpatient admission for evaluation and treatment of liver abscess: Plan: IV ceftriaxone, metronidazole, consult General  Surgery, Interventional Radiology and Infectious Disease.  NPO after MN.   Anticipated Length of Stay: Patient will be admitted on an inpatient basis with an anticipated length of stay of greater than 2 midnights secondary to liver abscess.     4/4 Interventional Radiology consult:  Possible liver abscess. IR drain. Plan for tomorrow.  Hold all thinners. NPO order placed.    4/5 General Surgery consult:  Liver abscess.  IR planning to drain abscess.  S/P laparoscopic cholecystectomy and ERCP in 2022  Unsure source, potentially bacteria from ERCP since patient had a sphincterotomy, also consider GI lesion.  No plans for acute surgical intervention at this time. Infectious Disease consult:  Given size of liver abscess, suspect that it has been present for quite a while, although not before May 2023 when she last had abdomen/pelvis CT without evidence of liver abnormality. Source of liver abscess is most likely colonic. She will need another colonoscopy. Admission blood cultures have no growth thus far.  Patient has no history of MDRO.  Patient will need drainage of this abscess.  Continue ceftriaxone/Flagyl for now. Follow-up on admission blood cultures. Elevated LFTs are most likely secondary to liver abscess, monitor.     4/6  Day 3: Has surpassed a 2nd midnight with active treatments and services. Internal Medicine:  Post IR drain placement yesterday, patient developed sepsis with fever, tachycardia, leukocytosis secondary to liver abscess.  Also noted somnolent after patient received home pain medication by  at bedside requiring dose of Narcan due to somnolence. This morning she is much more awake, alert, oriented x 4.  Two sets of blood culture growing gram-positive cocci in chains.  Abscess cultures growing gram-positive cocci in pairs and chains. Currently on IV ceftriaxone. Episode of soft blood pressure noted.  Holding home dose of lisinopril. Blood pressure improved after IV fluid bolus and albumin.  Patient requesting to resume home analgesics however agreeable to start with lower dose due to blood pressure.  Exam:  IR drain noted with seropurulent drainage.  Infectious Disease:  Streptococcal bacteremia.  Patient's admission blood cultures now have growth of Streptococcus in both sets.  ID of Streptococcus isolate is still pending but this does not appear to be Enterococcus by BCID.  Source of bacteremia is most likely liver abscess.   Continue IV ceftriaxone but increase dose.  Follow-up on final ID of Streptococcus isolate in blood cultures.  Repeat blood cultures in AM. Check TTE.    ED Treatment-Medication Administration from 04/04/2025 1415 to 04/04/2025 1846         Date/Time Order Dose Route Action     04/04/2025 1421 acetaminophen (FOR EMS ONLY) (TYLENOL) oral suspension 650 mg 0 mg Does not apply Given to EMS     04/04/2025 1540 potassium chloride (Klor-Con M20) CR tablet 40 mEq 40 mEq Oral Given     04/04/2025 1702 iohexol (OMNIPAQUE) 350 MG/ML injection (MULTI-DOSE) 100 mL 100 mL Intravenous Given     04/04/2025 1802 piperacillin-tazobactam (ZOSYN) IVPB 4.5 g 4.5 g Intravenous New Bag            Scheduled Medications:      cefTRIAXone, 2,000 mg, Intravenous, Q24H  [Held by provider] lisinopril, 5 mg, Oral, Daily  oxyCODONE ER, 36 mg, Oral, Q12H KEVIN        albumin human (FLEXBUMIN) 25 % injection 50 g  Dose: 50 g  Freq: Once Route: IV  Start: 04/06/25 0800 End: 04/06/25 0805    sodium chloride 0.9 % bolus 1,000 mL  Dose: 1,000 mL  Freq: Once Route: IV  Last Dose: 1,000 mL (04/06/25 0808)  Start: 04/06/25 0800 End: 04/06/25 1008      sodium chloride 0.9 % bolus 1,000 mL  Dose: 1,000 mL  Freq: Once Route: IV  Last Dose: 1,000 mL (04/06/25 1048)  Start: 04/06/25 1000 End: 04/06/25 1248       cefTRIAXone (ROCEPHIN) 2,000 mg in dextrose 5 % 50 mL IVPB  Dose: 2,000 mg  Freq: Every 24 hours Route: IV  Last Dose: 2,000 mg (04/06/25 1638)  Start: 04/06/25 1600       cefTRIAXone (ROCEPHIN) 1,000 mg in dextrose  5 % 50 mL IVPB x 2 doses  Dose: 1,000 mg  Freq: Every 24 hours Route: IV  Last Dose: 1,000 mg (04/06/25 0416)  Start: 04/05/25 0445 End: 04/06/25 1249      metroNIDAZOLE (FLAGYL) tablet 500 mg x 2 doses  Dose: 500 mg  Freq: Every 12 hours scheduled Route: PO  Start: 04/05/25 1700 End: 04/06/25 1249    metroNIDAZOLE (FLAGYL) IVPB (premix) 500 mg 100 mL x 1 dose  Dose: 500 mg  Freq: Every 12 hours Route: IV  Last Dose: 500 mg (04/05/25 0630)  Start: 04/05/25 0500 End: 04/05/25 1043      acetaminophen (Ofirmev) injection 1,000 mg  Dose: 1,000 mg  Freq: Once Route: IV  Last Dose: Stopped (04/05/25 0215)  Start: 04/05/25 0145 End: 04/05/25 0215       naloxone (NARCAN) intranasal 2 mg  Dose: 2 mg  Freq: Once Route: NA  Start: 04/05/25 1548 End: 04/05/25 1607         Continuous IV Infusions:      sodium chloride, 75 mL/hr, Intravenous, Continuous      PRN Meds:      acetaminophen, 650 mg, Oral, Q6H PRN      ED Triage Vitals   Temperature Pulse Respirations Blood Pressure SpO2 Pain Score   04/04/25 1425 04/04/25 1422 04/04/25 1422 04/04/25 1422 04/04/25 1422 04/04/25 1848   98.9 °F (37.2 °C) 95 18 122/66 96 % 8     Weight (last 2 days)       Date/Time Weight    04/04/25 1848 86.6 (190.92)    04/04/25 1422 79.4 (175)            Vital Signs (last 3 days)       Date/Time Temp Pulse Resp BP MAP (mmHg) SpO2 FiO2 (%) Calculated FIO2 (%) - Nasal Cannula Nasal Cannula O2 Flow Rate (L/min) O2 Device Patient Position - Orthostatic VS Pain    04/06/25 15:02:14 97.9 °F (36.6 °C) 76 17 106/64 78 98 % -- -- -- -- -- --    04/06/25 12:20:18 -- 81 18 104/62 76 98 % -- -- -- -- Lying --    04/06/25 0905 -- -- -- -- -- -- -- -- -- -- -- No Pain    04/06/25 07:45:30 98 °F (36.7 °C) 67 18 100/53 69 94 % -- -- -- -- Lying --    04/05/25 2154 98.2 °F (36.8 °C) 83 -- 90/53 65 95 % -- -- -- -- -- --    04/05/25 2125 -- -- -- -- -- -- -- -- -- -- -- 6    04/05/25 20:23:24 -- 102 18 111/63 79 98 % -- -- -- -- Sitting --    04/05/25 1820 98.1 °F  (36.7 °C) 93 -- -- -- 95 % 21 -- -- -- -- --    04/05/25 1630 101.8 °F (38.8 °C) 114 -- 126/65 85 96 % 21 -- -- -- -- --    04/05/25 1615 102.1 °F (38.9 °C) 124 -- -- -- 95 % 21 -- -- -- -- --    04/05/25 16:02:42 -- 121 18 116/67 83 93 % -- -- -- -- -- --    04/05/25 1532 -- -- -- -- -- -- -- -- -- -- -- 10 - Worst Possible Pain    04/05/25 15:12:08 100.9 °F (38.3 °C) 111 20 150/71 97 95 % -- -- -- -- -- --    04/05/25 1318 -- -- -- 128/67 -- -- 21 -- -- -- -- --    04/05/25 1315 -- 58 -- -- -- 71 % 21 -- -- -- -- --    04/05/25 1312 -- 80 -- 141/82 -- 99 % 21 -- -- -- -- --    04/05/25 1304 -- 76 -- 140/81 -- 98 % 21 -- -- -- -- --    04/05/25 1259 -- 72 -- 147/82 -- 100 % 21 -- -- -- -- --    04/05/25 1254 -- 79 -- 147/78 -- 100 % 21 -- -- -- -- --    04/05/25 12:53:19 -- -- -- -- -- 100 % -- 28 2 L/min Nasal cannula -- --    04/05/25 12:51:49 -- 79 18 147/78 -- 100 % -- -- -- -- -- --    04/05/25 0952 -- -- -- -- -- -- -- -- -- None (Room air) -- No Pain    04/05/25 07:42:41 98 °F (36.7 °C) 62 19 101/58 72 95 % -- -- -- -- -- --    04/05/25 03:50:32 98.4 °F (36.9 °C) 71 -- -- -- 95 % -- -- -- -- -- --    04/05/25 0139 -- -- -- -- -- -- -- -- -- -- -- 8    04/05/25 01:29:33 -- 113 -- 142/94 110 97 % -- -- -- -- -- --    04/05/25 01:21:38 100.7 °F (38.2 °C) 102 18 142/94 110 100 % -- -- -- None (Room air) Sitting --    04/04/25 21:36:41 97.6 °F (36.4 °C) 68 17 100/60 73 99 % -- -- -- -- -- --    04/04/25 2013 -- -- -- -- -- -- -- -- -- None (Room air) -- No Pain    04/04/25 1848 98 °F (36.7 °C) 73 19 115/55 75 97 % -- -- -- None (Room air) Sitting 8    04/04/25 1814 98.9 °F (37.2 °C) 65 20 109/55 -- 98 % -- -- -- None (Room air) Sitting --    04/04/25 1645 98.9 °F (37.2 °C) 60 12 109/55 76 99 % -- -- -- None (Room air) Sitting --    04/04/25 1545 98.9 °F (37.2 °C) 69 20 103/58 76 95 % -- -- -- None (Room air) Sitting --    04/04/25 1425 98.9 °F (37.2 °C) -- -- -- -- -- -- -- -- -- -- --    04/04/25 1422 -- 95 18  122/66 88 96 % -- -- -- None (Room air) Lying --              Pertinent Labs/Diagnostic Test Results:   Radiology:  CT pe study w abdomen pelvis w contrast   Final Interpretation by Ash Busby MD (04/04 1732)      1.  No pulmonary embolism or additional acute abnormality in the chest.   2.  Approximately 8.5 cm right hepatic lobe lesion with heterogeneous rim enhancement and necrotic appearing central portion, concerning for abscess. Necrotic malignancy felt less likely.   3.  Common bile duct dilatation and left pneumobilia, stable compared to CT 5/6/2023, and therefore likely related to prior cholecystectomy and sphincterotomy, although cholangitis remains possible given hepatic abscess.   4.  Liquid stool throughout the colon, suggesting a diarrheal illness.      The study was marked in EPIC for immediate notification.      Workstation performed: [id number]         CT head without contrast   Final Interpretation by Marcus Hall MD (04/04 1711)      No acute intracranial abnormality.                  Workstation performed: [id number]         IR drainage tube placement    (Results Pending)        Cardiology:      ECG 12 lead   Final Result by Luz Garcia MD (04/05 1120)   Normal sinus rhythm   Normal ECG   Confirmed by Luz Garcia ([id number]) on 4/5/2025 11:20:37 AM      ECG 12 lead   Final Result by Luz Garcia MD (04/05 1120)   Normal sinus rhythm   Normal ECG   Confirmed by Luz Garcia ([id number]) on 4/5/2025 11:20:42 AM      ECG 12 lead   Final Result by Luz Garcia MD (04/04 1548)   Normal sinus rhythm   Normal ECG      Confirmed by Luz Garcia ([id number]) on 4/4/2025 3:48:14 PM              Results from last 7 days   Lab Units 04/04/25  1437   SARS-COV-2  Negative     Results from last 7 days   Lab Units 04/06/25  0356 04/05/25  0438 04/04/25  1437   WBC Thousand/uL 19.23* 9.56 12.02*   HEMOGLOBIN g/dL 10.2* 10.2* 10.7*   HEMATOCRIT % 30.9* 31.7* 32.2*   PLATELETS Thousands/uL 344  292 288   TOTAL NEUT ABS Thousands/µL  --  8.42* 10.46*         Results from last 7 days   Lab Units 04/06/25  0356 04/05/25  0438 04/04/25  1437   SODIUM mmol/L 137 136 134*   POTASSIUM mmol/L 4.0 4.0 3.2*   CHLORIDE mmol/L 106 105 103   CO2 mmol/L 24 24 22   ANION GAP mmol/L 7 7 9   BUN mg/dL 14 10 11   CREATININE mg/dL 0.84 0.85 1.08   EGFR ml/min/1.73sq m 74 73 55   CALCIUM mg/dL 8.0* 7.8* 7.6*   MAGNESIUM mg/dL  --  2.0 1.9     Results from last 7 days   Lab Units 04/06/25  0356 04/05/25  0438 04/04/25  1437   AST U/L 45* 49* 29   ALT U/L 33 27 20   ALK PHOS U/L 227* 155* 164*   TOTAL PROTEIN g/dL 6.1* 5.9* 6.3*   ALBUMIN g/dL 2.8* 2.8* 3.0*   TOTAL BILIRUBIN mg/dL 0.74 0.63 0.78   BILIRUBIN DIRECT mg/dL  --  0.20  --          Results from last 7 days   Lab Units 04/06/25  0356 04/05/25 0438 04/04/25  1437   GLUCOSE RANDOM mg/dL 159* 132 165*           Results from last 7 days   Lab Units 04/04/25  1924 04/04/25  1638 04/04/25  1437   HS TNI 0HR ng/L  --   --  17   HS TNI 2HR ng/L  --  21  --    HSTNI D2 ng/L  --  4  --    HS TNI 4HR ng/L 18  --   --    HSTNI D4 ng/L 1  --   --      Results from last 7 days   Lab Units 04/04/25  1437   D-DIMER QUANTITATIVE ug/ml FEU 2.99*     Results from last 7 days   Lab Units 04/05/25  0438 04/04/25  1437   PROTIME seconds 15.6* 15.7*   INR  1.17 1.18   PTT seconds  --  37*     Results from last 7 days   Lab Units 04/04/25  1437   TSH 3RD GENERATON uIU/mL 2.160     Results from last 7 days   Lab Units 04/04/25  1437   PROCALCITONIN ng/ml 2.04*     Results from last 7 days   Lab Units 04/06/25  0814 04/04/25  1437   LACTIC ACID mmol/L 1.3 1.4             Results from last 7 days   Lab Units 04/04/25  1437   BNP pg/mL 221*               Results from last 7 days   Lab Units 04/04/25  1633   CLARITY UA  Turbid   COLOR UA  Yellow   SPEC GRAV UA  1.010   PH UA  6.0   GLUCOSE UA mg/dl Negative   KETONES UA mg/dl Negative   BLOOD UA  Small*   PROTEIN UA mg/dl 30 (1+)*   NITRITE UA   Negative   BILIRUBIN UA  Negative   UROBILINOGEN UA (BE) mg/dl <2.0   LEUKOCYTES UA  Trace*   WBC UA /hpf 4-10*   RBC UA /hpf 4-10*   BACTERIA UA /hpf Occasional   EPITHELIAL CELLS WET PREP /hpf Occasional     Results from last 7 days   Lab Units 04/04/25  1437   INFLUENZA A PCR  Negative   INFLUENZA B PCR  Negative   RSV PCR  Negative         Results from last 7 days   Lab Units 04/04/25  1633   AMPH/METH  Negative   BARBITURATE UR  Negative   BENZODIAZEPINE UR  Negative   COCAINE UR  Negative   METHADONE URINE  Negative   OPIATE UR  Positive*   PCP UR  Negative   THC UR  Negative                     Results from last 7 days   Lab Units 04/05/25  1319 04/04/25  1456 04/04/25  1437   GRAM STAIN RESULT  3+ Polys*  3+ Gram positive cocci in pairs, chains and clusters* Gram positive cocci in chains* Gram positive cocci in chains*                   Past Medical History:   Diagnosis Date    Hyperlipidemia     Hypertension      Present on Admission:   Hypertension   Opioid dependence (HCC)      Admitting Diagnosis: Hypokalemia [E87.6]  Hepatic abscess [K75.0]  Liver abscess [K75.0]  Weakness [R53.1]  Anemia [D64.9]  Age/Sex: 64 y.o. female    Network Utilization Review Department  ATTENTION: Please call with any questions or concerns to [phone] and carefully listen to the prompts so that you are directed to the right person. All voicemails are confidential.   For Discharge needs, contact Care Management DC Support Team at [phone] opt. 2  Send all requests for admission clinical reviews, approved or denied determinations and any other requests to dedicated fax number below belonging to the campus where the patient is receiving treatment. List of dedicated fax numbers for the Facilities:  FACILITY NAME UR FAX NUMBER   ADMISSION DENIALS (Administrative/Medical Necessity) [phone]   DISCHARGE SUPPORT TEAM (NETWORK) [phone]   PARENT CHILD HEALTH (Maternity/NICU/Pediatrics) [phone]   [facility]  [facility] [phone]   [facility] [phone]   [facility] [phone]   [facility] [phone]   [facility] [phone]   [facility] [phone]   [facility] [phone]   [facility] [phone]   [facility] [phone]   [facility] [phone]   [facility] [phone]   [facility] [phone]

## 2025-04-06 NOTE — ASSESSMENT & PLAN NOTE
Patient's admission blood cultures now have growth of Streptococcus in both sets.  ID of Streptococcus isolate is still pending but this does not appear to be Enterococcus by BCID.  Source of bacteremia is most likely liver abscess.  Continue IV ceftriaxone but increase dose.  Follow-up on final ID of Streptococcus isolate in blood cultures.  Repeat blood cultures in AM.  Check TTE.

## 2025-04-06 NOTE — PROGRESS NOTES
Progress Note - Infectious Disease   Name: Celia Rivera 64 y.o. female I MRN: [id number]  Unit/Bed#: -01 I Date of Admission: 4/4/2025   Date of Service: 4/6/2025 I Hospital Day: 2    Assessment & Plan  Streptococcal bacteremia  Patient's admission blood cultures now have growth of Streptococcus in both sets.  ID of Streptococcus isolate is still pending but this does not appear to be Enterococcus by BCID.  Source of bacteremia is most likely liver abscess.  Continue IV ceftriaxone but increase dose.  Follow-up on final ID of Streptococcus isolate in blood cultures.  Repeat blood cultures in AM.  Check TTE.  Liver abscess  Patient presented with fever/chills and nonspecific generalized weakness, without abdominal pain.  She is found to have a large liver abscess on abdomen/pelvis CT.  Given size of liver abscess, I suspect that it has been present for quite a while, although not before May 2023 when she last had abdomen/pelvis CT without evidence of liver abnormality.  Source of liver abscess is most likely colonic.  Abdomen/pelvis CT was benign, other than liver abscess.  Patient had 1 colonoscopy many years ago with unclear findings, benign according to patient but records are not available.  She will need another colonoscopy.  Fortunately, despite presence of liver abscess, patient is clinically and systemically well, without evidence of sepsis or systemic toxicity.  Patient is now status post drainage of abscess.  Drain remains seropurulent.  Continue ceftriaxone, as seen above.  No further need for Flagyl.  Monitor temperature/WBC.  Follow-up on abscess culture.  Drain per IR.  Recommend colonoscopy in the next few weeks.  Sepsis (HCC)  Patient developed fever and leukocytosis last night, after drainage of liver abscess.  This may be sepsis versus endotoxin release from procedure.  Admission blood cultures are now growing Streptococcus, as in above.  Fortunately, patient remains systemically well and  hemodynamically stable, without hypotension.  Antibiotic plan as in above.  Monitor temperature/WBC.    Monitor hemodynamics.  Elevated LFTs  LFTs are elevated, with normal bilirubin.  In addition, abdominal exam is benign.  Elevated LFTs are most likely secondary to liver abscess.  Doubt biliary obstruction with normal bilirubin and benign abdominal exam.  Antibiotic plan as in above.  Monitor LFT.  Serial abdominal exams.  Cholelithiasis with choledocholithiasis  Patient is status post laparoscopic cholecystectomy in ERCP with CBD stone extraction in 2022 when she presented with abdominal pain.  Abdomen/pelvis CT with stable postop changes.  LFTs are elevated but no hyperbilirubinemia, as in above.    Discussed with patient in detail regarding the above plan.  I have discussed with Dr. Parra from primary service regarding the above plan to modify antibiotic regimen above.  He agrees with the plan.    Antibiotics:  Ceftriaxone/Flagyl # 3    Subjective   Patient drained yesterday liver abscess yesterday.  She has discomfort at drain site today, but otherwise remains without abdominal pain.  Temperature up overnight, with chills.  She is tolerating antibiotics well.  No nausea, vomiting or diarrhea.    Objective :  Temp:  [98 °F (36.7 °C)-102.1 °F (38.9 °C)] 98 °F (36.7 °C)  HR:  [] 81  BP: ()/(53-82) 104/62  Resp:  [18-20] 18  SpO2:  [71 %-100 %] 98 %  O2 Device: Nasal cannula  Nasal Cannula O2 Flow Rate (L/min):  [2 L/min] 2 L/min  FiO2 (%):  [21] 21    Physical Exam:     General: Awake, alert, cooperative, no distress.   Neck:  Supple. No mass.  No lymphadenopathy.   Lungs: Expansion symmetric, no rales, no wheezing, respirations unlabored.   Heart:  Regular rate and rhythm, S1 and S2 normal, no murmur.   Abdomen: Soft, nondistended, mild tenderness at drain site, bowel sounds active all four quadrants, no masses, no organomegaly.  Drain seropurulent.   Extremities: No edema. No erythema/warmth. No  ulcer. Nontender to palpation.   Skin:  No rash.   Neuro: Moves all extremities.        Lab Results: I have reviewed the following results:  Results from last 7 days   Lab Units 04/06/25  0356 04/05/25  0438 04/04/25  1437   WBC Thousand/uL 19.23* 9.56 12.02*   HEMOGLOBIN g/dL 10.2* 10.2* 10.7*   PLATELETS Thousands/uL 344 292 288     Results from last 7 days   Lab Units 04/06/25  0356 04/05/25  0438 04/04/25  1437   SODIUM mmol/L 137 136 134*   POTASSIUM mmol/L 4.0 4.0 3.2*   CHLORIDE mmol/L 106 105 103   CO2 mmol/L 24 24 22   BUN mg/dL 14 10 11   CREATININE mg/dL 0.84 0.85 1.08   EGFR ml/min/1.73sq m 74 73 55   CALCIUM mg/dL 8.0* 7.8* 7.6*   AST U/L 45* 49* 29   ALT U/L 33 27 20   ALK PHOS U/L 227* 155* 164*   ALBUMIN g/dL 2.8* 2.8* 3.0*     Results from last 7 days   Lab Units 04/05/25  1319 04/04/25  1456 04/04/25  1437   GRAM STAIN RESULT  3+ Polys*  3+ Gram positive cocci in pairs, chains and clusters* Gram positive cocci in chains* Gram positive cocci in chains*     Results from last 7 days   Lab Units 04/04/25  1437   PROCALCITONIN ng/ml 2.04*             Results from last 7 days   Lab Units 04/04/25  1437   D-DIMER QUANTITATIVE ug/ml FEU 2.99*

## 2025-04-06 NOTE — ASSESSMENT & PLAN NOTE
Patient is status post laparoscopic cholecystectomy in ERCP with CBD stone extraction in 2022 when she presented with abdominal pain.  Abdomen/pelvis CT with stable postop changes.  LFTs are elevated but no hyperbilirubinemia, as in above.    Discussed with patient in detail regarding the above plan.

## 2025-04-06 NOTE — NURSING NOTE
Pt would like her son to bring in her current prescription for   Xtampza ER 18 MG C12A .   States he has been taking it for years for chronic back pain and feels she is having WDS - from not taking it - . Pt was advised to speak with the provider in the morning . If the medication was approved that the prescription would have to be -current.and in the original bottle with her name and identifying information printed on it. It would then be sent to pharmacy and dispensed from there.   Pt was agreeable.    Pt plan of care on going - no further concerns as of present and expresses no other needs at this time- call light within reach.

## 2025-04-06 NOTE — ASSESSMENT & PLAN NOTE
Patient presented with fever/chills and nonspecific generalized weakness, without abdominal pain.  She is found to have a large liver abscess on abdomen/pelvis CT.  Given size of liver abscess, I suspect that it has been present for quite a while, although not before May 2023 when she last had abdomen/pelvis CT without evidence of liver abnormality.  Source of liver abscess is most likely colonic.  Abdomen/pelvis CT was benign, other than liver abscess.  Patient had 1 colonoscopy many years ago with unclear findings, benign according to patient but records are not available.  She will need another colonoscopy.  Fortunately, despite presence of liver abscess, patient is clinically and systemically well, without evidence of sepsis or systemic toxicity.  Patient is now status post drainage of abscess.  Drain remains seropurulent.  Continue ceftriaxone, as seen above.  No further need for Flagyl.  Monitor temperature/WBC.  Follow-up on abscess culture.  Drain per IR.  Recommend colonoscopy in the next few weeks.

## 2025-04-06 NOTE — PLAN OF CARE
Problem: PAIN - ADULT  Goal: Verbalizes/displays adequate comfort level or baseline comfort level  Description: Interventions:- Encourage patient to monitor pain and request assistance- Assess pain using appropriate pain scale- Administer analgesics based on type and severity of pain and evaluate response- Implement non-pharmacological measures as appropriate and evaluate response- Consider cultural and social influences on pain and pain management- Notify physician/advanced practitioner if interventions unsuccessful or patient reports new pain  Outcome: Progressing     Problem: INFECTION - ADULT  Goal: Absence or prevention of progression during hospitalization  Description: INTERVENTIONS:- Assess and monitor for signs and symptoms of infection- Monitor lab/diagnostic results- Monitor all insertion sites, i.e. indwelling lines, tubes, and drains- Monitor endotracheal if appropriate and nasal secretions for changes in amount and color- [place] appropriate cooling/warming therapies per order- Administer medications as ordered- Instruct and encourage patient and family to use good hand hygiene technique- Identify and instruct in appropriate isolation precautions for identified infection/condition  Outcome: Progressing

## 2025-04-06 NOTE — ASSESSMENT & PLAN NOTE
Patient developed fever and leukocytosis last night, after drainage of liver abscess.  This may be sepsis versus endotoxin release from procedure.  Admission blood cultures are now growing Streptococcus, as in above.  Fortunately, patient remains systemically well and hemodynamically stable, without hypotension.  Antibiotic plan as in above.  Monitor temperature/WBC.    Monitor hemodynamics.

## 2025-04-06 NOTE — ASSESSMENT & PLAN NOTE
64-year-old female with history of hypertension, previous cholecystectomy  in 2022 who initially presented with generalized weakness intermittent fevers  Noted to have a liver abscess.  8.5 cm  Status post IR drain placed on 04/05/2025    Post IR drain placement patient developed sepsis with fever, tachycardia, leukocytosis secondary to liver abscess.  Currently she is afebrile and hemodynamically stable.  2 sets of blood culture growing gram-positive cocci in chains.  Abscess cultures growing gram-positive cocci in pairs and chains.  Lactic acid within normal limits.  Currently on IV ceftriaxone.  Ordered 2 L of IV fluid bolus.  Follow-up with ID recommendation.

## 2025-04-06 NOTE — ASSESSMENT & PLAN NOTE
Developed sepsis after IR drain placement yesterday.  Lactic acid within manage.  Ordered 2 L of fluid bolus today.  Clinically acutely nontoxic-appearing.  Currently she is much more awake, alert, oriented x 4.  Denies abdominal pain.  Tolerating antibiotics well.  2/2 blood cultures growing gram-positive cocci in chains.    Currently she is hemodynamically stable and acutely nontoxic-appearing.  Continue IV ceftriaxone.  Continue with IV fluids and IV albumin  Follow-up with ID recommendation.

## 2025-04-07 ENCOUNTER — APPOINTMENT (INPATIENT)
Dept: NON INVASIVE DIAGNOSTICS | Facility: HOSPITAL | Age: 65
End: 2025-04-07
Payer: COMMERCIAL

## 2025-04-07 LAB
ANION GAP SERPL CALCULATED.3IONS-SCNC: 6 MMOL/L (ref 4–13)
AORTIC ROOT: 3.1 CM
BACTERIA BLD CULT: ABNORMAL
BSA FOR ECHO PROCEDURE: 1.98 M2
BUN SERPL-MCNC: 13 MG/DL (ref 5–25)
CALCIUM SERPL-MCNC: 8.2 MG/DL (ref 8.4–10.2)
CHLORIDE SERPL-SCNC: 112 MMOL/L (ref 96–108)
CO2 SERPL-SCNC: 23 MMOL/L (ref 21–32)
CREAT SERPL-MCNC: 0.82 MG/DL (ref 0.6–1.3)
DOP CALC LVOT AREA: 2.83 CM2
DOP CALC LVOT DIAMETER: 1.9 CM
E WAVE DECELERATION TIME: 157 MS
E/A RATIO: 1.03
ERYTHROCYTE [DISTWIDTH] IN BLOOD BY AUTOMATED COUNT: 12.4 % (ref 11.6–15.1)
FRACTIONAL SHORTENING: 31 (ref 28–44)
GFR SERPL CREATININE-BSD FRML MDRD: 76 ML/MIN/1.73SQ M
GLUCOSE SERPL-MCNC: 110 MG/DL (ref 65–140)
GRAM STN SPEC: ABNORMAL
HCT VFR BLD AUTO: 30.2 % (ref 34.8–46.1)
HGB BLD-MCNC: 9.9 G/DL (ref 11.5–15.4)
INTERVENTRICULAR SEPTUM IN DIASTOLE (PARASTERNAL SHORT AXIS VIEW): 0.9 CM
INTERVENTRICULAR SEPTUM: 0.9 CM (ref 0.6–1.1)
LAAS-AP2: 15.8 CM2
LAAS-AP4: 25.4 CM2
LEFT ATRIUM SIZE: 4.4 CM
LEFT ATRIUM VOLUME (MOD BIPLANE): 68 ML
LEFT ATRIUM VOLUME INDEX (MOD BIPLANE): 34.3 ML/M2
LEFT INTERNAL DIMENSION IN SYSTOLE: 3.6 CM (ref 2.1–4)
LEFT VENTRICULAR INTERNAL DIMENSION IN DIASTOLE: 5.2 CM (ref 3.5–6)
LEFT VENTRICULAR POSTERIOR WALL IN END DIASTOLE: 0.9 CM
LEFT VENTRICULAR STROKE VOLUME: 77 ML
LV EF US.2D.A4C+ESTIMATED: 57 %
LVSV (TEICH): 77 ML
MCH RBC QN AUTO: 29.3 PG (ref 26.8–34.3)
MCHC RBC AUTO-ENTMCNC: 32.8 G/DL (ref 31.4–37.4)
MCV RBC AUTO: 89 FL (ref 82–98)
MITRAL REGURGITATION PEAK VELOCITY: 5.41 M/S
MITRAL VALVE MEAN INFLOW VELOCITY: 4.37 M/S
MITRAL VALVE REGURGITANT PEAK GRADIENT: 117 MMHG
MV E'TISSUE VEL-LAT: 12 CM/S
MV E'TISSUE VEL-SEP: 12 CM/S
MV PEAK A VEL: 1.18 M/S
MV PEAK E VEL: 122 CM/S
MV STENOSIS PRESSURE HALF TIME: 46 MS
MV VALVE AREA P 1/2 METHOD: 4.78
PLATELET # BLD AUTO: 366 THOUSANDS/UL (ref 149–390)
PMV BLD AUTO: 10.3 FL (ref 8.9–12.7)
POTASSIUM SERPL-SCNC: 3.4 MMOL/L (ref 3.5–5.3)
RBC # BLD AUTO: 3.38 MILLION/UL (ref 3.81–5.12)
RIGHT ATRIUM AREA SYSTOLE A4C: 19.3 CM2
RIGHT VENTRICLE ID DIMENSION: 3.2 CM
SL CV DOP CALC MV VTI RETROGRADE: 203 CM
SL CV LEFT ATRIUM LENGTH A2C: 4.7 CM
SL CV LV EF: 55
SL CV MV MEAN GRADIENT RETROGRADE: 84 MMHG
SL CV PED ECHO LEFT VENTRICLE DIASTOLIC VOLUME (MOD BIPLANE) 2D: 131 ML
SL CV PED ECHO LEFT VENTRICLE SYSTOLIC VOLUME (MOD BIPLANE) 2D: 55 ML
SODIUM SERPL-SCNC: 141 MMOL/L (ref 135–147)
TR MAX PG: 28 MMHG
TR PEAK VELOCITY: 2.7 M/S
TRICUSPID ANNULAR PLANE SYSTOLIC EXCURSION: 3 CM
TRICUSPID VALVE PEAK REGURGITATION VELOCITY: 2.65 M/S
WBC # BLD AUTO: 15.87 THOUSAND/UL (ref 4.31–10.16)

## 2025-04-07 PROCEDURE — 99233 SBSQ HOSP IP/OBS HIGH 50: CPT | Performed by: INTERNAL MEDICINE

## 2025-04-07 PROCEDURE — 93306 TTE W/DOPPLER COMPLETE: CPT

## 2025-04-07 PROCEDURE — 85027 COMPLETE CBC AUTOMATED: CPT | Performed by: STUDENT IN AN ORGANIZED HEALTH CARE EDUCATION/TRAINING PROGRAM

## 2025-04-07 PROCEDURE — G0545 PR INHERENT VISIT TO INPT: HCPCS | Performed by: INTERNAL MEDICINE

## 2025-04-07 PROCEDURE — 99232 SBSQ HOSP IP/OBS MODERATE 35: CPT

## 2025-04-07 PROCEDURE — 80048 BASIC METABOLIC PNL TOTAL CA: CPT | Performed by: STUDENT IN AN ORGANIZED HEALTH CARE EDUCATION/TRAINING PROGRAM

## 2025-04-07 PROCEDURE — 93306 TTE W/DOPPLER COMPLETE: CPT | Performed by: INTERNAL MEDICINE

## 2025-04-07 PROCEDURE — 87040 BLOOD CULTURE FOR BACTERIA: CPT | Performed by: INTERNAL MEDICINE

## 2025-04-07 PROCEDURE — 99232 SBSQ HOSP IP/OBS MODERATE 35: CPT | Performed by: STUDENT IN AN ORGANIZED HEALTH CARE EDUCATION/TRAINING PROGRAM

## 2025-04-07 RX ORDER — ENOXAPARIN SODIUM 100 MG/ML
40 INJECTION SUBCUTANEOUS
Status: DISCONTINUED | OUTPATIENT
Start: 2025-04-07 | End: 2025-04-11 | Stop reason: HOSPADM

## 2025-04-07 RX ADMIN — OXYCODONE 36 MG: 36 CAPSULE, EXTENDED RELEASE ORAL at 10:52

## 2025-04-07 RX ADMIN — ENOXAPARIN SODIUM 40 MG: 40 INJECTION SUBCUTANEOUS at 16:17

## 2025-04-07 RX ADMIN — CEFTRIAXONE SODIUM 2000 MG: 10 INJECTION, POWDER, FOR SOLUTION INTRAVENOUS at 16:17

## 2025-04-07 RX ADMIN — SODIUM CHLORIDE 75 ML/HR: 0.9 INJECTION, SOLUTION INTRAVENOUS at 01:20

## 2025-04-07 RX ADMIN — OXYCODONE 36 MG: 36 CAPSULE, EXTENDED RELEASE ORAL at 20:47

## 2025-04-07 NOTE — ASSESSMENT & PLAN NOTE
Developed sepsis after IR drain placement yesterday.  Lactic acid within manage.  Clinically acutely nontoxic-appearing.  Currently she is much more awake, alert, oriented x 4.  Denies abdominal pain.  Tolerating antibiotics well.  2/2 blood cultures growing gram-positive cocci in chains.  Repeat blood cultures pending.    Currently she is hemodynamically stable and acutely nontoxic-appearing.  Continue IV ceftriaxone.  Follow-up with 2D echo.  Likely needs 2 weeks of antibiotics once bacteremia clears per ID.

## 2025-04-07 NOTE — ASSESSMENT & PLAN NOTE
Episode of soft blood pressure noted.  Holding home dose of lisinopril  Discontinue IV fluids.  Consider resuming lisinopril tomorrow if blood pressure persistently elevated.

## 2025-04-07 NOTE — PLAN OF CARE
Problem: PAIN - ADULT  Goal: Verbalizes/displays adequate comfort level or baseline comfort level  Description: Interventions:- Encourage patient to monitor pain and request assistance- Assess pain using appropriate pain scale- Administer analgesics based on type and severity of pain and evaluate response- Implement non-pharmacological measures as appropriate and evaluate response- Consider cultural and social influences on pain and pain management- Notify physician/advanced practitioner if interventions unsuccessful or patient reports new pain  Outcome: Progressing     Problem: INFECTION - ADULT  Goal: Absence or prevention of progression during hospitalization  Description: INTERVENTIONS:- Assess and monitor for signs and symptoms of infection- Monitor lab/diagnostic results- Monitor all insertion sites, i.e. indwelling lines, tubes, and drains- Monitor endotracheal if appropriate and nasal secretions for changes in amount and color- [place] appropriate cooling/warming therapies per order- Administer medications as ordered- Instruct and encourage patient and family to use good hand hygiene technique- Identify and instruct in appropriate isolation precautions for identified infection/condition  Outcome: Progressing  Goal: Absence of fever/infection during neutropenic period  Description: INTERVENTIONS:- Monitor WBC  Outcome: Progressing     Problem: Prexisting or High Potential for Compromised Skin Integrity  Goal: Skin integrity is maintained or improved  Description: INTERVENTIONS:- Identify patients at risk for skin breakdown- Assess and monitor skin integrity- Assess and monitor nutrition and hydration status- Monitor labs - Assess for incontinence - Turn and reposition patient- Assist with mobility/ambulation- Relieve pressure over bony prominences- Avoid friction and shearing- Provide appropriate hygiene as needed including keeping skin clean and dry- Evaluate need for skin moisturizer/barrier cream-  Collaborate with interdisciplinary team - Patient/family teaching- Consider wound care consult   Outcome: Progressing

## 2025-04-07 NOTE — PROGRESS NOTES
Progress Note - Infectious Disease   Name: Celia Rivera 64 y.o. female I MRN: [id number]  Unit/Bed#: -01 I Date of Admission: 4/4/2025   Date of Service: 4/7/2025 I Hospital Day: 3    Assessment & Plan  Streptococcal bacteremia  Patient's admission blood cultures now have growth of alpha Streptococcus in both sets.  ID of Streptococcus isolate is still pending but this does not appear to be Enterococcus by BCID.  Source of bacteremia is most likely liver abscess.  Given presence of alpha strep bacteremia, patient will need 2 weeks of IV antibiotic.  Continue high-dose IV ceftriaxone.  Follow-up on final ID of Streptococcus isolate in blood cultures.  Repeat blood cultures today.  Follow-up TTE.  Patient will likely need 2 weeks of IV antibiotic after clearance of bacteremia.  Liver abscess  Patient presented with fever/chills and nonspecific generalized weakness, without abdominal pain.  She is found to have a large liver abscess on abdomen/pelvis CT.  Given size of liver abscess, I suspect that it has been present for quite a while, although not before May 2023 when she last had abdomen/pelvis CT without evidence of liver abnormality.  Source of liver abscess is most likely colonic.  Abdomen/pelvis CT was benign, other than liver abscess.  Patient had 1 colonoscopy many years ago with unclear findings, benign according to patient but records are not available.  She will need another colonoscopy.  Fortunately, despite presence of liver abscess, patient is clinically and systemically well, without evidence of sepsis or systemic toxicity.  Patient is now status post drainage of abscess.  Drain remains seropurulent but getting clearer.  Continue ceftriaxone, as in above.  Monitor temperature/WBC.  Follow-up on abscess culture.  Drain per IR.  Recommend colonoscopy in the next few weeks.  Sepsis (HCC)  Patient developed fever and leukocytosis last night, after drainage of liver abscess.  This may be sepsis  versus endotoxin release from procedure.  Admission blood cultures are now growing alpha Streptococcus, as in above.  Patient is improved, with resolving fever and decreasing WBC.  Fortunately, patient remains systemically well and hemodynamically stable, without hypotension.  Antibiotic plan as in above.  Monitor temperature/WBC.    Monitor hemodynamics.  Elevated LFTs  LFTs are elevated, with normal bilirubin.  In addition, abdominal exam is benign.  Elevated LFTs are most likely secondary to liver abscess.  Doubt biliary obstruction with normal bilirubin and benign abdominal exam.  Antibiotic plan as in above.  Monitor LFT.  Serial abdominal exams.  Cholelithiasis with choledocholithiasis  Patient is status post laparoscopic cholecystectomy in [facility] with CBD stone extraction in 2022 when she presented with abdominal pain.  Abdomen/pelvis CT with stable postop changes.  LFTs are elevated but no hyperbilirubinemia, as in above.    Discussed with patient in detail regarding the above plan.  I have discussed with Dr. Parra from primary service regarding the above plan to continue IV ceftriaxone.  He agrees with the plan.    Antibiotics:  Ceftriaxone # 4     Subjective   Patient better today.  Pain/discomfort at drain site improved.  Temperature is down.  No chills.  She is tolerating antibiotic well.  No nausea, vomiting or diarrhea.    Objective :  Temp:  [97.5 °F (36.4 °C)-98.3 °F (36.8 °C)] 97.5 °F (36.4 °C)  HR:  [65-81] 65  BP: (104-129)/(62-75) 129/75  Resp:  [16-18] 16  SpO2:  [95 %-100 %] 100 %  O2 Device: None (Room air)    Physical Exam:     General: Awake, alert, cooperative, no distress.   Neck:  Supple. No mass.  No lymphadenopathy.   Lungs: Expansion symmetric, no rales, no wheezing, respirations unlabored.   Heart:  Regular rate and rhythm, S1 and S2 normal, no murmur.   Abdomen: Soft, nondistended, mild and improved tenderness at drain site, bowel sounds active all four quadrants, no masses, no  organomegaly.  Drain clearer.   Extremities: No edema. No erythema/warmth. No ulcer. Nontender to palpation.   Skin:  No rash.   Neuro: Moves all extremities.        Lab Results: I have reviewed the following results:  Results from last 7 days   Lab Units 04/07/25  0458 04/06/25  0356 04/05/25  0438   WBC Thousand/uL 15.87* 19.23* 9.56   HEMOGLOBIN g/dL 9.9* 10.2* 10.2*   PLATELETS Thousands/uL 366 344 292     Results from last 7 days   Lab Units 04/07/25  0458 04/06/25  0356 04/05/25  0438 04/04/25  1437   SODIUM mmol/L 141 137 136 134*   POTASSIUM mmol/L 3.4* 4.0 4.0 3.2*   CHLORIDE mmol/L 112* 106 105 103   CO2 mmol/L 23 24 24 22   BUN mg/dL 13 14 10 11   CREATININE mg/dL 0.82 0.84 0.85 1.08   EGFR ml/min/1.73sq m 76 74 73 55   CALCIUM mg/dL 8.2* 8.0* 7.8* 7.6*   AST U/L  --  45* 49* 29   ALT U/L  --  33 27 20   ALK PHOS U/L  --  227* 155* 164*   ALBUMIN g/dL  --  2.8* 2.8* 3.0*     Results from last 7 days   Lab Units 04/05/25  1319 04/04/25  1456 04/04/25  1437   BLOOD CULTURE   --  Alpha Hemolytic Streptococcus NOT Enterococcus* Alpha Hemolytic Streptococcus NOT Enterococcus*   GRAM STAIN RESULT  3+ Polys*  3+ Gram positive cocci in pairs, chains and clusters* Gram positive cocci in chains* Gram positive cocci in chains*     Results from last 7 days   Lab Units 04/04/25  1437   PROCALCITONIN ng/ml 2.04*             Results from last 7 days   Lab Units 04/04/25  1437   D-DIMER QUANTITATIVE ug/ml FEU 2.99*

## 2025-04-07 NOTE — ASSESSMENT & PLAN NOTE
64-year-old female with history of hypertension, previous cholecystectomy  in 2022 who initially presented with generalized weakness intermittent fevers  Noted to have a liver abscess.  8.5 cm  Status post IR drain placed on 04/05/2025 2/2 blood culture growing Streptococcus  2/7/2025 repeat blood cultures pending.      Currently she is afebrile and hemodynamically stable.  Acutely nontoxic appearing.  Currently on IV ceftriaxone.  Follow-up with 2D echo.  ID recommendation appreciated.  Will likely need 2 weeks of antibiotics after clearance of bacteremia per ID.

## 2025-04-07 NOTE — ASSESSMENT & PLAN NOTE
Patient developed fever and leukocytosis last night, after drainage of liver abscess.  This may be sepsis versus endotoxin release from procedure.  Admission blood cultures are now growing alpha Streptococcus, as in above.  Patient is improved, with resolving fever and decreasing WBC.  Fortunately, patient remains systemically well and hemodynamically stable, without hypotension.  Antibiotic plan as in above.  Monitor temperature/WBC.    Monitor hemodynamics.

## 2025-04-07 NOTE — CASE MANAGEMENT
Case Management Assessment & Discharge Planning Note    Patient name Celia Rivera  Location /-01 MRN [id number]  : 1960 Date 2025       Current Admission Date: 2025  Current Admission Diagnosis:Liver abscess   Patient Active Problem List    Diagnosis Date Noted Date Diagnosed    Sepsis (HCC) 2025     Streptococcal bacteremia 2025     Cholelithiasis with choledocholithiasis 2025     Elevated LFTs 2025     Anemia 2025     GERD (gastroesophageal reflux disease) 2025     Liver abscess 2025     Transaminitis 2022     Right upper quadrant pain 2022     Choledocholithiasis 2022     Chronic low back pain 2022     Opioid dependence (HCC) 2022     Hypertension 2022       LOS (days): 3  Geometric Mean LOS (GMLOS) (days):   Days to GMLOS:     OBJECTIVE:    Risk of Unplanned Readmission Score: 16.58         Current admission status: Inpatient       Preferred Pharmacy:   [id number] - [place], [place] - [address]  [address]  [place] [address]  Phone: [phone] Fax: [phone]    [id number] - [place], [place]  [address]  [place] [address]  Phone: [phone] Fax: [phone]    Primary Care Provider: Harvey Jordan MD    Primary Insurance: [facility]  Secondary Insurance:     ASSESSMENT:  Active Health Care Proxies       Eulaloi Rivera Health Care Representative - Spouse   Primary Phone: [phone] (Home)                 Advance Directives  Does patient have a Health Care POA?: Yes  Does patient have Advance Directives?: Yes  Primary Contact: Eulalio Rivera   Health Care Representative - Spouse  Primary Phone: [phone]         Readmission Root Cause  30 Day Readmission: No    Patient Information  Admitted from:: Home  Mental Status: Alert  During Assessment patient was accompanied by: Not accompanied during assessment  Assessment  information provided by:: Patient  Primary Caregiver: Self  Support Systems: Spouse/significant other  County of Residence: [place]  What city do you live in?: [place]  Home entry access options. Select all that apply.: Stairs  Number of steps to enter home.: 1  Do the steps have railings?: Yes  Type of Current Residence: [address] home  Upon entering residence, is there a bedroom on the main floor (no further steps)?: Yes  Upon entering residence, is there a bathroom on the main floor (no further steps)?: Yes  Living Arrangements: Lives w/ Spouse/significant other  Is patient a ?: No    Activities of Daily Living Prior to Admission  Functional Status: Independent  Completes ADLs independently?: Yes  Ambulates independently?: Yes  Does patient use assisted devices?: Yes  Assisted Devices (DME) used: Straight Cane  Does patient currently own DME?: Yes  What DME does the patient currently own?: Walker, Straight Cane  Does patient have a history of Outpatient Therapy (PT/OT)?: Yes  Does the patient have a history of Short-Term Rehab?: Yes (acute rehab at [facility])  Does patient have a history of HHC?: No  Does patient currently have HHC?: No         Patient Information Continued  Income Source: Unemployed  Does patient have prescription coverage?: Yes  Can the patient afford their medications and any related supplies (such as glucometers or test strips)?: Yes  Does patient receive dialysis treatments?: No  Does patient have a history of substance abuse?: No  Does patient have a history of Mental Health Diagnosis?: No         Means of Transportation  Means of Transport to Appts:: Drives Self          DISCHARGE DETAILS:    Discharge planning discussed with:: Pt  Freedom of Choice: Yes  Comments - Freedom of Choice: CM met with pt at bedside and introduced self/role. FOC discussed at length. [facility] 23. no cm needs noted. CM continues to follow.  CM contacted family/caregiver?: No- see comments  Were Treatment  Team discharge recommendations reviewed with patient/caregiver?: Yes  Did patient/caregiver verbalize understanding of patient care needs?: Yes  Were patient/caregiver advised of the risks associated with not following Treatment Team discharge recommendations?: Yes    Contacts  Reason/Outcome: Continuity of Care, Emergency Contact, Referral, Discharge Planning    Requested Home Health Care         Is the patient interested in HHC at discharge?: No    DME Referral Provided  Referral made for DME?: No    Other Referral/Resources/Interventions Provided:  Interventions: None Indicated    Would you like to participate in our [facility] service program?  : No - Declined    Treatment Team Recommendation: Home  Discharge Destination Plan:: Home  Transport at Discharge : Family

## 2025-04-07 NOTE — ASSESSMENT & PLAN NOTE
Repeat blood cultures pending.  Current on IV ceftriaxone and will likely need 2 weeks of antibiotics.  2D echo pending.  ID following and recommendation appreciated.

## 2025-04-07 NOTE — ASSESSMENT & PLAN NOTE
Patient presented with fever/chills and nonspecific generalized weakness, without abdominal pain.  She is found to have a large liver abscess on abdomen/pelvis CT.  Given size of liver abscess, I suspect that it has been present for quite a while, although not before May 2023 when she last had abdomen/pelvis CT without evidence of liver abnormality.  Source of liver abscess is most likely colonic.  Abdomen/pelvis CT was benign, other than liver abscess.  Patient had 1 colonoscopy many years ago with unclear findings, benign according to patient but records are not available.  She will need another colonoscopy.  Fortunately, despite presence of liver abscess, patient is clinically and systemically well, without evidence of sepsis or systemic toxicity.  Patient is now status post drainage of abscess.  Drain remains seropurulent but getting clearer.  Continue ceftriaxone, as in above.  Monitor temperature/WBC.  Follow-up on abscess culture.  Drain per IR.  Recommend colonoscopy in the next few weeks.

## 2025-04-07 NOTE — ASSESSMENT & PLAN NOTE
Patient's admission blood cultures now have growth of alpha Streptococcus in both sets.  ID of Streptococcus isolate is still pending but this does not appear to be Enterococcus by BCID.  Source of bacteremia is most likely liver abscess.  Given presence of alpha strep bacteremia, patient will need 2 weeks of IV antibiotic.  Continue high-dose IV ceftriaxone.  Follow-up on final ID of Streptococcus isolate in blood cultures.  Repeat blood cultures today.  Follow-up TTE.  Patient will likely need 2 weeks of IV antibiotic after clearance of bacteremia.

## 2025-04-07 NOTE — ASSESSMENT & PLAN NOTE
Baseline hemoglobin is around 13-14 range.  Currently hemoglobin 10.7 on presentation currently 9.9  No signs of active overt bleeding noted.  Monitor CBC.

## 2025-04-07 NOTE — PLAN OF CARE
Problem: INFECTION - ADULT  Goal: Absence of fever/infection during neutropenic period  Description: INTERVENTIONS:- Monitor WBC  4/7/2025 1027 by Adalgisa Brian RN  Outcome: Progressing  4/7/2025 1026 by Adalgisa Brian RN  Outcome: Progressing     Problem: GASTROINTESTINAL - ADULT  Goal: Minimal or absence of nausea and/or vomiting  Description: INTERVENTIONS:- Administer IV fluids if ordered to ensure adequate hydration- Maintain NPO status until nausea and vomiting are resolved- Nasogastric tube if ordered- Administer ordered antiemetic medications as needed- Provide nonpharmacologic comfort measures as appropriate- Advance diet as tolerated, if ordered- Consider nutrition services referral to assist patient with adequate nutrition and appropriate food choices  4/7/2025 1027 by Adalgisa Brian RN  Outcome: Progressing  4/7/2025 1026 by Adalgisa Brian RN  Outcome: Progressing     Problem: PAIN - ADULT  Goal: Verbalizes/displays adequate comfort level or baseline comfort level  Description: Interventions:- Encourage patient to monitor pain and request assistance- Assess pain using appropriate pain scale- Administer analgesics based on type and severity of pain and evaluate response- Implement non-pharmacological measures as appropriate and evaluate response- Consider cultural and social influences on pain and pain management- Notify physician/advanced practitioner if interventions unsuccessful or patient reports new pain  4/7/2025 1027 by Adalgisa Brian RN  Outcome: Progressing  4/7/2025 1026 by Adalgisa Brian RN  Outcome: Progressing

## 2025-04-08 DIAGNOSIS — K75.0 LIVER ABSCESS: Primary | ICD-10-CM

## 2025-04-08 LAB
ANION GAP SERPL CALCULATED.3IONS-SCNC: 7 MMOL/L (ref 4–13)
BACTERIA BLD CULT: ABNORMAL
BUN SERPL-MCNC: 12 MG/DL (ref 5–25)
CALCIUM SERPL-MCNC: 7.9 MG/DL (ref 8.4–10.2)
CHLORIDE SERPL-SCNC: 111 MMOL/L (ref 96–108)
CO2 SERPL-SCNC: 23 MMOL/L (ref 21–32)
CREAT SERPL-MCNC: 0.84 MG/DL (ref 0.6–1.3)
ERYTHROCYTE [DISTWIDTH] IN BLOOD BY AUTOMATED COUNT: 12.7 % (ref 11.6–15.1)
GFR SERPL CREATININE-BSD FRML MDRD: 74 ML/MIN/1.73SQ M
GLUCOSE SERPL-MCNC: 138 MG/DL (ref 65–140)
GRAM STN SPEC: ABNORMAL
HCT VFR BLD AUTO: 34.2 % (ref 34.8–46.1)
HGB BLD-MCNC: 11.3 G/DL (ref 11.5–15.4)
MCH RBC QN AUTO: 29.2 PG (ref 26.8–34.3)
MCHC RBC AUTO-ENTMCNC: 33 G/DL (ref 31.4–37.4)
MCV RBC AUTO: 88 FL (ref 82–98)
PLATELET # BLD AUTO: 419 THOUSANDS/UL (ref 149–390)
PMV BLD AUTO: 9.4 FL (ref 8.9–12.7)
POTASSIUM SERPL-SCNC: 3.4 MMOL/L (ref 3.5–5.3)
RBC # BLD AUTO: 3.87 MILLION/UL (ref 3.81–5.12)
SODIUM SERPL-SCNC: 141 MMOL/L (ref 135–147)
STREPTOCOCCUS DNA BLD POS NAA+NON-PROBE: DETECTED
WBC # BLD AUTO: 13.16 THOUSAND/UL (ref 4.31–10.16)

## 2025-04-08 PROCEDURE — 85027 COMPLETE CBC AUTOMATED: CPT | Performed by: STUDENT IN AN ORGANIZED HEALTH CARE EDUCATION/TRAINING PROGRAM

## 2025-04-08 PROCEDURE — G0545 PR INHERENT VISIT TO INPT: HCPCS | Performed by: INTERNAL MEDICINE

## 2025-04-08 PROCEDURE — 99232 SBSQ HOSP IP/OBS MODERATE 35: CPT | Performed by: STUDENT IN AN ORGANIZED HEALTH CARE EDUCATION/TRAINING PROGRAM

## 2025-04-08 PROCEDURE — 80048 BASIC METABOLIC PNL TOTAL CA: CPT | Performed by: STUDENT IN AN ORGANIZED HEALTH CARE EDUCATION/TRAINING PROGRAM

## 2025-04-08 PROCEDURE — 99232 SBSQ HOSP IP/OBS MODERATE 35: CPT | Performed by: INTERNAL MEDICINE

## 2025-04-08 RX ORDER — SODIUM CHLORIDE 9 MG/ML
10 INJECTION, SOLUTION INTRAMUSCULAR; INTRAVENOUS; SUBCUTANEOUS DAILY
Qty: 300 ML | Refills: 2 | Status: SHIPPED | OUTPATIENT
Start: 2025-04-08 | End: 2025-07-07

## 2025-04-08 RX ORDER — PREGABALIN 75 MG/1
75 CAPSULE ORAL 2 TIMES DAILY
Status: DISCONTINUED | OUTPATIENT
Start: 2025-04-08 | End: 2025-04-11 | Stop reason: HOSPADM

## 2025-04-08 RX ORDER — POTASSIUM CHLORIDE 1500 MG/1
40 TABLET, EXTENDED RELEASE ORAL 2 TIMES DAILY
Status: DISCONTINUED | OUTPATIENT
Start: 2025-04-08 | End: 2025-04-11 | Stop reason: HOSPADM

## 2025-04-08 RX ORDER — PREGABALIN 75 MG/1
75 CAPSULE ORAL 3 TIMES DAILY
Status: DISCONTINUED | OUTPATIENT
Start: 2025-04-08 | End: 2025-04-08

## 2025-04-08 RX ADMIN — PREGABALIN 75 MG: 75 CAPSULE ORAL at 17:29

## 2025-04-08 RX ADMIN — CEFTRIAXONE SODIUM 2000 MG: 10 INJECTION, POWDER, FOR SOLUTION INTRAVENOUS at 16:02

## 2025-04-08 RX ADMIN — ENOXAPARIN SODIUM 40 MG: 40 INJECTION SUBCUTANEOUS at 08:51

## 2025-04-08 RX ADMIN — OXYCODONE 36 MG: 36 CAPSULE, EXTENDED RELEASE ORAL at 21:05

## 2025-04-08 RX ADMIN — OXYCODONE 36 MG: 36 CAPSULE, EXTENDED RELEASE ORAL at 08:51

## 2025-04-08 RX ADMIN — POTASSIUM CHLORIDE 40 MEQ: 1500 TABLET, EXTENDED RELEASE ORAL at 17:29

## 2025-04-08 RX ADMIN — POTASSIUM CHLORIDE 40 MEQ: 1500 TABLET, EXTENDED RELEASE ORAL at 08:51

## 2025-04-08 NOTE — ASSESSMENT & PLAN NOTE
64-year-old female with history of hypertension, previous cholecystectomy  in 2022 who initially presented with generalized weakness intermittent fevers  Noted to have a liver abscess.  8.5 cm  Status post IR drain placed on 04/05/2025  2/2 blood culture growing Streptococcus  2/7/2025 repeat blood cultures without growth in 24 hours.  2D echo report reviewed noted with EF of 55% without vegetation.      Currently she is afebrile and hemodynamically stable.  Acutely nontoxic appearing.  Currently on IV ceftriaxone.  ID recommendation appreciated.  Will likely need 2 weeks of antibiotics after clearance of bacteremia per ID.  Plan is to place PICC line after repeat blood culture remain negative for 72 hours.  Patient is in agreement with above plan.

## 2025-04-08 NOTE — ASSESSMENT & PLAN NOTE
Blood pressure currently reasonably controlled.  Holding home dose of lisinopril  Discontinue IV fluids.  Consider resuming lisinopril once blood pressure persistently elevated and uncontrolled.

## 2025-04-08 NOTE — ASSESSMENT & PLAN NOTE
Patient developed fever and leukocytosis last night, after drainage of liver abscess.  This may be sepsis versus endotoxin release from procedure.  Admission blood cultures are now growing alpha Streptococcus, as in above.  Patient is clinically much improved, with resolving fever and decreasing WBC.  Fortunately, patient remains systemically well and hemodynamically stable, without hypotension.  Antibiotic plan as in above.  Monitor temperature/WBC.    Monitor hemodynamics.

## 2025-04-08 NOTE — PROGRESS NOTES
Progress Note - Infectious Disease   Name: Celia Rivera 64 y.o. female I MRN: [id number]  Unit/Bed#: -01 I Date of Admission: 4/4/2025   Date of Service: 4/8/2025 I Hospital Day: 4    Assessment & Plan  Streptococcal bacteremia  Patient's admission blood cultures now have growth of Streptococcus intermedius in both sets.  ID of Streptococcus isolate is still pending but this does not appear to be Enterococcus by BCID.  Source of bacteremia is most likely liver abscess.  TTE is without obvious vegetation.  Repeat blood cultures have no growth thus far.  Given presence of alpha strep bacteremia, patient will need 2 weeks of IV antibiotic.  Continue high-dose IV ceftriaxone.  Follow-up repeat blood cultures.  As long as bacteremia clears, treat x 2 weeks after clearance of bacteremia.  Liver abscess  Patient presented with fever/chills and nonspecific generalized weakness, without abdominal pain.  She is found to have a large liver abscess on abdomen/pelvis CT.  Given size of liver abscess, I suspect that it has been present for quite a while, although not before May 2023 when she last had abdomen/pelvis CT without evidence of liver abnormality.  Source of liver abscess is most likely colonic.  Abdomen/pelvis CT was benign, other than liver abscess.  Patient had 1 colonoscopy many years ago with unclear findings, benign according to patient but records are not available.  She will need another colonoscopy.  Fortunately, despite presence of liver abscess, patient is clinically and systemically well, without evidence of sepsis or systemic toxicity.  Patient is now status post drainage of abscess, with abscess culture growing Streptococcus intermedius also.  Drain is now much clearer.  Continue ceftriaxone, as in above.  Monitor temperature/WBC.  Drain per IR.  Recommend colonoscopy in the next few weeks.  This can be done as an outpatient.  Sepsis (HCC)  Patient developed fever and leukocytosis last night, after  drainage of liver abscess.  This may be sepsis versus endotoxin release from procedure.  Admission blood cultures are now growing alpha Streptococcus, as in above.  Patient is clinically much improved, with resolving fever and decreasing WBC.  Fortunately, patient remains systemically well and hemodynamically stable, without hypotension.  Antibiotic plan as in above.  Monitor temperature/WBC.    Monitor hemodynamics.  Elevated LFTs  LFTs are elevated, with normal bilirubin.  In addition, abdominal exam is benign.  Elevated LFTs are most likely secondary to liver abscess.  Doubt biliary obstruction with normal bilirubin and benign abdominal exam.  Antibiotic plan as in above.  Monitor LFT.  Serial abdominal exams.  Cholelithiasis with choledocholithiasis  Patient is status post laparoscopic cholecystectomy in ERCP with CBD stone extraction in 2022 when she presented with abdominal pain.  Abdomen/pelvis CT with stable postop changes.  LFTs are elevated but no hyperbilirubinemia, as in above.    Discussed with patient in detail regarding the above plan.      Antibiotics:  Ceftriaxone # 5    Subjective   Patient is comfortable.  Pain at drain site is a little better.  No abdominal pain otherwise.  Temperature stays down.  No chills.  She is tolerating antibiotic well.  No nausea, vomiting or diarrhea.    Objective :  Temp:  [97.4 °F (36.3 °C)-98 °F (36.7 °C)] 97.5 °F (36.4 °C)  HR:  [60-63] 60  BP: (130-133)/(76-81) 133/78  Resp:  [16-18] 16  SpO2:  [95 %-98 %] 95 %  O2 Device: None (Room air)    Physical Exam:     General: Awake, alert, cooperative, no distress.   Neck:  Supple. No mass.  No lymphadenopathy.   Lungs: Expansion symmetric, no rales, no wheezing, respirations unlabored.   Heart:  Regular rate and rhythm, S1 and S2 normal, no murmur.   Abdomen: Soft, nondistended, mild tenderness at drain site, bowel sounds active all four quadrants, no masses, no organomegaly.  Drain now serous.   Extremities: No edema.  No erythema/warmth. No ulcer. Nontender to palpation.   Skin:  No rash.   Neuro: Moves all extremities.        Lab Results: I have reviewed the following results:  Results from last 7 days   Lab Units 04/08/25  0556 04/07/25  0458 04/06/25  0356   WBC Thousand/uL 13.16* 15.87* 19.23*   HEMOGLOBIN g/dL 11.3* 9.9* 10.2*   PLATELETS Thousands/uL 419* 366 344     Results from last 7 days   Lab Units 04/08/25  0556 04/07/25  0458 04/06/25  0356 04/05/25  0438 04/04/25  1437   SODIUM mmol/L 141 141 137 136 134*   POTASSIUM mmol/L 3.4* 3.4* 4.0 4.0 3.2*   CHLORIDE mmol/L 111* 112* 106 105 103   CO2 mmol/L 23 23 24 24 22   BUN mg/dL 12 13 14 10 11   CREATININE mg/dL 0.84 0.82 0.84 0.85 1.08   EGFR ml/min/1.73sq m 74 76 74 73 55   CALCIUM mg/dL 7.9* 8.2* 8.0* 7.8* 7.6*   AST U/L  --   --  45* 49* 29   ALT U/L  --   --  33 27 20   ALK PHOS U/L  --   --  227* 155* 164*   ALBUMIN g/dL  --   --  2.8* 2.8* 3.0*     Results from last 7 days   Lab Units 04/07/25  0458 04/05/25  1319 04/04/25  1456 04/04/25  1437   BLOOD CULTURE  No Growth at 24 hrs.  No Growth at 24 hrs.  --  Streptococcus intermedius* Streptococcus intermedius*   GRAM STAIN RESULT   --  3+ Polys*  3+ Gram positive cocci in pairs, chains and clusters* Gram positive cocci in chains* Gram positive cocci in chains*   BODY FLUID CULTURE, STERILE   --  4+ Growth of Streptococcus intermedius*  --   --      Results from last 7 days   Lab Units 04/04/25  1437   PROCALCITONIN ng/ml 2.04*             Results from last 7 days   Lab Units 04/04/25  1437   D-DIMER QUANTITATIVE ug/ml FEU 2.99*

## 2025-04-08 NOTE — ASSESSMENT & PLAN NOTE
Patient does report having spinal stimulator however reports battery  and  will try to bring in better pack .  PDMP reviewed, patient is on xtampza and pregabalin.  Patient does report having chronic back pain chronically on Xtampza 72 mg in am and 54mg at night.   On presentation she was somnolent requiring Narcan.  Currently awake, alert, x 3 and much more interactive and back to normal self.  Continue 36 mg twice daily dose for now.  Resume home dose of pregabalin.

## 2025-04-08 NOTE — ASSESSMENT & PLAN NOTE
Patient's admission blood cultures now have growth of Streptococcus intermedius in both sets.  ID of Streptococcus isolate is still pending but this does not appear to be Enterococcus by BCID.  Source of bacteremia is most likely liver abscess.  TTE is without obvious vegetation.  Repeat blood cultures have no growth thus far.  Given presence of alpha strep bacteremia, patient will need 2 weeks of IV antibiotic.  Continue high-dose IV ceftriaxone.  Follow-up repeat blood cultures.  As long as bacteremia clears, treat x 2 weeks after clearance of bacteremia.

## 2025-04-08 NOTE — PROGRESS NOTES
Progress Note - Hospitalist   Name: Celia Rivera 64 y.o. female I MRN: [id number]  Unit/Bed#: -01 I Date of Admission: 4/4/2025   Date of Service: 4/8/2025 I Hospital Day: 4    Assessment & Plan  Liver abscess  64-year-old female with history of hypertension, previous cholecystectomy  in 2022 who initially presented with generalized weakness intermittent fevers  Noted to have a liver abscess.  8.5 cm  Status post IR drain placed on 04/05/2025  2/2 blood culture growing Streptococcus  2/7/2025 repeat blood cultures without growth in 24 hours.  2D echo report reviewed noted with EF of 55% without vegetation.      Currently she is afebrile and hemodynamically stable.  Acutely nontoxic appearing.  Currently on IV ceftriaxone.  ID recommendation appreciated.  Will likely need 2 weeks of antibiotics after clearance of bacteremia per ID.  Plan is to place PICC line after repeat blood culture remain negative for 72 hours.  Patient is in agreement with above plan.  Sepsis (HCC)  Developed sepsis after IR drain placement yesterday.  Lactic acid within manage.  Clinically acutely nontoxic-appearing.  Currently she is much more awake, alert, oriented x 4.  Denies abdominal pain.  Tolerating antibiotics well.  2/2 blood cultures growing gram-positive cocci in chains.  Repeat cultures without growth in 24 hours.  2D echo negative for vegetation.    Currently she is hemodynamically stable and acutely nontoxic-appearing.  Continue IV ceftriaxone.  Likely needs 2 weeks of antibiotics once bacteremia clears per ID.  Hypertension  Blood pressure currently reasonably controlled.  Holding home dose of lisinopril  Discontinue IV fluids.  Consider resuming lisinopril once blood pressure persistently elevated and uncontrolled.  GERD (gastroesophageal reflux disease)  Stable.  Continue PPI.  Cholelithiasis with choledocholithiasis  Patient has laparoscopic cholecystectomy and ERCP with CBD stone instruction in 2022.  CT and was  reviewed reviewed noted with stable postop changes.  Plan as above.  Elevated LFTs  Elevated LFTs without hyperbilirubinemia.  Patient denies abdominal pain, nausea, vomiting.  Plan as above.  Repeat CMP tomorrow AM.  Anemia  Baseline hemoglobin is around 13-14 range.  Currently hemoglobin 10.7 on presentation currently 11.3  No signs of active overt bleeding noted.  Monitor CBC.  Opioid dependence (HCC)  Patient does report having spinal stimulator however reports battery  and  will try to bring in better pack .  PDMP reviewed, patient is on xtampza and pregabalin.  Patient does report having chronic back pain chronically on Xtampza 72 mg in am and 54mg at night.   On presentation she was somnolent requiring Narcan.  Currently awake, alert, x 3 and much more interactive and back to normal self.  Continue 36 mg twice daily dose for now.  Resume home dose of pregabalin.    Streptococcal bacteremia  Repeat blood cultures pending.  Current on IV ceftriaxone and will likely need 2 weeks of antibiotics.  Plan as above.    VTE Pharmacologic Prophylaxis:   Moderate Risk (Score 3-4) - Pharmacological DVT Prophylaxis Ordered: enoxaparin (Lovenox).    Mobility:   Basic Mobility Inpatient Raw Score: 23  -HLM Goal: 7: Walk 25 feet or more  JH-HLM Achieved: 7: Walk 25 feet or more      Patient Centered Rounds: I performed bedside rounds with nursing staff today.   Discussions with Specialists or Other Care Team Provider: ID        Current Length of Stay: 4 day(s)  Current Patient Status: Inpatient   Certification Statement: The patient will continue to require additional inpatient hospital stay due to strep bacteremia currently on IV antibiotics awaiting repeat blood cultures  Discharge Plan: Anticipate discharge in 48-72 hrs to discharge location to be determined pending rehab evaluations.    Code Status: Level 1 - Full Code    Subjective   Seen during a.m. rounds.  Patient reports neurostimulator battery   and  will bring in .  Denies any other new complaints.  Tolerating antibiotics well.  No other events reported.    Objective :  Temp:  [97.5 °F (36.4 °C)-98 °F (36.7 °C)] 97.6 °F (36.4 °C)  HR:  [60-63] 60  BP: (130-139)/(76-82) 139/82  Resp:  [16-18] 18  SpO2:  [95 %] 95 %  O2 Device: None (Room air)    Body mass index is 29.76 kg/m².     Input and Output Summary (last 24 hours):     Intake/Output Summary (Last 24 hours) at 2025 1519  Last data filed at 2025 1332  Gross per 24 hour   Intake 960 ml   Output 640 ml   Net 320 ml       Physical Exam  Constitutional:       General: She is not in acute distress.     Appearance: Normal appearance. She is not ill-appearing, toxic-appearing or diaphoretic.   Cardiovascular:      Rate and Rhythm: Normal rate.      Pulses: Normal pulses.   Pulmonary:      Effort: Pulmonary effort is normal. No respiratory distress.      Breath sounds: Normal breath sounds. No wheezing.   Abdominal:      General: Bowel sounds are normal. There is no distension.      Palpations: Abdomen is soft.      Tenderness: There is no abdominal tenderness.   Musculoskeletal:      Right lower leg: No edema.      Left lower leg: No edema.   Neurological:      Mental Status: She is alert and oriented to person, place, and time. Mental status is at baseline.   Psychiatric:         Mood and Affect: Mood normal.         Behavior: Behavior normal.           Lines/Drains:  Lines/Drains/Airways       Active Status       Name Placement date Placement time Site Days    Abscess Drain Abdomen 25  1309  Abdomen  3                            Lab Results: I have reviewed the following results:   Results from last 7 days   Lab Units 25  0556 25  0356 25  0438   WBC Thousand/uL 13.16*   < > 9.56   HEMOGLOBIN g/dL 11.3*   < > 10.2*   HEMATOCRIT % 34.2*   < > 31.7*   PLATELETS Thousands/uL 419*   < > 292   SEGS PCT %  --   --  88*   LYMPHO PCT %  --   --  4*   MONO PCT %  --    --  7   EOS PCT %  --   --  0    < > = values in this interval not displayed.     Results from last 7 days   Lab Units 04/08/25  0556 04/07/25  0458 04/06/25  0356   SODIUM mmol/L 141   < > 137   POTASSIUM mmol/L 3.4*   < > 4.0   CHLORIDE mmol/L 111*   < > 106   CO2 mmol/L 23   < > 24   BUN mg/dL 12   < > 14   CREATININE mg/dL 0.84   < > 0.84   ANION GAP mmol/L 7   < > 7   CALCIUM mg/dL 7.9*   < > 8.0*   ALBUMIN g/dL  --   --  2.8*   TOTAL BILIRUBIN mg/dL  --   --  0.74   ALK PHOS U/L  --   --  227*   ALT U/L  --   --  33   AST U/L  --   --  45*   GLUCOSE RANDOM mg/dL 138   < > 159*    < > = values in this interval not displayed.     Results from last 7 days   Lab Units 04/05/25  0438   INR  1.17             Results from last 7 days   Lab Units 04/06/25  0814 04/04/25  1437   LACTIC ACID mmol/L 1.3 1.4   PROCALCITONIN ng/ml  --  2.04*       Recent Cultures (last 7 days):   Results from last 7 days   Lab Units 04/07/25  0458 04/05/25  1319 04/04/25  1456 04/04/25  1437   BLOOD CULTURE  No Growth at 24 hrs.  No Growth at 24 hrs.  --  Streptococcus intermedius* Streptococcus intermedius*   GRAM STAIN RESULT   --  3+ Polys*  3+ Gram positive cocci in pairs, chains and clusters* Gram positive cocci in chains* Gram positive cocci in chains*   BODY FLUID CULTURE, STERILE   --  4+ Growth of Streptococcus intermedius*  --   --              Last 24 Hours Medication List:     Current Facility-Administered Medications:     acetaminophen (TYLENOL) tablet 650 mg, Q6H PRN    cefTRIAXone (ROCEPHIN) 2,000 mg in dextrose 5 % 50 mL IVPB, Q24H, Last Rate: 2,000 mg (04/07/25 1617)    enoxaparin (LOVENOX) subcutaneous injection 40 mg, Q24H KEVIN    [Held by provider] lisinopril (ZESTRIL) tablet 5 mg, Daily    oxyCODONE ER (Xtampza) extended release capsule 36 mg, Q12H KEVIN    potassium chloride (Klor-Con M20) CR tablet 40 mEq, BID    Administrative Statements   Today, Patient Was Seen By: Stephane Parra MD      **Please Note: This  note may have been constructed using a voice recognition system.**

## 2025-04-08 NOTE — ASSESSMENT & PLAN NOTE
Baseline hemoglobin is around 13-14 range.  Currently hemoglobin 10.7 on presentation currently 11.3  No signs of active overt bleeding noted.  Monitor CBC.

## 2025-04-08 NOTE — ASSESSMENT & PLAN NOTE
Patient presented with fever/chills and nonspecific generalized weakness, without abdominal pain.  She is found to have a large liver abscess on abdomen/pelvis CT.  Given size of liver abscess, I suspect that it has been present for quite a while, although not before May 2023 when she last had abdomen/pelvis CT without evidence of liver abnormality.  Source of liver abscess is most likely colonic.  Abdomen/pelvis CT was benign, other than liver abscess.  Patient had 1 colonoscopy many years ago with unclear findings, benign according to patient but records are not available.  She will need another colonoscopy.  Fortunately, despite presence of liver abscess, patient is clinically and systemically well, without evidence of sepsis or systemic toxicity.  Patient is now status post drainage of abscess, with abscess culture growing Streptococcus intermedius also.  Drain is now much clearer.  Continue ceftriaxone, as in above.  Monitor temperature/WBC.  Drain per IR.  Recommend colonoscopy in the next few weeks.  This can be done as an outpatient.

## 2025-04-08 NOTE — ASSESSMENT & PLAN NOTE
Repeat blood cultures pending.  Current on IV ceftriaxone and will likely need 2 weeks of antibiotics.  Plan as above.

## 2025-04-08 NOTE — ASSESSMENT & PLAN NOTE
Developed sepsis after IR drain placement yesterday.  Lactic acid within manage.  Clinically acutely nontoxic-appearing.  Currently she is much more awake, alert, oriented x 4.  Denies abdominal pain.  Tolerating antibiotics well.  2/2 blood cultures growing gram-positive cocci in chains.  Repeat cultures without growth in 24 hours.  2D echo negative for vegetation.    Currently she is hemodynamically stable and acutely nontoxic-appearing.  Continue IV ceftriaxone.  Likely needs 2 weeks of antibiotics once bacteremia clears per ID.

## 2025-04-08 NOTE — PLAN OF CARE
Problem: PAIN - ADULT  Goal: Verbalizes/displays adequate comfort level or baseline comfort level  Description: Interventions:- Encourage patient to monitor pain and request assistance- Assess pain using appropriate pain scale- Administer analgesics based on type and severity of pain and evaluate response- Implement non-pharmacological measures as appropriate and evaluate response- Consider cultural and social influences on pain and pain management- Notify physician/advanced practitioner if interventions unsuccessful or patient reports new pain  Outcome: Progressing     Problem: INFECTION - ADULT  Goal: Absence or prevention of progression during hospitalization  Description: INTERVENTIONS:- Assess and monitor for signs and symptoms of infection- Monitor lab/diagnostic results- Monitor all insertion sites, i.e. indwelling lines, tubes, and drains- Monitor endotracheal if appropriate and nasal secretions for changes in amount and color- [place] appropriate cooling/warming therapies per order- Administer medications as ordered- Instruct and encourage patient and family to use good hand hygiene technique- Identify and instruct in appropriate isolation precautions for identified infection/condition  Outcome: Progressing  Goal: Absence of fever/infection during neutropenic period  Description: INTERVENTIONS:- Monitor WBC  Outcome: Progressing     Problem: SAFETY ADULT  Goal: Patient will remain free of falls  Description: INTERVENTIONS:- Educate patient/family on patient safety including physical limitations- Instruct patient to call for assistance with activity - Consult OT/PT to assist with strengthening/mobility - Keep Call bell within reach- Keep bed low and locked with side rails adjusted as appropriate- Keep care items and personal belongings within reach- Initiate and maintain comfort rounds- Make Fall Risk Sign visible to staff- Offer Toileting every 2 Hours, in advance of need- Initiate/Maintain bed alarm-  Obtain necessary fall risk management equipment: - Apply yellow socks and bracelet for high fall risk patients- Consider moving patient to room near nurses station  Outcome: Progressing     Problem: GASTROINTESTINAL - ADULT  Goal: Minimal or absence of nausea and/or vomiting  Description: INTERVENTIONS:- Administer IV fluids if ordered to ensure adequate hydration- Maintain NPO status until nausea and vomiting are resolved- Nasogastric tube if ordered- Administer ordered antiemetic medications as needed- Provide nonpharmacologic comfort measures as appropriate- Advance diet as tolerated, if ordered- Consider nutrition services referral to assist patient with adequate nutrition and appropriate food choices  Outcome: Progressing  Goal: Maintains or returns to baseline bowel function  Description: INTERVENTIONS:- Assess bowel function- Encourage oral fluids to ensure adequate hydration- Administer IV fluids if ordered to ensure adequate hydration- Administer ordered medications as needed- Encourage mobilization and activity- Consider nutritional services referral to assist patient with adequate nutrition and appropriate food choices  Outcome: Progressing  Goal: Maintains adequate nutritional intake  Description: INTERVENTIONS:- Monitor percentage of each meal consumed- Identify factors contributing to decreased intake, treat as appropriate- Assist with meals as needed- Monitor I&O, weight, and lab values if indicated- Obtain nutrition services referral as needed  Outcome: Progressing     Problem: Prexisting or High Potential for Compromised Skin Integrity  Goal: Skin integrity is maintained or improved  Description: INTERVENTIONS:- Identify patients at risk for skin breakdown- Assess and monitor skin integrity- Assess and monitor nutrition and hydration status- Monitor labs - Assess for incontinence - Turn and reposition patient- Assist with mobility/ambulation- Relieve pressure over bony prominences- Avoid friction  and shearing- Provide appropriate hygiene as needed including keeping skin clean and dry- Evaluate need for skin moisturizer/barrier cream- Collaborate with interdisciplinary team - Patient/family teaching- Consider wound care consult   Outcome: Progressing

## 2025-04-08 NOTE — ASSESSMENT & PLAN NOTE
Elevated LFTs without hyperbilirubinemia.  Patient denies abdominal pain, nausea, vomiting.  Plan as above.  Repeat CMP tomorrow AM.

## 2025-04-08 NOTE — PROGRESS NOTES
"Progress Note -Interventional Radiology NP  Celia Rivera 64 y.o. female MRN: [id number]  Unit/Bed#: -01 Encounter: [id number]    Assessment/Plan:    Celia Rivera is a 64 y.o. female who presented with weakness and lightheadedness for several days.    Patient was noted to have a liver abscess on her CT.  Patient had leukocytosis - 12.02    Patient is s/p 10 Fr liver abscess drain placement on 4/5/25.    Patient with approximately 90 ml total output since placement.  Scant amount of serous drainage noted in bulb.    Continue to flush drain daily with 10 ml NSS and record output every 8 hours.    Please contact IR immediately if unable to flush drain, pain or leaking with flushing, if drain becomes dislodged or if output is less than 10 ml for two consecutive days.    Will plan for outpatient drain check in two weeks.    Patient instructed on when to contact IR once discharged.    R/x for saline flushes sent to patients pharmacy listed in chart.    Please contact IR with any questions or concerns.    Subjective: Patient reports improvement to her abdominal discomfort. Pain with palpation.      Patient Active Problem List   Diagnosis    Right upper quadrant pain    Choledocholithiasis    Chronic low back pain    Opioid dependence (HCC)    Hypertension    Transaminitis    GERD (gastroesophageal reflux disease)    Liver abscess    Cholelithiasis with choledocholithiasis    Elevated LFTs    Anemia    Sepsis (HCC)    Streptococcal bacteremia          Objective:    Vitals:  /82   Pulse 60   Temp 97.6 °F (36.4 °C)   Resp 18   Ht 5' 7\" (1.702 m)   Wt 86.2 kg (190 lb)   LMP  (LMP Unknown)   SpO2 95%   BMI 29.76 kg/m²   Body mass index is 29.76 kg/m².  Weight (last 2 days)       Date/Time Weight    04/07/25 0850 86.2 (190)            I/Os:    Intake/Output Summary (Last 24 hours) at 4/8/2025 1604  Last data filed at 4/8/2025 1332  Gross per 24 hour   Intake 960 ml   Output 640 ml   Net 320 ml " "      Invasive Devices       Peripheral Intravenous Line  Duration             Peripheral IV 04/06/25 Dorsal (posterior);Left Forearm 2 days    Peripheral IV 04/06/25 Left;Ventral (anterior) Forearm 2 days              Drain  Duration             Abscess Drain Abdomen 3 days                    Physical Exam:  Physical Exam  HENT:      Mouth/Throat:      Mouth: Mucous membranes are moist.   Cardiovascular:      Rate and Rhythm: Normal rate.   Pulmonary:      Effort: Pulmonary effort is normal.   Abdominal:      Palpations: Abdomen is soft.      Tenderness: There is abdominal tenderness.   Skin:     General: Skin is warm and dry.      Capillary Refill: Capillary refill takes less than 2 seconds.   Neurological:      Mental Status: She is alert and oriented to person, place, and time.   Psychiatric:         Mood and Affect: Mood normal.         Behavior: Behavior normal.         Thought Content: Thought content normal.         Judgment: Judgment normal.                      Lab Results and Cultures:   CBC with diff:   Lab Results   Component Value Date    WBC 13.16 (H) 04/08/2025    HGB 11.3 (L) 04/08/2025    HCT 34.2 (L) 04/08/2025    MCV 88 04/08/2025     (H) 04/08/2025    RBC 3.87 04/08/2025    MCH 29.2 04/08/2025    MCHC 33.0 04/08/2025    RDW 12.7 04/08/2025    MPV 9.4 04/08/2025    NRBC 0 04/05/2025      BMP/CMP:  Lab Results   Component Value Date    K 3.4 (L) 04/08/2025     (H) 04/08/2025    CO2 23 04/08/2025    BUN 12 04/08/2025    CREATININE 0.84 04/08/2025    CALCIUM 7.9 (L) 04/08/2025    AST 45 (H) 04/06/2025    ALT 33 04/06/2025    ALKPHOS 227 (H) 04/06/2025    EGFR 74 04/08/2025   ,     Coags:   Lab Results   Component Value Date    PTT 37 (H) 04/04/2025    INR 1.17 04/05/2025   ,   Results from last 7 days   Lab Units 04/05/25  0438 04/04/25  1437   PTT seconds  --  37*   INR  1.17 1.18        Lipid Panel: No results found for: \"CHOL\"  Lab Results   Component Value Date    HDL 50 06/22/2024 " "    Lab Results   Component Value Date    HDL 50 06/22/2024     Lab Results   Component Value Date    LDLCALC 83 06/22/2024     Lab Results   Component Value Date    TRIG 143 06/22/2024       HgbA1c: No results found for: \"HGBA1C\"    Blood Culture:   Lab Results   Component Value Date    BLOODCX No Growth at 24 hrs. 04/07/2025    BLOODCX No Growth at 24 hrs. 04/07/2025   ,   Urinalysis:   Lab Results   Component Value Date    COLORU Yellow 04/04/2025    CLARITYU Turbid 04/04/2025    SPECGRAV 1.010 04/04/2025    PHUR 6.0 04/04/2025    LEUKOCYTESUR Trace (A) 04/04/2025    NITRITE Negative 04/04/2025    GLUCOSEU Negative 04/04/2025    KETONESU Negative 04/04/2025    BILIRUBINUR Negative 04/04/2025    BLOODU Small (A) 04/04/2025   ,   Urine Culture:   Lab Results   Component Value Date    URINECX 20,000-29,000 cfu/ml Enterococcus faecalis (A) 04/04/2024   ,   Wound Culure:  No results found for: \"WOUNDCULT\"    VTE Pharmacologic Prophylaxis: Sequential compression device (Venodyne)  and Enoxaparin (Lovenox)    Counseling / Coordination of Care  Total floor / unit time spent today 40 minutes.  Greater than 50% of total time was spent with the patient and / or family counseling and / or coordination of care.  A description of the counseling / coordination of care:       Thank you for allowing me to participate in the care of Celia Rivera. Please don't hesitate to call, text, email, or TigerText with any questions.     This text is generated with voice recognition software. There may be translation, syntax,  or grammatical errors. If you have any questions, please contact the dictating provider.    "

## 2025-04-08 NOTE — CASE MANAGEMENT
Case Management Progress Note    Patient name Celia Rivera  Location /-01 MRN [id number]  : 1960 Date 2025       LOS (days): 4  Geometric Mean LOS (GMLOS) (days):   Days to GMLOS:        OBJECTIVE:        Current admission status: Inpatient  Preferred Pharmacy:   [id number] - [place], [place] - [address]  [address]  [place] [address]  Phone: [phone] Fax: [phone]    [id number] - [place], [place] - [address]  [address]  [place] [address]  Phone: [phone] Fax: [phone]    Primary Care Provider: Harvey Jordan MD    Primary Insurance: [facility]  Secondary Insurance:     PROGRESS NOTE:  As per SLIM rounds, The patient will continue to require additional inpatient hospital stay due to start bacteremia currently on IV biotics pending 2D echo. CM continues to follow.

## 2025-04-09 LAB
ALBUMIN SERPL BCG-MCNC: 3.1 G/DL (ref 3.5–5)
ALP SERPL-CCNC: 180 U/L (ref 34–104)
ALT SERPL W P-5'-P-CCNC: 17 U/L (ref 7–52)
ANION GAP SERPL CALCULATED.3IONS-SCNC: 6 MMOL/L (ref 4–13)
AST SERPL W P-5'-P-CCNC: 19 U/L (ref 13–39)
BACTERIA SPEC BFLD CULT: ABNORMAL
BILIRUB SERPL-MCNC: 0.4 MG/DL (ref 0.2–1)
BUN SERPL-MCNC: 9 MG/DL (ref 5–25)
CALCIUM ALBUM COR SERPL-MCNC: 9.5 MG/DL (ref 8.3–10.1)
CALCIUM SERPL-MCNC: 8.8 MG/DL (ref 8.4–10.2)
CHLORIDE SERPL-SCNC: 110 MMOL/L (ref 96–108)
CO2 SERPL-SCNC: 25 MMOL/L (ref 21–32)
CREAT SERPL-MCNC: 0.83 MG/DL (ref 0.6–1.3)
ERYTHROCYTE [DISTWIDTH] IN BLOOD BY AUTOMATED COUNT: 12.7 % (ref 11.6–15.1)
GFR SERPL CREATININE-BSD FRML MDRD: 75 ML/MIN/1.73SQ M
GLUCOSE SERPL-MCNC: 118 MG/DL (ref 65–140)
GRAM STN SPEC: ABNORMAL
GRAM STN SPEC: ABNORMAL
HCT VFR BLD AUTO: 35.4 % (ref 34.8–46.1)
HGB BLD-MCNC: 11.5 G/DL (ref 11.5–15.4)
MCH RBC QN AUTO: 28.9 PG (ref 26.8–34.3)
MCHC RBC AUTO-ENTMCNC: 32.5 G/DL (ref 31.4–37.4)
MCV RBC AUTO: 89 FL (ref 82–98)
PLATELET # BLD AUTO: 429 THOUSANDS/UL (ref 149–390)
PMV BLD AUTO: 9.2 FL (ref 8.9–12.7)
POTASSIUM SERPL-SCNC: 4.1 MMOL/L (ref 3.5–5.3)
PROT SERPL-MCNC: 6 G/DL (ref 6.4–8.4)
RBC # BLD AUTO: 3.98 MILLION/UL (ref 3.81–5.12)
SODIUM SERPL-SCNC: 141 MMOL/L (ref 135–147)
WBC # BLD AUTO: 9.72 THOUSAND/UL (ref 4.31–10.16)

## 2025-04-09 PROCEDURE — 99232 SBSQ HOSP IP/OBS MODERATE 35: CPT | Performed by: STUDENT IN AN ORGANIZED HEALTH CARE EDUCATION/TRAINING PROGRAM

## 2025-04-09 PROCEDURE — 99233 SBSQ HOSP IP/OBS HIGH 50: CPT | Performed by: INTERNAL MEDICINE

## 2025-04-09 PROCEDURE — G0545 PR INHERENT VISIT TO INPT: HCPCS | Performed by: INTERNAL MEDICINE

## 2025-04-09 PROCEDURE — 85027 COMPLETE CBC AUTOMATED: CPT | Performed by: STUDENT IN AN ORGANIZED HEALTH CARE EDUCATION/TRAINING PROGRAM

## 2025-04-09 PROCEDURE — 80053 COMPREHEN METABOLIC PANEL: CPT | Performed by: STUDENT IN AN ORGANIZED HEALTH CARE EDUCATION/TRAINING PROGRAM

## 2025-04-09 RX ADMIN — PREGABALIN 75 MG: 75 CAPSULE ORAL at 08:27

## 2025-04-09 RX ADMIN — OXYCODONE 36 MG: 36 CAPSULE, EXTENDED RELEASE ORAL at 08:27

## 2025-04-09 RX ADMIN — ENOXAPARIN SODIUM 40 MG: 40 INJECTION SUBCUTANEOUS at 08:27

## 2025-04-09 RX ADMIN — POTASSIUM CHLORIDE 40 MEQ: 1500 TABLET, EXTENDED RELEASE ORAL at 08:27

## 2025-04-09 RX ADMIN — OXYCODONE 72 MG: 36 CAPSULE, EXTENDED RELEASE ORAL at 22:42

## 2025-04-09 RX ADMIN — PREGABALIN 75 MG: 75 CAPSULE ORAL at 16:57

## 2025-04-09 RX ADMIN — CEFTRIAXONE SODIUM 2000 MG: 10 INJECTION, POWDER, FOR SOLUTION INTRAVENOUS at 16:52

## 2025-04-09 RX ADMIN — POTASSIUM CHLORIDE 40 MEQ: 1500 TABLET, EXTENDED RELEASE ORAL at 16:57

## 2025-04-09 NOTE — ASSESSMENT & PLAN NOTE
64-year-old female with history of hypertension, previous cholecystectomy  in 2022 who initially presented with generalized weakness intermittent fevers  Noted to have a liver abscess.  8.5 cm  Status post IR drain placed on 04/05/2025  2/2 blood culture growing Streptococcus  2/7/2025 repeat blood cultures without growth in 48 hours.  2D echo report reviewed noted with EF of 55% without vegetation.      Currently she is afebrile and hemodynamically stable.  Acutely nontoxic appearing.  Currently on IV ceftriaxone 2 gram every 24 hours.  ID recommendation appreciated.  Will likely need 2 weeks of antibiotics after clearance of bacteremia per ID.  Plan is to place PICC line after repeat blood culture remain negative for 72 hours.  Patient is in agreement with above plan.  Recommend outpatient follow-up with GI for colonoscopy next few weeks.  ID recommendation appreciated.

## 2025-04-09 NOTE — ASSESSMENT & PLAN NOTE
Patient does report having spinal stimulator however reports battery  and  will try to bring in better pack .  PDMP reviewed, patient is on xtampza and pregabalin.  Patient does report having chronic back pain chronically on Xtampza 72 mg in am and 54mg at night.   On presentation she was somnolent requiring Narcan.  Currently awake, alert, x 3 and much more interactive and back to normal self.  Resume home dose of Xtampza  Resume home dose of pregabalin.

## 2025-04-09 NOTE — PLAN OF CARE
Problem: PAIN - ADULT  Goal: Verbalizes/displays adequate comfort level or baseline comfort level  Description: Interventions:- Encourage patient to monitor pain and request assistance- Assess pain using appropriate pain scale- Administer analgesics based on type and severity of pain and evaluate response- Implement non-pharmacological measures as appropriate and evaluate response- Consider cultural and social influences on pain and pain management- Notify physician/advanced practitioner if interventions unsuccessful or patient reports new pain  Outcome: Progressing     Problem: INFECTION - ADULT  Goal: Absence or prevention of progression during hospitalization  Description: INTERVENTIONS:- Assess and monitor for signs and symptoms of infection- Monitor lab/diagnostic results- Monitor all insertion sites, i.e. indwelling lines, tubes, and drains- Monitor endotracheal if appropriate and nasal secretions for changes in amount and color- [place] appropriate cooling/warming therapies per order- Administer medications as ordered- Instruct and encourage patient and family to use good hand hygiene technique- Identify and instruct in appropriate isolation precautions for identified infection/condition  Outcome: Progressing  Goal: Absence of fever/infection during neutropenic period  Description: INTERVENTIONS:- Monitor WBC  Outcome: Progressing     Problem: Prexisting or High Potential for Compromised Skin Integrity  Goal: Skin integrity is maintained or improved  Description: INTERVENTIONS:- Identify patients at risk for skin breakdown- Assess and monitor skin integrity- Assess and monitor nutrition and hydration status- Monitor labs - Assess for incontinence - Turn and reposition patient- Assist with mobility/ambulation- Relieve pressure over bony prominences- Avoid friction and shearing- Provide appropriate hygiene as needed including keeping skin clean and dry- Evaluate need for skin moisturizer/barrier cream-  Collaborate with interdisciplinary team - Patient/family teaching- Consider wound care consult   Outcome: Progressing

## 2025-04-09 NOTE — PROGRESS NOTES
Progress Note - Infectious Disease   Name: Celia Rivera 64 y.o. female I MRN: [id number]  Unit/Bed#: -01 I Date of Admission: 4/4/2025   Date of Service: 4/9/2025 I Hospital Day: 5    Assessment & Plan  Streptococcal bacteremia  Patient's admission blood cultures now have growth of Streptococcus intermedius in both sets.  ID of Streptococcus isolate is still pending but this does not appear to be Enterococcus by BCID.  Source of bacteremia is most likely liver abscess.  TTE is without obvious vegetation.  Repeat blood cultures have no growth thus far.  Given presence of alpha strep bacteremia, patient will need 2 weeks of IV antibiotic.  Continue high-dose IV ceftriaxone.  Follow-up repeat blood cultures.  As long as bacteremia clears, treat x 2 weeks after clearance of bacteremia, through 4/21.  Okay for PICC placement tomorrow, if repeat blood cultures remain negative.  Liver abscess  Patient presented with fever/chills and nonspecific generalized weakness, without abdominal pain.  She is found to have a large liver abscess on abdomen/pelvis CT.  Given size of liver abscess, I suspect that it has been present for quite a while, although not before May 2023 when she last had abdomen/pelvis CT without evidence of liver abnormality.  Source of liver abscess is most likely colonic.  Abdomen/pelvis CT was benign, other than liver abscess.  Patient had 1 colonoscopy many years ago with unclear findings, benign according to patient but records are not available.  She will need another colonoscopy.  Fortunately, despite presence of liver abscess, patient is clinically and systemically well, without evidence of sepsis or systemic toxicity.  Patient is now status post drainage of abscess, with abscess culture growing Streptococcus intermedius also.  Drain is now much clearer.  Continue ceftriaxone, as in above.  Monitor temperature/WBC.  Drain per IR.  Recommend colonoscopy in the next few weeks.  This can be done  as an outpatient.  Sepsis (HCC)  Patient developed fever and leukocytosis last night, after drainage of liver abscess.  This may be sepsis versus endotoxin release from procedure.  Admission blood cultures are now growing alpha Streptococcus, as in above.  Patient is clinically much improved, with resolution of fever and leukocytosis.  Fortunately, patient remains systemically well and hemodynamically stable, without hypotension.  Antibiotic plan as in above.  Monitor temperature/WBC.    Monitor hemodynamics.  Elevated LFTs  LFTs are elevated, with normal bilirubin.  In addition, abdominal exam is benign.  Elevated LFTs are most likely secondary to liver abscess.  Doubt biliary obstruction with normal bilirubin and benign abdominal exam.  Antibiotic plan as in above.  Monitor LFT.  Serial abdominal exams.  Cholelithiasis with choledocholithiasis  Patient is status post laparoscopic cholecystectomy in [facility] with CBD stone extraction in 2022 when she presented with abdominal pain.  Abdomen/pelvis CT with stable postop changes.  LFTs are elevated but no hyperbilirubinemia, as in above.    Discussed with patient in detail regarding the above plan.  I have discussed with Dr. Parra from primary service regarding the above plan to continue IV antibiotic.  He agrees with the plan.  Rx on chart for home IV antibiotic.    Antibiotics:  Ceftriaxone # 6  Post drainage # 4  Last negative blood culture 4/7    Subjective   Patient is well.  She only has mild pain at drain site.  No abdominal pain.  Temperature stays down.  No chills.  She is tolerating antibiotic well.  No nausea, vomiting or diarrhea.    Objective :  Temp:  [97.6 °F (36.4 °C)-97.9 °F (36.6 °C)] 97.9 °F (36.6 °C)  HR:  [56-60] 56  BP: (139-141)/(75-82) 141/75  Resp:  [17-18] 17  SpO2:  [95 %] 95 %    Physical Exam:     General: Awake, alert, cooperative, no distress.   Neck:  Supple. No mass.  No lymphadenopathy.   Lungs: Expansion symmetric, no rales, no  wheezing, respirations unlabored.   Heart:  Regular rate and rhythm, S1 and S2 normal, no murmur.   Abdomen: Soft, nondistended, very mild tenderness at drain site, drain still moderate but now serous, bowel sounds active all four quadrants, no masses, no organomegaly.   Extremities: No edema. No erythema/warmth. No ulcer. Nontender to palpation.   Skin:  No rash.   Neuro: Moves all extremities.        Lab Results: I have reviewed the following results:  Results from last 7 days   Lab Units 04/09/25 0703 04/08/25  0556 04/07/25  0458   WBC Thousand/uL 9.72 13.16* 15.87*   HEMOGLOBIN g/dL 11.5 11.3* 9.9*   PLATELETS Thousands/uL 429* 419* 366     Results from last 7 days   Lab Units 04/09/25  0703 04/08/25  0556 04/07/25  0458 04/06/25  0356 04/05/25  0438   SODIUM mmol/L 141 141 141 137 136   POTASSIUM mmol/L 4.1 3.4* 3.4* 4.0 4.0   CHLORIDE mmol/L 110* 111* 112* 106 105   CO2 mmol/L 25 23 23 24 24   BUN mg/dL 9 12 13 14 10   CREATININE mg/dL 0.83 0.84 0.82 0.84 0.85   EGFR ml/min/1.73sq m 75 74 76 74 73   CALCIUM mg/dL 8.8 7.9* 8.2* 8.0* 7.8*   AST U/L 19  --   --  45* 49*   ALT U/L 17  --   --  33 27   ALK PHOS U/L 180*  --   --  227* 155*   ALBUMIN g/dL 3.1*  --   --  2.8* 2.8*     Results from last 7 days   Lab Units 04/07/25  0458 04/05/25  1319 04/04/25  1456 04/04/25  1437   BLOOD CULTURE  No Growth at 48 hrs.  No Growth at 48 hrs.  --  Streptococcus intermedius* Streptococcus intermedius*   GRAM STAIN RESULT   --  3+ Polys*  3+ Gram positive cocci in pairs, chains and clusters* Gram positive cocci in chains* Gram positive cocci in chains*   BODY FLUID CULTURE, STERILE   --  4+ Growth of Streptococcus intermedius*  --   --      Results from last 7 days   Lab Units 04/04/25  1437   PROCALCITONIN ng/ml 2.04*             Results from last 7 days   Lab Units 04/04/25  1437   D-DIMER QUANTITATIVE ug/ml FEU 2.99*

## 2025-04-09 NOTE — PLAN OF CARE
Problem: PAIN - ADULT  Goal: Verbalizes/displays adequate comfort level or baseline comfort level  Description: Interventions:- Encourage patient to monitor pain and request assistance- Assess pain using appropriate pain scale- Administer analgesics based on type and severity of pain and evaluate response- Implement non-pharmacological measures as appropriate and evaluate response- Consider cultural and social influences on pain and pain management- Notify physician/advanced practitioner if interventions unsuccessful or patient reports new pain  Outcome: Progressing     Problem: INFECTION - ADULT  Goal: Absence or prevention of progression during hospitalization  Description: INTERVENTIONS:- Assess and monitor for signs and symptoms of infection- Monitor lab/diagnostic results- Monitor all insertion sites, i.e. indwelling lines, tubes, and drains- Monitor endotracheal if appropriate and nasal secretions for changes in amount and color- [place] appropriate cooling/warming therapies per order- Administer medications as ordered- Instruct and encourage patient and family to use good hand hygiene technique- Identify and instruct in appropriate isolation precautions for identified infection/condition  Outcome: Progressing  Goal: Absence of fever/infection during neutropenic period  Description: INTERVENTIONS:- Monitor WBC  Outcome: Progressing     Problem: SAFETY ADULT  Goal: Patient will remain free of falls  Description: INTERVENTIONS:- Educate patient/family on patient safety including physical limitations- Instruct patient to call for assistance with activity - Consult OT/PT to assist with strengthening/mobility - Keep Call bell within reach- Keep bed low and locked with side rails adjusted as appropriate- Keep care items and personal belongings within reach- Initiate and maintain comfort rounds- Make Fall Risk Sign visible to staff- Offer Toileting every 2 Hours, in advance of need- Initiate/Maintain bed alarm-  Obtain necessary fall risk management equipment: - Apply yellow socks and bracelet for high fall risk patients- Consider moving patient to room near nurses station  Outcome: Progressing     Problem: GASTROINTESTINAL - ADULT  Goal: Minimal or absence of nausea and/or vomiting  Description: INTERVENTIONS:- Administer IV fluids if ordered to ensure adequate hydration- Maintain NPO status until nausea and vomiting are resolved- Nasogastric tube if ordered- Administer ordered antiemetic medications as needed- Provide nonpharmacologic comfort measures as appropriate- Advance diet as tolerated, if ordered- Consider nutrition services referral to assist patient with adequate nutrition and appropriate food choices  Outcome: Progressing  Goal: Maintains or returns to baseline bowel function  Description: INTERVENTIONS:- Assess bowel function- Encourage oral fluids to ensure adequate hydration- Administer IV fluids if ordered to ensure adequate hydration- Administer ordered medications as needed- Encourage mobilization and activity- Consider nutritional services referral to assist patient with adequate nutrition and appropriate food choices  Outcome: Progressing  Goal: Maintains adequate nutritional intake  Description: INTERVENTIONS:- Monitor percentage of each meal consumed- Identify factors contributing to decreased intake, treat as appropriate- Assist with meals as needed- Monitor I&O, weight, and lab values if indicated- Obtain nutrition services referral as needed  Outcome: Progressing     Problem: Prexisting or High Potential for Compromised Skin Integrity  Goal: Skin integrity is maintained or improved  Description: INTERVENTIONS:- Identify patients at risk for skin breakdown- Assess and monitor skin integrity- Assess and monitor nutrition and hydration status- Monitor labs - Assess for incontinence - Turn and reposition patient- Assist with mobility/ambulation- Relieve pressure over bony prominences- Avoid friction  and shearing- Provide appropriate hygiene as needed including keeping skin clean and dry- Evaluate need for skin moisturizer/barrier cream- Collaborate with interdisciplinary team - Patient/family teaching- Consider wound care consult   Outcome: Progressing

## 2025-04-09 NOTE — ASSESSMENT & PLAN NOTE
Patient's admission blood cultures now have growth of Streptococcus intermedius in both sets.  ID of Streptococcus isolate is still pending but this does not appear to be Enterococcus by BCID.  Source of bacteremia is most likely liver abscess.  TTE is without obvious vegetation.  Repeat blood cultures have no growth thus far.  Given presence of alpha strep bacteremia, patient will need 2 weeks of IV antibiotic.  Continue high-dose IV ceftriaxone.  Follow-up repeat blood cultures.  As long as bacteremia clears, treat x 2 weeks after clearance of bacteremia, through 4/21.  Okay for PICC placement tomorrow, if repeat blood cultures remain negative.

## 2025-04-09 NOTE — CASE MANAGEMENT
Case Management Progress Note    Patient name Celia Rivera  Location /-01 MRN [id number]  : 1960 Date 2025       LOS (days): 5  Geometric Mean LOS (GMLOS) (days): 4.8  Days to GMLOS:0        OBJECTIVE:        Current admission status: Inpatient  Preferred Pharmacy:   [id number] - [place], [place] - [address]  [address] [facility]  [place] [address]  Phone: [phone] Fax: [phone]    [id number] - [place], [place] - [address]  [address]  [place] [address]  Phone: [phone] Fax: [phone]    Primary Care Provider: Harvey Jordan MD    Primary Insurance: [facility]  Secondary Insurance:     PROGRESS NOTE:  As per SLIM rounds, pt is anticipated for dc within 48hrs. CM spoke with pt regarding dc plans. Pt wants to be dc home and is aware that she will need 2 weeks of IV abx. Pt is aware that an infusion referral was made via [facility] as well as a homecare referral for SN only. Pt is awaiting picc placement once Blood cultures results are back. CM continues to follow and assist with pt dc plans.

## 2025-04-09 NOTE — PROGRESS NOTES
Progress Note - Hospitalist   Name: Celia Rivera 64 y.o. female I MRN: [id number]  Unit/Bed#: -01 I Date of Admission: 4/4/2025   Date of Service: 4/9/2025 I Hospital Day: 5    Assessment & Plan  Liver abscess  64-year-old female with history of hypertension, previous cholecystectomy  in 2022 who initially presented with generalized weakness intermittent fevers  Noted to have a liver abscess.  8.5 cm  Status post IR drain placed on 04/05/2025  2/2 blood culture growing Streptococcus  2/7/2025 repeat blood cultures without growth in 48 hours.  2D echo report reviewed noted with EF of 55% without vegetation.      Currently she is afebrile and hemodynamically stable.  Acutely nontoxic appearing.  Currently on IV ceftriaxone 2 gram every 24 hours.  ID recommendation appreciated.  Will likely need 2 weeks of antibiotics after clearance of bacteremia per ID.  Plan is to place PICC line after repeat blood culture remain negative for 72 hours.  Patient is in agreement with above plan.  Recommend outpatient follow-up with GI for colonoscopy next few weeks.  ID recommendation appreciated.  Sepsis (HCC)  Developed sepsis after IR drain placement yesterday.  Lactic acid within manage.  Clinically acutely nontoxic-appearing.  Currently she is much more awake, alert, oriented x 4.  Denies abdominal pain.  Tolerating antibiotics well.  2/2 blood cultures growing gram-positive cocci in chains.  Repeat cultures without growth in 24 hours.  2D echo negative for vegetation.    Currently she is hemodynamically stable and acutely nontoxic-appearing.  Continue IV ceftriaxone.  Likely needs 2 weeks of antibiotics once bacteremia clears per ID.  Hypertension  Blood pressure currently reasonably controlled.  Holding home dose of lisinopril  Discontinue IV fluids.  Consider resuming lisinopril once blood pressure persistently elevated and uncontrolled.  GERD (gastroesophageal reflux disease)  Stable.  Continue PPI.  Cholelithiasis  with choledocholithiasis  Patient has laparoscopic cholecystectomy and ERCP with CBD stone instruction in .  CT and was reviewed reviewed noted with stable postop changes.  Plan as above.  Elevated LFTs  Elevated LFTs without hyperbilirubinemia.  Patient denies abdominal pain, nausea, vomiting.  Plan as above.  Repeat CMP tomorrow AM.  Anemia  Baseline hemoglobin is around 13-14 range.  Currently hemoglobin 10.7 on presentation currently 11.5  No signs of active overt bleeding noted.  Monitor CBC.  Opioid dependence (HCC)  Patient does report having spinal stimulator however reports battery  and  will try to bring in better pack .  PDMP reviewed, patient is on xtampza and pregabalin.  Patient does report having chronic back pain chronically on Xtampza 72 mg in am and 54mg at night.   On presentation she was somnolent requiring Narcan.  Currently awake, alert, x 3 and much more interactive and back to normal self.  Resume home dose of Xtampza  Resume home dose of pregabalin.    Streptococcal bacteremia  Repeat blood cultures pending.  Current on IV ceftriaxone and will likely need 2 weeks of antibiotics.  Plan as above.    VTE Pharmacologic Prophylaxis:   Moderate Risk (Score 3-4) - Pharmacological DVT Prophylaxis Ordered: enoxaparin (Lovenox).    Mobility:   Basic Mobility Inpatient Raw Score: 23  JH-HLM Goal: 7: Walk 25 feet or more  JH-HLM Achieved: 8: Walk 250 feet ot more      Patient Centered Rounds: I performed bedside rounds with nursing staff today.   Discussions with Specialists or Other Care Team Provider: ID    Education and Discussions with Family / Patient: Patient declined call to .     Current Length of Stay: 5 day(s)  Current Patient Status: Inpatient   Certification Statement: The patient will continue to require additional inpatient hospital stay due to awaiting blood cultures to remain negative for 72 hours before PICC line placement.  Discharge Plan: Anticipate  discharge in 24-48 hrs to home.    Code Status: Level 1 - Full Code    Subjective   Patient appears comfortable.  Tolerating IV antibiotics well.  Denies chest pain, dyspnea, fever, chills, nausea, vomiting, any other new complaints.  Complaining of low back pain and requesting to resume home regimen of Xtampza.  No other events reported.    Objective :  Temp:  [97.6 °F (36.4 °C)-97.9 °F (36.6 °C)] 97.9 °F (36.6 °C)  HR:  [56-60] 56  BP: (139-141)/(75-82) 141/75  Resp:  [17-18] 17  SpO2:  [95 %] 95 %    Body mass index is 29.76 kg/m².     Input and Output Summary (last 24 hours):     Intake/Output Summary (Last 24 hours) at 4/9/2025 1452  Last data filed at 4/9/2025 0826  Gross per 24 hour   Intake 240 ml   Output --   Net 240 ml       Physical Exam  Constitutional:       General: She is not in acute distress.     Appearance: Normal appearance. She is not ill-appearing, toxic-appearing or diaphoretic.   Cardiovascular:      Rate and Rhythm: Normal rate.      Pulses: Normal pulses.   Pulmonary:      Effort: Pulmonary effort is normal. No respiratory distress.      Breath sounds: Normal breath sounds. No wheezing.   Abdominal:      General: Bowel sounds are normal. There is no distension.      Palpations: Abdomen is soft.      Tenderness: There is no abdominal tenderness.   Musculoskeletal:      Right lower leg: No edema.      Left lower leg: No edema.   Neurological:      Mental Status: She is alert and oriented to person, place, and time. Mental status is at baseline.   Psychiatric:         Mood and Affect: Mood normal.         Behavior: Behavior normal.           Lines/Drains:  Lines/Drains/Airways       Active Status       Name Placement date Placement time Site Days    Abscess Drain Abdomen 04/05/25  1309  Abdomen  4                            Lab Results: I have reviewed the following results:   Results from last 7 days   Lab Units 04/09/25  0703 04/06/25  0356 04/05/25  0438   WBC Thousand/uL 9.72   < > 9.56    HEMOGLOBIN g/dL 11.5   < > 10.2*   HEMATOCRIT % 35.4   < > 31.7*   PLATELETS Thousands/uL 429*   < > 292   SEGS PCT %  --   --  88*   LYMPHO PCT %  --   --  4*   MONO PCT %  --   --  7   EOS PCT %  --   --  0    < > = values in this interval not displayed.     Results from last 7 days   Lab Units 04/09/25  0703   SODIUM mmol/L 141   POTASSIUM mmol/L 4.1   CHLORIDE mmol/L 110*   CO2 mmol/L 25   BUN mg/dL 9   CREATININE mg/dL 0.83   ANION GAP mmol/L 6   CALCIUM mg/dL 8.8   ALBUMIN g/dL 3.1*   TOTAL BILIRUBIN mg/dL 0.40   ALK PHOS U/L 180*   ALT U/L 17   AST U/L 19   GLUCOSE RANDOM mg/dL 118     Results from last 7 days   Lab Units 04/05/25  0438   INR  1.17             Results from last 7 days   Lab Units 04/06/25  0814 04/04/25  1437   LACTIC ACID mmol/L 1.3 1.4   PROCALCITONIN ng/ml  --  2.04*       Recent Cultures (last 7 days):   Results from last 7 days   Lab Units 04/07/25  0458 04/05/25  1319 04/04/25  1456 04/04/25  1437   BLOOD CULTURE  No Growth at 48 hrs.  No Growth at 48 hrs.  --  Streptococcus intermedius* Streptococcus intermedius*   GRAM STAIN RESULT   --  3+ Polys*  3+ Gram positive cocci in pairs, chains and clusters* Gram positive cocci in chains* Gram positive cocci in chains*   BODY FLUID CULTURE, STERILE   --  4+ Growth of Streptococcus intermedius*  --   --              Last 24 Hours Medication List:     Current Facility-Administered Medications:     acetaminophen (TYLENOL) tablet 650 mg, Q6H PRN    cefTRIAXone (ROCEPHIN) 2,000 mg in dextrose 5 % 50 mL IVPB, Q24H, Last Rate: 2,000 mg (04/08/25 1602)    enoxaparin (LOVENOX) subcutaneous injection 40 mg, Q24H KEVIN    [Held by provider] lisinopril (ZESTRIL) tablet 5 mg, Daily    oxyCODONE ER (Xtampza) extended release capsule 36 mg, Q12H KEVIN    potassium chloride (Klor-Con M20) CR tablet 40 mEq, BID    pregabalin (LYRICA) capsule 75 mg, BID    Administrative Statements   Today, Patient Was Seen By: Stephane Parra MD      **Please Note: This  note may have been constructed using a voice recognition system.**

## 2025-04-09 NOTE — ASSESSMENT & PLAN NOTE
Baseline hemoglobin is around 13-14 range.  Currently hemoglobin 10.7 on presentation currently 11.5  No signs of active overt bleeding noted.  Monitor CBC.

## 2025-04-09 NOTE — ASSESSMENT & PLAN NOTE
Patient developed fever and leukocytosis last night, after drainage of liver abscess.  This may be sepsis versus endotoxin release from procedure.  Admission blood cultures are now growing alpha Streptococcus, as in above.  Patient is clinically much improved, with resolution of fever and leukocytosis.  Fortunately, patient remains systemically well and hemodynamically stable, without hypotension.  Antibiotic plan as in above.  Monitor temperature/WBC.    Monitor hemodynamics.

## 2025-04-10 ENCOUNTER — APPOINTMENT (INPATIENT)
Dept: NON INVASIVE DIAGNOSTICS | Facility: HOSPITAL | Age: 65
End: 2025-04-10
Attending: STUDENT IN AN ORGANIZED HEALTH CARE EDUCATION/TRAINING PROGRAM
Payer: COMMERCIAL

## 2025-04-10 PROCEDURE — G0545 PR INHERENT VISIT TO INPT: HCPCS | Performed by: INTERNAL MEDICINE

## 2025-04-10 PROCEDURE — 05HB33Z INSERTION OF INFUSION DEVICE INTO RIGHT BASILIC VEIN, PERCUTANEOUS APPROACH: ICD-10-PCS | Performed by: RADIOLOGY

## 2025-04-10 PROCEDURE — 99232 SBSQ HOSP IP/OBS MODERATE 35: CPT | Performed by: STUDENT IN AN ORGANIZED HEALTH CARE EDUCATION/TRAINING PROGRAM

## 2025-04-10 PROCEDURE — 36573 INSJ PICC RS&I 5 YR+: CPT | Performed by: RADIOLOGY

## 2025-04-10 PROCEDURE — 99232 SBSQ HOSP IP/OBS MODERATE 35: CPT | Performed by: INTERNAL MEDICINE

## 2025-04-10 PROCEDURE — 36569 INSJ PICC 5 YR+ W/O IMAGING: CPT

## 2025-04-10 PROCEDURE — C1751 CATH, INF, PER/CENT/MIDLINE: HCPCS

## 2025-04-10 RX ORDER — LIDOCAINE WITH 8.4% SOD BICARB 0.9%(10ML)
SYRINGE (ML) INJECTION AS NEEDED
Status: COMPLETED | OUTPATIENT
Start: 2025-04-10 | End: 2025-04-10

## 2025-04-10 RX ADMIN — OXYCODONE 18 MG: 18 CAPSULE, EXTENDED RELEASE ORAL at 11:08

## 2025-04-10 RX ADMIN — POTASSIUM CHLORIDE 40 MEQ: 1500 TABLET, EXTENDED RELEASE ORAL at 18:14

## 2025-04-10 RX ADMIN — CEFTRIAXONE SODIUM 2000 MG: 10 INJECTION, POWDER, FOR SOLUTION INTRAVENOUS at 16:22

## 2025-04-10 RX ADMIN — OXYCODONE 72 MG: 36 CAPSULE, EXTENDED RELEASE ORAL at 22:11

## 2025-04-10 RX ADMIN — PREGABALIN 75 MG: 75 CAPSULE ORAL at 10:17

## 2025-04-10 RX ADMIN — PREGABALIN 75 MG: 75 CAPSULE ORAL at 18:14

## 2025-04-10 RX ADMIN — Medication 6 ML: at 13:55

## 2025-04-10 RX ADMIN — OXYCODONE 36 MG: 36 CAPSULE, EXTENDED RELEASE ORAL at 11:08

## 2025-04-10 RX ADMIN — ENOXAPARIN SODIUM 40 MG: 40 INJECTION SUBCUTANEOUS at 10:17

## 2025-04-10 RX ADMIN — POTASSIUM CHLORIDE 40 MEQ: 1500 TABLET, EXTENDED RELEASE ORAL at 10:18

## 2025-04-10 NOTE — PROGRESS NOTES
Progress Note - Infectious Disease   Name: Celia Rivera 64 y.o. female I MRN: [id number]  Unit/Bed#: -01 I Date of Admission: 4/4/2025   Date of Service: 4/10/2025 I Hospital Day: 6    Assessment & Plan  Streptococcal bacteremia  Patient's admission blood cultures now have growth of Streptococcus intermedius in both sets.  ID of Streptococcus isolate is still pending but this does not appear to be Enterococcus by BCID.  Source of bacteremia is most likely liver abscess.  TTE is without obvious vegetation.  Bacteremia clearing.  Given presence of alpha strep bacteremia, patient will need 2 weeks of IV antibiotic.  Continue high-dose IV ceftriaxone.  Follow-up repeat blood cultures.  Treat x 2 weeks after clearance of bacteremia, through 4/21.  Okay for PICC placement anytime.  Liver abscess  Patient presented with fever/chills and nonspecific generalized weakness, without abdominal pain.  She is found to have a large liver abscess on abdomen/pelvis CT.  Given size of liver abscess, I suspect that it has been present for quite a while, although not before May 2023 when she last had abdomen/pelvis CT without evidence of liver abnormality.  Source of liver abscess is most likely colonic.  Abdomen/pelvis CT was benign, other than liver abscess.  Patient had 1 colonoscopy many years ago with unclear findings, benign according to patient but records are not available.  She will need another colonoscopy.  Fortunately, despite presence of liver abscess, patient is clinically and systemically well, without evidence of sepsis or systemic toxicity.  Patient is now status post drainage of abscess, with abscess culture growing Streptococcus intermedius also.  Drain is now much clearer.  Continue ceftriaxone, as in above.  Monitor temperature/WBC.  Drain per IR.  Recommend colonoscopy in the next few weeks.  This can be done as an outpatient.  Sepsis (HCC)  Patient developed fever and leukocytosis last night, after  drainage of liver abscess.  This may be sepsis versus endotoxin release from procedure.  Admission blood cultures are now growing alpha Streptococcus, as in above.  Patient is clinically much improved, with resolution of fever and leukocytosis.  Fortunately, patient remains systemically well and hemodynamically stable, without hypotension.  Antibiotic plan as in above.  Monitor temperature/WBC.    Monitor hemodynamics.  Elevated LFTs  LFTs are elevated, with normal bilirubin.  In addition, abdominal exam is benign.  Elevated LFTs are most likely secondary to liver abscess.  Doubt biliary obstruction with normal bilirubin and benign abdominal exam.  LFTs are improved.  Antibiotic plan as in above.  Monitor LFT.  Serial abdominal exams.  Cholelithiasis with choledocholithiasis  Patient is status post laparoscopic cholecystectomy in ERCP with CBD stone extraction in 2022 when she presented with abdominal pain.  Abdomen/pelvis CT with stable postop changes.  LFTs are elevated but no hyperbilirubinemia, as in above.    Discussed with patient in detail regarding the above plan.  Rx on chart for home IV antibiotic.  Okay for discharge from ID viewpoint, whenever home IV antibiotic can be set up.      Antibiotics:  Ceftriaxone # 7  Post drainage # 5  Last negative blood culture 4/7    Subjective   Patient feels well.  No abdominal pain, other than mild discomfort at drain site.  Temperature stays down.  No chills.  She is tolerating antibiotic well.  No nausea, vomiting or diarrhea.    Objective :  Temp:  [97.9 °F (36.6 °C)-98 °F (36.7 °C)] 97.9 °F (36.6 °C)  HR:  [58-62] 62  BP: (128-137)/(73-78) 137/78  Resp:  [16-18] 18  SpO2:  [94 %-96 %] 96 %    Physical Exam:     General: Awake, alert, cooperative, no distress.   Neck:  Supple. No mass.  No lymphadenopathy.   Lungs: Expansion symmetric, no rales, no wheezing, respirations unlabored.   Heart:  Regular rate and rhythm, S1 and S2 normal, no murmur.   Abdomen: Soft,  nondistended, mild and improved tenderness at drain site, drain serous, normal bowel sounds.   Extremities: No edema. No erythema/warmth. No ulcer. Nontender to palpation.   Skin:  No rash.   Neuro: Moves all extremities.        Lab Results: I have reviewed the following results:  Results from last 7 days   Lab Units 04/09/25  0703 04/08/25  0556 04/07/25  0458   WBC Thousand/uL 9.72 13.16* 15.87*   HEMOGLOBIN g/dL 11.5 11.3* 9.9*   PLATELETS Thousands/uL 429* 419* 366     Results from last 7 days   Lab Units 04/09/25  0703 04/08/25  0556 04/07/25  0458 04/06/25  0356 04/05/25  0438   SODIUM mmol/L 141 141 141 137 136   POTASSIUM mmol/L 4.1 3.4* 3.4* 4.0 4.0   CHLORIDE mmol/L 110* 111* 112* 106 105   CO2 mmol/L 25 23 23 24 24   BUN mg/dL 9 12 13 14 10   CREATININE mg/dL 0.83 0.84 0.82 0.84 0.85   EGFR ml/min/1.73sq m 75 74 76 74 73   CALCIUM mg/dL 8.8 7.9* 8.2* 8.0* 7.8*   AST U/L 19  --   --  45* 49*   ALT U/L 17  --   --  33 27   ALK PHOS U/L 180*  --   --  227* 155*   ALBUMIN g/dL 3.1*  --   --  2.8* 2.8*     Results from last 7 days   Lab Units 04/07/25  0458 04/05/25  1319 04/04/25  1456 04/04/25  1437   BLOOD CULTURE  No Growth at 72 hrs.  No Growth at 72 hrs.  --  Streptococcus intermedius* Streptococcus intermedius*   GRAM STAIN RESULT   --  3+ Polys*  3+ Gram positive cocci in pairs, chains and clusters* Gram positive cocci in chains* Gram positive cocci in chains*   BODY FLUID CULTURE, STERILE   --  4+ Growth of Streptococcus intermedius*  --   --      Results from last 7 days   Lab Units 04/04/25  1437   PROCALCITONIN ng/ml 2.04*             Results from last 7 days   Lab Units 04/04/25  1437   D-DIMER QUANTITATIVE ug/ml FEU 2.99*

## 2025-04-10 NOTE — ASSESSMENT & PLAN NOTE
64-year-old female with history of hypertension, previous cholecystectomy  in 2022 who initially presented with generalized weakness intermittent fevers  Noted to have a liver abscess.  8.5 cm  Status post IR drain placed on 04/05/2025  2/2 blood culture growing Streptococcus  2/7/2025 repeat blood cultures without growth in 72 hours.  2D echo report reviewed noted with EF of 55% without vegetation.      Currently she is afebrile and hemodynamically stable.  Acutely nontoxic appearing.  Currently on IV ceftriaxone 2 gram every 24 hours.  ID recommendation appreciated.  Will likely need 2 weeks of antibiotics after clearance of bacteremia per ID.  Plan for PICC line placement today and hopefully discharge tomorrow.  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  Patient is in agreement with above plan.  Recommend outpatient follow-up with GI for colonoscopy next few weeks.  ID recommendation appreciated.

## 2025-04-10 NOTE — ASSESSMENT & PLAN NOTE
Patient is status post laparoscopic cholecystectomy in ERCP with CBD stone extraction in 2022 when she presented with abdominal pain.  Abdomen/pelvis CT with stable postop changes.  LFTs are elevated but no hyperbilirubinemia, as in above.    Discussed with patient in detail regarding the above plan.  Rx on chart for home IV antibiotic.  Okay for discharge from ID viewpoint, whenever home IV antibiotic can be set up.

## 2025-04-10 NOTE — BRIEF OP NOTE (RAD/CATH)
IR PICC PLACEMENT SINGLE LUMEN Procedure Note    PATIENT NAME: Celia Rivera  : 1960  MRN: [id number]    Pre-op Diagnosis:   1. Anemia    2. Hypokalemia    3. Hepatic abscess    4. Liver abscess      Post-op Diagnosis:   1. Anemia    2. Hypokalemia    3. Hepatic abscess    4. Liver abscess        Provider:   NEMO Scott    Assistants:   None    Estimated Blood Loss: Minimal     Findings: Placement of 4 F single lumen PICC into RIGHT basilic vein. Catheter length 40 cm. Arm circumference 31.5 cm  Tip confirmation verified on fluoroscopy.     Specimens: None     Complications:  None immediate     Anesthesia: local    NEMO Scott     Date: 4/10/2025  Time: 2:19 PM

## 2025-04-10 NOTE — ASSESSMENT & PLAN NOTE
Developed sepsis after IR drain placement yesterday.  Lactic acid within manage.  Clinically acutely nontoxic-appearing.  Currently she is much more awake, alert, oriented x 4.  Denies abdominal pain.  Tolerating antibiotics well.  2/2 blood cultures growing gram-positive cocci in chains.  Repeat cultures without growth in 72hrs  2D echo negative for vegetation.    Currently she is hemodynamically stable and acutely nontoxic-appearing.  Continue IV ceftriaxone.  Likely needs 2 weeks of antibiotics once bacteremia clears per ID.

## 2025-04-10 NOTE — CASE MANAGEMENT
"   Case Management Progress Note    Patient name Celia Rivera  Location /-01 MRN [id number]  : 1960 Date 4/10/2025       LOS (days): 6  Geometric Mean LOS (GMLOS) (days): 4.8  Days to GMLOS:-1.1        OBJECTIVE:        Current admission status: Inpatient  Preferred Pharmacy:   [id number] - [place], [place] - [address]  [address]  [place] [address]  Phone: [phone] Fax: [phone]    [id number] - [place], [place] - [address]  [address]  [place] [address]  Phone: [phone] Fax: [phone]    Primary Care Provider: Harvey Jordan MD    Primary Insurance: [facility]  Secondary Insurance:     PROGRESS NOTE:  As per SLIM rounds, pt is anticipated for dc within 24hrs pending picc placement. Pt has [employer] infusion company available. As per [employer] \" the insurance that the drug is billed under the medical plan, not the RX plan, so pt is covered at 100%.\"         "

## 2025-04-10 NOTE — PROGRESS NOTES
Blood cultures negative for 72 hours.  Plan for PICC line placement today and hopefully discharge home tomorrow.  Progress Note - Hospitalist   Name: Celia Rivera 64 y.o. female I MRN: [id number]  Unit/Bed#: -01 I Date of Admission: 4/4/2025   Date of Service: 4/10/2025 I Hospital Day: 6    Assessment & Plan  Liver abscess  64-year-old female with history of hypertension, previous cholecystectomy  in 2022 who initially presented with generalized weakness intermittent fevers  Noted to have a liver abscess.  8.5 cm  Status post IR drain placed on 04/05/2025  2/2 blood culture growing Streptococcus  2/7/2025 repeat blood cultures without growth in 72 hours.  2D echo report reviewed noted with EF of 55% without vegetation.      Currently she is afebrile and hemodynamically stable.  Acutely nontoxic appearing.  Currently on IV ceftriaxone 2 gram every 24 hours.  ID recommendation appreciated.  Will likely need 2 weeks of antibiotics after clearance of bacteremia per ID.  Plan for PICC line placement today and hopefully discharge tomorrow.  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  Patient is in agreement with above plan.  Recommend outpatient follow-up with GI for colonoscopy next few weeks.  ID recommendation appreciated.  Sepsis (HCC)  Developed sepsis after IR drain placement yesterday.  Lactic acid within manage.  Clinically acutely nontoxic-appearing.  Currently she is much more awake, alert, oriented x 4.  Denies abdominal pain.  Tolerating antibiotics well.  2/2 blood cultures growing gram-positive cocci in chains.  Repeat cultures without growth in 72hrs  2D echo negative for vegetation.    Currently she is hemodynamically stable and acutely nontoxic-appearing.  Continue IV ceftriaxone.  Likely needs 2 weeks of antibiotics once bacteremia clears per ID.  Hypertension  Blood pressure currently reasonably controlled.  Holding home  dose of lisinopril  Discontinue IV fluids.  Consider resuming lisinopril once blood pressure persistently elevated and uncontrolled.  GERD (gastroesophageal reflux disease)  Stable.  Continue PPI.  Cholelithiasis with choledocholithiasis  Patient has laparoscopic cholecystectomy and ERCP with CBD stone instruction in .  CT and was reviewed reviewed noted with stable postop changes.  Plan as above.  Elevated LFTs  Elevated LFTs without hyperbilirubinemia.  Patient denies abdominal pain, nausea, vomiting.  Plan as above.  Repeat CMP tomorrow AM.  Anemia  Baseline hemoglobin is around 13-14 range.  Currently hemoglobin 10.7 on presentation currently 11.5  No signs of active overt bleeding noted.  Monitor CBC.  Opioid dependence (HCC)  Patient does report having spinal stimulator however reports battery  and  will try to bring in better pack .  PDMP reviewed, patient is on xtampza and pregabalin.  Patient does report having chronic back pain chronically on Xtampza 72 mg in pm and 54mg in am.   On presentation she was somnolent requiring Narcan.  Currently awake, alert, x 3 and much more interactive and back to normal self.  Resume home dose of Xtampza  Resume home dose of pregabalin.    Streptococcal bacteremia  Repeat blood cultures pending.  Current on IV ceftriaxone and will likely need 2 weeks of antibiotics.  Plan as above.    VTE Pharmacologic Prophylaxis:   Moderate Risk (Score 3-4) - Pharmacological DVT Prophylaxis Ordered: enoxaparin (Lovenox).    Mobility:   Basic Mobility Inpatient Raw Score: 23  JH-HLM Goal: 7: Walk 25 feet or more  JH-HLM Achieved: 8: Walk 250 feet ot more      Patient Centered Rounds: I performed bedside rounds with nursing staff today.   Discussions with Specialists or Other Care Team Provider: Infectious disease        Current Length of Stay: 6 day(s)  Current Patient Status: Inpatient   Certification Statement: The patient will continue to require additional  inpatient hospital stay due to PICC line placement today and likely discharge tomorrow  Discharge Plan: Anticipate discharge tomorrow to home with home services.    Code Status: Level 1 - Full Code    Subjective   Seen during a.m. rounds.  Patient appears comfortable nondistressed. Reports back pain is now reasonably well-controlled.  Denies any other new comments.  No other events reported.    Objective :  Temp:  [97.9 °F (36.6 °C)-98 °F (36.7 °C)] 97.9 °F (36.6 °C)  HR:  [58-62] 62  BP: (128-137)/(73-78) 137/78  Resp:  [16-18] 18  SpO2:  [94 %-96 %] 96 %    Body mass index is 29.76 kg/m².     Input and Output Summary (last 24 hours):     Intake/Output Summary (Last 24 hours) at 4/10/2025 1006  Last data filed at 4/9/2025 1700  Gross per 24 hour   Intake 220 ml   Output --   Net 220 ml       Physical Exam  Constitutional:       General: She is not in acute distress.     Appearance: Normal appearance. She is not ill-appearing, toxic-appearing or diaphoretic.   Cardiovascular:      Rate and Rhythm: Normal rate.      Pulses: Normal pulses.   Pulmonary:      Effort: Pulmonary effort is normal. No respiratory distress.      Breath sounds: Normal breath sounds. No wheezing.   Abdominal:      General: Bowel sounds are normal. There is no distension.      Palpations: Abdomen is soft.      Tenderness: There is no abdominal tenderness.   Musculoskeletal:      Right lower leg: No edema.      Left lower leg: No edema.   Neurological:      Mental Status: She is alert and oriented to person, place, and time. Mental status is at baseline.   Psychiatric:         Mood and Affect: Mood normal.         Behavior: Behavior normal.           Lines/Drains:  Lines/Drains/Airways       Active Status       Name Placement date Placement time Site Days    Abscess Drain Abdomen 04/05/25  1309  Abdomen  4                            Lab Results: I have reviewed the following results:   Results from last 7 days   Lab Units 04/09/25  0701  04/06/25  0356 04/05/25  0438   WBC Thousand/uL 9.72   < > 9.56   HEMOGLOBIN g/dL 11.5   < > 10.2*   HEMATOCRIT % 35.4   < > 31.7*   PLATELETS Thousands/uL 429*   < > 292   SEGS PCT %  --   --  88*   LYMPHO PCT %  --   --  4*   MONO PCT %  --   --  7   EOS PCT %  --   --  0    < > = values in this interval not displayed.     Results from last 7 days   Lab Units 04/09/25  0703   SODIUM mmol/L 141   POTASSIUM mmol/L 4.1   CHLORIDE mmol/L 110*   CO2 mmol/L 25   BUN mg/dL 9   CREATININE mg/dL 0.83   ANION GAP mmol/L 6   CALCIUM mg/dL 8.8   ALBUMIN g/dL 3.1*   TOTAL BILIRUBIN mg/dL 0.40   ALK PHOS U/L 180*   ALT U/L 17   AST U/L 19   GLUCOSE RANDOM mg/dL 118     Results from last 7 days   Lab Units 04/05/25  0438   INR  1.17             Results from last 7 days   Lab Units 04/06/25  0814 04/04/25  1437   LACTIC ACID mmol/L 1.3 1.4   PROCALCITONIN ng/ml  --  2.04*       Recent Cultures (last 7 days):   Results from last 7 days   Lab Units 04/07/25  0458 04/05/25  1319 04/04/25  1456 04/04/25  1437   BLOOD CULTURE  No Growth at 48 hrs.  No Growth at 48 hrs.  --  Streptococcus intermedius* Streptococcus intermedius*   GRAM STAIN RESULT   --  3+ Polys*  3+ Gram positive cocci in pairs, chains and clusters* Gram positive cocci in chains* Gram positive cocci in chains*   BODY FLUID CULTURE, STERILE   --  4+ Growth of Streptococcus intermedius*  --   --              Last 24 Hours Medication List:     Current Facility-Administered Medications:     acetaminophen (TYLENOL) tablet 650 mg, Q6H PRN    cefTRIAXone (ROCEPHIN) 2,000 mg in dextrose 5 % 50 mL IVPB, Q24H, Last Rate: 2,000 mg (04/09/25 1652)    enoxaparin (LOVENOX) subcutaneous injection 40 mg, Q24H KEVIN    [Held by provider] lisinopril (ZESTRIL) tablet 5 mg, Daily    oxyCODONE ER (Xtampza) extended release capsule 36 mg, QAM **AND** oxyCODONE ER (Xtampza) extended release capsule 18 mg, Daily    oxyCODONE ER (Xtampza) extended release capsule 72 mg, HS    potassium  chloride (Klor-Con M20) CR tablet 40 mEq, BID    pregabalin (LYRICA) capsule 75 mg, BID    Administrative Statements   Today, Patient Was Seen By: Stephane Parra MD      **Please Note: This note may have been constructed using a voice recognition system.**

## 2025-04-10 NOTE — ASSESSMENT & PLAN NOTE
LFTs are elevated, with normal bilirubin.  In addition, abdominal exam is benign.  Elevated LFTs are most likely secondary to liver abscess.  Doubt biliary obstruction with normal bilirubin and benign abdominal exam.  LFTs are improved.  Antibiotic plan as in above.  Monitor LFT.  Serial abdominal exams.

## 2025-04-10 NOTE — PLAN OF CARE
Problem: PAIN - ADULT  Goal: Verbalizes/displays adequate comfort level or baseline comfort level  Description: Interventions:- Encourage patient to monitor pain and request assistance- Assess pain using appropriate pain scale- Administer analgesics based on type and severity of pain and evaluate response- Implement non-pharmacological measures as appropriate and evaluate response- Consider cultural and social influences on pain and pain management- Notify physician/advanced practitioner if interventions unsuccessful or patient reports new pain  Outcome: Progressing     Problem: INFECTION - ADULT  Goal: Absence or prevention of progression during hospitalization  Description: INTERVENTIONS:- Assess and monitor for signs and symptoms of infection- Monitor lab/diagnostic results- Monitor all insertion sites, i.e. indwelling lines, tubes, and drains- Monitor endotracheal if appropriate and nasal secretions for changes in amount and color- [place] appropriate cooling/warming therapies per order- Administer medications as ordered- Instruct and encourage patient and family to use good hand hygiene technique- Identify and instruct in appropriate isolation precautions for identified infection/condition  Outcome: Progressing  Goal: Absence of fever/infection during neutropenic period  Description: INTERVENTIONS:- Monitor WBC  Outcome: Progressing     Problem: SAFETY ADULT  Goal: Patient will remain free of falls  Description: INTERVENTIONS:- Educate patient/family on patient safety including physical limitations- Instruct patient to call for assistance with activity - Consult OT/PT to assist with strengthening/mobility - Keep Call bell within reach- Keep bed low and locked with side rails adjusted as appropriate- Keep care items and personal belongings within reach- Initiate and maintain comfort rounds- Make Fall Risk Sign visible to staff- Offer Toileting every 2 Hours, in advance of need- Initiate/Maintain bed alarm-  Obtain necessary fall risk management equipment: - Apply yellow socks and bracelet for high fall risk patients- Consider moving patient to room near nurses station  Outcome: Progressing     Problem: GASTROINTESTINAL - ADULT  Goal: Minimal or absence of nausea and/or vomiting  Description: INTERVENTIONS:- Administer IV fluids if ordered to ensure adequate hydration- Maintain NPO status until nausea and vomiting are resolved- Nasogastric tube if ordered- Administer ordered antiemetic medications as needed- Provide nonpharmacologic comfort measures as appropriate- Advance diet as tolerated, if ordered- Consider nutrition services referral to assist patient with adequate nutrition and appropriate food choices  Outcome: Progressing  Goal: Maintains or returns to baseline bowel function  Description: INTERVENTIONS:- Assess bowel function- Encourage oral fluids to ensure adequate hydration- Administer IV fluids if ordered to ensure adequate hydration- Administer ordered medications as needed- Encourage mobilization and activity- Consider nutritional services referral to assist patient with adequate nutrition and appropriate food choices  Outcome: Progressing  Goal: Maintains adequate nutritional intake  Description: INTERVENTIONS:- Monitor percentage of each meal consumed- Identify factors contributing to decreased intake, treat as appropriate- Assist with meals as needed- Monitor I&O, weight, and lab values if indicated- Obtain nutrition services referral as needed  Outcome: Progressing     Problem: Prexisting or High Potential for Compromised Skin Integrity  Goal: Skin integrity is maintained or improved  Description: INTERVENTIONS:- Identify patients at risk for skin breakdown- Assess and monitor skin integrity- Assess and monitor nutrition and hydration status- Monitor labs - Assess for incontinence - Turn and reposition patient- Assist with mobility/ambulation- Relieve pressure over bony prominences- Avoid friction  and shearing- Provide appropriate hygiene as needed including keeping skin clean and dry- Evaluate need for skin moisturizer/barrier cream- Collaborate with interdisciplinary team - Patient/family teaching- Consider wound care consult   Outcome: Progressing

## 2025-04-10 NOTE — ASSESSMENT & PLAN NOTE
Patient does report having spinal stimulator however reports battery  and  will try to bring in better pack .  PDMP reviewed, patient is on xtampza and pregabalin.  Patient does report having chronic back pain chronically on Xtampza 72 mg in pm and 54mg in am.   On presentation she was somnolent requiring Narcan.  Currently awake, alert, x 3 and much more interactive and back to normal self.  Resume home dose of Xtampza  Resume home dose of pregabalin.

## 2025-04-10 NOTE — PLAN OF CARE
Problem: PAIN - ADULT  Goal: Verbalizes/displays adequate comfort level or baseline comfort level  Description: Interventions:- Encourage patient to monitor pain and request assistance- Assess pain using appropriate pain scale- Administer analgesics based on type and severity of pain and evaluate response- Implement non-pharmacological measures as appropriate and evaluate response- Consider cultural and social influences on pain and pain management- Notify physician/advanced practitioner if interventions unsuccessful or patient reports new pain  4/10/2025 1320 by Tisha Prakash  Outcome: Progressing  4/10/2025 1320 by Tisha Prakash  Outcome: Progressing     Problem: INFECTION - ADULT  Goal: Absence or prevention of progression during hospitalization  Description: INTERVENTIONS:- Assess and monitor for signs and symptoms of infection- Monitor lab/diagnostic results- Monitor all insertion sites, i.e. indwelling lines, tubes, and drains- Monitor endotracheal if appropriate and nasal secretions for changes in amount and color- [place] appropriate cooling/warming therapies per order- Administer medications as ordered- Instruct and encourage patient and family to use good hand hygiene technique- Identify and instruct in appropriate isolation precautions for identified infection/condition  4/10/2025 1320 by Tisha Prakash  Outcome: Progressing  4/10/2025 1320 by Tisha Prakash  Outcome: Progressing  Goal: Absence of fever/infection during neutropenic period  Description: INTERVENTIONS:- Monitor WBC  4/10/2025 1320 by Tisha Prakash  Outcome: Progressing  4/10/2025 1320 by Tisha Prakash  Outcome: Progressing     Problem: SAFETY ADULT  Goal: Patient will remain free of falls  Description: INTERVENTIONS:- Educate patient/family on patient safety including physical limitations- Instruct patient to call for assistance with activity - Consult OT/PT to assist with strengthening/mobility - Keep Call bell within reach- Keep bed low  and locked with side rails adjusted as appropriate- Keep care items and personal belongings within reach- Initiate and maintain comfort rounds- Make Fall Risk Sign visible to staff- Offer Toileting every 2 Hours, in advance of need- Initiate/Maintain bed alarm- Obtain necessary fall risk management equipment: call bell within reach - Apply yellow socks and bracelet for high fall risk patients- Consider moving patient to room near nurses station  4/10/2025 1320 by Tisha Prakash  Outcome: Progressing  4/10/2025 1320 by Tisha Prakash  Outcome: Progressing     Problem: GASTROINTESTINAL - ADULT  Goal: Minimal or absence of nausea and/or vomiting  Description: INTERVENTIONS:- Administer IV fluids if ordered to ensure adequate hydration- Maintain NPO status until nausea and vomiting are resolved- Nasogastric tube if ordered- Administer ordered antiemetic medications as needed- Provide nonpharmacologic comfort measures as appropriate- Advance diet as tolerated, if ordered- Consider nutrition services referral to assist patient with adequate nutrition and appropriate food choices  4/10/2025 1320 by Tisha Prakash  Outcome: Progressing  4/10/2025 1320 by Tisha Prakash  Outcome: Progressing  Goal: Maintains or returns to baseline bowel function  Description: INTERVENTIONS:- Assess bowel function- Encourage oral fluids to ensure adequate hydration- Administer IV fluids if ordered to ensure adequate hydration- Administer ordered medications as needed- Encourage mobilization and activity- Consider nutritional services referral to assist patient with adequate nutrition and appropriate food choices  4/10/2025 1320 by Tisha Prakash  Outcome: Progressing  4/10/2025 1320 by Tisha Prakash  Outcome: Progressing  Goal: Maintains adequate nutritional intake  Description: INTERVENTIONS:- Monitor percentage of each meal consumed- Identify factors contributing to decreased intake, treat as appropriate- Assist with meals as needed- Monitor  I&O, weight, and lab values if indicated- Obtain nutrition services referral as needed  4/10/2025 1320 by Tisha Prakash  Outcome: Progressing  4/10/2025 1320 by Tisha Prakash  Outcome: Progressing     Problem: Prexisting or High Potential for Compromised Skin Integrity  Goal: Skin integrity is maintained or improved  Description: INTERVENTIONS:- Identify patients at risk for skin breakdown- Assess and monitor skin integrity- Assess and monitor nutrition and hydration status- Monitor labs - Assess for incontinence - Turn and reposition patient- Assist with mobility/ambulation- Relieve pressure over bony prominences- Avoid friction and shearing- Provide appropriate hygiene as needed including keeping skin clean and dry- Evaluate need for skin moisturizer/barrier cream- Collaborate with interdisciplinary team - Patient/family teaching- Consider wound care consult   4/10/2025 1320 by Tisha Prakash  Outcome: Progressing  4/10/2025 1320 by Tisha Prakash  Outcome: Progressing

## 2025-04-10 NOTE — ASSESSMENT & PLAN NOTE
Patient's admission blood cultures now have growth of Streptococcus intermedius in both sets.  ID of Streptococcus isolate is still pending but this does not appear to be Enterococcus by [facility].  Source of bacteremia is most likely liver abscess.  TTE is without obvious vegetation.  Bacteremia clearing.  Given presence of alpha strep bacteremia, patient will need 2 weeks of IV antibiotic.  Continue high-dose IV ceftriaxone.  Follow-up repeat blood cultures.  Treat x 2 weeks after clearance of bacteremia, through 4/21.  Okay for PICC placement anytime.

## 2025-04-11 ENCOUNTER — TRANSITIONAL CARE MANAGEMENT (OUTPATIENT)
Dept: FAMILY MEDICINE CLINIC | Facility: CLINIC | Age: 65
End: 2025-04-11

## 2025-04-11 VITALS
HEART RATE: 57 BPM | SYSTOLIC BLOOD PRESSURE: 113 MMHG | BODY MASS INDEX: 29.82 KG/M2 | TEMPERATURE: 98.2 F | HEIGHT: 67 IN | OXYGEN SATURATION: 94 % | WEIGHT: 190 LBS | RESPIRATION RATE: 18 BRPM | DIASTOLIC BLOOD PRESSURE: 66 MMHG

## 2025-04-11 PROCEDURE — 99239 HOSP IP/OBS DSCHRG MGMT >30: CPT | Performed by: STUDENT IN AN ORGANIZED HEALTH CARE EDUCATION/TRAINING PROGRAM

## 2025-04-11 PROCEDURE — G0545 PR INHERENT VISIT TO INPT: HCPCS | Performed by: INTERNAL MEDICINE

## 2025-04-11 PROCEDURE — 99233 SBSQ HOSP IP/OBS HIGH 50: CPT | Performed by: INTERNAL MEDICINE

## 2025-04-11 RX ORDER — SODIUM CHLORIDE 9 MG/ML
20 INJECTION, SOLUTION INTRAVENOUS ONCE
Status: CANCELLED | OUTPATIENT
Start: 2025-04-12

## 2025-04-11 RX ORDER — PANTOPRAZOLE SODIUM 40 MG/1
40 TABLET, DELAYED RELEASE ORAL DAILY
Qty: 90 TABLET | Refills: 0 | Status: SHIPPED | OUTPATIENT
Start: 2025-04-11

## 2025-04-11 RX ADMIN — PREGABALIN 75 MG: 75 CAPSULE ORAL at 08:32

## 2025-04-11 RX ADMIN — OXYCODONE 18 MG: 18 CAPSULE, EXTENDED RELEASE ORAL at 08:32

## 2025-04-11 RX ADMIN — POTASSIUM CHLORIDE 40 MEQ: 1500 TABLET, EXTENDED RELEASE ORAL at 08:32

## 2025-04-11 RX ADMIN — OXYCODONE 36 MG: 36 CAPSULE, EXTENDED RELEASE ORAL at 08:32

## 2025-04-11 RX ADMIN — ENOXAPARIN SODIUM 40 MG: 40 INJECTION SUBCUTANEOUS at 08:33

## 2025-04-11 NOTE — NURSING NOTE
Patients monitor removed, Patient provided education, all questions answered. Patient's AVS reviewed with patient and . Patient brought down via wheelchair to back entrance and transported by  to home.

## 2025-04-11 NOTE — ASSESSMENT & PLAN NOTE
2D echo negative for vegetation.  Currently patient is discharged home with PICC line and IR liver abscess drain for 2 weeks of antibiotic course with ceftriaxone as recommended by ID.  Discussed with patient at length to follow-up with gastroenterology for colonoscopy.

## 2025-04-11 NOTE — ASSESSMENT & PLAN NOTE
Patient does report having spinal stimulator however reports battery  and  will try to bring in better pack .  PDMP reviewed, patient is on xtampza and pregabalin.  Patient does report having chronic back pain chronically on Xtampza 72 mg in pm and 54mg in am.     Currently awake, alert, x 3 and much more interactive and back to normal self and tolerating above regimen well.  Recommend outpatient follow-up with PCP.

## 2025-04-11 NOTE — PROGRESS NOTES
Progress Note - Infectious Disease   Name: Celia Rivera 64 y.o. female I MRN: [id number]  Unit/Bed#: -01 I Date of Admission: 4/4/2025   Date of Service: 4/11/2025 I Hospital Day: 7    Assessment & Plan  Streptococcal bacteremia  Patient's admission blood cultures now have growth of Streptococcus intermedius in both sets.  ID of Streptococcus isolate is still pending but this does not appear to be Enterococcus by BCID.  Source of bacteremia is most likely liver abscess.  TTE is without obvious vegetation.  Bacteremia clearing.  Given presence of alpha strep bacteremia, patient will need 2 weeks of IV antibiotic.  Patient initially wanted to try IV antibiotic at home but now prefers to go to infusion center daily.  Continue high-dose IV ceftriaxone.  Follow-up repeat blood cultures.  Treat x 2 weeks after clearance of bacteremia, through 4/21.  Liver abscess  Patient presented with fever/chills and nonspecific generalized weakness, without abdominal pain.  She is found to have a large liver abscess on abdomen/pelvis CT.  Given size of liver abscess, I suspect that it has been present for quite a while, although not before May 2023 when she last had abdomen/pelvis CT without evidence of liver abnormality.  Source of liver abscess is most likely colonic.  Abdomen/pelvis CT was benign, other than liver abscess.  Patient had 1 colonoscopy many years ago with unclear findings, benign according to patient but records are not available.  She will need another colonoscopy.  Fortunately, despite presence of liver abscess, patient is clinically and systemically well, without evidence of sepsis or systemic toxicity.  Patient is now status post drainage of abscess, with abscess culture growing Streptococcus intermedius also.  Drain is now much clearer.  Continue ceftriaxone, as in above.  Monitor temperature/WBC.  Drain per IR.  Recommend colonoscopy in the next few weeks.  This can be done as an outpatient.  Sepsis  (HCC)  Patient developed fever and leukocytosis last night, after drainage of liver abscess.  This may be sepsis versus endotoxin release from procedure.  Admission blood cultures are now growing alpha Streptococcus, as in above.  Patient is clinically much improved, with resolution of fever and leukocytosis.  Fortunately, patient remains systemically well and hemodynamically stable, without hypotension.  Antibiotic plan as in above.  Monitor temperature/WBC.    Monitor hemodynamics.  Elevated LFTs  LFTs are elevated, with normal bilirubin.  In addition, abdominal exam is benign.  Elevated LFTs are most likely secondary to liver abscess.  Doubt biliary obstruction with normal bilirubin and benign abdominal exam.  LFTs are improved.  Antibiotic plan as in above.  Monitor LFT.  Serial abdominal exams.  Cholelithiasis with choledocholithiasis  Patient is status post laparoscopic cholecystectomy in ERCP with CBD stone extraction in 2022 when she presented with abdominal pain.  Abdomen/pelvis CT with stable postop changes.  LFTs are elevated but no hyperbilirubinemia, as in above.    Discussed with patient in detail regarding the above plan.  [place] orders entered for IV ceftriaxone to be given at the infusion center.  I have discussed with Dr. Parra from primary service regarding the above plan to continue current antibiotic regimen as outpatient.  He agrees with the plan.  Ok for discharge from Infectious Disease service perspective.    Antibiotics:  Ceftriaxone # 8  Post drainage # 6  Last negative blood culture 4/7    Subjective   Patient feels well.  Stable mild discomfort at drain site but no abdominal pain  Temperature stays down.  No chills.  She is tolerating antibiotic well.  No nausea, vomiting or diarrhea.    Objective :  Temp:  [98.2 °F (36.8 °C)] 98.2 °F (36.8 °C)  HR:  [57-70] 57  BP: (108-146)/(66-78) 113/66  Resp:  [16-20] 18  SpO2:  [93 %-95 %] 94 %  O2 Device: None (Room air)    Physical  Exam:     General: Awake, alert, cooperative, no distress.   Neck:  Supple. No mass.  No lymphadenopathy.   Lungs: Expansion symmetric, no rales, no wheezing, respirations unlabored.   Heart:  Regular rate and rhythm, S1 and S2 normal, no murmur.   Abdomen: Soft, nondistended, very mild tenderness at drain site, drain serous, still with moderate amount, bowel sounds normal.   Extremities: No edema. No erythema/warmth. No ulcer. Nontender to palpation.   Skin:  No rash.   Neuro: Moves all extremities.        Lab Results: I have reviewed the following results:  Results from last 7 days   Lab Units 04/09/25  0703 04/08/25  0556 04/07/25  0458   WBC Thousand/uL 9.72 13.16* 15.87*   HEMOGLOBIN g/dL 11.5 11.3* 9.9*   PLATELETS Thousands/uL 429* 419* 366     Results from last 7 days   Lab Units 04/09/25  0703 04/08/25  0556 04/07/25  0458 04/06/25  0356 04/05/25  0438   SODIUM mmol/L 141 141 141 137 136   POTASSIUM mmol/L 4.1 3.4* 3.4* 4.0 4.0   CHLORIDE mmol/L 110* 111* 112* 106 105   CO2 mmol/L 25 23 23 24 24   BUN mg/dL 9 12 13 14 10   CREATININE mg/dL 0.83 0.84 0.82 0.84 0.85   EGFR ml/min/1.73sq m 75 74 76 74 73   CALCIUM mg/dL 8.8 7.9* 8.2* 8.0* 7.8*   AST U/L 19  --   --  45* 49*   ALT U/L 17  --   --  33 27   ALK PHOS U/L 180*  --   --  227* 155*   ALBUMIN g/dL 3.1*  --   --  2.8* 2.8*     Results from last 7 days   Lab Units 04/07/25  0458 04/05/25  1319 04/04/25  1456 04/04/25  1437   BLOOD CULTURE  No Growth at 72 hrs.  No Growth at 72 hrs.  --  Streptococcus intermedius* Streptococcus intermedius*   GRAM STAIN RESULT   --  3+ Polys*  3+ Gram positive cocci in pairs, chains and clusters* Gram positive cocci in chains* Gram positive cocci in chains*   BODY FLUID CULTURE, STERILE   --  4+ Growth of Streptococcus intermedius*  --   --      Results from last 7 days   Lab Units 04/04/25  1437   PROCALCITONIN ng/ml 2.04*             Results from last 7 days   Lab Units 04/04/25  1437   D-DIMER QUANTITATIVE ug/ml FEU  2.99*

## 2025-04-11 NOTE — PLAN OF CARE
Problem: PAIN - ADULT  Goal: Verbalizes/displays adequate comfort level or baseline comfort level  Description: Interventions:- Encourage patient to monitor pain and request assistance- Assess pain using appropriate pain scale- Administer analgesics based on type and severity of pain and evaluate response- Implement non-pharmacological measures as appropriate and evaluate response- Consider cultural and social influences on pain and pain management- Notify physician/advanced practitioner if interventions unsuccessful or patient reports new pain  Outcome: Progressing     Problem: GASTROINTESTINAL - ADULT  Goal: Minimal or absence of nausea and/or vomiting  Description: INTERVENTIONS:- Administer IV fluids if ordered to ensure adequate hydration- Maintain NPO status until nausea and vomiting are resolved- Nasogastric tube if ordered- Administer ordered antiemetic medications as needed- Provide nonpharmacologic comfort measures as appropriate- Advance diet as tolerated, if ordered- Consider nutrition services referral to assist patient with adequate nutrition and appropriate food choices  Outcome: Progressing

## 2025-04-11 NOTE — ASSESSMENT & PLAN NOTE
Blood pressure currently reasonably controlled.  Continue to hold lisinopril on discharge with outpatient follow-up with PCP for blood pressure monitoring.

## 2025-04-11 NOTE — ASSESSMENT & PLAN NOTE
64-year-old female with history of hypertension, previous cholecystectomy  in 2022 who initially presented with generalized weakness intermittent fevers  Noted to have a liver abscess.  8.5 cm  Status post IR drain placed on 04/05/2025  2/2 blood culture growing Streptococcus intermedius  2/7/2025 repeat blood cultures without growth in 72 hours.  2D echo report reviewed noted with EF of 55% without vegetation.      Currently she is afebrile and hemodynamically stable.  Acutely nontoxic appearing.  Currently on IV ceftriaxone 2 gram every 24 hours for 2 weeks through 4/21/2025.  IR drain in place: Recommend outpatient follow-up with PCP and IR for drain check and management.  PICC line was placed on 4/10/2025  Plan was discharged home with VNA services however there was some concern about visiting nurse and currently patient is requesting to follow-up at infusion center for IV antibiotics.  ID has arranged outpatient antibiotics at infusion centers.  Recommend to remove PICC line after completion antibiotic course  Currently she is hemodynamically stable for discharge.  ID recommendation appreciated

## 2025-04-11 NOTE — ASSESSMENT & PLAN NOTE
Baseline hemoglobin is around 13-14 range.  Current hemoglobin 11.5  No signs of active overt bleeding noted.  Recommend outpatient follow-up with PCP.

## 2025-04-11 NOTE — ASSESSMENT & PLAN NOTE
Patient's admission blood cultures now have growth of Streptococcus intermedius in both sets.  ID of Streptococcus isolate is still pending but this does not appear to be Enterococcus by BCID.  Source of bacteremia is most likely liver abscess.  TTE is without obvious vegetation.  Bacteremia clearing.  Given presence of alpha strep bacteremia, patient will need 2 weeks of IV antibiotic.  Patient initially wanted to try IV antibiotic at home but now prefers to go to infusion center daily.  Continue high-dose IV ceftriaxone.  Follow-up repeat blood cultures.  Treat x 2 weeks after clearance of bacteremia, through 4/21.

## 2025-04-11 NOTE — CASE MANAGEMENT
Case Management Discharge Planning Note    Patient name Celai Rivera  Location /-01 MRN [id number]  : 1960 Date 2025       Current Admission Date: 2025  Current Admission Diagnosis:Liver abscess   Patient Active Problem List    Diagnosis Date Noted Date Diagnosed    Sepsis (HCC) 2025     Streptococcal bacteremia 2025     Cholelithiasis with choledocholithiasis 2025     Elevated LFTs 2025     Anemia 2025     GERD (gastroesophageal reflux disease) 2025     Liver abscess 2025     Transaminitis 2022     Right upper quadrant pain 2022     Choledocholithiasis 2022     Chronic low back pain 2022     Opioid dependence (HCC) 2022     Hypertension 2022       LOS (days): 7  Geometric Mean LOS (GMLOS) (days): 4.8  Days to GMLOS:-1.9     OBJECTIVE:  Risk of Unplanned Readmission Score: 15.4         Current admission status: Inpatient   Preferred Pharmacy:   [id number] - [place], [place] - [address]  [address]  [place] [address]  Phone: [phone] Fax: [phone]    [id number] - [place], [place]  [address]  [place] [address]  Phone: [phone] Fax: [phone]    Primary Care Provider: Harvey Jordan MD    Primary Insurance: [employer] [facility]  Secondary Insurance:     DISCHARGE DETAILS:    Discharge planning discussed with:: Pt  Freedom of Choice: Yes  Comments - Freedom of Choice: CM met with pt at bedside and discussed dc planning. Pt was all set up with [facility] for home IV abx however is now interested in going to the infusion center. Referral cancelled and CM contacted [facility] with Melissa to coordinate first infusion. As per Melissa, pt has her fiest visit tomorrow Sat 2025 @ 1030am. AVS updated and pt made aware. CM continues to follow and assist with pt dc plans.     Were Treatment Team discharge  recommendations reviewed with patient/caregiver?: Yes  Did patient/caregiver verbalize understanding of patient care needs?: Yes  Were patient/caregiver advised of the risks associated with not following Treatment Team discharge recommendations?: Yes    Contacts  Reason/Outcome: Continuity of Care, Emergency Contact, Referral, Discharge Planning    Requested Home Health Care         Is the patient interested in HHC at discharge?: No    DME Referral Provided  Referral made for DME?: No    Other Referral/Resources/Interventions Provided:  Interventions: Other (Specify)  Referral Comments: Infusion center appointments arranged         Treatment Team Recommendation: Home  Discharge Destination Plan:: Home  Transport at Discharge : Family                             IMM Given (Date):: 04/11/25  IMM Given to:: Patient  Family notified:: Pt  Additional Comments: IMM reviewed with pt at bedside. Pt expressed full and complete understanding. Verbal consent obtained, copy given and original in MR.

## 2025-04-11 NOTE — ASSESSMENT & PLAN NOTE
Developed sepsis after IR drain placement yesterday.  Lactic acid within manage.  Clinically acutely nontoxic-appearing.  Currently she is much more awake, alert, oriented x 4.  Denies abdominal pain.  Tolerating antibiotics well.  2/2 blood cultures growing Streptococcus intermedius bacteremia.  Repeat cultures without growth in 72hrs  2D echo negative for vegetation.    Currently she is hemodynamically stable for discharge.  Being discharged on IV ceftriaxone for 2-week course as above.

## 2025-04-11 NOTE — ASSESSMENT & PLAN NOTE
Patient is status post laparoscopic cholecystectomy in ERCP with CBD stone extraction in 2022 when she presented with abdominal pain.  Abdomen/pelvis CT with stable postop changes.  LFTs are elevated but no hyperbilirubinemia, as in above.    Discussed with patient in detail regarding the above plan.  [place] orders entered for IV ceftriaxone to be given at the infusion center.

## 2025-04-11 NOTE — PLAN OF CARE
Problem: PAIN - ADULT  Goal: Verbalizes/displays adequate comfort level or baseline comfort level  Description: Interventions:- Encourage patient to monitor pain and request assistance- Assess pain using appropriate pain scale- Administer analgesics based on type and severity of pain and evaluate response- Implement non-pharmacological measures as appropriate and evaluate response- Consider cultural and social influences on pain and pain management- Notify physician/advanced practitioner if interventions unsuccessful or patient reports new pain  Outcome: Progressing     Problem: INFECTION - ADULT  Goal: Absence or prevention of progression during hospitalization  Description: INTERVENTIONS:- Assess and monitor for signs and symptoms of infection- Monitor lab/diagnostic results- Monitor all insertion sites, i.e. indwelling lines, tubes, and drains- Monitor endotracheal if appropriate and nasal secretions for changes in amount and color- [place] appropriate cooling/warming therapies per order- Administer medications as ordered- Instruct and encourage patient and family to use good hand hygiene technique- Identify and instruct in appropriate isolation precautions for identified infection/condition  Outcome: Progressing  Goal: Absence of fever/infection during neutropenic period  Description: INTERVENTIONS:- Monitor WBC  Outcome: Progressing     Problem: SAFETY ADULT  Goal: Patient will remain free of falls  Description: INTERVENTIONS:- Educate patient/family on patient safety including physical limitations- Instruct patient to call for assistance with activity - Consult OT/PT to assist with strengthening/mobility - Keep Call bell within reach- Keep bed low and locked with side rails adjusted as appropriate- Keep care items and personal belongings within reach- Initiate and maintain comfort rounds- Make Fall Risk Sign visible to staff- Offer Toileting every 2 Hours, in advance of need- Initiate/Maintain bed alarm-  Obtain necessary fall risk management equipment: - Apply yellow socks and bracelet for high fall risk patients- Consider moving patient to room near nurses station  Outcome: Progressing     Problem: GASTROINTESTINAL - ADULT  Goal: Minimal or absence of nausea and/or vomiting  Description: INTERVENTIONS:- Administer IV fluids if ordered to ensure adequate hydration- Maintain NPO status until nausea and vomiting are resolved- Nasogastric tube if ordered- Administer ordered antiemetic medications as needed- Provide nonpharmacologic comfort measures as appropriate- Advance diet as tolerated, if ordered- Consider nutrition services referral to assist patient with adequate nutrition and appropriate food choices  Outcome: Progressing  Goal: Maintains or returns to baseline bowel function  Description: INTERVENTIONS:- Assess bowel function- Encourage oral fluids to ensure adequate hydration- Administer IV fluids if ordered to ensure adequate hydration- Administer ordered medications as needed- Encourage mobilization and activity- Consider nutritional services referral to assist patient with adequate nutrition and appropriate food choices  Outcome: Progressing  Goal: Maintains adequate nutritional intake  Description: INTERVENTIONS:- Monitor percentage of each meal consumed- Identify factors contributing to decreased intake, treat as appropriate- Assist with meals as needed- Monitor I&O, weight, and lab values if indicated- Obtain nutrition services referral as needed  Outcome: Progressing     Problem: Prexisting or High Potential for Compromised Skin Integrity  Goal: Skin integrity is maintained or improved  Description: INTERVENTIONS:- Identify patients at risk for skin breakdown- Assess and monitor skin integrity- Assess and monitor nutrition and hydration status- Monitor labs - Assess for incontinence - Turn and reposition patient- Assist with mobility/ambulation- Relieve pressure over bony prominences- Avoid friction  and shearing- Provide appropriate hygiene as needed including keeping skin clean and dry- Evaluate need for skin moisturizer/barrier cream- Collaborate with interdisciplinary team - Patient/family teaching- Consider wound care consult   Outcome: Progressing

## 2025-04-11 NOTE — DISCHARGE SUMMARY
Discharge Summary - Hospitalist   Name: Celia Rivera 64 y.o. female I MRN: [id number]  Unit/Bed#: -01 I Date of Admission: 4/4/2025   Date of Service: 4/11/2025 I Hospital Day: 7     Assessment & Plan  Liver abscess  64-year-old female with history of hypertension, previous cholecystectomy  in 2022 who initially presented with generalized weakness intermittent fevers  Noted to have a liver abscess.  8.5 cm  Status post IR drain placed on 04/05/2025  2/2 blood culture growing Streptococcus intermedius  2/7/2025 repeat blood cultures without growth in 72 hours.  2D echo report reviewed noted with EF of 55% without vegetation.      Currently she is afebrile and hemodynamically stable.  Acutely nontoxic appearing.  Currently on IV ceftriaxone 2 gram every 24 hours for 2 weeks through 4/21/2025.  IR drain in place: Recommend outpatient follow-up with PCP and IR for drain check and management.  PICC line was placed on 4/10/2025  Plan was discharged home with VNA services however there was some concern about visiting nurse and currently patient is requesting to follow-up at infusion center for IV antibiotics.  ID has arranged outpatient antibiotics at infusion centers.  Recommend to remove PICC line after completion antibiotic course  Currently she is hemodynamically stable for discharge.  ID recommendation appreciated    Sepsis (HCC)  Developed sepsis after IR drain placement yesterday.  Lactic acid within manage.  Clinically acutely nontoxic-appearing.  Currently she is much more awake, alert, oriented x 4.  Denies abdominal pain.  Tolerating antibiotics well.  2/2 blood cultures growing Streptococcus intermedius bacteremia.  Repeat cultures without growth in 72hrs  2D echo negative for vegetation.    Currently she is hemodynamically stable for discharge.  Being discharged on IV ceftriaxone for 2-week course as above.  Hypertension  Blood pressure currently reasonably controlled.  Continue to hold lisinopril on  discharge with outpatient follow-up with PCP for blood pressure monitoring.  GERD (gastroesophageal reflux disease)  Stable.  Continue PPI.  Cholelithiasis with choledocholithiasis  Patient has laparoscopic cholecystectomy and ERCP with CBD stone instruction in .  CT and was reviewed reviewed noted with stable postop changes.  Plan as above.  Elevated LFTs  Improving.  Recommend outpatient follow-up PCP  Anemia  Baseline hemoglobin is around 13-14 range.  Current hemoglobin 11.5  No signs of active overt bleeding noted.  Recommend outpatient follow-up with PCP.  Opioid dependence (HCC)  Patient does report having spinal stimulator however reports battery  and  will try to bring in better pack .  PDMP reviewed, patient is on xtampza and pregabalin.  Patient does report having chronic back pain chronically on Xtampza 72 mg in pm and 54mg in am.     Currently awake, alert, x 3 and much more interactive and back to normal self and tolerating above regimen well.  Recommend outpatient follow-up with PCP.    Streptococcal bacteremia  2D echo negative for vegetation.  Currently patient is discharged home with PICC line and IR liver abscess drain for 2 weeks of antibiotic course with ceftriaxone as recommended by ID.  Discussed with patient at length to follow-up with gastroenterology for colonoscopy.     Medical Problems       Resolved Problems  Date Reviewed: 2025   None       Discharging Physician / Practitioner: Stephane Parra MD  PCP: Harvey Jordan MD  Admission Date:   Admission Orders (From admission, onward)       Ordered        25  INPATIENT ADMISSION  Once                          Discharge Date: 25    Consultations During Hospital Stay:  Infectious disease    Reason for Admission: Sepsis, liver abscess , strep bacteremia      Hospital Course:     Celia Rivera is a 64 y.o. female patient with past medical history of hypertension, GERD, chronic opioid dependence,  chronic back pain, spinal stimulator in place, who originally presented to the hospital on 4/4/2025 due to sepsis secondary to liver abscess, streptococcal intermedius bacteremia.  Repeat cultures negative and bacteremia cleared with IV ceftriaxone.  2D echo negative for vegetation.  CTM pressure wound noted with approximately 8.5 cm right hepatic lobe abscess. patient was evaluated by IR and currently has liver abscess drain in place.  Patient was also noted lethargic initially requiring Narcan since on chronic opioids and she responded well.  Subsequently resume home dose of analgesics and tolerated well without any events.  Patient was seen by infectious disease and currently recommendation is to continue IV ceftriaxone 2 g every 24 hours for 2-week course since bacteremia cleared through 4/21/2025.  PICC line was placed initially plan was continuation of antibiotics at home however patient prefers to follow-up at infusion centers.  Outpatient infusion center treatment has been arranged by infectious disease.  Recommend to continue outpatient follow-up with PCP and infectious disease.  Recommend to remove PICC line after completion antibiotic course.  Currently hemodynamically stable for discharge.  No other events reported.  Refer to earlier notes for further clarification.      Please see above list of diagnoses and related plan for additional information.     Condition at Discharge: good    Discharge Day Visit / Exam:     Subjective: Currently patient reports feeling much better and eager to go home.  Denies chest pain, dyspnea, fever, chills, nausea, vomiting, diarrhea, any other new comments.  Has been tolerating ceftriaxone well.  Patient prefers to follow-up at infusion center for antibiotic course and outpatient infusion center treatment has been arranged per ID.      Vitals: Blood Pressure: 113/66 (04/11/25 0709)  Pulse: 57 (04/11/25 0709)  Temperature: 98.2 °F (36.8 °C) (04/11/25 0709)  Temp Source:  "Oral (04/10/25 2310)  Respirations: 18 (04/11/25 0709)  Height: 5' 7\" (170.2 cm) (04/07/25 0850)  Weight - Scale: 86.2 kg (190 lb) (04/07/25 0850)  SpO2: 94 % (04/11/25 0709)  Physical Exam  Constitutional:       General: She is not in acute distress.     Appearance: Normal appearance. She is not ill-appearing, toxic-appearing or diaphoretic.   Cardiovascular:      Rate and Rhythm: Normal rate.      Pulses: Normal pulses.   Pulmonary:      Effort: Pulmonary effort is normal. No respiratory distress.      Breath sounds: Normal breath sounds. No wheezing.   Abdominal:      General: Bowel sounds are normal. There is no distension.      Palpations: Abdomen is soft.      Tenderness: There is no abdominal tenderness.   Musculoskeletal:      Right lower leg: No edema.      Left lower leg: No edema.   Neurological:      General: No focal deficit present.      Mental Status: She is alert and oriented to person, place, and time. Mental status is at baseline.   Psychiatric:         Mood and Affect: Mood normal.         Behavior: Behavior normal.            Discharge instructions/Information to patient and family:   See after visit summary for information provided to patient and family.      Provisions for Follow-Up Care:  See after visit summary for information related to follow-up care and any pertinent home health orders.      Mobility at time of Discharge:   Basic Mobility Inpatient Raw Score: 23  JH-HLM Goal: 7: Walk 25 feet or more  JH-HLM Achieved: 7: Walk 25 feet or more       Disposition:   Home    Planned Readmission:     Discharge Medications:  See after visit summary for reconciled discharge medications provided to patient and/or family.      Administrative Statements   Discharge Statement:  I have spent a total time of 35 minutes in caring for this patient on the day of the visit/encounter. >30 minutes of time was spent on: Diagnostic results, Prognosis, Risks and benefits of tx options, Patient and family " education, Importance of tx compliance, Risk factor reductions, Impressions, Counseling / Coordination of care, Documenting in the medical record, Reviewing / ordering tests, medicine, procedures  , and Communicating with other healthcare professionals .    **Please Note: This note may have been constructed using a voice recognition system**

## 2025-04-11 NOTE — DISCHARGE INSTR - AVS FIRST PAGE
Recommend outpatient follow-up with primary care provider within 1 to 2 weeks of discharge.  Continue outpatient follow-up with ID.  Recommend outpatient follow-up with gastroenterology for colonoscopy.

## 2025-04-12 ENCOUNTER — HOSPITAL ENCOUNTER (OUTPATIENT)
Dept: INFUSION CENTER | Facility: CLINIC | Age: 65
Discharge: HOME | End: 2025-04-12
Attending: INTERNAL MEDICINE
Payer: COMMERCIAL

## 2025-04-12 VITALS
TEMPERATURE: 97 F | SYSTOLIC BLOOD PRESSURE: 102 MMHG | HEART RATE: 71 BPM | DIASTOLIC BLOOD PRESSURE: 68 MMHG | OXYGEN SATURATION: 96 % | RESPIRATION RATE: 18 BRPM

## 2025-04-12 DIAGNOSIS — R78.81 STREPTOCOCCAL BACTEREMIA: Primary | ICD-10-CM

## 2025-04-12 DIAGNOSIS — B95.5 STREPTOCOCCAL BACTEREMIA: Primary | ICD-10-CM

## 2025-04-12 LAB
ALBUMIN SERPL BCG-MCNC: 3.5 G/DL (ref 3.5–5)
ALP SERPL-CCNC: 174 U/L (ref 34–104)
ALT SERPL W P-5'-P-CCNC: 12 U/L (ref 7–52)
ANION GAP SERPL CALCULATED.3IONS-SCNC: 6 MMOL/L (ref 4–13)
AST SERPL W P-5'-P-CCNC: 13 U/L (ref 13–39)
BACTERIA BLD CULT: NORMAL
BACTERIA BLD CULT: NORMAL
BASOPHILS # BLD MANUAL: 0 THOUSAND/UL (ref 0–0.1)
BASOPHILS NFR MAR MANUAL: 0 % (ref 0–1)
BILIRUB SERPL-MCNC: 0.45 MG/DL (ref 0.2–1)
BUN SERPL-MCNC: 10 MG/DL (ref 5–25)
CALCIUM SERPL-MCNC: 9 MG/DL (ref 8.4–10.2)
CHLORIDE SERPL-SCNC: 101 MMOL/L (ref 96–108)
CO2 SERPL-SCNC: 27 MMOL/L (ref 21–32)
CREAT SERPL-MCNC: 0.92 MG/DL (ref 0.6–1.3)
EOSINOPHIL # BLD MANUAL: 0.51 THOUSAND/UL (ref 0–0.4)
EOSINOPHIL NFR BLD MANUAL: 5 % (ref 0–6)
ERYTHROCYTE [DISTWIDTH] IN BLOOD BY AUTOMATED COUNT: 13.4 % (ref 11.6–15.1)
GFR SERPL CREATININE-BSD FRML MDRD: 66 ML/MIN/1.73SQ M
GLUCOSE SERPL-MCNC: 108 MG/DL (ref 65–140)
HCT VFR BLD AUTO: 37.7 % (ref 34.8–46.1)
HGB BLD-MCNC: 12.2 G/DL (ref 11.5–15.4)
LYMPHOCYTES # BLD AUTO: 1.52 THOUSAND/UL (ref 0.6–4.47)
LYMPHOCYTES # BLD AUTO: 15 % (ref 14–44)
MCH RBC QN AUTO: 29.8 PG (ref 26.8–34.3)
MCHC RBC AUTO-ENTMCNC: 32.4 G/DL (ref 31.4–37.4)
MCV RBC AUTO: 92 FL (ref 82–98)
MONOCYTES # BLD AUTO: 0.2 THOUSAND/UL (ref 0–1.22)
MONOCYTES NFR BLD: 2 % (ref 4–12)
NEUTROPHILS # BLD MANUAL: 7.9 THOUSAND/UL (ref 1.85–7.62)
NEUTS BAND NFR BLD MANUAL: 3 % (ref 0–8)
NEUTS SEG NFR BLD AUTO: 75 % (ref 43–75)
PLATELET # BLD AUTO: 511 THOUSANDS/UL (ref 149–390)
PLATELET BLD QL SMEAR: ABNORMAL
PMV BLD AUTO: 9.5 FL (ref 8.9–12.7)
POTASSIUM SERPL-SCNC: 4.5 MMOL/L (ref 3.5–5.3)
PROT SERPL-MCNC: 7.2 G/DL (ref 6.4–8.4)
RBC # BLD AUTO: 4.09 MILLION/UL (ref 3.81–5.12)
RBC MORPH BLD: NORMAL
SODIUM SERPL-SCNC: 134 MMOL/L (ref 135–147)
WBC # BLD AUTO: 10.13 THOUSAND/UL (ref 4.31–10.16)

## 2025-04-12 PROCEDURE — 85007 BL SMEAR W/DIFF WBC COUNT: CPT | Performed by: INTERNAL MEDICINE

## 2025-04-12 PROCEDURE — 80053 COMPREHEN METABOLIC PANEL: CPT | Performed by: INTERNAL MEDICINE

## 2025-04-12 PROCEDURE — 85027 COMPLETE CBC AUTOMATED: CPT | Performed by: INTERNAL MEDICINE

## 2025-04-12 PROCEDURE — 96365 THER/PROPH/DIAG IV INF INIT: CPT

## 2025-04-12 RX ORDER — SODIUM CHLORIDE 9 MG/ML
20 INJECTION, SOLUTION INTRAVENOUS ONCE
Status: CANCELLED | OUTPATIENT
Start: 2025-04-13

## 2025-04-12 RX ORDER — SODIUM CHLORIDE 9 MG/ML
20 INJECTION, SOLUTION INTRAVENOUS ONCE
Status: COMPLETED | OUTPATIENT
Start: 2025-04-12 | End: 2025-04-12

## 2025-04-12 RX ADMIN — CEFTRIAXONE SODIUM 2000 MG: 2 INJECTION, POWDER, FOR SOLUTION INTRAMUSCULAR; INTRAVENOUS at 10:16

## 2025-04-12 RX ADMIN — SODIUM CHLORIDE 20 ML/HR: 0.9 INJECTION, SOLUTION INTRAVENOUS at 10:15

## 2025-04-12 NOTE — PROGRESS NOTES
Pt here for abx, tolerated well with no complaints at this time. Next appt 4/13 at 0900 at [facility]. Declined AVS.

## 2025-04-13 ENCOUNTER — HOSPITAL ENCOUNTER (OUTPATIENT)
Dept: INFUSION CENTER | Facility: CLINIC | Age: 65
Discharge: HOME | End: 2025-04-13
Attending: INTERNAL MEDICINE
Payer: COMMERCIAL

## 2025-04-13 VITALS
HEART RATE: 79 BPM | RESPIRATION RATE: 18 BRPM | SYSTOLIC BLOOD PRESSURE: 107 MMHG | TEMPERATURE: 97.1 F | DIASTOLIC BLOOD PRESSURE: 71 MMHG

## 2025-04-13 DIAGNOSIS — R78.81 STREPTOCOCCAL BACTEREMIA: Primary | ICD-10-CM

## 2025-04-13 DIAGNOSIS — B95.5 STREPTOCOCCAL BACTEREMIA: Primary | ICD-10-CM

## 2025-04-13 PROCEDURE — 96365 THER/PROPH/DIAG IV INF INIT: CPT

## 2025-04-13 RX ORDER — SODIUM CHLORIDE 9 MG/ML
20 INJECTION, SOLUTION INTRAVENOUS ONCE
Status: CANCELLED | OUTPATIENT
Start: 2025-04-14

## 2025-04-13 RX ORDER — SODIUM CHLORIDE 9 MG/ML
20 INJECTION, SOLUTION INTRAVENOUS ONCE
Status: COMPLETED | OUTPATIENT
Start: 2025-04-13 | End: 2025-04-13

## 2025-04-13 RX ADMIN — SODIUM CHLORIDE 20 ML/HR: 9 INJECTION, SOLUTION INTRAVENOUS at 08:45

## 2025-04-13 RX ADMIN — CEFTRIAXONE SODIUM 2000 MG: 2 INJECTION, POWDER, FOR SOLUTION INTRAMUSCULAR; INTRAVENOUS at 08:48

## 2025-04-13 NOTE — PROGRESS NOTES
Celia Rivera presents for IV antibiotics, offers no complaints. PICC line flushed with positive blood return. Tolerated treatment well with no complications.      Celia Rivera is aware of future appt on 4/14 at 10:30 am. PICC lined flushed with positive blood return. Passive disinfecting cap placed.    AVS declined.

## 2025-04-14 ENCOUNTER — TELEPHONE (OUTPATIENT)
Age: 65
End: 2025-04-14

## 2025-04-14 ENCOUNTER — HOSPITAL ENCOUNTER (OUTPATIENT)
Dept: INFUSION CENTER | Facility: CLINIC | Age: 65
Discharge: HOME | End: 2025-04-14
Attending: INTERNAL MEDICINE
Payer: COMMERCIAL

## 2025-04-14 ENCOUNTER — TELEPHONE (OUTPATIENT)
Dept: INFECTIOUS DISEASES | Facility: CLINIC | Age: 65
End: 2025-04-14

## 2025-04-14 VITALS
TEMPERATURE: 97.7 F | SYSTOLIC BLOOD PRESSURE: 123 MMHG | RESPIRATION RATE: 18 BRPM | HEART RATE: 74 BPM | DIASTOLIC BLOOD PRESSURE: 69 MMHG

## 2025-04-14 DIAGNOSIS — B95.5 STREPTOCOCCAL BACTEREMIA: Primary | ICD-10-CM

## 2025-04-14 DIAGNOSIS — R78.81 STREPTOCOCCAL BACTEREMIA: Primary | ICD-10-CM

## 2025-04-14 PROCEDURE — 96365 THER/PROPH/DIAG IV INF INIT: CPT

## 2025-04-14 RX ORDER — SODIUM CHLORIDE 9 MG/ML
20 INJECTION, SOLUTION INTRAVENOUS ONCE
Status: COMPLETED | OUTPATIENT
Start: 2025-04-14 | End: 2025-04-14

## 2025-04-14 RX ORDER — SODIUM CHLORIDE 9 MG/ML
20 INJECTION, SOLUTION INTRAVENOUS ONCE
Status: CANCELLED | OUTPATIENT
Start: 2025-04-15

## 2025-04-14 RX ADMIN — SODIUM CHLORIDE 20 ML/HR: 9 INJECTION, SOLUTION INTRAVENOUS at 09:48

## 2025-04-14 RX ADMIN — CEFTRIAXONE SODIUM 2000 MG: 2 INJECTION, POWDER, FOR SOLUTION INTRAMUSCULAR; INTRAVENOUS at 09:51

## 2025-04-14 NOTE — TELEPHONE ENCOUNTER
Patients GI provider:  GERI Mccall    Number to return call: [phone]    Reason for call: Pt was referred by the ED to have port removed.IR drain was placed on 04/05/2025. Patient states the output is around 10cc. Please reach out to patient to schedule, thank you.    Scheduled procedure/appointment date if applicable: Apt/procedure n/a

## 2025-04-14 NOTE — PROGRESS NOTES
Pt to clinic for IV antibiotics, offers no complaints today, tolerated infusion via PICC without complications, aware of when to return for treatment tomorrow 4/15/25 at 9am, PICC flushed and saline locked and passive disinfecting cap applied prior to discharge, avs declined.

## 2025-04-14 NOTE — TELEPHONE ENCOUNTER
Pt called in stating she missed a call from Abby to set up her hospital f/u. Warm transferred her over to [facility] in the office.

## 2025-04-14 NOTE — TELEPHONE ENCOUNTER
Called and spoke with patient today.   Went over antibiotic plan and weekly labs. Informed patient she does not need follow up appointment as she is on a short course of antibiotics.    Informed patient if there are any further questions or concerns to call the office   Patient verbalizes understanding at this time.

## 2025-04-14 NOTE — UTILIZATION REVIEW
NOTIFICATION OF ADMISSION DISCHARGE   This is a Notification of Discharge from [facility]. Please be advised that this patient has been discharge from our facility. Below you will find the admission and discharge date and time including the patient’s disposition.   UTILIZATION REVIEW CONTACT:  Utilization Review Assistants  Network Utilization Review Department  Phone: [phone] x carefully listen to the prompts. All voicemails are confidential.  Email: [email or web address]     ADMISSION INFORMATION  PRESENTATION DATE: 4/4/2025  2:15 PM  OBERVATION ADMISSION DATE: N/A  INPATIENT ADMISSION DATE: 4/4/25  6:11 PM   DISCHARGE DATE: 4/11/2025  1:00 PM   DISPOSITION:Home/Self Care    Network Utilization Review Department  ATTENTION: Please call with any questions or concerns to [phone] and carefully listen to the prompts so that you are directed to the right person. All voicemails are confidential.   For Discharge needs, contact Care Management DC Support Team at [phone] opt. 2  Send all requests for admission clinical reviews, approved or denied determinations and any other requests to dedicated fax number below belonging to the campus where the patient is receiving treatment. List of dedicated fax numbers for the Facilities:  FACILITY NAME UR FAX NUMBER   ADMISSION DENIALS (Administrative/Medical Necessity) [phone]   DISCHARGE SUPPORT TEAM ([facility]) [phone]   PARENT CHILD HEALTH (Maternity/NICU/Pediatrics) [phone]   [facility] [phone]   [facility] [phone]   [facility] [phone]   [facility] [phone]   [facility] [phone]   [facility] [phone]   [facility] [phone]   [facility] [phone]   [facility]  [facility] [phone]   [facility] [phone]   [facility] [phone]   [facility] [phone]

## 2025-04-14 NOTE — PROGRESS NOTES
OPAT NOTE    AP ONLY CAMPUSES ARE: [place] and [place].   In these cases, physician is only cosigning notes.    Supervising/Discharge provider: Mayra  Cosigning Physician:    Diagnosis: Streptococcal bacteremia/ Liver abscess     Drug: Ceftriaxone     Dose/Route/Frequency: [id number]    Labs/Frequency: CBCD CMP weekly     End Date: 4/21    Infusion/VNA/SNF contact: [facility]     Next appointment: no follow up appointment needed due to short course

## 2025-04-15 ENCOUNTER — OFFICE VISIT (OUTPATIENT)
Dept: FAMILY MEDICINE CLINIC | Facility: CLINIC | Age: 65
End: 2025-04-15
Payer: COMMERCIAL

## 2025-04-15 ENCOUNTER — RESULTS FOLLOW-UP (OUTPATIENT)
Dept: OTHER | Facility: HOSPITAL | Age: 65
End: 2025-04-15

## 2025-04-15 ENCOUNTER — PREP FOR PROCEDURE (OUTPATIENT)
Dept: GASTROENTEROLOGY | Facility: CLINIC | Age: 65
End: 2025-04-15

## 2025-04-15 ENCOUNTER — HOSPITAL ENCOUNTER (OUTPATIENT)
Dept: INFUSION CENTER | Facility: CLINIC | Age: 65
Discharge: HOME | End: 2025-04-15
Attending: INTERNAL MEDICINE
Payer: COMMERCIAL

## 2025-04-15 VITALS
TEMPERATURE: 97.3 F | WEIGHT: 189.2 LBS | SYSTOLIC BLOOD PRESSURE: 120 MMHG | HEART RATE: 72 BPM | BODY MASS INDEX: 29.7 KG/M2 | OXYGEN SATURATION: 98 % | HEIGHT: 67 IN | DIASTOLIC BLOOD PRESSURE: 72 MMHG

## 2025-04-15 VITALS
DIASTOLIC BLOOD PRESSURE: 63 MMHG | RESPIRATION RATE: 18 BRPM | HEART RATE: 71 BPM | SYSTOLIC BLOOD PRESSURE: 112 MMHG | TEMPERATURE: 98.1 F

## 2025-04-15 DIAGNOSIS — R79.89 ELEVATED LFTS: ICD-10-CM

## 2025-04-15 DIAGNOSIS — R78.81 STREPTOCOCCAL BACTEREMIA: ICD-10-CM

## 2025-04-15 DIAGNOSIS — K75.0 HEPATIC ABSCESS: ICD-10-CM

## 2025-04-15 DIAGNOSIS — R10.11 RIGHT UPPER QUADRANT PAIN: ICD-10-CM

## 2025-04-15 DIAGNOSIS — B95.5 STREPTOCOCCAL BACTEREMIA: Primary | ICD-10-CM

## 2025-04-15 DIAGNOSIS — K75.0 LIVER ABSCESS: Primary | ICD-10-CM

## 2025-04-15 DIAGNOSIS — A40.8: Primary | ICD-10-CM

## 2025-04-15 DIAGNOSIS — B95.5 STREPTOCOCCAL BACTEREMIA: ICD-10-CM

## 2025-04-15 DIAGNOSIS — Z12.31 ENCOUNTER FOR SCREENING MAMMOGRAM FOR BREAST CANCER: ICD-10-CM

## 2025-04-15 DIAGNOSIS — R65.20: Primary | ICD-10-CM

## 2025-04-15 DIAGNOSIS — K75.0 LIVER ABSCESS: ICD-10-CM

## 2025-04-15 DIAGNOSIS — J96.00: Primary | ICD-10-CM

## 2025-04-15 DIAGNOSIS — R78.81 STREPTOCOCCAL BACTEREMIA: Primary | ICD-10-CM

## 2025-04-15 LAB
ALBUMIN SERPL BCG-MCNC: 3.4 G/DL (ref 3.5–5)
ALP SERPL-CCNC: 163 U/L (ref 34–104)
ALT SERPL W P-5'-P-CCNC: 8 U/L (ref 7–52)
ANION GAP SERPL CALCULATED.3IONS-SCNC: 6 MMOL/L (ref 4–13)
AST SERPL W P-5'-P-CCNC: 12 U/L (ref 13–39)
BASOPHILS # BLD AUTO: 0.08 THOUSANDS/ÂΜL (ref 0–0.1)
BASOPHILS NFR BLD AUTO: 1 % (ref 0–1)
BILIRUB SERPL-MCNC: 0.34 MG/DL (ref 0.2–1)
BUN SERPL-MCNC: 9 MG/DL (ref 5–25)
CALCIUM ALBUM COR SERPL-MCNC: 9.3 MG/DL (ref 8.3–10.1)
CALCIUM SERPL-MCNC: 8.8 MG/DL (ref 8.4–10.2)
CHLORIDE SERPL-SCNC: 101 MMOL/L (ref 96–108)
CO2 SERPL-SCNC: 30 MMOL/L (ref 21–32)
CREAT SERPL-MCNC: 0.87 MG/DL (ref 0.6–1.3)
EOSINOPHIL # BLD AUTO: 0.23 THOUSAND/ÂΜL (ref 0–0.61)
EOSINOPHIL NFR BLD AUTO: 3 % (ref 0–6)
ERYTHROCYTE [DISTWIDTH] IN BLOOD BY AUTOMATED COUNT: 12.9 % (ref 11.6–15.1)
GFR SERPL CREATININE-BSD FRML MDRD: 71 ML/MIN/1.73SQ M
GLUCOSE SERPL-MCNC: 119 MG/DL (ref 65–140)
HCT VFR BLD AUTO: 37.1 % (ref 34.8–46.1)
HGB BLD-MCNC: 11.2 G/DL (ref 11.5–15.4)
IMM GRANULOCYTES # BLD AUTO: 0.06 THOUSAND/UL (ref 0–0.2)
IMM GRANULOCYTES NFR BLD AUTO: 1 % (ref 0–2)
LYMPHOCYTES # BLD AUTO: 1.39 THOUSANDS/ÂΜL (ref 0.6–4.47)
LYMPHOCYTES NFR BLD AUTO: 17 % (ref 14–44)
MCH RBC QN AUTO: 28.6 PG (ref 26.8–34.3)
MCHC RBC AUTO-ENTMCNC: 30.2 G/DL (ref 31.4–37.4)
MCV RBC AUTO: 95 FL (ref 82–98)
MONOCYTES # BLD AUTO: 0.57 THOUSAND/ÂΜL (ref 0.17–1.22)
MONOCYTES NFR BLD AUTO: 7 % (ref 4–12)
NEUTROPHILS # BLD AUTO: 5.74 THOUSANDS/ÂΜL (ref 1.85–7.62)
NEUTS SEG NFR BLD AUTO: 71 % (ref 43–75)
NRBC BLD AUTO-RTO: 0 /100 WBCS
PLATELET # BLD AUTO: 398 THOUSANDS/UL (ref 149–390)
PMV BLD AUTO: 9.5 FL (ref 8.9–12.7)
POTASSIUM SERPL-SCNC: 3.9 MMOL/L (ref 3.5–5.3)
PROT SERPL-MCNC: 7 G/DL (ref 6.4–8.4)
RBC # BLD AUTO: 3.92 MILLION/UL (ref 3.81–5.12)
SODIUM SERPL-SCNC: 137 MMOL/L (ref 135–147)
WBC # BLD AUTO: 8.07 THOUSAND/UL (ref 4.31–10.16)

## 2025-04-15 PROCEDURE — 99495 TRANSJ CARE MGMT MOD F2F 14D: CPT | Performed by: FAMILY MEDICINE

## 2025-04-15 PROCEDURE — 80053 COMPREHEN METABOLIC PANEL: CPT | Performed by: INTERNAL MEDICINE

## 2025-04-15 PROCEDURE — 96365 THER/PROPH/DIAG IV INF INIT: CPT

## 2025-04-15 PROCEDURE — 85025 COMPLETE CBC W/AUTO DIFF WBC: CPT | Performed by: INTERNAL MEDICINE

## 2025-04-15 RX ORDER — METHOCARBAMOL 750 MG/1
750 TABLET, FILM COATED ORAL EVERY 6 HOURS PRN
COMMUNITY

## 2025-04-15 RX ORDER — SODIUM CHLORIDE 9 MG/ML
20 INJECTION, SOLUTION INTRAVENOUS ONCE
Status: COMPLETED | OUTPATIENT
Start: 2025-04-15 | End: 2025-04-15

## 2025-04-15 RX ORDER — CEFTRIAXONE 2 G/50ML
2000 INJECTION, SOLUTION INTRAVENOUS ONCE
Status: CANCELLED | OUTPATIENT
Start: 2025-04-16

## 2025-04-15 RX ORDER — SODIUM CHLORIDE 9 MG/ML
20 INJECTION, SOLUTION INTRAVENOUS ONCE
Status: CANCELLED | OUTPATIENT
Start: 2025-04-16

## 2025-04-15 RX ADMIN — CEFTRIAXONE SODIUM 2000 MG: 2 INJECTION, POWDER, FOR SOLUTION INTRAMUSCULAR; INTRAVENOUS at 09:13

## 2025-04-15 RX ADMIN — SODIUM CHLORIDE 20 ML/HR: 0.9 INJECTION, SOLUTION INTRAVENOUS at 09:05

## 2025-04-15 NOTE — ASSESSMENT & PLAN NOTE
Sepsis secondary to streptococcal species, improving with IV antibiotic, patient continues to have antibiotic treatment outpatient via infusion center  After completion of IV antibiotics patient will have PICC line removed.

## 2025-04-15 NOTE — PROGRESS NOTES
Celia Rivera presents for iv antibiotics, pt  tolerated treatment well with no complications.      Celia Rivera is aware of future appt on 4/16 at 1300.     AVS No (Declined by Celia Rivera) d/c from unit stable

## 2025-04-15 NOTE — TELEPHONE ENCOUNTER
She will need a drain check with IR and they can remove it. I can place an order for this so she can get it scheduled.   In terms of the colonoscopy, I will have our office call her to schedule this.      Cristo can you get her scheduled for a colonoscopy with Giovany? She had a ERCP with him 3 years ago. I will put the order in. Thanks!

## 2025-04-15 NOTE — PROGRESS NOTES
Transition of Care Visit:  Name: Celia Rivera      : 1960      MRN: [id number]  Encounter Provider: Harvey Jordan MD  Encounter Date: 4/15/2025   Encounter [facility]    Assessment & Plan  Sepsis due to other Streptococcus species with acute respiratory failure, unspecified whether hypoxia or hypercapnia present, unspecified whether septic shock present (HCC)  Sepsis secondary to streptococcal species, improving with IV antibiotic, patient continues to have antibiotic treatment outpatient via infusion center  After completion of IV antibiotics patient will have PICC line removed.       Hepatic abscess  Will have IR remove drain once appropriate  Currently being evaluated for origin of hepatic abscess         Streptococcal bacteremia  See above     Elevated LFTs  Resolving       Encounter for screening mammogram for breast cancer  Patient to complete mammogram before end of the year  Orders:    Mammo screening bilateral w 3d and cad; Future    Liver abscess              History of Present Illness     Transitional Care Management Review:   Celia Rivera is a 64 y.o. female here for TCM follow up.     During the TCM phone call patient stated:  TCM Call (since 2025)       Date and time call was made  2025  2:12 PM    Hospital care reviewed  Records reviewed    Patient was hospitialized at  [facility]    Date of Admission  25    Date of discharge  25    Diagnosis  Liver abscess    Disposition  Home    Were the patients medications reviewed and updated  Yes          TCM Call (since 2025)       Scheduled for follow up?  Yes    Patients specialists  Other (comment)    Other specialists names  General Surgeon    Did you obtain your prescribed medications  Yes    Do you need help managing your prescriptions or medications  No    Is transportation to your appointment needed  No    I have advised the patient to call PCP with any new or worsening  symptoms  Abby Moody MA    Living Arrangements  Spouse or Significiant other    Are you recieving home care services  No          HPI    Corazon is a 64-year-old female patient presents for follow-up after her hospitalization at [facility]./Was her 27 until 4/11/2025.  Patient had sepsis secondary to a liver abscess.  CT scan for pulmonary embolism with abdominal pelvis contrast completed on 4/4/2025 demonstrated a 8.5 x 5.7 x 7.1 cm lesion on the right hepatic lobe with heterogeneous rim and enhancement.  Her blood culture initially did not show positive for Staphylococcus intermedius bacteria, repeat blood culture was negative after IV antibiotics.  Patient currently have a IR drain, PICC line in place for outpatient IV antibiotics.  Patient will have the drain removed on Friday 4/18/25.  Patient's alkaline phosphatase is trending down based on most recent blood work completed.  Liver enzyme elevation has resolved.  Her anemia is stable, 11.2, white count is gradually trending down.    Pt states she lost the spinal chord stimulator .     Patient will follow-up with gastroenterology for colonoscopy to evaluate for etiology of her hepatic abscess.  Echocardiogram negative for vegetation.    Review of Systems   Constitutional:  Positive for fatigue. Negative for appetite change, chills and fever.   HENT:  Negative for congestion.    Respiratory:  Negative for chest tightness and shortness of breath.    Cardiovascular:  Negative for chest pain.   Gastrointestinal:  Positive for nausea. Negative for abdominal pain, blood in stool, constipation, diarrhea and vomiting.   Genitourinary:  Positive for hematuria (in the hospital). Negative for dysuria.   Neurological:  Positive for headaches. Negative for dizziness and light-headedness.   Psychiatric/Behavioral:  Negative for sleep disturbance.        Objective   /72 (BP Location: Left arm, Patient Position: Sitting, Cuff Size: Standard)    "Pulse 72   Temp (!) 97.3 °F (36.3 °C) (Temporal)   Ht 5' 7\" (1.702 m)   Wt 85.8 kg (189 lb 3.2 oz)   LMP  (LMP Unknown)   SpO2 98%   BMI 29.63 kg/m²     Physical Exam  Vitals reviewed.   Constitutional:       General: She is not in acute distress.     Appearance: Normal appearance.      Comments: Appears pale   Cardiovascular:      Rate and Rhythm: Normal rate.      Pulses: Normal pulses.      Heart sounds: No murmur heard.  Pulmonary:      Effort: Pulmonary effort is normal. No respiratory distress.      Breath sounds: Normal breath sounds.   Abdominal:      General: Abdomen is flat. Bowel sounds are normal. There is no distension.      Palpations: Abdomen is soft.      Tenderness: There is no abdominal tenderness.   Musculoskeletal:         General: No deformity.   Skin:     Capillary Refill: Capillary refill takes less than 2 seconds.      Coloration: Skin is pale.   Neurological:      General: No focal deficit present.      Mental Status: She is alert.   Psychiatric:         Mood and Affect: Mood normal.         Medications have been reviewed by provider in current encounter    "

## 2025-04-16 ENCOUNTER — HOSPITAL ENCOUNTER (OUTPATIENT)
Dept: INFUSION CENTER | Facility: CLINIC | Age: 65
Discharge: HOME | End: 2025-04-16
Attending: INTERNAL MEDICINE
Payer: COMMERCIAL

## 2025-04-16 VITALS
TEMPERATURE: 96.8 F | HEART RATE: 72 BPM | RESPIRATION RATE: 18 BRPM | SYSTOLIC BLOOD PRESSURE: 116 MMHG | DIASTOLIC BLOOD PRESSURE: 71 MMHG

## 2025-04-16 DIAGNOSIS — B95.5 STREPTOCOCCAL BACTEREMIA: Primary | ICD-10-CM

## 2025-04-16 DIAGNOSIS — R78.81 STREPTOCOCCAL BACTEREMIA: Primary | ICD-10-CM

## 2025-04-16 PROCEDURE — 96365 THER/PROPH/DIAG IV INF INIT: CPT

## 2025-04-16 RX ORDER — SODIUM CHLORIDE 9 MG/ML
20 INJECTION, SOLUTION INTRAVENOUS ONCE
Status: COMPLETED | OUTPATIENT
Start: 2025-04-16 | End: 2025-04-16

## 2025-04-16 RX ORDER — SODIUM CHLORIDE 9 MG/ML
20 INJECTION, SOLUTION INTRAVENOUS ONCE
Status: CANCELLED | OUTPATIENT
Start: 2025-04-17

## 2025-04-16 RX ORDER — CEFTRIAXONE 2 G/50ML
2000 INJECTION, SOLUTION INTRAVENOUS ONCE
Status: COMPLETED | OUTPATIENT
Start: 2025-04-16 | End: 2025-04-16

## 2025-04-16 RX ADMIN — SODIUM CHLORIDE 20 ML/HR: 0.9 INJECTION, SOLUTION INTRAVENOUS at 13:08

## 2025-04-16 RX ADMIN — CEFTRIAXONE SODIUM 2000 MG: 2 INJECTION, POWDER, FOR SOLUTION INTRAMUSCULAR; INTRAVENOUS at 13:10

## 2025-04-16 NOTE — TELEPHONE ENCOUNTER
"Patient is aware Dr Gaffney  will be reaching out in the near future to se up procedure dates.    Patient stated she \"already spoke with IR\" and has her procedure scheduled with them for Friday 4/18 at 8:30  "

## 2025-04-16 NOTE — PROGRESS NOTES
Pt here for Rocephin, offering no complaints. R PICC line flushed per protocol with positive blood return noted. Tolerated entire infusion without complications. PICC line flushed and passive disinfecting cap applied. AVS declined. Next appt 4/17 330pm. Walked out in stable condition.

## 2025-04-17 ENCOUNTER — HOSPITAL ENCOUNTER (OUTPATIENT)
Dept: INFUSION CENTER | Facility: CLINIC | Age: 65
Discharge: HOME | End: 2025-04-17
Attending: INTERNAL MEDICINE
Payer: COMMERCIAL

## 2025-04-17 VITALS
RESPIRATION RATE: 18 BRPM | SYSTOLIC BLOOD PRESSURE: 134 MMHG | TEMPERATURE: 96.6 F | DIASTOLIC BLOOD PRESSURE: 85 MMHG | HEART RATE: 66 BPM

## 2025-04-17 DIAGNOSIS — R78.81 STREPTOCOCCAL BACTEREMIA: Primary | ICD-10-CM

## 2025-04-17 DIAGNOSIS — B95.5 STREPTOCOCCAL BACTEREMIA: Primary | ICD-10-CM

## 2025-04-17 PROCEDURE — 96365 THER/PROPH/DIAG IV INF INIT: CPT

## 2025-04-17 RX ORDER — SODIUM CHLORIDE 9 MG/ML
20 INJECTION, SOLUTION INTRAVENOUS ONCE
Status: COMPLETED | OUTPATIENT
Start: 2025-04-17 | End: 2025-04-17

## 2025-04-17 RX ORDER — SODIUM CHLORIDE 9 MG/ML
20 INJECTION, SOLUTION INTRAVENOUS ONCE
Status: CANCELLED | OUTPATIENT
Start: 2025-04-18

## 2025-04-17 RX ORDER — CEFTRIAXONE 2 G/50ML
2000 INJECTION, SOLUTION INTRAVENOUS ONCE
Status: CANCELLED | OUTPATIENT
Start: 2025-04-18

## 2025-04-17 RX ADMIN — CEFTRIAXONE SODIUM 2000 MG: 2 INJECTION, POWDER, FOR SOLUTION INTRAMUSCULAR; INTRAVENOUS at 15:41

## 2025-04-17 RX ADMIN — SODIUM CHLORIDE 20 ML/HR: 0.9 INJECTION, SOLUTION INTRAVENOUS at 15:38

## 2025-04-17 NOTE — TELEPHONE ENCOUNTER
Called and scheduled patient - reviewed and Mailed Prep Instructions    Scheduled date of colonoscopy (as of today):5/1/25  Physician performing colonoscopy:Giovany  Location of colonoscopy:[place]  Bowel prep reviewed with patient:Markell/Miralax  Instructions reviewed with patient by:Cristo russo  Clearances: none

## 2025-04-17 NOTE — PROGRESS NOTES
Pt to clinic for IV antibiotics and PICC dressing and cap change, offers no complaints today, tolerated infusion via PICC and dressing and cap change without complications, aware of when to return for treatment tomorrow 4/18/25 at 10:30am, PICC flushed and saline locked and passive disinfecting cap applied prior to discharge, avs declined.

## 2025-04-18 ENCOUNTER — HOSPITAL ENCOUNTER (OUTPATIENT)
Dept: NON INVASIVE DIAGNOSTICS | Facility: HOSPITAL | Age: 65
Discharge: HOME | End: 2025-04-18
Attending: RADIOLOGY
Payer: COMMERCIAL

## 2025-04-18 ENCOUNTER — HOSPITAL ENCOUNTER (OUTPATIENT)
Dept: INFUSION CENTER | Facility: CLINIC | Age: 65
End: 2025-04-18
Attending: INTERNAL MEDICINE
Payer: COMMERCIAL

## 2025-04-18 VITALS
RESPIRATION RATE: 17 BRPM | DIASTOLIC BLOOD PRESSURE: 86 MMHG | HEART RATE: 75 BPM | SYSTOLIC BLOOD PRESSURE: 130 MMHG | TEMPERATURE: 97.1 F

## 2025-04-18 DIAGNOSIS — R78.81 STREPTOCOCCAL BACTEREMIA: Primary | ICD-10-CM

## 2025-04-18 DIAGNOSIS — B95.5 STREPTOCOCCAL BACTEREMIA: Primary | ICD-10-CM

## 2025-04-18 DIAGNOSIS — K75.0 LIVER ABSCESS: Primary | ICD-10-CM

## 2025-04-18 PROCEDURE — C1769 GUIDE WIRE: HCPCS

## 2025-04-18 PROCEDURE — C1887 CATHETER, GUIDING: HCPCS

## 2025-04-18 PROCEDURE — 76080 X-RAY EXAM OF FISTULA: CPT

## 2025-04-18 PROCEDURE — 96365 THER/PROPH/DIAG IV INF INIT: CPT

## 2025-04-18 PROCEDURE — C1729 CATH, DRAINAGE: HCPCS

## 2025-04-18 PROCEDURE — 49424 ASSESS CYST CONTRAST INJECT: CPT

## 2025-04-18 PROCEDURE — 75984 XRAY CONTROL CATHETER CHANGE: CPT | Performed by: RADIOLOGY

## 2025-04-18 PROCEDURE — 49423 EXCHANGE DRAINAGE CATHETER: CPT | Performed by: RADIOLOGY

## 2025-04-18 RX ORDER — SODIUM CHLORIDE 9 MG/ML
20 INJECTION, SOLUTION INTRAVENOUS ONCE
Status: CANCELLED | OUTPATIENT
Start: 2025-04-19

## 2025-04-18 RX ORDER — CEFTRIAXONE 2 G/50ML
2000 INJECTION, SOLUTION INTRAVENOUS ONCE
Status: COMPLETED | OUTPATIENT
Start: 2025-04-18 | End: 2025-04-18

## 2025-04-18 RX ORDER — SODIUM CHLORIDE 9 MG/ML
20 INJECTION, SOLUTION INTRAVENOUS ONCE
Status: COMPLETED | OUTPATIENT
Start: 2025-04-18 | End: 2025-04-18

## 2025-04-18 RX ORDER — LIDOCAINE WITH 8.4% SOD BICARB 0.9%(10ML)
SYRINGE (ML) INJECTION AS NEEDED
Status: COMPLETED | OUTPATIENT
Start: 2025-04-18 | End: 2025-04-18

## 2025-04-18 RX ADMIN — IOHEXOL 10 ML: 350 INJECTION, SOLUTION INTRAVENOUS at 09:40

## 2025-04-18 RX ADMIN — SODIUM CHLORIDE 20 ML/HR: 9 INJECTION, SOLUTION INTRAVENOUS at 13:14

## 2025-04-18 RX ADMIN — CEFTRIAXONE SODIUM 2000 MG: 2 INJECTION, POWDER, FOR SOLUTION INTRAMUSCULAR; INTRAVENOUS at 13:14

## 2025-04-18 RX ADMIN — Medication 4 ML: at 09:25

## 2025-04-18 NOTE — PROGRESS NOTES
Pt here for Rocephin, offering no complaints. R PICC line flushed per protocol with positive blood return noted. Tolerated entire infusion without complications. PICC line flushed and passive disinfecting cap applied. AVS declined. Next appt 4/19 1130am. Walked out in stable condition.

## 2025-04-19 ENCOUNTER — HOSPITAL ENCOUNTER (OUTPATIENT)
Dept: INFUSION CENTER | Facility: CLINIC | Age: 65
Discharge: HOME | End: 2025-04-19
Attending: INTERNAL MEDICINE
Payer: COMMERCIAL

## 2025-04-19 VITALS
RESPIRATION RATE: 18 BRPM | SYSTOLIC BLOOD PRESSURE: 107 MMHG | OXYGEN SATURATION: 95 % | DIASTOLIC BLOOD PRESSURE: 71 MMHG | HEART RATE: 60 BPM | TEMPERATURE: 97.9 F

## 2025-04-19 DIAGNOSIS — B95.5 STREPTOCOCCAL BACTEREMIA: Primary | ICD-10-CM

## 2025-04-19 DIAGNOSIS — R78.81 STREPTOCOCCAL BACTEREMIA: Primary | ICD-10-CM

## 2025-04-19 PROCEDURE — 96365 THER/PROPH/DIAG IV INF INIT: CPT

## 2025-04-19 RX ORDER — SODIUM CHLORIDE 9 MG/ML
20 INJECTION, SOLUTION INTRAVENOUS ONCE
Status: CANCELLED | OUTPATIENT
Start: 2025-04-20

## 2025-04-19 RX ORDER — SODIUM CHLORIDE 9 MG/ML
20 INJECTION, SOLUTION INTRAVENOUS ONCE
Status: COMPLETED | OUTPATIENT
Start: 2025-04-19 | End: 2025-04-19

## 2025-04-19 RX ADMIN — CEFTRIAXONE SODIUM 2000 MG: 2 INJECTION, POWDER, FOR SOLUTION INTRAMUSCULAR; INTRAVENOUS at 11:27

## 2025-04-19 RX ADMIN — SODIUM CHLORIDE 20 ML/HR: 0.9 INJECTION, SOLUTION INTRAVENOUS at 11:25

## 2025-04-19 NOTE — PROGRESS NOTES
Pt resting with no complaints. PICC flushes well and gives good blood return. Vitals stable. Callbell within reach.

## 2025-04-20 ENCOUNTER — HOSPITAL ENCOUNTER (OUTPATIENT)
Dept: INFUSION CENTER | Facility: CLINIC | Age: 65
Discharge: HOME | End: 2025-04-20
Attending: INTERNAL MEDICINE
Payer: COMMERCIAL

## 2025-04-20 VITALS
DIASTOLIC BLOOD PRESSURE: 77 MMHG | HEART RATE: 60 BPM | RESPIRATION RATE: 18 BRPM | SYSTOLIC BLOOD PRESSURE: 121 MMHG | TEMPERATURE: 97.5 F

## 2025-04-20 DIAGNOSIS — B95.5 STREPTOCOCCAL BACTEREMIA: Primary | ICD-10-CM

## 2025-04-20 DIAGNOSIS — R78.81 STREPTOCOCCAL BACTEREMIA: Primary | ICD-10-CM

## 2025-04-20 PROCEDURE — 96365 THER/PROPH/DIAG IV INF INIT: CPT

## 2025-04-20 RX ORDER — SODIUM CHLORIDE 9 MG/ML
20 INJECTION, SOLUTION INTRAVENOUS ONCE
Status: COMPLETED | OUTPATIENT
Start: 2025-04-20 | End: 2025-04-20

## 2025-04-20 RX ORDER — SODIUM CHLORIDE 9 MG/ML
20 INJECTION, SOLUTION INTRAVENOUS ONCE
Status: CANCELLED | OUTPATIENT
Start: 2025-04-21

## 2025-04-20 RX ADMIN — SODIUM CHLORIDE 20 ML/HR: 9 INJECTION, SOLUTION INTRAVENOUS at 08:53

## 2025-04-20 RX ADMIN — CEFTRIAXONE 2000 MG: 2 INJECTION, POWDER, FOR SOLUTION INTRAMUSCULAR; INTRAVENOUS at 08:53

## 2025-04-20 NOTE — PROGRESS NOTES
Patient to Infusion Center for antibiotics. Patient offers no complaints at this time. Next appt confirmed with patient for 4/21 at 0730 at [place]. AVS declined.

## 2025-04-21 ENCOUNTER — HOSPITAL ENCOUNTER (OUTPATIENT)
Dept: INFUSION CENTER | Facility: CLINIC | Age: 65
Discharge: HOME | End: 2025-04-21
Attending: INTERNAL MEDICINE
Payer: COMMERCIAL

## 2025-04-21 DIAGNOSIS — B95.5 STREPTOCOCCAL BACTEREMIA: Primary | ICD-10-CM

## 2025-04-21 DIAGNOSIS — R78.81 STREPTOCOCCAL BACTEREMIA: Primary | ICD-10-CM

## 2025-04-21 LAB
ALBUMIN SERPL BCG-MCNC: 3.5 G/DL (ref 3.5–5)
ALP SERPL-CCNC: 150 U/L (ref 34–104)
ALT SERPL W P-5'-P-CCNC: 9 U/L (ref 7–52)
ANION GAP SERPL CALCULATED.3IONS-SCNC: 5 MMOL/L (ref 4–13)
AST SERPL W P-5'-P-CCNC: 16 U/L (ref 13–39)
BASOPHILS # BLD AUTO: 0.12 THOUSANDS/ÂΜL (ref 0–0.1)
BASOPHILS NFR BLD AUTO: 3 % (ref 0–1)
BILIRUB SERPL-MCNC: 0.3 MG/DL (ref 0.2–1)
BUN SERPL-MCNC: 8 MG/DL (ref 5–25)
CALCIUM SERPL-MCNC: 8.9 MG/DL (ref 8.4–10.2)
CHLORIDE SERPL-SCNC: 102 MMOL/L (ref 96–108)
CO2 SERPL-SCNC: 30 MMOL/L (ref 21–32)
CREAT SERPL-MCNC: 0.84 MG/DL (ref 0.6–1.3)
EOSINOPHIL # BLD AUTO: 0.2 THOUSAND/ÂΜL (ref 0–0.61)
EOSINOPHIL NFR BLD AUTO: 5 % (ref 0–6)
ERYTHROCYTE [DISTWIDTH] IN BLOOD BY AUTOMATED COUNT: 12.5 % (ref 11.6–15.1)
GFR SERPL CREATININE-BSD FRML MDRD: 74 ML/MIN/1.73SQ M
GLUCOSE SERPL-MCNC: 96 MG/DL (ref 65–140)
HCT VFR BLD AUTO: 36.3 % (ref 34.8–46.1)
HGB BLD-MCNC: 11.3 G/DL (ref 11.5–15.4)
IMM GRANULOCYTES # BLD AUTO: 0.01 THOUSAND/UL (ref 0–0.2)
IMM GRANULOCYTES NFR BLD AUTO: 0 % (ref 0–2)
LYMPHOCYTES # BLD AUTO: 1.04 THOUSANDS/ÂΜL (ref 0.6–4.47)
LYMPHOCYTES NFR BLD AUTO: 24 % (ref 14–44)
MCH RBC QN AUTO: 28.7 PG (ref 26.8–34.3)
MCHC RBC AUTO-ENTMCNC: 31.1 G/DL (ref 31.4–37.4)
MCV RBC AUTO: 92 FL (ref 82–98)
MONOCYTES # BLD AUTO: 0.47 THOUSAND/ÂΜL (ref 0.17–1.22)
MONOCYTES NFR BLD AUTO: 11 % (ref 4–12)
NEUTROPHILS # BLD AUTO: 2.5 THOUSANDS/ÂΜL (ref 1.85–7.62)
NEUTS SEG NFR BLD AUTO: 57 % (ref 43–75)
NRBC BLD AUTO-RTO: 0 /100 WBCS
PLATELET # BLD AUTO: 331 THOUSANDS/UL (ref 149–390)
PMV BLD AUTO: 9.8 FL (ref 8.9–12.7)
POTASSIUM SERPL-SCNC: 4.1 MMOL/L (ref 3.5–5.3)
PROT SERPL-MCNC: 6.9 G/DL (ref 6.4–8.4)
RBC # BLD AUTO: 3.94 MILLION/UL (ref 3.81–5.12)
SODIUM SERPL-SCNC: 137 MMOL/L (ref 135–147)
WBC # BLD AUTO: 4.34 THOUSAND/UL (ref 4.31–10.16)

## 2025-04-21 PROCEDURE — 96365 THER/PROPH/DIAG IV INF INIT: CPT

## 2025-04-21 PROCEDURE — 85025 COMPLETE CBC W/AUTO DIFF WBC: CPT | Performed by: INTERNAL MEDICINE

## 2025-04-21 PROCEDURE — 80053 COMPREHEN METABOLIC PANEL: CPT | Performed by: INTERNAL MEDICINE

## 2025-04-21 RX ORDER — CEFTRIAXONE 2 G/50ML
2000 INJECTION, SOLUTION INTRAVENOUS ONCE
Status: CANCELLED | OUTPATIENT
Start: 2025-04-21

## 2025-04-21 RX ORDER — SODIUM CHLORIDE 9 MG/ML
20 INJECTION, SOLUTION INTRAVENOUS ONCE
Status: COMPLETED | OUTPATIENT
Start: 2025-04-21 | End: 2025-04-21

## 2025-04-21 RX ORDER — SODIUM CHLORIDE 9 MG/ML
20 INJECTION, SOLUTION INTRAVENOUS ONCE
Status: CANCELLED | OUTPATIENT
Start: 2025-04-21

## 2025-04-21 RX ADMIN — CEFTRIAXONE SODIUM 2000 MG: 2 INJECTION, POWDER, FOR SOLUTION INTRAMUSCULAR; INTRAVENOUS at 07:42

## 2025-04-21 RX ADMIN — SODIUM CHLORIDE 20 ML/HR: 0.9 INJECTION, SOLUTION INTRAVENOUS at 07:42

## 2025-04-21 NOTE — PROGRESS NOTES
Pt presents to the infusion center for IV abx and PICC line removal offering no complaints. Pt tolerated infusion and PICC removal without incident. PICC measured 40cm upon removal. Dressing placed and manual pressure held. Pt educated on s/s of bleeding. Pt aware this is her last infusion.

## 2025-04-23 ENCOUNTER — HOSPITAL ENCOUNTER (OUTPATIENT)
Dept: NON INVASIVE DIAGNOSTICS | Facility: HOSPITAL | Age: 65
Discharge: HOME | End: 2025-04-23
Attending: RADIOLOGY | Admitting: RADIOLOGY
Payer: COMMERCIAL

## 2025-04-23 DIAGNOSIS — K75.0 LIVER ABSCESS: ICD-10-CM

## 2025-04-23 PROCEDURE — 49424 ASSESS CYST CONTRAST INJECT: CPT | Performed by: RADIOLOGY

## 2025-04-23 PROCEDURE — 76080 X-RAY EXAM OF FISTULA: CPT

## 2025-04-23 PROCEDURE — 49424 ASSESS CYST CONTRAST INJECT: CPT

## 2025-04-23 PROCEDURE — 76080 X-RAY EXAM OF FISTULA: CPT | Performed by: RADIOLOGY

## 2025-04-23 RX ADMIN — IOHEXOL 1 ML: 350 INJECTION, SOLUTION INTRAVENOUS at 11:17

## 2025-04-23 NOTE — SEDATION DOCUMENTATION
Tube check performed. Images reviewed with pt. As per MD ok to remove tube today. Pt verbalized understanding. Pt instructed to come back to Er if fever , pain , nausea and fever comes back

## 2025-05-01 ENCOUNTER — APPOINTMENT (OUTPATIENT)
Dept: LAB | Facility: HOSPITAL | Age: 65
End: 2025-05-01
Payer: COMMERCIAL

## 2025-05-01 ENCOUNTER — ANESTHESIA (OUTPATIENT)
Dept: GASTROENTEROLOGY | Facility: HOSPITAL | Age: 65
End: 2025-05-01
Payer: COMMERCIAL

## 2025-05-01 ENCOUNTER — ANESTHESIA EVENT (OUTPATIENT)
Dept: GASTROENTEROLOGY | Facility: HOSPITAL | Age: 65
End: 2025-05-01
Payer: COMMERCIAL

## 2025-05-01 ENCOUNTER — TREATMENT (OUTPATIENT)
Dept: GASTROENTEROLOGY | Facility: CLINIC | Age: 65
End: 2025-05-01

## 2025-05-01 ENCOUNTER — HOSPITAL ENCOUNTER (OUTPATIENT)
Dept: GASTROENTEROLOGY | Facility: HOSPITAL | Age: 65
Setting detail: OUTPATIENT SURGERY
End: 2025-05-01
Attending: PHYSICIAN ASSISTANT
Payer: COMMERCIAL

## 2025-05-01 VITALS
WEIGHT: 186.07 LBS | RESPIRATION RATE: 20 BRPM | SYSTOLIC BLOOD PRESSURE: 132 MMHG | HEIGHT: 67 IN | OXYGEN SATURATION: 98 % | DIASTOLIC BLOOD PRESSURE: 63 MMHG | HEART RATE: 77 BPM | BODY MASS INDEX: 29.2 KG/M2 | TEMPERATURE: 97.6 F

## 2025-05-01 DIAGNOSIS — R10.11 RIGHT UPPER QUADRANT PAIN: ICD-10-CM

## 2025-05-01 DIAGNOSIS — B95.5 STREPTOCOCCAL BACTEREMIA: ICD-10-CM

## 2025-05-01 DIAGNOSIS — C20 RECTAL CANCER (HCC): Primary | ICD-10-CM

## 2025-05-01 DIAGNOSIS — R78.81 STREPTOCOCCAL BACTEREMIA: ICD-10-CM

## 2025-05-01 DIAGNOSIS — K75.0 LIVER ABSCESS: ICD-10-CM

## 2025-05-01 DIAGNOSIS — C20 RECTAL CANCER (HCC): ICD-10-CM

## 2025-05-01 LAB
ANION GAP SERPL CALCULATED.3IONS-SCNC: 6 MMOL/L (ref 4–13)
BUN SERPL-MCNC: 11 MG/DL (ref 5–25)
CALCIUM SERPL-MCNC: 9.1 MG/DL (ref 8.4–10.2)
CEA SERPL-MCNC: 1.3 NG/ML (ref 0–3)
CHLORIDE SERPL-SCNC: 103 MMOL/L (ref 96–108)
CO2 SERPL-SCNC: 29 MMOL/L (ref 21–32)
CREAT SERPL-MCNC: 0.97 MG/DL (ref 0.6–1.3)
GFR SERPL CREATININE-BSD FRML MDRD: 62 ML/MIN/1.73SQ M
GLUCOSE P FAST SERPL-MCNC: 123 MG/DL (ref 65–99)
POTASSIUM SERPL-SCNC: 4.1 MMOL/L (ref 3.5–5.3)
SODIUM SERPL-SCNC: 138 MMOL/L (ref 135–147)

## 2025-05-01 PROCEDURE — 45380 COLONOSCOPY AND BIOPSY: CPT | Performed by: INTERNAL MEDICINE

## 2025-05-01 PROCEDURE — 88342 IMHCHEM/IMCYTCHM 1ST ANTB: CPT | Performed by: PATHOLOGY

## 2025-05-01 PROCEDURE — 82378 CARCINOEMBRYONIC ANTIGEN: CPT

## 2025-05-01 PROCEDURE — 45385 COLONOSCOPY W/LESION REMOVAL: CPT | Performed by: INTERNAL MEDICINE

## 2025-05-01 PROCEDURE — 36415 COLL VENOUS BLD VENIPUNCTURE: CPT

## 2025-05-01 PROCEDURE — 45381 COLONOSCOPY SUBMUCOUS NJX: CPT | Performed by: INTERNAL MEDICINE

## 2025-05-01 PROCEDURE — 88305 TISSUE EXAM BY PATHOLOGIST: CPT | Performed by: PATHOLOGY

## 2025-05-01 PROCEDURE — 80048 BASIC METABOLIC PNL TOTAL CA: CPT

## 2025-05-01 PROCEDURE — 88341 IMHCHEM/IMCYTCHM EA ADD ANTB: CPT | Performed by: PATHOLOGY

## 2025-05-01 RX ORDER — LIDOCAINE HYDROCHLORIDE 10 MG/ML
INJECTION, SOLUTION EPIDURAL; INFILTRATION; INTRACAUDAL; PERINEURAL AS NEEDED
Status: DISCONTINUED | OUTPATIENT
Start: 2025-05-01 | End: 2025-05-01

## 2025-05-01 RX ORDER — SODIUM CHLORIDE, SODIUM LACTATE, POTASSIUM CHLORIDE, CALCIUM CHLORIDE 600; 310; 30; 20 MG/100ML; MG/100ML; MG/100ML; MG/100ML
INJECTION, SOLUTION INTRAVENOUS CONTINUOUS PRN
Status: DISCONTINUED | OUTPATIENT
Start: 2025-05-01 | End: 2025-05-01

## 2025-05-01 RX ORDER — PROPOFOL 10 MG/ML
INJECTION, EMULSION INTRAVENOUS AS NEEDED
Status: DISCONTINUED | OUTPATIENT
Start: 2025-05-01 | End: 2025-05-01

## 2025-05-01 RX ADMIN — PROPOFOL 120 MG: 10 INJECTION, EMULSION INTRAVENOUS at 10:06

## 2025-05-01 RX ADMIN — PROPOFOL 50 MG: 10 INJECTION, EMULSION INTRAVENOUS at 10:21

## 2025-05-01 RX ADMIN — PROPOFOL 50 MG: 10 INJECTION, EMULSION INTRAVENOUS at 10:35

## 2025-05-01 RX ADMIN — PROPOFOL 20 MG: 10 INJECTION, EMULSION INTRAVENOUS at 10:29

## 2025-05-01 RX ADMIN — PROPOFOL 20 MG: 10 INJECTION, EMULSION INTRAVENOUS at 10:26

## 2025-05-01 RX ADMIN — PROPOFOL 30 MG: 10 INJECTION, EMULSION INTRAVENOUS at 10:09

## 2025-05-01 RX ADMIN — PROPOFOL 30 MG: 10 INJECTION, EMULSION INTRAVENOUS at 10:17

## 2025-05-01 RX ADMIN — SODIUM CHLORIDE, SODIUM LACTATE, POTASSIUM CHLORIDE, AND CALCIUM CHLORIDE: .6; .31; .03; .02 INJECTION, SOLUTION INTRAVENOUS at 10:06

## 2025-05-01 RX ADMIN — LIDOCAINE HYDROCHLORIDE 50 MG: 10 INJECTION, SOLUTION EPIDURAL; INFILTRATION; INTRACAUDAL; PERINEURAL at 10:06

## 2025-05-01 RX ADMIN — PROPOFOL 20 MG: 10 INJECTION, EMULSION INTRAVENOUS at 10:32

## 2025-05-01 RX ADMIN — PROPOFOL 30 MG: 10 INJECTION, EMULSION INTRAVENOUS at 10:13

## 2025-05-01 RX ADMIN — PROPOFOL 20 MG: 10 INJECTION, EMULSION INTRAVENOUS at 10:24

## 2025-05-01 NOTE — ANESTHESIA PREPROCEDURE EVALUATION
Procedure:  COLONOSCOPY    Relevant Problems   CARDIO   (+) Hypertension      GI/HEPATIC   (+) GERD (gastroesophageal reflux disease)   (+) Liver abscess      HEMATOLOGY   (+) Anemia      MUSCULOSKELETAL   (+) Chronic low back pain      NEURO/PSYCH   (+) Chronic low back pain        4/7/2025 TTE    Left Ventricle: Left ventricular cavity size is normal. Wall thickness is normal. The left ventricular ejection fraction is 55%. Systolic function is normal. Although no diagnostic regional wall motion abnormality was identified, this possibility cannot be completely excluded on the basis of this study.  This is a technically difficult study. Consider transesophageal echo to rule out endocarditis if clinical index of suspicion is high. Diastolic function is normal.    Left Atrium: The atrium is mildly dilated.    Mitral Valve: There is mild annular calcification.     Physical Exam    Airway    Mallampati score: II  TM Distance: >3 FB  Neck ROM: full     Dental   No notable dental hx     Cardiovascular  Rhythm: regular, No weak pulses    Pulmonary   No stridor    Other Findings  post-pubertal.      Anesthesia Plan  ASA Score- 2     Anesthesia Type- IV sedation with anesthesia with ASA Monitors.         Additional Monitors:     Airway Plan:            Plan Factors-    Chart reviewed.   Existing labs reviewed. Patient summary reviewed.                  Induction- intravenous.    Postoperative Plan-     Perioperative Resuscitation Plan - Level 1 - Full Code.       Informed Consent-       NPO Status:  Vitals Value Taken Time   Date of last liquid 04/30/25 05/01/25 0835   Time of last liquid 2330 05/01/25 0835   Date of last solid 04/29/25 05/01/25 0835   Time of last solid 1700 05/01/25 0835

## 2025-05-01 NOTE — ANESTHESIA POSTPROCEDURE EVALUATION
Post-Op Assessment Note    CV Status:  Stable  Pain Score: 0    Pain management: adequate       Mental Status:  Alert and awake   Hydration Status:  Euvolemic   PONV Controlled:  Controlled   Airway Patency:  Patent     Post Op Vitals Reviewed: Yes    No anethesia notable event occurred.    Staff: CRNA           Last Filed PACU Vitals:  Vitals Value Taken Time   Temp 97.6 °F (36.4 °C) 05/01/25 1045   Pulse 70 05/01/25 1045   BP 96/67 05/01/25 1045   Resp 16 05/01/25 1045   SpO2 98 % 05/01/25 1045       Modified Carrie:     Vitals Value Taken Time   Activity 2 05/01/25 1045   Respiration 2 05/01/25 1045   Circulation 2 05/01/25 1045   Consciousness 1 05/01/25 1045   Oxygen Saturation 2 05/01/25 1045     Modified Carrie Score: 9

## 2025-05-01 NOTE — H&P
"History and Physical -  [facility]  Celia Rivera 64 y.o. female MRN: [id number]      HPI: Celia Rivera is a 64 y.o. year old female who presents for history of colon polyp      REVIEW OF SYSTEMS: Per the HPI, and otherwise unremarkable.    Historical Information   Past Medical History:   Diagnosis Date    Hyperlipidemia     Hypertension      Past Surgical History:   Procedure Laterality Date    BACK SURGERY      L3-S1    CHOLECYSTECTOMY LAPAROSCOPIC N/A 02/10/2022    Procedure: CHOLECYSTECTOMY LAPAROSCOPIC;  Surgeon: Eulalio Gross DO;  Location: [facility];  Service: General    EGD      ELBOW SURGERY Right     IR DRAINAGE TUBE CHECK/CHANGE/REPOSITION/REINSERTION/UPSIZE  2025    IR DRAINAGE TUBE CHECK/CHANGE/REPOSITION/REINSERTION/UPSIZE  2025    IR DRAINAGE TUBE PLACEMENT  2025    IR PICC PLACEMENT SINGLE LUMEN  04/10/2025    SPINAL STIMULATOR PLACEMENT       Social History   Social History     Substance and Sexual Activity   Alcohol Use Never     Social History     Substance and Sexual Activity   Drug Use Not Currently    Types: Marijuana    Comment: haven't used in over 1 year     Social History     Tobacco Use   Smoking Status Former    Current packs/day: 0.00    Types: Cigarettes    Quit date: 2018    Years since quittin.3   Smokeless Tobacco Never     History reviewed. No pertinent family history.    Meds/Allergies     Not in a hospital admission.    No Known Allergies    Objective     Blood pressure 127/82, pulse 73, temperature 97.8 °F (36.6 °C), temperature source Temporal, resp. rate 16, height 5' 7\" (1.702 m), weight 84.4 kg (186 lb 1.1 oz), SpO2 97%.      PHYSICAL EXAM    Gen: NAD  CV: RRR  CHEST: Clear  ABD: soft, NT/ND  EXT: no edema      ASSESSMENT/PLAN:  This is a 64 y.o. year old female here for colonoscopy, and she is stable and optimized for her procedure.          "

## 2025-05-02 ENCOUNTER — RESULTS FOLLOW-UP (OUTPATIENT)
Dept: FAMILY MEDICINE CLINIC | Facility: CLINIC | Age: 65
End: 2025-05-02

## 2025-05-02 ENCOUNTER — RESULTS FOLLOW-UP (OUTPATIENT)
Dept: GASTROENTEROLOGY | Facility: CLINIC | Age: 65
End: 2025-05-02

## 2025-05-02 ENCOUNTER — TELEPHONE (OUTPATIENT)
Dept: GASTROENTEROLOGY | Facility: CLINIC | Age: 65
End: 2025-05-02

## 2025-05-02 DIAGNOSIS — C20 RECTAL CANCER (HCC): Primary | ICD-10-CM

## 2025-05-02 PROCEDURE — 88341 IMHCHEM/IMCYTCHM EA ADD ANTB: CPT | Performed by: PATHOLOGY

## 2025-05-02 PROCEDURE — 88305 TISSUE EXAM BY PATHOLOGIST: CPT | Performed by: PATHOLOGY

## 2025-05-02 PROCEDURE — 88342 IMHCHEM/IMCYTCHM 1ST ANTB: CPT | Performed by: PATHOLOGY

## 2025-05-02 NOTE — TELEPHONE ENCOUNTER
Celia was found on yesterday's colonoscopy to have a rectal cancer extending from 15 cm to 10 cm from the anal verge.  It is a moderately differentiated invasive adenocarcinoma.  Her CEA level is normal.  She has a recent history of a liver abscess 1 month ago and a CT scan of the chest, abdomen, and pelvis at that time showed no evidence of the rectal cancer and no evidence of metastasis.

## 2025-05-02 NOTE — TELEPHONE ENCOUNTER
----- Message from Dre Adair DO sent at 5/2/2025  7:38 AM EDT -----  Please call the patient with the CEA level.  This is carcinoma embryonic antigen.  This is a tumor marker for colon cancer.  The CEA level was normal.  We are still waiting for the results of the biopsies.

## 2025-05-02 NOTE — TELEPHONE ENCOUNTER
Called pt and LMOM with blood work results and to call us back if any questions.  Office # provided.

## 2025-05-05 ENCOUNTER — OFFICE VISIT (OUTPATIENT)
Dept: FAMILY MEDICINE CLINIC | Facility: CLINIC | Age: 65
End: 2025-05-05
Payer: COMMERCIAL

## 2025-05-05 ENCOUNTER — TELEPHONE (OUTPATIENT)
Age: 65
End: 2025-05-05

## 2025-05-05 ENCOUNTER — PREP FOR PROCEDURE (OUTPATIENT)
Age: 65
End: 2025-05-05

## 2025-05-05 VITALS
HEIGHT: 67 IN | BODY MASS INDEX: 29.66 KG/M2 | OXYGEN SATURATION: 97 % | TEMPERATURE: 97.7 F | SYSTOLIC BLOOD PRESSURE: 102 MMHG | WEIGHT: 189 LBS | DIASTOLIC BLOOD PRESSURE: 80 MMHG | HEART RATE: 74 BPM

## 2025-05-05 DIAGNOSIS — C80.1 ADENOCARCINOMA IN ADENOMATOUS POLYP (HCC): Primary | ICD-10-CM

## 2025-05-05 DIAGNOSIS — K63.89 COLONIC MASS: Primary | ICD-10-CM

## 2025-05-05 PROCEDURE — 99214 OFFICE O/P EST MOD 30 MIN: CPT | Performed by: FAMILY MEDICINE

## 2025-05-05 PROCEDURE — G2211 COMPLEX E/M VISIT ADD ON: HCPCS | Performed by: FAMILY MEDICINE

## 2025-05-05 RX ORDER — OXYCODONE 36 MG/1
CAPSULE, EXTENDED RELEASE ORAL
COMMUNITY
Start: 2025-04-29

## 2025-05-05 NOTE — TELEPHONE ENCOUNTER
Patients GI provider:  Dr. Larson    Number to return call: [phone]    Reason for call: Pt calling stating she is returning a call from [facility]. I attempted to connect pt to [facility] with no answer. Please call back pt at the above number provided     Scheduled procedure/appointment date if applicable: Apt/procedure

## 2025-05-05 NOTE — TELEPHONE ENCOUNTER
Returned patient's call.  Scheduled Flexible Sigmoidoscopy on 5/7/25 with Dr. Larson.  Gave patient instructions over the phone.  Patient is scheduled for office visit 5/7/25 at [address] [place] [address] office at 12:40 pm with Dr Larson. Patient agreeable to same.

## 2025-05-05 NOTE — TELEPHONE ENCOUNTER
Patient scheduled for flexible sigmoidoscopy with rigid proctoscopy on 5/9/2025. Follow up OV scheduled for 5/15/2025.

## 2025-05-05 NOTE — TELEPHONE ENCOUNTER
----- Message from Susanna LANDA sent at 5/5/2025  9:23 AM EDT -----  Regarding: FW: referral for rectal cancer    ----- Message -----  From: Susanna Sims MA  Sent: 5/5/2025   8:30 AM EDT  To: [facility]  Subject: FW: referral for rectal cancer                     ----- Message -----  From: GERALD Larson MD  Sent: 5/3/2025   1:01 PM EDT  To: Dre Adair DO; #  Subject: RE: referral for rectal cancer                   Office, please schedule (ASAP!!!) flexible sigmoidoscopy with RIGID PROCTO at GI lab, followed by an OV, hopefully on the same day, so she doesn't have to travel twice.     Thanks, Thanh. We'll try to figure out her appropriate treatment without the MRI. Our u/s probe is not working.    Sunny  ----- Message -----  From: Dre Adair DO  Sent: 5/2/2025   2:40 PM EDT  To: GERALD Larson MD; Abby Toledo, ADAMA  Subject: referral for rectal cancer                       ANDREAS Carlson performed a colonoscopy on this patient yesterday which identified a rectal cancer extending 10 cm to 15 cm from the anal verge.  It was nonobstructing.  A tattoo was placed distally.  The colonoscopy was performed based on a history of liver abscess identified 1 month ago.  She apparently has never undergone colonoscopy in the past.  CT scan of the chest, abdomen, and pelvis performed 1 month ago did not demonstrate evidence of rectal cancer or liver metastasis.  She has a metal implant adjacent to her spine so she is not a candidate for an MRI.  Her CEA level is normal.  The cancer has been identified as a moderately differentiated invasive adenocarcinoma.  She has no symptoms of a change in bowel habits or rectal bleeding.

## 2025-05-05 NOTE — PROGRESS NOTES
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  Patient have referral to colorectal surgery  Will provide referral to hematology oncology for evaluation of adenocarcinoma  Patient denies any significant symptoms such as change in bowel movement, bleeding    Hematology oncologist encouraged to surgery for further steps  Will have patient follow-up with    Orders:    Ambulatory Referral to Hematology / Oncology; Future         History of Present Illness       HPI    Celia is a 64-year-old female patient presents to discuss abnormal finding on colonoscopy.  Patient completed colonoscopy on 2025.  During the procedure, a malignant appearing fungating, multilobular polypoid mass that did not appear friable measuring 4 cm x 5 cm in the proximal rectum from anal verge located.  Core biopsy demonstrates adenocarcinoma, moderately differentiated.  Patient's CEA is negative.  BMP does not demonstrate any significant abnormal finding other than elevated fasting glucose.    EMR reviewed, no referral to hematology and oncology seen.      Patient reports she has the name of the follow-up cancer doctor she have with [facility].  Has referral to colorectal surgery.      Review of Systems   Constitutional:  Negative for chills and fever.   HENT:  Negative for congestion, rhinorrhea and sore throat.    Respiratory:  Negative for chest tightness and shortness of breath.    Cardiovascular:  Negative for chest pain.   Gastrointestinal:  Positive for constipation. Negative for abdominal pain, blood in stool, diarrhea, nausea and vomiting.   Musculoskeletal:  Positive for back pain.   Neurological:  Negative for dizziness, light-headedness and  headaches.   Psychiatric/Behavioral:  Negative for sleep disturbance.        Past Medical History:   Diagnosis Date    Hyperlipidemia     Hypertension      Past Surgical History:   Procedure Laterality Date    BACK SURGERY      L3-S1    CHOLECYSTECTOMY LAPAROSCOPIC N/A 02/10/2022    Procedure: CHOLECYSTECTOMY LAPAROSCOPIC;  Surgeon: Eulalio Gross DO;  Location: [facility];  Service: General    EGD      ELBOW SURGERY Right     IR DRAINAGE TUBE CHECK/CHANGE/REPOSITION/REINSERTION/UPSIZE  2025    IR DRAINAGE TUBE CHECK/CHANGE/REPOSITION/REINSERTION/UPSIZE  2025    IR DRAINAGE TUBE PLACEMENT  2025    IR PICC PLACEMENT SINGLE LUMEN  04/10/2025    SPINAL STIMULATOR PLACEMENT  2019     History reviewed. No pertinent family history.  Social History     Tobacco Use    Smoking status: Former     Current packs/day: 0.00     Types: Cigarettes     Quit date: 2018     Years since quittin.3    Smokeless tobacco: Never   Vaping Use    Vaping status: Former    Substances: Nicotine, THC, Flavoring   Substance and Sexual Activity    Alcohol use: Never    Drug use: Not Currently     Types: Marijuana     Comment: haven't used in over 1 year    Sexual activity: Not on file     Current Outpatient Medications on File Prior to Visit   Medication Sig    methocarbamol (ROBAXIN) 750 mg tablet Take 750 mg by mouth every 6 (six) hours as needed for muscle spasms    multivitamin (THERAGRAN) TABS Take 1 tablet by mouth daily    pantoprazole (PROTONIX) 40 mg tablet Take 1 tablet (40 mg total) by mouth daily (Patient taking differently: Take 40 mg by mouth if needed)    pregabalin (LYRICA) 75 mg capsule Take 75 mg by mouth 3 (three) times a day    senna-docusate sodium (SENOKOT-S) 8.6-50 mg per tablet Take 1 tablet by mouth daily    VITAMIN D, CHOLECALCIFEROL, PO Take by mouth    Xtampza ER 18 MG C12A TAKE 1 CAPSULE BY MOUTH IN THE MORNING FOR CHRONIC PAIN.    Xtampza ER 36 MG extended release capsule PLEASE SEE ATTACHED  "FOR DETAILED DIRECTIONS    dicyclomine (BENTYL) 20 mg tablet Take 1 tablet (20 mg total) by mouth 2 (two) times a day (Patient not taking: Reported on 5/5/2025)    sodium chloride, PF, 0.9 % 10 mL by Intracatheter route daily for 90 doses (Patient not taking: Reported on 5/5/2025)     No Known Allergies    There is no immunization history on file for this patient.  Objective   /80 (BP Location: Right arm, Patient Position: Sitting, Cuff Size: Large)   Pulse 74   Temp 97.7 °F (36.5 °C) (Temporal)   Ht 5' 7\" (1.702 m)   Wt 85.7 kg (189 lb)   LMP  (LMP Unknown)   SpO2 97%   BMI 29.60 kg/m²     Physical Exam  Vitals reviewed.   Constitutional:       General: She is not in acute distress.     Appearance: Normal appearance. She is not ill-appearing, toxic-appearing or diaphoretic.   Cardiovascular:      Rate and Rhythm: Normal rate.      Pulses: Normal pulses.   Pulmonary:      Effort: Pulmonary effort is normal.   Abdominal:      General: Abdomen is flat.   Musculoskeletal:         General: No swelling or deformity.   Skin:     General: Skin is warm and dry.      Capillary Refill: Capillary refill takes less than 2 seconds.      Coloration: Skin is not jaundiced.   Neurological:      General: No focal deficit present.      Mental Status: She is alert.   Psychiatric:         Mood and Affect: Mood normal.       Harvey Jordan M.D.  Family Medicine    Please excuse any \"sound-alike\" errors that may have ocurred during the process of dictation. Parts of this note have been dictated and there may be errors present in the transcription process. Thank you.    "

## 2025-05-05 NOTE — TELEPHONE ENCOUNTER
Left patient to discuss scheduling straight to procedures vs OV. Gave my direct number for a return call.

## 2025-05-05 NOTE — LETTER
Celia Rivera  [address]  [place] [address]        Location:  [facility] - [address] [place] [address] - [facility] - Entrance A - 1st Floor    Performing Physician: MARQUEZ Larson MD      DATE OF PROCEDURE: 5/9/2025 TIME OF PROCEDURE:                                 The OR/GI Lab will contact you the evening prior to your procedure with your exact arrival time.    We kindly ask that you immediately notify us of any need to cancel or reschedule your procedure.      Patient is to have nothing to eat or drink after midnight.    Also in preparation for this procedure, take 2 Fleet Enemas 2 hours prior to the appointment.  These enemas can be purchased over the counter in most pharmacies.  Follow the directions on the box.       Arrangements must be made for a responsible party to drive you home from the procedure.  If you do not have a responsible family member or friend to drive you home, the procedure will not be done.  Lyft, uber, taxi, etc., is not acceptable.  It is important you notify our office of any insurance changes prior to your procedure and, if necessary, supply us with referrals from your primary care physician.    DAY OF THE PROCEDURE:  You may brush your teeth.  Leave all jewelry at home. Take out any facial piercings, if able.  Please arrive for your procedure as indicated by the OR / GI Lab / Endoscopy Unit. The hospital will contact you the day before with your exact arrival time.  Make sure you have arranged ahead of time for a responsible adult (18 or older) to accompany and drive you home after the procedure. Please discuss any transportation concerns with our staff prior to your procedure.  The effects of the anesthesia can persist for 24 hours. After receiving the sedation, you must exercise caution before engaging in any activity that could harm yourself and others (such as driving a car). Do not make any important decisions or do not drink any  alcoholic beverages during this time period.  After your procedure, you may have anything you’d like to eat or drink. You will probably want to start with something light. Please include plenty of fluids. Avoid items that cause gas such as sodas and salads.      SPECIAL INSTRUCTIONS:    For patients currently taking blood thinners and/or antiplatelet therapy our office will contact the prescribing provider. Our office will contact you with any required changes to your medication regimen.  Blood thinner (i.e. - Coumadin, Pradaxa, Lovenox, Xarelto, Eliquis)  N/A  Antiplatelet (i.e. - Plavix, Aggrenox, Effient, Brilinta)  N/A    Diabetes:  If you are Diabetic, please see separate Diabetic Instruction Sheet.  Prescribed medications:  Do not stop your aspirin, or any of your other medications (unless instructed otherwise).  Take the rest of your prescribed medications with small sips of water at least 2 hours prior to your procedure    NOTHING TO EAT OR DRINK (INCLUDING CHEWING GUM) AFTER 12 MIDNIGHT PRIOR TO YOUR PROCEDURE EXCEPT YOUR BOWEL PREP.        For any questions or concerns related to your bowel preparation or pre-procedure instructions, please contact our office.  Thank you for choosing [facility]!    Revised 1/2023    [phone]

## 2025-05-05 NOTE — TELEPHONE ENCOUNTER
VM for patient to call.  Told patient to ask for me to schedule appointments.  Need to schedule patient for flexible sigmoidoscopy with rigid procto and office visit on same day with Dr Larson.

## 2025-05-06 PROBLEM — A41.9 SEPSIS (HCC): Status: RESOLVED | Noted: 2025-04-06 | Resolved: 2025-05-06

## 2025-05-08 ENCOUNTER — TELEPHONE (OUTPATIENT)
Dept: GASTROENTEROLOGY | Facility: HOSPITAL | Age: 65
End: 2025-05-08

## 2025-05-09 ENCOUNTER — ANESTHESIA (OUTPATIENT)
Dept: GASTROENTEROLOGY | Facility: HOSPITAL | Age: 65
End: 2025-05-09
Payer: COMMERCIAL

## 2025-05-09 ENCOUNTER — RESULTS FOLLOW-UP (OUTPATIENT)
Dept: FAMILY MEDICINE CLINIC | Facility: CLINIC | Age: 65
End: 2025-05-09

## 2025-05-09 ENCOUNTER — HOSPITAL ENCOUNTER (OUTPATIENT)
Dept: GASTROENTEROLOGY | Facility: HOSPITAL | Age: 65
Setting detail: OUTPATIENT SURGERY
End: 2025-05-09
Attending: COLON & RECTAL SURGERY
Payer: COMMERCIAL

## 2025-05-09 ENCOUNTER — ANESTHESIA EVENT (OUTPATIENT)
Dept: GASTROENTEROLOGY | Facility: HOSPITAL | Age: 65
End: 2025-05-09
Payer: COMMERCIAL

## 2025-05-09 VITALS
RESPIRATION RATE: 16 BRPM | SYSTOLIC BLOOD PRESSURE: 116 MMHG | OXYGEN SATURATION: 97 % | DIASTOLIC BLOOD PRESSURE: 69 MMHG | TEMPERATURE: 96.2 F | HEART RATE: 55 BPM

## 2025-05-09 DIAGNOSIS — C80.1 ADENOCARCINOMA IN A POLYP (HCC): ICD-10-CM

## 2025-05-09 DIAGNOSIS — C19 RECTOSIGMOID CANCER (HCC): Primary | ICD-10-CM

## 2025-05-09 DIAGNOSIS — R10.13 EPIGASTRIC ABDOMINAL PAIN: ICD-10-CM

## 2025-05-09 PROCEDURE — 45338 SIGMOIDOSCOPY W/TUMR REMOVE: CPT | Performed by: COLON & RECTAL SURGERY

## 2025-05-09 PROCEDURE — 88305 TISSUE EXAM BY PATHOLOGIST: CPT | Performed by: STUDENT IN AN ORGANIZED HEALTH CARE EDUCATION/TRAINING PROGRAM

## 2025-05-09 RX ORDER — SODIUM CHLORIDE 9 MG/ML
INJECTION, SOLUTION INTRAVENOUS CONTINUOUS PRN
Status: DISCONTINUED | OUTPATIENT
Start: 2025-05-09 | End: 2025-05-09

## 2025-05-09 RX ORDER — PROPOFOL 10 MG/ML
INJECTION, EMULSION INTRAVENOUS AS NEEDED
Status: DISCONTINUED | OUTPATIENT
Start: 2025-05-09 | End: 2025-05-09

## 2025-05-09 RX ADMIN — PROPOFOL 20 MG: 10 INJECTION, EMULSION INTRAVENOUS at 11:40

## 2025-05-09 RX ADMIN — SODIUM CHLORIDE: 0.9 INJECTION, SOLUTION INTRAVENOUS at 11:26

## 2025-05-09 RX ADMIN — PROPOFOL 30 MG: 10 INJECTION, EMULSION INTRAVENOUS at 11:32

## 2025-05-09 RX ADMIN — PROPOFOL 30 MG: 10 INJECTION, EMULSION INTRAVENOUS at 11:35

## 2025-05-09 RX ADMIN — PROPOFOL 70 MG: 10 INJECTION, EMULSION INTRAVENOUS at 11:29

## 2025-05-09 RX ADMIN — PROPOFOL 30 MG: 10 INJECTION, EMULSION INTRAVENOUS at 11:37

## 2025-05-09 NOTE — H&P
History and Physical   [facility]   Celia Rivera 64 y.o. female MRN: [id number]  Unit/Bed#:  Encounter: [id number]  05/09/25   11:18 AM      CC:  rectal adenocarcinoma.    History of Present Illness   HPI:  Celia Rivera is a 64 y.o. female with known lesion in the rectum for pre-consultation evaluation of tumor.    She had presented to the emergency room in April for symptoms and sepsis.  A CT scan showed a liver abscess that was treated with a drain.  Ultrasound follow-up allowed for removal of that drain.  She is now doing well.    Colonoscopy revealed a rectal mass and colon polyp.  Biopsies proved the rectal mass to be adenocarcinoma.  Her only symptom is a minimal amount of rectal discomfort.  She has no bleeding.    Laboratory studies are essentially normal.  CEA is 1.3.  She has mild anemia with a hemoglobin of 11.    She is not amenable to an MRI due to an implant.  A repeat CT scan will be done to rule out metastases and reevaluate the liver finding of an abscess before drainage.    Historical Information   Past Medical History:   Diagnosis Date    Chronic pain disorder     Hyperlipidemia     Hypertension      Past Surgical History:   Procedure Laterality Date    BACK SURGERY      L3-S1    CHOLECYSTECTOMY LAPAROSCOPIC N/A 02/10/2022    Procedure: CHOLECYSTECTOMY LAPAROSCOPIC;  Surgeon: Eulalio Gross DO;  Location: [facility];  Service: General    EGD      ELBOW SURGERY Right     IR DRAINAGE TUBE CHECK/CHANGE/REPOSITION/REINSERTION/UPSIZE  04/18/2025    IR DRAINAGE TUBE CHECK/CHANGE/REPOSITION/REINSERTION/UPSIZE  04/23/2025    IR DRAINAGE TUBE PLACEMENT  04/05/2025    IR PICC PLACEMENT SINGLE LUMEN  04/10/2025    SPINAL STIMULATOR PLACEMENT  2019       Meds/Allergies     Not in a hospital admission.      Current Outpatient Medications:     multivitamin (THERAGRAN) TABS, Take 1 tablet by mouth daily, Disp: , Rfl:     pregabalin (LYRICA) 75 mg capsule, Take 75 mg by mouth 3 (three) times a  day, Disp: , Rfl:     senna-docusate sodium (SENOKOT-S) 8.6-50 mg per tablet, Take 1 tablet by mouth daily, Disp: 7 tablet, Rfl: 0    VITAMIN D, CHOLECALCIFEROL, PO, Take by mouth, Disp: , Rfl:     Xtampza ER 18 MG C12A, TAKE 1 CAPSULE BY MOUTH IN THE MORNING FOR CHRONIC PAIN., Disp: , Rfl:     dicyclomine (BENTYL) 20 mg tablet, Take 1 tablet (20 mg total) by mouth 2 (two) times a day (Patient not taking: Reported on 2025), Disp: 20 tablet, Rfl: 0    methocarbamol (ROBAXIN) 750 mg tablet, Take 750 mg by mouth every 6 (six) hours as needed for muscle spasms, Disp: , Rfl:     pantoprazole (PROTONIX) 40 mg tablet, Take 1 tablet (40 mg total) by mouth daily (Patient taking differently: Take 40 mg by mouth if needed), Disp: 90 tablet, Rfl: 0    sodium chloride, PF, 0.9 %, 10 mL by Intracatheter route daily for 90 doses (Patient not taking: Reported on 2025), Disp: 300 mL, Rfl: 2    Xtampza ER 36 MG extended release capsule, PLEASE SEE ATTACHED FOR DETAILED DIRECTIONS, Disp: , Rfl:     No Known Allergies      Social History   Social History     Substance and Sexual Activity   Alcohol Use Never     Social History     Substance and Sexual Activity   Drug Use Not Currently     Social History     Tobacco Use   Smoking Status Former    Current packs/day: 0.00    Types: Cigarettes    Quit date: 2018    Years since quittin.3   Smokeless Tobacco Never         Family History: History reviewed. No pertinent family history.      Objective     Current Vitals:   Blood Pressure: 143/85 (25 1105)  Pulse: 62 (25 1105)  Temperature: (!) 97.3 °F (36.3 °C) (25 1105)  Temp Source: Tympanic (25 1105)  Respirations: 16 (25 1105)  SpO2: 97 % (25 1105)  No intake or output data in the 24 hours ending 25 1118    Physical Exam:  General:  Well nourished, no distress.  Neuro: Alert and oriented  Eyes:Sclera anicteric, conjunctiva pink.  Pulm: Clear to auscultation bilaterally. No respiratory  Distress.   CV:  Regular rate and rhythm. No murmurs.  Abdomen:  Soft, flat, non-tender, without masses or hepatosplenomegaly.  Arms and legs without findings.    Lab Results: All reviewed.    CT results were reviewed.  Images from colonoscopy were reviewed.  The report was reviewed.  Pathology findings from colonoscopy were reviewed.      ASSESSMENT:  Celia Rivera is a 64 y.o. female for assessment of rectal mass.  PLAN:  Colonoscopy.  Risks , including, but not limited to, bleeding, perforation, missed lesions, and potential need for surgery, were reviewed. Alternatives to colonoscopy were discussed.  Repeat CT scan will be done to assess the therapy for the liver abscess and rule out metastases.  Close attention to the rectal tissues will be made.    She is scheduled for a follow-up visit in less than 2 weeks.  We can review the findings and potential surgical plan at that time.  I have asked for the CT scan to be done with added care in the rectal region to assess for lymphadenopathy as well as possible.  MARQUEZ Larson MD

## 2025-05-09 NOTE — ANESTHESIA PREPROCEDURE EVALUATION
Procedure:  FLEXIBLE SIGMOIDOSCOPY    Relevant Problems   CARDIO   (+) Hypertension      GI/HEPATIC   (+) GERD (gastroesophageal reflux disease)   (+) Liver abscess      HEMATOLOGY   (+) Anemia      MUSCULOSKELETAL   (+) Chronic low back pain      NEURO/PSYCH   (+) Chronic low back pain      Numbness in left foot since her back surgery    NPO 5/8    Physical Exam    Airway    Mallampati score: I  TM Distance: >3 FB  Neck ROM: full     Dental       Cardiovascular  Cardiovascular exam normal    Pulmonary  Pulmonary exam normal     Other Findings  post-pubertal.      Anesthesia Plan  ASA Score- 2     Anesthesia Type- IV sedation with anesthesia with ASA Monitors.         Additional Monitors:     Airway Plan:            Plan Factors-Exercise tolerance (METS): >4 METS.    Chart reviewed. EKG reviewed.  Existing labs reviewed. Patient summary reviewed.          Obstructive sleep apnea risk education given perioperatively.        Induction- intravenous.    Postoperative Plan-     Perioperative Resuscitation Plan - Level 1 - Full Code.       Informed Consent- Anesthetic plan and risks discussed with patient.  I personally reviewed this patient with the CRNA. Discussed and agreed on the Anesthesia Plan with the CRNA..      NPO Status:  Vitals Value Taken Time   Date of last liquid 05/08/25 05/09/25 1051   Time of last liquid 2230 05/09/25 1051   Date of last solid 05/08/25 05/09/25 1051   Time of last solid 1600 05/09/25 1051

## 2025-05-09 NOTE — ANESTHESIA POSTPROCEDURE EVALUATION
Post-Op Assessment Note    CV Status:  Stable  Pain Score: 0    Pain management: adequate       Mental Status:  Awake   Hydration Status:  Euvolemic   PONV Controlled:  Controlled   Airway Patency:  Patent     Post Op Vitals Reviewed: Yes    No anethesia notable event occurred.    Staff: CRNA           Last Filed PACU Vitals:  Vitals Value Taken Time   Temp 96.5    Pulse 66    BP 91/50    Resp 12    SpO2 93%

## 2025-05-13 ENCOUNTER — RESULTS FOLLOW-UP (OUTPATIENT)
Dept: OTHER | Facility: HOSPITAL | Age: 65
End: 2025-05-13

## 2025-05-13 PROCEDURE — 88305 TISSUE EXAM BY PATHOLOGIST: CPT | Performed by: STUDENT IN AN ORGANIZED HEALTH CARE EDUCATION/TRAINING PROGRAM

## 2025-05-13 NOTE — H&P (VIEW-ONLY)
Colon and Rectal Surgery   Celia Rivera 64 y.o. female MRN [id number]  Encounter: [id number]  05/15/25 11:29 AM            Assessment: Celia Rivera is a 64 y.o. female who has rectosigmoid cancer.      Plan:   Rectosigmoid cancer (HCC)  The patient presents for evaluation of a rectosigmoid cancer.  Rigid proctoscopy revealed the tumor to be well out of the mid rectum, making it a rectosigmoid tumor.  The measurement was at 17 cm from the anal verge on rigid proctoscopy.  No other lesions were seen except for a polyp that was distal to the lesion.  This was removed and was benign.  The finding simplifies her care and that we can move directly to surgery.    Her operation would involve an attempt at a minimally invasive low anterior resection.  This would be done robotically or laparoscopically.  A primary anastomosis is intended.  She understands the risks of surgery.    Risks of surgery, including but not limited to bleeding, need for transfusion of blood products, pain, infection, hernia formation, injury to the ureter or other internal organs requiring surgery specific to these injuries, anastomotic leak, bowel obstruction, bladder dysfunction, deep vein thrombosis, renal failure, pulmonary embolism, myocardial infarction or heart failure, stroke, death, need for and enterostomy, or other unspecified complications were discussed. Benefits and alternatives were discussed. Questions were answered.  She agrees to surgery.    She is very well and relatively young.  She has back issues that limit her ability to exercise.  I will defer on preoperative surgical optimization for these reasons.    She has a CT scan scheduled to reevaluate for liver lesions and clearance of her prior liver abscess.  Currently, she has no symptoms and feels well.  Her exam is benign.      Subjective     HPI    Celia Rivera is a 64 y.o. female who presents for follow up to flexible sigmoidoscopy; surgical discussion.     Flexible  sigmoidoscopy on 5/9/2025 showed: 1)  Rigid proctoscopy was done and measures the rectal cancer at 17 cm from the anal verge.  This makes the tumor more rectosigmoid than rectal and allows for a simplified treatment plan.  A low anterior resection is recommended.  2) 2 polyps in the proximal rectum; performed cold snare. 3) View from the area of the proximal anus. Shows a normal rectum above.       Lab Results   Component Value Date    CEA 1.3 05/01/2025     Lab Results   Component Value Date    WBC 4.34 04/21/2025    HGB 11.3 (L) 04/21/2025    HCT 36.3 04/21/2025    MCV 92 04/21/2025     04/21/2025     Lab Results   Component Value Date    SODIUM 138 05/01/2025    K 4.1 05/01/2025     05/01/2025    CO2 29 05/01/2025    AGAP 6 05/01/2025    BUN 11 05/01/2025    CREATININE 0.97 05/01/2025    GLUC 96 04/21/2025    GLUF 123 (H) 05/01/2025    CALCIUM 9.1 05/01/2025    AST 16 04/21/2025    ALT 9 04/21/2025    ALKPHOS 150 (H) 04/21/2025    TP 6.9 04/21/2025    TBILI 0.30 04/21/2025    EGFR 62 05/01/2025     Historical Information   Past Medical History:   Diagnosis Date    Chronic pain disorder     Hyperlipidemia     Hypertension      Past Surgical History:   Procedure Laterality Date    BACK SURGERY      L3-S1    CHOLECYSTECTOMY LAPAROSCOPIC N/A 02/10/2022    Procedure: CHOLECYSTECTOMY LAPAROSCOPIC;  Surgeon: Eulalio Gross DO;  Location: [facility];  Service: General    EGD      ELBOW SURGERY Right     IR DRAINAGE TUBE CHECK/CHANGE/REPOSITION/REINSERTION/UPSIZE  04/18/2025    IR DRAINAGE TUBE CHECK/CHANGE/REPOSITION/REINSERTION/UPSIZE  04/23/2025    IR DRAINAGE TUBE PLACEMENT  04/05/2025    IR PICC PLACEMENT SINGLE LUMEN  04/10/2025    SPINAL STIMULATOR PLACEMENT  2019       Meds/Allergies       Current Outpatient Medications:     dicyclomine (BENTYL) 20 mg tablet, Take 1 tablet (20 mg total) by mouth 2 (two) times a day (Patient not taking: Reported on 5/5/2025), Disp: 20 tablet, Rfl: 0    methocarbamol  "(ROBAXIN) 750 mg tablet, Take 750 mg by mouth every 6 (six) hours as needed for muscle spasms, Disp: , Rfl:     multivitamin (THERAGRAN) TABS, Take 1 tablet by mouth daily, Disp: , Rfl:     pantoprazole (PROTONIX) 40 mg tablet, Take 1 tablet (40 mg total) by mouth daily (Patient taking differently: Take 40 mg by mouth if needed), Disp: 90 tablet, Rfl: 0    pregabalin (LYRICA) 75 mg capsule, Take 75 mg by mouth 3 (three) times a day, Disp: , Rfl:     senna-docusate sodium (SENOKOT-S) 8.6-50 mg per tablet, Take 1 tablet by mouth daily, Disp: 7 tablet, Rfl: 0    sodium chloride, PF, 0.9 %, 10 mL by Intracatheter route daily for 90 doses (Patient not taking: Reported on 2025), Disp: 300 mL, Rfl: 2    VITAMIN D, CHOLECALCIFEROL, PO, Take by mouth, Disp: , Rfl:     Xtampza ER 18 MG C12A, TAKE 1 CAPSULE BY MOUTH IN THE MORNING FOR CHRONIC PAIN., Disp: , Rfl:     Xtampza ER 36 MG extended release capsule, PLEASE SEE ATTACHED FOR DETAILED DIRECTIONS, Disp: , Rfl:   No Known Allergies    Social History   Social History     Substance and Sexual Activity   Drug Use Not Currently     Social History     Tobacco Use   Smoking Status Former    Current packs/day: 0.00    Types: Cigarettes    Quit date: 2018    Years since quittin.3   Smokeless Tobacco Never         No family history on file.      Review of Systems    Objective   Current Vitals:  Vitals:    05/15/25 1103   Weight: 88 kg (194 lb)   Height: 5' 7\" (1.702 m)         Physical Exam  Constitutional:       Appearance: Normal appearance.     Eyes:      Conjunctiva/sclera: Conjunctivae normal.       Cardiovascular:      Rate and Rhythm: Normal rate and regular rhythm.   Pulmonary:      Effort: Pulmonary effort is normal.      Breath sounds: Normal breath sounds.   Abdominal:      General: Abdomen is flat.      Palpations: Abdomen is soft.     Neurological:      General: No focal deficit present.      Mental Status: She is alert and oriented to person, place, and " time.     Psychiatric:         Mood and Affect: Mood normal.         Behavior: Behavior normal.

## 2025-05-13 NOTE — PROGRESS NOTES
Colon and Rectal Surgery   Celia Rivera 64 y.o. female MRN [id number]  Encounter: [id number]  05/15/25 11:29 AM            Assessment: Celia Rivera is a 64 y.o. female who has rectosigmoid cancer.      Plan:   Rectosigmoid cancer (HCC)  The patient presents for evaluation of a rectosigmoid cancer.  Rigid proctoscopy revealed the tumor to be well out of the mid rectum, making it a rectosigmoid tumor.  The measurement was at 17 cm from the anal verge on rigid proctoscopy.  No other lesions were seen except for a polyp that was distal to the lesion.  This was removed and was benign.  The finding simplifies her care and that we can move directly to surgery.    Her operation would involve an attempt at a minimally invasive low anterior resection.  This would be done robotically or laparoscopically.  A primary anastomosis is intended.  She understands the risks of surgery.    Risks of surgery, including but not limited to bleeding, need for transfusion of blood products, pain, infection, hernia formation, injury to the ureter or other internal organs requiring surgery specific to these injuries, anastomotic leak, bowel obstruction, bladder dysfunction, deep vein thrombosis, renal failure, pulmonary embolism, myocardial infarction or heart failure, stroke, death, need for and enterostomy, or other unspecified complications were discussed. Benefits and alternatives were discussed. Questions were answered.  She agrees to surgery.    She is very well and relatively young.  She has back issues that limit her ability to exercise.  I will defer on preoperative surgical optimization for these reasons.    She has a CT scan scheduled to reevaluate for liver lesions and clearance of her prior liver abscess.  Currently, she has no symptoms and feels well.  Her exam is benign.      Subjective     HPI    Celia Rivera is a 64 y.o. female who presents for follow up to flexible sigmoidoscopy; surgical discussion.     Flexible  sigmoidoscopy on 5/9/2025 showed: 1)  Rigid proctoscopy was done and measures the rectal cancer at 17 cm from the anal verge.  This makes the tumor more rectosigmoid than rectal and allows for a simplified treatment plan.  A low anterior resection is recommended.  2) 2 polyps in the proximal rectum; performed cold snare. 3) View from the area of the proximal anus. Shows a normal rectum above.       Lab Results   Component Value Date    CEA 1.3 05/01/2025     Lab Results   Component Value Date    WBC 4.34 04/21/2025    HGB 11.3 (L) 04/21/2025    HCT 36.3 04/21/2025    MCV 92 04/21/2025     04/21/2025     Lab Results   Component Value Date    SODIUM 138 05/01/2025    K 4.1 05/01/2025     05/01/2025    CO2 29 05/01/2025    AGAP 6 05/01/2025    BUN 11 05/01/2025    CREATININE 0.97 05/01/2025    GLUC 96 04/21/2025    GLUF 123 (H) 05/01/2025    CALCIUM 9.1 05/01/2025    AST 16 04/21/2025    ALT 9 04/21/2025    ALKPHOS 150 (H) 04/21/2025    TP 6.9 04/21/2025    TBILI 0.30 04/21/2025    EGFR 62 05/01/2025     Historical Information   Past Medical History:   Diagnosis Date    Chronic pain disorder     Hyperlipidemia     Hypertension      Past Surgical History:   Procedure Laterality Date    BACK SURGERY      L3-S1    CHOLECYSTECTOMY LAPAROSCOPIC N/A 02/10/2022    Procedure: CHOLECYSTECTOMY LAPAROSCOPIC;  Surgeon: Eulalio Gross DO;  Location: [facility];  Service: General    EGD      ELBOW SURGERY Right     IR DRAINAGE TUBE CHECK/CHANGE/REPOSITION/REINSERTION/UPSIZE  04/18/2025    IR DRAINAGE TUBE CHECK/CHANGE/REPOSITION/REINSERTION/UPSIZE  04/23/2025    IR DRAINAGE TUBE PLACEMENT  04/05/2025    IR PICC PLACEMENT SINGLE LUMEN  04/10/2025    SPINAL STIMULATOR PLACEMENT  2019       Meds/Allergies       Current Outpatient Medications:     dicyclomine (BENTYL) 20 mg tablet, Take 1 tablet (20 mg total) by mouth 2 (two) times a day (Patient not taking: Reported on 5/5/2025), Disp: 20 tablet, Rfl: 0    methocarbamol  "(ROBAXIN) 750 mg tablet, Take 750 mg by mouth every 6 (six) hours as needed for muscle spasms, Disp: , Rfl:     multivitamin (THERAGRAN) TABS, Take 1 tablet by mouth daily, Disp: , Rfl:     pantoprazole (PROTONIX) 40 mg tablet, Take 1 tablet (40 mg total) by mouth daily (Patient taking differently: Take 40 mg by mouth if needed), Disp: 90 tablet, Rfl: 0    pregabalin (LYRICA) 75 mg capsule, Take 75 mg by mouth 3 (three) times a day, Disp: , Rfl:     senna-docusate sodium (SENOKOT-S) 8.6-50 mg per tablet, Take 1 tablet by mouth daily, Disp: 7 tablet, Rfl: 0    sodium chloride, PF, 0.9 %, 10 mL by Intracatheter route daily for 90 doses (Patient not taking: Reported on 2025), Disp: 300 mL, Rfl: 2    VITAMIN D, CHOLECALCIFEROL, PO, Take by mouth, Disp: , Rfl:     Xtampza ER 18 MG C12A, TAKE 1 CAPSULE BY MOUTH IN THE MORNING FOR CHRONIC PAIN., Disp: , Rfl:     Xtampza ER 36 MG extended release capsule, PLEASE SEE ATTACHED FOR DETAILED DIRECTIONS, Disp: , Rfl:   No Known Allergies    Social History   Social History     Substance and Sexual Activity   Drug Use Not Currently     Social History     Tobacco Use   Smoking Status Former    Current packs/day: 0.00    Types: Cigarettes    Quit date: 2018    Years since quittin.3   Smokeless Tobacco Never         No family history on file.      Review of Systems    Objective   Current Vitals:  Vitals:    05/15/25 1103   Weight: 88 kg (194 lb)   Height: 5' 7\" (1.702 m)         Physical Exam  Constitutional:       Appearance: Normal appearance.     Eyes:      Conjunctiva/sclera: Conjunctivae normal.       Cardiovascular:      Rate and Rhythm: Normal rate and regular rhythm.   Pulmonary:      Effort: Pulmonary effort is normal.      Breath sounds: Normal breath sounds.   Abdominal:      General: Abdomen is flat.      Palpations: Abdomen is soft.     Neurological:      General: No focal deficit present.      Mental Status: She is alert and oriented to person, place, and " time.     Psychiatric:         Mood and Affect: Mood normal.         Behavior: Behavior normal.

## 2025-05-13 NOTE — LETTER
May 14, 2025     Celia Miguel  [facility] [address]      Dear Ms. Rivera:    Below are the results from your recent visit: Sigmoidoscopy    Resulted Orders   Tissue Exam   Result Value Ref Range    Case Report       Surgical Pathology Report                         Case: [id number]                                  Authorizing Provider:  GERALD Larson MD       Collected:           05/09/2025 1137              Ordering Location:     [facility]       Received:            05/09/2025 [address] [place] [address] Endoscopy                                                           Pathologist:           Karl Ruiz DO                                                          Specimen:    Polyp, Colorectal, Proximal rectum polyp x2, cold snare                                    Final Diagnosis       A. Polyp, Colorectal, Proximal rectum polyp x2, cold snare:  - Fragments of hyperplastic polyp(s).  - Separate fragment of granulation tissue.  - Negative for dysplasia and malignancy.          Additional Information       All reported additional testing was performed with appropriately reactive controls.  These tests were developed and their performance characteristics determined by [facility] or appropriate performing facility, though some tests may be performed on tissues which have not been validated for performance characteristics (such as staining performed on alcohol exposed cell blocks and decalcified tissues).  Results should be interpreted with caution and in the context of the patients’ clinical condition. These tests may not be cleared or approved by the U.S. Food and Drug Administration, though the FDA has determined that such clearance or approval is not necessary. These tests are used for clinical purposes and they should not be regarded as investigational or for research. This laboratory has been approved by CLIA 88, designated  "as a high-complexity laboratory and is qualified to perform these tests.    Interpretation performed at [facility] [address] [place] [address], [place] [address].      Synoptic Checklist          COLON/RECTUM POLYP FORM - GI - All Specimens          :    Other      Gross Description       A. The specimen is received in formalin, labeled with the patient's name and hospital number, and is designated \" proximal rectum polyp x 2\".  The specimen consists of multiple tan-pink irregularly-shaped tissue fragments measuring in aggregate of 1.7 x 0.5 x 0.2 cm.  The specimen is entirely submitted in a screened cassette.    Note: The estimated total formalin fixation time based upon information provided by the submitting clinician and the standard processing schedule is under 72 hours. Kimberly Conway       The test results shown above are benign.     If you have any questions or concerns, please don't hesitate to call.         Sincerely,        MARQUEZ Larson MD  "

## 2025-05-14 ENCOUNTER — DOCUMENTATION (OUTPATIENT)
Dept: HEMATOLOGY ONCOLOGY | Facility: CLINIC | Age: 65
End: 2025-05-14

## 2025-05-14 ENCOUNTER — PATIENT OUTREACH (OUTPATIENT)
Dept: HEMATOLOGY ONCOLOGY | Facility: CLINIC | Age: 65
End: 2025-05-14

## 2025-05-14 NOTE — PROGRESS NOTES
Initial outreach. Spoke to Celia Rivera. Introduced myself and explained the role of an Oncology Nurse Navigator to assist with coordination of cancer care, preparation for upcoming appointment, be a point of contact prior to oncology consult, provide support and connect her with available resources. Discussed Medical Oncology referral. Explained I will be her main contact until she is established with her team and a treatment plan is established.     Assessed for barriers of care.   General assessment completed Yes    Do you have a history of cancer? no  Have you ever received Radiation Therapy? No    Do you have any implantable devices?   [] Pacemaker  [x] Pain stimulator  [] Defibrillator  [] Implanted sleep apnea device  []     NVD/constipation: No  Weight Loss: No  Appetite change: No  Fatigue: Yes.  Lethargy  Pain: No  Other:     PCP:   Insurance:  Reviewed upcoming appointments including date, time and location.  Future Appointments   Date Time Provider Department [place]   5/15/2025 11:00 AM GERALD Larson MD [facility] [address] [place] [address]   5/27/2025  4:00 PM Harvey Jordan MD [facility]       Introduced Corazon, and explained she will be her patient navigator, who will reach out after the first consult to introduce themselves. The PN will provide support, make sure she has a good understanding of the plan and schedule and to connect with additional resources if/when needed.     My direct contact information provided. Patient is aware that she can call me should she need any further assistance. Celia was very appreciative.

## 2025-05-14 NOTE — PROGRESS NOTES
All records needed are in patients chart. No records retrieval needed at this time.    Pt referred to Medical Oncoogy and should be scheduled within 7 days.  She also needs CT CAP scheduled prior to consult. Please notify me if not scheduled within time frame.    Referral received/ Chart reviewed for work up completed     Imaging completed:    [] PET/CT   [] MRI   [] CT   [] US   [] Mammo   [] Bone scan   [] N/A    Pathology completed:    Date:   Location:   []N/A    Additional records needed:    [] Genomic report   [] Genetic testing results   [] Office Note   [] Procedure/ Operative note   [] Lab results   [] N/A      [] Radiation Oncology records retrieval needed (PN to route to rad/onc clerical pool once scheduled)  Date:  Location:      Referring provider-Dr. Jordan    Colonoscopy date-5/1/25    Impression-  Polyp in the proximal sigmoid colon was removed with cold snare  Single malignant-appearing, fungating, multilobular and polypoid mass measuring 4 cm x 5 cm in the proximal rectum 10 cm from the anal verge, covering one half of the circumference; performed cold forceps biopsy; tattooed distal to the finding  The cecum, ascending colon, hepatic flexure, transverse colon, splenic flexure, descending colon and rectosigmoid appeared normal.    Pathology-  RESULTS OF IMMUNOHISTOCHEMICAL ANALYSIS FOR MISMATCH REPAIR PROTEIN LOSS  INTERPRETATION: No loss of nuclear expression of MMR proteins: low probability of MSI-H        RESULTS:  Antibody            Clone                 Description                                 Results  MLH1                 G168                  Mismatch repair protein  Intact nuclear expression  MSH2                [id number]        Mismatch repair protein  Intact nuclear expression  MSH6                SP93                  Mismatch repair protein  Intact nuclear expression  PMS2                 EP51                  Mismatch repair protein  Intact nuclear expression     Final Diagnosis  A.  Polyp, Colorectal, proximal sigmoid:  - Hyperplastic polyp.  - No epithelial dysplasia and no evidence of malignancy.     B. Colon, rectal mass:  - Adenocarcinoma, moderately differentiated.    Labs-  4/21/25 CMP  5/1/25 BMP  5/1/25 CEA 1.3    Imaging-  4/4/25 CT pe study w a/p    Scheduled appointments-  5/15/25 Dr. Larson    Surgical date-TBD      Post surgical pathology-N/A      Previous history of cancer/treatment? No

## 2025-05-15 ENCOUNTER — OFFICE VISIT (OUTPATIENT)
Age: 65
End: 2025-05-15
Attending: INTERNAL MEDICINE
Payer: COMMERCIAL

## 2025-05-15 VITALS — WEIGHT: 194 LBS | HEIGHT: 67 IN | BODY MASS INDEX: 30.45 KG/M2

## 2025-05-15 DIAGNOSIS — C19 RECTOSIGMOID CANCER (HCC): Primary | ICD-10-CM

## 2025-05-15 DIAGNOSIS — Z01.818 PRE-OP EVALUATION: ICD-10-CM

## 2025-05-15 PROCEDURE — 99205 OFFICE O/P NEW HI 60 MIN: CPT | Performed by: COLON & RECTAL SURGERY

## 2025-05-15 RX ORDER — NEOMYCIN SULFATE 500 MG/1
1000 TABLET ORAL 3 TIMES DAILY
OUTPATIENT
Start: 2025-05-15 | End: 2025-05-16

## 2025-05-15 RX ORDER — HEPARIN SODIUM 5000 [USP'U]/ML
5000 INJECTION, SOLUTION INTRAVENOUS; SUBCUTANEOUS ONCE
OUTPATIENT
Start: 2025-05-15 | End: 2025-05-15

## 2025-05-15 RX ORDER — SODIUM CHLORIDE, SODIUM LACTATE, POTASSIUM CHLORIDE, CALCIUM CHLORIDE 600; 310; 30; 20 MG/100ML; MG/100ML; MG/100ML; MG/100ML
20 INJECTION, SOLUTION INTRAVENOUS CONTINUOUS
OUTPATIENT
Start: 2025-05-15

## 2025-05-15 RX ORDER — METRONIDAZOLE 250 MG/1
500 TABLET ORAL 3 TIMES DAILY
OUTPATIENT
Start: 2025-05-15 | End: 2025-05-16

## 2025-05-15 NOTE — ASSESSMENT & PLAN NOTE
The patient presents for evaluation of a rectosigmoid cancer.  Rigid proctoscopy revealed the tumor to be well out of the mid rectum, making it a rectosigmoid tumor.  The measurement was at 17 cm from the anal verge on rigid proctoscopy.  No other lesions were seen except for a polyp that was distal to the lesion.  This was removed and was benign.  The finding simplifies her care and that we can move directly to surgery.    Her operation would involve an attempt at a minimally invasive low anterior resection.  This would be done robotically or laparoscopically.  A primary anastomosis is intended.  She understands the risks of surgery.    Risks of surgery, including but not limited to bleeding, need for transfusion of blood products, pain, infection, hernia formation, injury to the ureter or other internal organs requiring surgery specific to these injuries, anastomotic leak, bowel obstruction, bladder dysfunction, deep vein thrombosis, renal failure, pulmonary embolism, myocardial infarction or heart failure, stroke, death, need for and enterostomy, or other unspecified complications were discussed. Benefits and alternatives were discussed. Questions were answered.  She agrees to surgery.    She is very well and relatively young.  She has back issues that limit her ability to exercise.  I will defer on preoperative surgical optimization for these reasons.    She has a CT scan scheduled to reevaluate for liver lesions and clearance of her prior liver abscess.  Currently, she has no symptoms and feels well.  Her exam is benign.

## 2025-05-15 NOTE — TELEPHONE ENCOUNTER
Patient scheduled for surgery  6/10/2025  at [facility]. Instructions and PAT's gone over with and handed to the patient.   Post Op  SOC  Antibiotics   Type and Screen   ----- Message from GERALD Larson MD sent at 5/15/2025 11:29 AM EDT -----  OR

## 2025-05-15 NOTE — LETTER
Celia Rivera  [facility] [address] [facility] [place] [address]        5/15/2025    Dear Celia Rivera,    Your surgery is scheduled for: 6/10/2025   The hospital will call the evening prior to your surgery with your expected arrival time.   Location:[address] [address]    CHECK LIST PRIOR TO INPATIENT SURGERY  It is your responsibility to obtain any/all referrals needed for your surgery if required by your insurance. Our office will contact you to discuss your insurance coverage for this procedure.    Special instructions required if you are taking any blood thinners. Please verify with the prescribing physician. Examples include Coumadin, Plavix, Xarelto, Eliquis, Pradaxa, etc.    Please check with your family physician if you are taking the following medications: Aspirin or any Aspirin containing medication, Gingko biloba, Ginseng, Feverfew, and/or Laura’s Wort. We suggest stopping these for 3 days.     The night before and the day of your surgery, wash from your neck to groin with chlorhexidine soap.  This soap is available at most retail pharmacies under such brand names as Hibiclens, Endure or Aplicare.    Pre-admission testing Required: YES x NO     If Yes, what type:  BLOOD-WORK X       Other Clearances/ Additional Testing: NONE    BOWEL PREPARATION REQUIRED: YES x NO   If yes, start date: 6/09/2025. Please see attached bowel preparation instructions.    NOTHING TO EAT OR DRINK AFTER MIDNIGHT PRIOR TO SURGERY.    Please do not hesitate to call our office with any questions regarding your surgery.                 MIRALAX/GATORADE SURGERY PREPARATION INSTRUCTIONS  DAY BEFORE SURGERY:  Have a normal breakfast and clear liquids thereafter. It is important that you drink plenty of clear liquids throughout the day to prevent dehydration. Nothing red, orange or purple    You MAY have:  Soda  Water  Broth Gatorade  Jell-O  Popsicles Coffee/Tea without milk/creamer      YOU MAY NOT HAVE:  Solid Foods  Milk and milk products  Juice with pulp    BOWEL PREPARATION: Includes: One (1) 238-gram container of Miralax (polyethylene glycol 3350), four (4) 5 mg Dulcolax (bisacodyl) tablets, and 64-ounces of Gatorade (sports drink). Preparation may be refrigerated. Entire bowel prep should be completed.    **REMEMBER** A prescription for antibiotics has been called into your pharmacy for  prior to your surgery.    At 1:00 pm, take (4) Dulcolax Tablets with 8 oz. of water.  Swallow the tablets whole with a full glass of water.  The package may direct you not to exceed (2) tablets at any time but for the purpose of this examination, you should take (4).    At 1:00 pm, Mix the 238g bottle of MiraLax in 64 oz. of Gatorade and shake the solution until the MiraLax is dissolved.  Drink 8 oz. glassfuls at your own pace.  It may take 3-4 hours to drink all the solution.    At 1:00 pm, take your first dose of antibiotic as prescribed.    At 2:00 pm, take your second dose of antibiotic as prescribed.    At 10:00 pm, take your last dose of antibiotic as prescribed.  REMEMBER TO STAY CLOSE TO TOILET FACILITIES.    DAY OF SURGERY  NOTHING TO EAT OR DRINK (INCLUDING CHEWING GUM) AFTER 12 MIDNIGHT PRIOR TO YOUR SURGERY EXCEPT YOUR PRE-SURGERY ENSURE DRINK (IF APPLICABLE).  For any questions or concerns related to your bowel preparation or pre-procedure instructions, please contact our office. Thank you for choosing [facility]!                            Post-Operative Care Information  Abdominal Surgery  What are my post-operative instructions?   The following information and instructions are for your continued care after discharge from the hospital. Please read the information (including the After Visit Summary provided to you at the time of discharge) carefully. If you have any questions or concerns, please contact the clinical staff at [phone]    How do I take care of  my incision?  Your incision(s) may have surgical glue, dissolvable sutures with white pieces of tape called steri strips, or staples.   If you have steri strips or surgical glue, do not remove them. Steri strips will fall off naturally in 7-10 days. Surgical glue (Exofin) will fall off over a period of up to 2-3 weeks.   Do not put any topical ointments or lotions on the incisions.   Avoid tight clothing around your incision(s) or fabric that may irritate your skin such as wool, hooks and hernan.     Should I continue taking my prescribed medications?   Take medications as prescribed. Please review the After Visit Summary provided to you at the time of discharge for details.     How will I manage my pain at home?  For best pain control take your pain medication at regular intervals for the first couple of days, about every 4-6 hours. This will prevent pain build-up, which occurs when medication is taken on an as needed basis.   Use only as much prescription pain medication as you need (i.e. 1 tablet instead of 2).  Taper off the prescription pain medication as pain decreases, stopping narcotics first.   Use over-the-counter acetaminophen (Tylenolâ) if your doctor allows. When using over-the-counter medication, never use more than what is prescribed on the package directions. Do not take more than 3000mg of Tylenolâ in a 24 hour period. Do not take more than 2400mg of Ibuprofen (such as Motrinâ or Advilâ) in a 24 hour period.   While taking prescription pain medication you may not drive, work, or drink alcohol.     Are there any diet restrictions?   Continue low fiber diet up to 1 week post op.  (This allows the bowel to rest)    It is normal for bowel movements to be loose and occur more frequently post-operatively. This should improve over time.  During this time pay attention to hydration  1.5 weeks post op you may slowly begin to add fiber back into your diet.    By 2 weeks post op you may resume a normal high  fiber diet.     What activity restrictions will I have?   Walk as much as tolerated.  Do not lift, pull, or push objects greater than 10 pounds for 6 weeks. This includes vacuuming, lifting children or groceries, walking the dog, mowing lawns, snow shoveling, etc. Please discuss other specific physical activities with the doctor at your post op appointment.   Do not drive while taking pain medications. You may drive when you have no pain - usually in 1-2 weeks following surgery.   You may climb stairs in moderation but do not overtire.  Resume sexual activity after you are cleared by your doctor.     When will I receive follow-up care?   You will be scheduled to return to see your physician in the office typically in 3-4 weeks after surgery.    If you do not have a scheduled appointment at the time of discharge, please call the office at [phone].    When should I call my doctor?   Notify your doctor for any of the following signs and symptoms of possible infection:   Temperature above 101 degrees.   Increase in pain or discomfort.   Redness, swelling, drainage at incision site(s). A small amount of clear yellow or pinkish-yellow drainage is normal  If incision begins to open.     Call if you have any changes in your overall health such as having:   Nausea   Vomiting   Chills   profuse (excessive) sweating   inability to urinate or completely empty bladder   diarrhea   constipation

## 2025-05-16 ENCOUNTER — TELEPHONE (OUTPATIENT)
Dept: ANESTHESIOLOGY | Facility: CLINIC | Age: 65
End: 2025-05-16

## 2025-05-18 NOTE — PROGRESS NOTES
Name: Celia Rivera      : 1960      MRN: [id number]  Encounter Provider: Jose Arizmendi MD  Encounter Date: 2025   Encounter department: [facility] [place]    Chief Complaint   Patient presents with   • Consult     :  Assessment & Plan  Carcinoma of rectosigmoid (colon) (HCC)  Newly diagnosed at colonoscopic biopsy from May 01, 2025  Surgical resection scheduled for Katty 10, 2025    Orders:  •  CBC and differential; Future  •  Comprehensive metabolic panel; Future  •  CEA; Future    Liver abscess due to bacteria  Status post drainage and antimicrobial care       Health care maintenance    Orders:  •  Mammo screening bilateral w 3d and cad; Future  •  DXA bone density spine hip and pelvis; Future    Encounter for screening mammogram for malignant neoplasm of breast    Orders:  •  Mammo screening bilateral w 3d and cad; Future    Postmenopausal    Orders:  •  DXA bone density spine hip and pelvis; Future    Class 1 obesity due to excess calories with body mass index (BMI) of 30.0 to 30.9 in adult, unspecified whether serious comorbidity present           Assessment & Plan          Clinical/Pathologic Stage  Cancer Staging   No matching staging information was found for the patient.      Current Therapy  Pending surgical reviews    Discussion  This delightful postmenopausal female reports to medical oncology in connection around her newly diagnosed diagnosis of invasive adenocarcinoma of the rectosigmoid colon.  Her diagnosis was serendipitously discovered at the time of imaging related to a bacterial hepatic abscess review for care..  The patient subsequently had colonoscopic reviews with biopsies x 2 and is set to undergo definitive surgical resection of disease on Katty 10, 2025.    This patient's risk factors for the development of solid tumor malignancy include age, gender, obesity, lifestyle including inconsistent regular exercise and poor adherence to screening by  "age and gender    I reviewed her diagnosis in great detail and provided handwritten and formal education sheets for her edification.  I specifically discussed curative level therapeutic intent to include surgical care for local control and diagnostic staging as well as adjuvant care planning around the care of micrometastatic disease.    The patient has had a CT scan of the chest abdomen pelvis the results of which at this time have not been finalized and made available.    The patient returns to medical oncology 2-4 weeks postoperatively for comprehensive reviews and therapeutic planning.    As indicated, the patient received educational materials on colon cancer, adjuvant systemic therapy and chemotherapy.  She expressed understanding clarity and gratitude    I would note that the patient has not had age-appropriate gender appropriate mammography, bone density scan and updated GYN evaluations through her own decision making.  I strongly encouraged these reviews.        Special Studies  05/01/2025 MMR Proteins by IHC  INTERPRETATION: No loss of nuclear expression of MMR proteins: low probability of MSI-H        RESULTS:  Antibody            Clone                 Description                                 Results  MLH1                 G168                  Mismatch repair protein  Intact nuclear expression  MSH2                [id number]        Mismatch repair protein  Intact nuclear expression  MSH6                SP93                  Mismatch repair protein  Intact nuclear expression  PMS2                 EP51                  Mismatch repair protein  Intact nuclear expression     Note: A positive control for each antibody have been reviewed and accepted.     No results found for: \"GENETIC\", \"INTERP\", \"GENES\"      History of Present Illness   This postmenopausal female comes for medical oncology reviews around a new diagnosis of adenocarcinoma of the rectosigmoid colon.    On April 5, 2025 the patient had " placement of drainage tube to due to possible liver abscess in the form of a complex fluid collection in the dome of the liver.  Fluid was positive for bacteria, white blood cells, gram-positive cocci in pairs chains and clusters with growth of Streptococcus intermedius.    On April 15, 2025 the patient completed her first colonoscopy in the setting of right upper quadrant pain, liver abscess and strep bacteremia history.  Please see [facility] surgical pathology number  [id number] for confirmation and review.  the patient was found to have 1 sessile polyp smaller than 5 mm in the proximal sigmoid colon which was removed completely by cold snare.  She was noted to have a single malignant appearing fungating and multilobular polypoid mass measuring 4 cm x 5 cm in the proximal rectum at about 10 cm from the anal verge.  Mass extended from approximately 10 cm - 15 cm from the anal verge.  The mass comfort one half of the luminal circumference.  The remaining surfaces from the cecum, ascending colon, hepatic flexure, transverse colon, splenic flexure, descending colon and rectosigmoid colon appeared normal.    Final pathologic diagnosis ( [id number]) revealed a hyperplastic polyp involving the proximal sigmoid colon.  There is no dysplasia or evidence of malignancy.  Biopsy of the colorectal mass confirmed adenocarcinoma moderately differentiated.  On review of immunohistochemical analysis for mismatch repair protein loss there is no loss of nuclear expression of MMR proteins and therefore a low probability of MSI high high.      On May 9, 2025 the patient was reviewed by colorectal surgery with flexible sigmoidoscopy plus colonoscopy.  The patient was found to have 2 sessile polyps measuring smaller 5 mm in the proximal rectum 14 cm from the anal verge.  Rigid proctoscope was done and measured at the rectal cancer at 17 cm from the anal verge.  The tumor was therefore deemed to be rectosigmoid versus rectal.    By  "review of systems I note that the patient has not had prior mammography, recent GYN reviews or prior bone density scans.  She also has declined COVID vaccination over time.    Pertinent History from May 2025    Cancer Screening and Prevention  Mammography: Not on file   GI/Colonoscopy: 2025   GYN: Not on file   Bone Density: Not on file   Covid 19 Vaccination Status: Never    Oncology Therapeutic Summary:             Review of Systems  Medical History Reviewed by provider this encounter:     .    Medications Ordered Prior to Encounter[1]   Social History     Tobacco Use   • Smoking status: Former     Current packs/day: 0.00     Types: Cigarettes     Quit date: 2018     Years since quittin.3   • Smokeless tobacco: Never   Vaping Use   • Vaping status: Former   • Substances: Nicotine, THC, Flavoring   Substance and Sexual Activity   • Alcohol use: Never   • Drug use: Not Currently         Objective   BP (!) 148/102 (BP Location: Left arm, Patient Position: Sitting, Cuff Size: Adult)   Pulse 73   Temp 98.2 °F (36.8 °C) (Temporal)   Resp 16   Ht 5' 7\" (1.702 m)   Wt 87.1 kg (192 lb)   LMP  (LMP Unknown)   SpO2 100%   BMI 30.07 kg/m²     Pain Screening:  Pain Score:   8  ECOG   1  Physical Exam  Constitutional:       General: She is not in acute distress.     Appearance: Normal appearance. She is obese.   Neck:      Trachea: Trachea normal.     Cardiovascular:      Rate and Rhythm: Normal rate and regular rhythm.      Heart sounds: Murmur heard.      Systolic murmur is present with a grade of 2/6.      No diastolic murmur is present.      No gallop. No S3 or S4 sounds.   Pulmonary:      Breath sounds: Normal breath sounds. No decreased air movement or transmitted upper airway sounds. No decreased breath sounds, wheezing or rhonchi.   Chest:      Chest wall: No swelling or tenderness. There is no dullness to percussion.   Abdominal:      General: Abdomen is protuberant. Bowel sounds are normal.      " Palpations: Abdomen is soft. There is no hepatomegaly, splenomegaly or mass.      Tenderness: There is no abdominal tenderness. There is no right CVA tenderness or left CVA tenderness.     Musculoskeletal:      Right lower leg: No edema.      Left lower leg: No edema.   Lymphadenopathy:      Cervical:      Right cervical: No superficial, deep or posterior cervical adenopathy.     Left cervical: No superficial, deep or posterior cervical adenopathy.     Neurological:      Mental Status: She is alert.         Labs: I have reviewed the following labs:  Lab Results   Component Value Date/Time    WBC 4.34 04/21/2025 07:42 AM    RBC 3.94 04/21/2025 07:42 AM    Hemoglobin 11.3 (L) 04/21/2025 07:42 AM    Hematocrit 36.3 04/21/2025 07:42 AM    MCV 92 04/21/2025 07:42 AM    MCH 28.7 04/21/2025 07:42 AM    RDW 12.5 04/21/2025 07:42 AM    Platelets 331 04/21/2025 07:42 AM    Segmented % 57 04/21/2025 07:42 AM    Lymphocytes % 24 04/21/2025 07:42 AM    Monocytes % 11 04/21/2025 07:42 AM    Eosinophils Relative 5 04/21/2025 07:42 AM    Basophils Relative 3 (H) 04/21/2025 07:42 AM    Immature Grans % 0 04/21/2025 07:42 AM    Absolute Neutrophils 2.50 04/21/2025 07:42 AM     Lab Results   Component Value Date/Time    Potassium 4.1 05/01/2025 11:39 AM    Chloride 103 05/01/2025 11:39 AM    CO2 29 05/01/2025 11:39 AM    BUN 11 05/01/2025 11:39 AM    Creatinine 0.97 05/01/2025 11:39 AM    Glucose, Fasting 123 (H) 05/01/2025 11:39 AM    Calcium 9.1 05/01/2025 11:39 AM    Corrected Calcium 9.3 04/15/2025 09:04 AM    AST 16 04/21/2025 07:42 AM    ALT 9 04/21/2025 07:42 AM    Alkaline Phosphatase 150 (H) 04/21/2025 07:42 AM    Total Protein 6.9 04/21/2025 07:42 AM    Albumin 3.5 04/21/2025 07:42 AM    Total Bilirubin 0.30 04/21/2025 07:42 AM    eGFR 62 05/01/2025 11:39 AM         Pathology:   May 9, 2025 S 25 [id number] status post biopsy/cold snare polypectomy via endoscopy/colonoscopy  Final Diagnosis   A. Polyp, Colorectal, Proximal rectum  polyp x2, cold snare:  - Fragments of hyperplastic polyp(s).  - Separate fragment of granulation tissue.  - Negative for dysplasia and malignancy.         May 1, 2025 S 25 [id number]  status post colonoscopy  Final Diagnosis     A. Polyp, Colorectal, proximal sigmoid: - Hyperplastic polyp. - No epithelial dysplasia and no evidence of malignancy.     B. Colon, rectal mass: - Adenocarcinoma, moderately differentiated. Comment: Immunohistochemistry    RESULTS OF IMMUNOHISTOCHEMICAL ANALYSIS FOR MISMATCH REPAIR PROTEIN LOSS  INTERPRETATION: No loss of nuclear expression of MMR proteins: low probability of MSI-H        RESULTS:  Antibody            Clone                 Description                                 Results  MLH1                 G168                  Mismatch repair protein  Intact nuclear expression  MSH2                [id number]        Mismatch repair protein  Intact nuclear expression  MSH6                SP93                  Mismatch repair protein  Intact nuclear expression  PMS2                 EP51                  Mismatch repair protein  Intact nuclear expression     Note: A positive control for each antibody have been reviewed and accepted.        Imaging:   May 20, 2025 CT scan chest abdomen pelvis with contrast  Results pending  No results found for this or any previous visit.                 [1]  Current Outpatient Medications on File Prior to Visit   Medication Sig Dispense Refill   • methocarbamol (ROBAXIN) 750 mg tablet Take 750 mg by mouth every 6 (six) hours as needed for muscle spasms     • multivitamin (THERAGRAN) TABS Take 1 tablet by mouth in the morning.     • pregabalin (LYRICA) 75 mg capsule Take 75 mg by mouth in the morning and 75 mg in the evening and 75 mg before bedtime.     • senna-docusate sodium (SENOKOT-S) 8.6-50 mg per tablet Take 1 tablet by mouth daily 7 tablet 0   • VITAMIN D, CHOLECALCIFEROL, PO Take by mouth     • Xtampza ER 18 MG C12A daily at bedtime     • Xtampza ER  36 MG extended release capsule in the morning     • dicyclomine (BENTYL) 20 mg tablet Take 1 tablet (20 mg total) by mouth 2 (two) times a day (Patient not taking: Reported on 5/5/2025) 20 tablet 0   • [START ON 6/9/2025] metroNIDAZOLE (FLAGYL) 500 mg tablet Take 1 tablet at 1:00pm, 1 tablet at 2pm, and 1 tablet at 10:00pm the day prior to surgery. Do not start before June 9, 2025. 3 tablet 0   • [START ON 6/9/2025] neomycin (MYCIFRADIN) 500 mg tablet Take 2 tablets at 1:00pm, 2 tablets at 2pm, and 2 tablets at 10:00pm the day prior to surgery. Do not start before June 9, 2025. 6 tablet 0   • pantoprazole (PROTONIX) 40 mg tablet Take 1 tablet (40 mg total) by mouth daily (Patient taking differently: Take 40 mg by mouth if needed) 90 tablet 0   • sodium chloride, PF, 0.9 % 10 mL by Intracatheter route daily for 90 doses (Patient not taking: Reported on 5/5/2025) 300 mL 2     No current facility-administered medications on file prior to visit.

## 2025-05-19 ENCOUNTER — ANESTHESIA EVENT (INPATIENT)
Dept: PERIOP | Facility: HOSPITAL | Age: 65
DRG: 330 | End: 2025-05-19
Payer: COMMERCIAL

## 2025-05-19 RX ORDER — METRONIDAZOLE 500 MG/1
TABLET ORAL
Qty: 3 TABLET | Refills: 0 | Status: SHIPPED | OUTPATIENT
Start: 2025-06-09 | End: 2025-06-10

## 2025-05-19 RX ORDER — NEOMYCIN SULFATE 500 MG/1
TABLET ORAL
Qty: 6 TABLET | Refills: 0 | Status: SHIPPED | OUTPATIENT
Start: 2025-06-09 | End: 2025-06-10

## 2025-05-19 NOTE — PRE-PROCEDURE INSTRUCTIONS
Pre-Surgery Instructions:   Medication Instructions    methocarbamol (ROBAXIN) 750 mg tablet Uses PRN- OK to take day of surgery    [START ON 6/9/2025] metroNIDAZOLE (FLAGYL) 500 mg tablet Instructions provided by MD    multivitamin (THERAGRAN) TABS Stop taking 7 days prior to surgery.    [START ON 6/9/2025] neomycin (MYCIFRADIN) 500 mg tablet Instructions provided by MD    pantoprazole (PROTONIX) 40 mg tablet Uses PRN- OK to take day of surgery    pregabalin (LYRICA) 75 mg capsule Take day of surgery.    senna-docusate sodium (SENOKOT-S) 8.6-50 mg per tablet Hold day of surgery.    VITAMIN D, CHOLECALCIFEROL, PO Stop taking 7 days prior to surgery.    Xtampza ER 18 MG C12A Take night before surgery    Xtampza ER 36 MG extended release capsule Take day of surgery.   Medication instructions for day of surgery reviewed. Please take all instructed medications with only a sip of water.       You will receive a call one business day prior to surgery with an arrival time and hospital directions. If your surgery is scheduled on a Monday, the hospital will be calling you on the Friday prior to your surgery. If you have not heard from anyone by 8pm, please call the hospital supervisor through the hospital  at [phone]. ([place] [phone] or [place] [phone]).    Do not eat or drink anything after midnight the night before your surgery, including candy, mints, lifesavers, or chewing gum. Do not drink alcohol 24hrs before your surgery. Try not to smoke at least 24hrs before your surgery.       Follow the pre surgery showering instructions as listed in the “My Surgical Experience Booklet” or otherwise provided by your surgeon's office. Do not use a blade to shave the surgical area 1 week before surgery. It is okay to use a clean electric clippers up to 24 hours before surgery. Do not apply any lotions, creams, including makeup, cologne, deodorant, or perfumes after showering on the day of your surgery. Do  not use dry shampoo, hair spray, hair gel, or any type of hair products.     No contact lenses, eye make-up, or artificial eyelashes. Remove nail polish, including gel polish, and any artificial, gel, or acrylic nails if possible. Remove all jewelry including rings and body piercing jewelry.     Wear causal clothing that is easy to take on and off. Consider your type of surgery.    Keep any valuables, jewelry, piercings at home. Please bring any specially ordered equipment (sling, braces) if indicated.    Arrange for a responsible person to drive you to and from the hospital on the day of your surgery. Please confirm the visitor policy for the day of your procedure when you receive your phone call with an arrival time.     Call the surgeon's office with any new illnesses, exposures, or additional questions prior to surgery.    Please reference your “My Surgical Experience Booklet” for additional information to prepare for your upcoming surgery.     Has instructions for bowel prep and antibiotics.

## 2025-05-20 ENCOUNTER — HOSPITAL ENCOUNTER (OUTPATIENT)
Dept: RADIOLOGY | Facility: MEDICAL CENTER | Age: 65
Discharge: HOME | End: 2025-05-20
Attending: COLON & RECTAL SURGERY
Payer: COMMERCIAL

## 2025-05-20 DIAGNOSIS — C19 RECTOSIGMOID CANCER (HCC): ICD-10-CM

## 2025-05-20 PROCEDURE — 71260 CT THORAX DX C+: CPT

## 2025-05-20 PROCEDURE — 74177 CT ABD & PELVIS W/CONTRAST: CPT

## 2025-05-20 RX ADMIN — IOHEXOL 100 ML: 350 INJECTION, SOLUTION INTRAVENOUS at 14:47

## 2025-05-20 RX ADMIN — IOHEXOL 50 ML: 240 INJECTION, SOLUTION INTRATHECAL; INTRAVASCULAR; INTRAVENOUS; ORAL at 14:47

## 2025-05-22 ENCOUNTER — CONSULT (OUTPATIENT)
Dept: HEMATOLOGY ONCOLOGY | Facility: CLINIC | Age: 65
End: 2025-05-22
Attending: FAMILY MEDICINE
Payer: COMMERCIAL

## 2025-05-22 VITALS
DIASTOLIC BLOOD PRESSURE: 102 MMHG | TEMPERATURE: 98.2 F | RESPIRATION RATE: 16 BRPM | WEIGHT: 192 LBS | BODY MASS INDEX: 30.13 KG/M2 | OXYGEN SATURATION: 100 % | SYSTOLIC BLOOD PRESSURE: 148 MMHG | HEIGHT: 67 IN | HEART RATE: 73 BPM

## 2025-05-22 DIAGNOSIS — E66.09 CLASS 1 OBESITY DUE TO EXCESS CALORIES WITH BODY MASS INDEX (BMI) OF 30.0 TO 30.9 IN ADULT, UNSPECIFIED WHETHER SERIOUS COMORBIDITY PRESENT: ICD-10-CM

## 2025-05-22 DIAGNOSIS — Z12.31 ENCOUNTER FOR SCREENING MAMMOGRAM FOR MALIGNANT NEOPLASM OF BREAST: ICD-10-CM

## 2025-05-22 DIAGNOSIS — Z78.0 POSTMENOPAUSAL: ICD-10-CM

## 2025-05-22 DIAGNOSIS — E66.811 CLASS 1 OBESITY DUE TO EXCESS CALORIES WITH BODY MASS INDEX (BMI) OF 30.0 TO 30.9 IN ADULT, UNSPECIFIED WHETHER SERIOUS COMORBIDITY PRESENT: ICD-10-CM

## 2025-05-22 DIAGNOSIS — C19 CARCINOMA OF RECTOSIGMOID (COLON) (HCC): Primary | ICD-10-CM

## 2025-05-22 DIAGNOSIS — K75.0 LIVER ABSCESS DUE TO BACTERIA: ICD-10-CM

## 2025-05-22 DIAGNOSIS — B96.89 LIVER ABSCESS DUE TO BACTERIA: ICD-10-CM

## 2025-05-22 DIAGNOSIS — Z00.00 HEALTH CARE MAINTENANCE: ICD-10-CM

## 2025-05-22 PROCEDURE — 99205 OFFICE O/P NEW HI 60 MIN: CPT | Performed by: INTERNAL MEDICINE

## 2025-05-23 ENCOUNTER — APPOINTMENT (OUTPATIENT)
Dept: LAB | Facility: MEDICAL CENTER | Age: 65
End: 2025-05-23
Payer: COMMERCIAL

## 2025-05-23 DIAGNOSIS — C19 RECTOSIGMOID CANCER (HCC): ICD-10-CM

## 2025-05-23 DIAGNOSIS — C19 CARCINOMA OF RECTOSIGMOID (COLON) (HCC): ICD-10-CM

## 2025-05-23 LAB
ABO GROUP BLD: NORMAL
ALBUMIN SERPL BCG-MCNC: 3.8 G/DL (ref 3.5–5)
ALP SERPL-CCNC: 114 U/L (ref 34–104)
ALT SERPL W P-5'-P-CCNC: 14 U/L (ref 7–52)
ANION GAP SERPL CALCULATED.3IONS-SCNC: 8 MMOL/L (ref 4–13)
AST SERPL W P-5'-P-CCNC: 20 U/L (ref 13–39)
BASOPHILS # BLD AUTO: 0.05 THOUSANDS/ÂΜL (ref 0–0.1)
BASOPHILS NFR BLD AUTO: 1 % (ref 0–1)
BILIRUB SERPL-MCNC: 0.47 MG/DL (ref 0.2–1)
BLD GP AB SCN SERPL QL: NEGATIVE
BUN SERPL-MCNC: 11 MG/DL (ref 5–25)
CALCIUM SERPL-MCNC: 9.1 MG/DL (ref 8.4–10.2)
CEA SERPL-MCNC: 1.2 NG/ML (ref 0–3)
CHLORIDE SERPL-SCNC: 104 MMOL/L (ref 96–108)
CO2 SERPL-SCNC: 30 MMOL/L (ref 21–32)
CREAT SERPL-MCNC: 1.07 MG/DL (ref 0.6–1.3)
EOSINOPHIL # BLD AUTO: 0.35 THOUSAND/ÂΜL (ref 0–0.61)
EOSINOPHIL NFR BLD AUTO: 6 % (ref 0–6)
ERYTHROCYTE [DISTWIDTH] IN BLOOD BY AUTOMATED COUNT: 13.5 % (ref 11.6–15.1)
GFR SERPL CREATININE-BSD FRML MDRD: 55 ML/MIN/1.73SQ M
GLUCOSE SERPL-MCNC: 90 MG/DL (ref 65–140)
HCT VFR BLD AUTO: 42.8 % (ref 34.8–46.1)
HGB BLD-MCNC: 13.6 G/DL (ref 11.5–15.4)
IMM GRANULOCYTES # BLD AUTO: 0.01 THOUSAND/UL (ref 0–0.2)
IMM GRANULOCYTES NFR BLD AUTO: 0 % (ref 0–2)
LYMPHOCYTES # BLD AUTO: 1.72 THOUSANDS/ÂΜL (ref 0.6–4.47)
LYMPHOCYTES NFR BLD AUTO: 30 % (ref 14–44)
MCH RBC QN AUTO: 29.1 PG (ref 26.8–34.3)
MCHC RBC AUTO-ENTMCNC: 31.8 G/DL (ref 31.4–37.4)
MCV RBC AUTO: 92 FL (ref 82–98)
MONOCYTES # BLD AUTO: 0.49 THOUSAND/ÂΜL (ref 0.17–1.22)
MONOCYTES NFR BLD AUTO: 9 % (ref 4–12)
NEUTROPHILS # BLD AUTO: 3.11 THOUSANDS/ÂΜL (ref 1.85–7.62)
NEUTS SEG NFR BLD AUTO: 54 % (ref 43–75)
NRBC BLD AUTO-RTO: 0 /100 WBCS
PLATELET # BLD AUTO: 301 THOUSANDS/UL (ref 149–390)
PMV BLD AUTO: 10.1 FL (ref 8.9–12.7)
POTASSIUM SERPL-SCNC: 4.7 MMOL/L (ref 3.5–5.3)
PROT SERPL-MCNC: 6.8 G/DL (ref 6.4–8.4)
RBC # BLD AUTO: 4.68 MILLION/UL (ref 3.81–5.12)
RH BLD: POSITIVE
SODIUM SERPL-SCNC: 142 MMOL/L (ref 135–147)
SPECIMEN EXPIRATION DATE: NORMAL
WBC # BLD AUTO: 5.73 THOUSAND/UL (ref 4.31–10.16)

## 2025-05-23 PROCEDURE — 80053 COMPREHEN METABOLIC PANEL: CPT

## 2025-05-23 PROCEDURE — 86850 RBC ANTIBODY SCREEN: CPT

## 2025-05-23 PROCEDURE — 36415 COLL VENOUS BLD VENIPUNCTURE: CPT

## 2025-05-23 PROCEDURE — 86900 BLOOD TYPING SEROLOGIC ABO: CPT

## 2025-05-23 PROCEDURE — 82378 CARCINOEMBRYONIC ANTIGEN: CPT

## 2025-05-23 PROCEDURE — 86901 BLOOD TYPING SEROLOGIC RH(D): CPT

## 2025-05-23 PROCEDURE — 85025 COMPLETE CBC W/AUTO DIFF WBC: CPT

## 2025-05-27 ENCOUNTER — PATIENT OUTREACH (OUTPATIENT)
Dept: HEMATOLOGY ONCOLOGY | Facility: CLINIC | Age: 65
End: 2025-05-27

## 2025-05-27 NOTE — PROGRESS NOTES
I reached out to Celia  to complete the Distress Thermometer, review for any barriers to care and to offer any supportive services that may be needed. I left VM with the reason for my call including my direct phone number .

## 2025-05-29 ENCOUNTER — PATIENT OUTREACH (OUTPATIENT)
Dept: CASE MANAGEMENT | Facility: OTHER | Age: 65
End: 2025-05-29

## 2025-05-29 ENCOUNTER — PATIENT OUTREACH (OUTPATIENT)
Dept: HEMATOLOGY ONCOLOGY | Facility: CLINIC | Age: 65
End: 2025-05-29

## 2025-05-29 DIAGNOSIS — C19 RECTOSIGMOID CANCER (HCC): Primary | ICD-10-CM

## 2025-05-29 NOTE — PROGRESS NOTES
I reached out and spoke with Celia ,.  She has been seen in consult by Medical Oncology. I introduced myself and explained my role as their Patient Navigator. I reviewed for any barriers to care and offered referrals to supportive services as needed. I reviewed and updated the members assigned to the care team in [facility]. She knows the members of the care team as well as how and when to contact them with any needs.     Distress Thermometer completed at this time. Patient scored 3/10. Referral to  placed.. Per pt request     She is currently able to drive and denies any transportation needs.      She stated she has back pain from her spinal stimulator and is established with pain management for that     She states that she is eating and drinking as per usual with no unintentional weight loss.       Patient does not smoke.     She states she is well supported by family and friends.  Community support groups discussed including the [facility] Support [facility] of the [facility]. Patient declined information at this time.     She feels she has adequate insurance coverage and denies any financial concerns at this time.     She verbalizes managing the schedules well.   Future Appointments   Date Time Provider Department Center   6/4/2025  9:00 AM AN DEXA WG 1 AN WG DEXA AN [facility]   6/21/2025  9:30 AM EA MAMMO 1 EA MAMMO [facility]   7/7/2025 11:20 AM Jose Arizmendi MD TIMI ONC [facility]   7/14/2025  4:00 PM GERALD Larson MD [facility] [address] [place] [address]-Med        Based on individual needs I will follow up in about 6 weeks. I have provided my direct contact information and welcome them to contact me if needs as discussed above change. She was appreciative for the call.

## 2025-05-30 LAB
ABO GROUP BLD: NORMAL
BLD GP AB SCN SERPL QL: NEGATIVE
RH BLD: POSITIVE
SPECIMEN EXPIRATION DATE: NORMAL

## 2025-06-02 ENCOUNTER — PATIENT OUTREACH (OUTPATIENT)
Dept: CASE MANAGEMENT | Facility: OTHER | Age: 65
End: 2025-06-02

## 2025-06-04 ENCOUNTER — HOSPITAL ENCOUNTER (OUTPATIENT)
Dept: RADIOLOGY | Facility: MEDICAL CENTER | Age: 65
Discharge: HOME | End: 2025-06-04
Attending: INTERNAL MEDICINE
Payer: COMMERCIAL

## 2025-06-04 DIAGNOSIS — Z78.0 POSTMENOPAUSAL: ICD-10-CM

## 2025-06-04 DIAGNOSIS — Z00.00 HEALTH CARE MAINTENANCE: ICD-10-CM

## 2025-06-04 PROCEDURE — 77080 DXA BONE DENSITY AXIAL: CPT

## 2025-06-09 ENCOUNTER — PATIENT OUTREACH (OUTPATIENT)
Dept: CASE MANAGEMENT | Facility: OTHER | Age: 65
End: 2025-06-09

## 2025-06-09 NOTE — PROGRESS NOTES
Biopsychosocial and Barriers Assessment    Type of Cancer: Colon  Treatment plan: Having surgery on 6/10  Noted barriers to care: none  Cultural/Episcopalian concerns: none  Hair Loss/ Wig resources needed: n/a    DT completed: yes   DT score: 3/10  Issues noted: pain    Marital status/Lives with: /Lives with spouse  Pt's support system: Strong support from spouse, sons, brother and sister  Mental Health history: none  Substance Abuse: none    POA/LW/HCR: None- provided education  Side affect: Has pain    Employment/income source: retired  Concerns with bills (treatment vs household): none  Noted issues with home: none    Narrative note:   OSW placed outreach TC to pt this day. OSW introduced self and role. Pt is a pleasant woman with a dx of colon cancer. She is scheduled for a resection tomorrow, 6/10/25. She states that she is well supported by her spouse, 2 sons and siblings. She feels as though she is dealing with her diagnosis appropriately. She states that she is eager to have her surgery complete. OSW educated on the CSC and she denies the need at this time. This writer offered to check in with her in a month and she was agreeable. OSW encouraged her to call with any questions/concerns. OSW will continue to follow.

## 2025-06-10 ENCOUNTER — HOSPITAL ENCOUNTER (INPATIENT)
Facility: HOSPITAL | Age: 65
LOS: 2 days | Discharge: HOME | DRG: 330 | End: 2025-06-12
Attending: COLON & RECTAL SURGERY | Admitting: COLON & RECTAL SURGERY
Payer: COMMERCIAL

## 2025-06-10 ENCOUNTER — ANESTHESIA (INPATIENT)
Dept: PERIOP | Facility: HOSPITAL | Age: 65
DRG: 330 | End: 2025-06-10
Payer: COMMERCIAL

## 2025-06-10 DIAGNOSIS — C19 RECTOSIGMOID CANCER (HCC): ICD-10-CM

## 2025-06-10 DIAGNOSIS — N17.9 AKI (ACUTE KIDNEY INJURY) (HCC): Primary | ICD-10-CM

## 2025-06-10 DIAGNOSIS — G89.18 ACUTE POSTOPERATIVE PAIN: ICD-10-CM

## 2025-06-10 PROBLEM — R10.9 POSTOPERATIVE ABDOMINAL PAIN: Status: ACTIVE | Noted: 2025-06-10

## 2025-06-10 LAB
ABO GROUP BLD: NORMAL
GLUCOSE SERPL-MCNC: 116 MG/DL (ref 65–140)
PLATELET # BLD AUTO: 251 THOUSANDS/UL (ref 149–390)
PMV BLD AUTO: 9.5 FL (ref 8.9–12.7)
RH BLD: POSITIVE

## 2025-06-10 PROCEDURE — NC001 PR NO CHARGE: Performed by: PHYSICIAN ASSISTANT

## 2025-06-10 PROCEDURE — 85049 AUTOMATED PLATELET COUNT: CPT

## 2025-06-10 PROCEDURE — 44207 L COLECTOMY/COLOPROCTOSTOMY: CPT | Performed by: COLON & RECTAL SURGERY

## 2025-06-10 PROCEDURE — 82948 REAGENT STRIP/BLOOD GLUCOSE: CPT

## 2025-06-10 PROCEDURE — 99223 1ST HOSP IP/OBS HIGH 75: CPT | Performed by: PHYSICIAN ASSISTANT

## 2025-06-10 PROCEDURE — 0DBP4ZZ EXCISION OF RECTUM, PERCUTANEOUS ENDOSCOPIC APPROACH: ICD-10-PCS | Performed by: COLON & RECTAL SURGERY

## 2025-06-10 PROCEDURE — S2900 ROBOTIC SURGICAL SYSTEM: HCPCS | Performed by: COLON & RECTAL SURGERY

## 2025-06-10 PROCEDURE — 0DBN4ZZ EXCISION OF SIGMOID COLON, PERCUTANEOUS ENDOSCOPIC APPROACH: ICD-10-PCS | Performed by: COLON & RECTAL SURGERY

## 2025-06-10 PROCEDURE — 0DJD8ZZ INSPECTION OF LOWER INTESTINAL TRACT, VIA NATURAL OR ARTIFICIAL OPENING ENDOSCOPIC: ICD-10-PCS | Performed by: COLON & RECTAL SURGERY

## 2025-06-10 PROCEDURE — S2900 ROBOTIC SURGICAL SYSTEM: HCPCS | Performed by: PHYSICIAN ASSISTANT

## 2025-06-10 PROCEDURE — 44207 L COLECTOMY/COLOPROCTOSTOMY: CPT | Performed by: PHYSICIAN ASSISTANT

## 2025-06-10 PROCEDURE — 88309 TISSUE EXAM BY PATHOLOGIST: CPT | Performed by: PATHOLOGY

## 2025-06-10 PROCEDURE — 8E0W4CZ ROBOTIC ASSISTED PROCEDURE OF TRUNK REGION, PERCUTANEOUS ENDOSCOPIC APPROACH: ICD-10-PCS | Performed by: COLON & RECTAL SURGERY

## 2025-06-10 RX ORDER — LIDOCAINE HYDROCHLORIDE 10 MG/ML
INJECTION, SOLUTION EPIDURAL; INFILTRATION; INTRACAUDAL; PERINEURAL AS NEEDED
Status: DISCONTINUED | OUTPATIENT
Start: 2025-06-10 | End: 2025-06-10

## 2025-06-10 RX ORDER — HEPARIN SODIUM 5000 [USP'U]/ML
5000 INJECTION, SOLUTION INTRAVENOUS; SUBCUTANEOUS ONCE
Status: COMPLETED | OUTPATIENT
Start: 2025-06-10 | End: 2025-06-10

## 2025-06-10 RX ORDER — DEXAMETHASONE SODIUM PHOSPHATE 10 MG/ML
INJECTION, SOLUTION INTRAMUSCULAR; INTRAVENOUS AS NEEDED
Status: DISCONTINUED | OUTPATIENT
Start: 2025-06-10 | End: 2025-06-10

## 2025-06-10 RX ORDER — METRONIDAZOLE 500 MG/1
500 TABLET ORAL 3 TIMES DAILY
Status: DISCONTINUED | OUTPATIENT
Start: 2025-06-10 | End: 2025-06-10 | Stop reason: ALTCHOICE

## 2025-06-10 RX ORDER — KETAMINE HCL IN NACL, ISO-OSM 100MG/10ML
SYRINGE (ML) INJECTION AS NEEDED
Status: DISCONTINUED | OUTPATIENT
Start: 2025-06-10 | End: 2025-06-10

## 2025-06-10 RX ORDER — ACETAMINOPHEN 10 MG/ML
1000 INJECTION, SOLUTION INTRAVENOUS EVERY 6 HOURS SCHEDULED
Status: DISCONTINUED | OUTPATIENT
Start: 2025-06-10 | End: 2025-06-11

## 2025-06-10 RX ORDER — MIDAZOLAM HYDROCHLORIDE 2 MG/2ML
INJECTION, SOLUTION INTRAMUSCULAR; INTRAVENOUS AS NEEDED
Status: DISCONTINUED | OUTPATIENT
Start: 2025-06-10 | End: 2025-06-10

## 2025-06-10 RX ORDER — METHOCARBAMOL 100 MG/ML
750 INJECTION, SOLUTION INTRAMUSCULAR; INTRAVENOUS EVERY 8 HOURS SCHEDULED
Status: DISCONTINUED | OUTPATIENT
Start: 2025-06-10 | End: 2025-06-11

## 2025-06-10 RX ORDER — HYDROMORPHONE HCL/PF 1 MG/ML
0.5 SYRINGE (ML) INJECTION
Status: COMPLETED | OUTPATIENT
Start: 2025-06-10 | End: 2025-06-10

## 2025-06-10 RX ORDER — SODIUM CHLORIDE, SODIUM LACTATE, POTASSIUM CHLORIDE, CALCIUM CHLORIDE 600; 310; 30; 20 MG/100ML; MG/100ML; MG/100ML; MG/100ML
20 INJECTION, SOLUTION INTRAVENOUS CONTINUOUS
Status: DISCONTINUED | OUTPATIENT
Start: 2025-06-10 | End: 2025-06-10

## 2025-06-10 RX ORDER — METRONIDAZOLE 500 MG/100ML
500 INJECTION, SOLUTION INTRAVENOUS ONCE
Status: COMPLETED | OUTPATIENT
Start: 2025-06-10 | End: 2025-06-10

## 2025-06-10 RX ORDER — METHADONE HYDROCHLORIDE 10 MG/ML
20 INJECTION, SOLUTION INTRAMUSCULAR; INTRAVENOUS; SUBCUTANEOUS ONCE
Status: COMPLETED | OUTPATIENT
Start: 2025-06-10 | End: 2025-06-10

## 2025-06-10 RX ORDER — FENTANYL CITRATE 50 UG/ML
INJECTION, SOLUTION INTRAMUSCULAR; INTRAVENOUS AS NEEDED
Status: DISCONTINUED | OUTPATIENT
Start: 2025-06-10 | End: 2025-06-10

## 2025-06-10 RX ORDER — FENTANYL CITRATE/PF 50 MCG/ML
50 SYRINGE (ML) INJECTION
Status: COMPLETED | OUTPATIENT
Start: 2025-06-10 | End: 2025-06-10

## 2025-06-10 RX ORDER — ONDANSETRON 2 MG/ML
4 INJECTION INTRAMUSCULAR; INTRAVENOUS EVERY 6 HOURS PRN
Status: DISCONTINUED | OUTPATIENT
Start: 2025-06-10 | End: 2025-06-12 | Stop reason: HOSPADM

## 2025-06-10 RX ORDER — EPHEDRINE SULFATE 50 MG/ML
INJECTION INTRAVENOUS AS NEEDED
Status: DISCONTINUED | OUTPATIENT
Start: 2025-06-10 | End: 2025-06-10

## 2025-06-10 RX ORDER — ROCURONIUM BROMIDE 10 MG/ML
INJECTION, SOLUTION INTRAVENOUS AS NEEDED
Status: DISCONTINUED | OUTPATIENT
Start: 2025-06-10 | End: 2025-06-10

## 2025-06-10 RX ORDER — NEOMYCIN SULFATE 500 MG/1
1000 TABLET ORAL 3 TIMES DAILY
Status: DISCONTINUED | OUTPATIENT
Start: 2025-06-10 | End: 2025-06-10 | Stop reason: ALTCHOICE

## 2025-06-10 RX ORDER — PANTOPRAZOLE SODIUM 40 MG/1
40 TABLET, DELAYED RELEASE ORAL DAILY
Status: DISCONTINUED | OUTPATIENT
Start: 2025-06-11 | End: 2025-06-12 | Stop reason: HOSPADM

## 2025-06-10 RX ORDER — PREGABALIN 75 MG/1
75 CAPSULE ORAL 3 TIMES DAILY
Status: DISCONTINUED | OUTPATIENT
Start: 2025-06-10 | End: 2025-06-12 | Stop reason: HOSPADM

## 2025-06-10 RX ORDER — ONDANSETRON 2 MG/ML
INJECTION INTRAMUSCULAR; INTRAVENOUS AS NEEDED
Status: DISCONTINUED | OUTPATIENT
Start: 2025-06-10 | End: 2025-06-10

## 2025-06-10 RX ORDER — SODIUM CHLORIDE, SODIUM LACTATE, POTASSIUM CHLORIDE, CALCIUM CHLORIDE 600; 310; 30; 20 MG/100ML; MG/100ML; MG/100ML; MG/100ML
125 INJECTION, SOLUTION INTRAVENOUS CONTINUOUS
Status: DISCONTINUED | OUTPATIENT
Start: 2025-06-10 | End: 2025-06-11

## 2025-06-10 RX ORDER — SODIUM CHLORIDE 9 MG/ML
INJECTION, SOLUTION INTRAVENOUS CONTINUOUS PRN
Status: DISCONTINUED | OUTPATIENT
Start: 2025-06-10 | End: 2025-06-10

## 2025-06-10 RX ORDER — ALBUMIN HUMAN 50 G/1000ML
SOLUTION INTRAVENOUS CONTINUOUS PRN
Status: DISCONTINUED | OUTPATIENT
Start: 2025-06-10 | End: 2025-06-10

## 2025-06-10 RX ORDER — ONDANSETRON 2 MG/ML
4 INJECTION INTRAMUSCULAR; INTRAVENOUS ONCE AS NEEDED
Status: DISCONTINUED | OUTPATIENT
Start: 2025-06-10 | End: 2025-06-10 | Stop reason: HOSPADM

## 2025-06-10 RX ORDER — PROPOFOL 10 MG/ML
INJECTION, EMULSION INTRAVENOUS AS NEEDED
Status: DISCONTINUED | OUTPATIENT
Start: 2025-06-10 | End: 2025-06-10

## 2025-06-10 RX ORDER — HEPARIN SODIUM 5000 [USP'U]/ML
5000 INJECTION, SOLUTION INTRAVENOUS; SUBCUTANEOUS EVERY 8 HOURS SCHEDULED
Status: DISCONTINUED | OUTPATIENT
Start: 2025-06-10 | End: 2025-06-12

## 2025-06-10 RX ORDER — CEFAZOLIN SODIUM 2 G/50ML
2000 SOLUTION INTRAVENOUS ONCE
Status: COMPLETED | OUTPATIENT
Start: 2025-06-10 | End: 2025-06-10

## 2025-06-10 RX ADMIN — HYDROMORPHONE HYDROCHLORIDE 0.5 MG: 1 INJECTION, SOLUTION INTRAMUSCULAR; INTRAVENOUS; SUBCUTANEOUS at 17:16

## 2025-06-10 RX ADMIN — METHADONE HYDROCHLORIDE 10 MG: 10 INJECTION, SOLUTION INTRAMUSCULAR; INTRAVENOUS; SUBCUTANEOUS at 14:05

## 2025-06-10 RX ADMIN — PROPOFOL 200 MG: 10 INJECTION, EMULSION INTRAVENOUS at 13:23

## 2025-06-10 RX ADMIN — METRONIDAZOLE: 500 SOLUTION INTRAVENOUS at 13:28

## 2025-06-10 RX ADMIN — ROCURONIUM BROMIDE 30 MG: 10 INJECTION, SOLUTION INTRAVENOUS at 15:39

## 2025-06-10 RX ADMIN — CEFAZOLIN SODIUM 2000 MG: 2 SOLUTION INTRAVENOUS at 13:26

## 2025-06-10 RX ADMIN — ROCURONIUM BROMIDE 30 MG: 10 INJECTION, SOLUTION INTRAVENOUS at 14:37

## 2025-06-10 RX ADMIN — ALBUMIN (HUMAN): 12.5 INJECTION, SOLUTION INTRAVENOUS at 15:00

## 2025-06-10 RX ADMIN — DEXAMETHASONE SODIUM PHOSPHATE 10 MG: 10 INJECTION, SOLUTION INTRAMUSCULAR; INTRAVENOUS at 13:45

## 2025-06-10 RX ADMIN — FENTANYL CITRATE 50 MCG: 50 INJECTION INTRAMUSCULAR; INTRAVENOUS at 16:58

## 2025-06-10 RX ADMIN — ROCURONIUM BROMIDE 70 MG: 10 INJECTION, SOLUTION INTRAVENOUS at 13:23

## 2025-06-10 RX ADMIN — ACETAMINOPHEN 1000 MG: 10 INJECTION INTRAVENOUS at 17:13

## 2025-06-10 RX ADMIN — FENTANYL CITRATE 50 MCG: 50 INJECTION INTRAMUSCULAR; INTRAVENOUS at 16:55

## 2025-06-10 RX ADMIN — ONDANSETRON 4 MG: 2 INJECTION INTRAMUSCULAR; INTRAVENOUS at 15:53

## 2025-06-10 RX ADMIN — LIDOCAINE HYDROCHLORIDE 50 MG: 10 INJECTION, SOLUTION EPIDURAL; INFILTRATION; INTRACAUDAL; PERINEURAL at 13:23

## 2025-06-10 RX ADMIN — PREGABALIN 75 MG: 75 CAPSULE ORAL at 19:03

## 2025-06-10 RX ADMIN — HYDROMORPHONE HYDROCHLORIDE 0.5 MG: 1 INJECTION, SOLUTION INTRAMUSCULAR; INTRAVENOUS; SUBCUTANEOUS at 17:45

## 2025-06-10 RX ADMIN — Medication 50 MG: at 14:08

## 2025-06-10 RX ADMIN — SODIUM CHLORIDE: 0.9 INJECTION, SOLUTION INTRAVENOUS at 13:27

## 2025-06-10 RX ADMIN — EPHEDRINE SULFATE 5 MG: 50 INJECTION, SOLUTION INTRAVENOUS at 15:51

## 2025-06-10 RX ADMIN — EPHEDRINE SULFATE 10 MG: 50 INJECTION, SOLUTION INTRAVENOUS at 14:31

## 2025-06-10 RX ADMIN — HEPARIN SODIUM 5000 UNITS: 5000 INJECTION, SOLUTION INTRAVENOUS; SUBCUTANEOUS at 12:15

## 2025-06-10 RX ADMIN — SUGAMMADEX 200 MG: 100 INJECTION, SOLUTION INTRAVENOUS at 16:26

## 2025-06-10 RX ADMIN — Medication: at 17:40

## 2025-06-10 RX ADMIN — MIDAZOLAM 2 MG: 1 INJECTION INTRAMUSCULAR; INTRAVENOUS at 13:19

## 2025-06-10 RX ADMIN — SODIUM CHLORIDE, SODIUM LACTATE, POTASSIUM CHLORIDE, AND CALCIUM CHLORIDE 20 ML/HR: .6; .31; .03; .02 INJECTION, SOLUTION INTRAVENOUS at 11:50

## 2025-06-10 RX ADMIN — HEPARIN SODIUM 5000 UNITS: 5000 INJECTION INTRAVENOUS; SUBCUTANEOUS at 21:32

## 2025-06-10 RX ADMIN — METHADONE HYDROCHLORIDE 10 MG: 10 INJECTION, SOLUTION INTRAMUSCULAR; INTRAVENOUS; SUBCUTANEOUS at 13:23

## 2025-06-10 RX ADMIN — HYDROMORPHONE HYDROCHLORIDE 0.5 MG: 1 INJECTION, SOLUTION INTRAMUSCULAR; INTRAVENOUS; SUBCUTANEOUS at 17:08

## 2025-06-10 RX ADMIN — PHENYLEPHRINE HYDROCHLORIDE 20 MCG/MIN: 50 INJECTION INTRAVENOUS at 13:48

## 2025-06-10 RX ADMIN — HYDROMORPHONE HYDROCHLORIDE 0.5 MG: 1 INJECTION, SOLUTION INTRAMUSCULAR; INTRAVENOUS; SUBCUTANEOUS at 17:01

## 2025-06-10 RX ADMIN — SODIUM CHLORIDE, SODIUM LACTATE, POTASSIUM CHLORIDE, AND CALCIUM CHLORIDE 125 ML/HR: .6; .31; .03; .02 INJECTION, SOLUTION INTRAVENOUS at 19:03

## 2025-06-10 RX ADMIN — DEXMEDETOMIDINE HYDROCHLORIDE 16 MCG: 100 INJECTION, SOLUTION INTRAVENOUS at 14:40

## 2025-06-10 RX ADMIN — FENTANYL CITRATE 100 MCG: 50 INJECTION INTRAMUSCULAR; INTRAVENOUS at 14:45

## 2025-06-10 RX ADMIN — SODIUM CHLORIDE, SODIUM LACTATE, POTASSIUM CHLORIDE, AND CALCIUM CHLORIDE: .6; .31; .03; .02 INJECTION, SOLUTION INTRAVENOUS at 15:51

## 2025-06-10 NOTE — PLAN OF CARE
Problem: PAIN - ADULT  Goal: Verbalizes/displays adequate comfort level or baseline comfort level  Description: Interventions:  - Encourage patient to monitor pain and request assistance  - Assess pain using appropriate pain scale  - Administer analgesics as ordered based on type and severity of pain and evaluate response  - Implement non-pharmacological measures as appropriate and evaluate response  - Consider cultural and social influences on pain and pain management  - Notify physician/advanced practitioner if interventions unsuccessful or patient reports new pain  - Educate patient/family on pain management process including their role and importance of  reporting pain   - Provide non-pharmacologic/complimentary pain relief interventions  Outcome: Progressing     Problem: INFECTION - ADULT  Goal: Absence or prevention of progression during hospitalization  Description: INTERVENTIONS:  - Assess and monitor for signs and symptoms of infection  - Monitor lab/diagnostic results  - Monitor all insertion sites, i.e. indwelling lines, tubes, and drains  - Monitor endotracheal if appropriate and nasal secretions for changes in amount and color  - [place] appropriate cooling/warming therapies per order  - Administer medications as ordered  - Instruct and encourage patient and family to use good hand hygiene technique  - Identify and instruct in appropriate isolation precautions for identified infection/condition  Outcome: Progressing  Goal: Absence of fever/infection during neutropenic period  Description: INTERVENTIONS:  - Monitor WBC  - Perform strict hand hygiene  - Limit to healthy visitors only  - No plants, dried, fresh or silk flowers with givens in patient room  Outcome: Progressing     Problem: SAFETY ADULT  Goal: Patient will remain free of falls  Description: INTERVENTIONS:  - Educate patient/family on patient safety including physical limitations  - Instruct patient to call for assistance with activity   -  Consider consulting OT/PT to assist with strengthening/mobility based on AM PAC & JH-HLM score  - Consult OT/PT to assist with strengthening/mobility   - Keep Call bell within reach  - Keep bed low and locked with side rails adjusted as appropriate  - Keep care items and personal belongings within reach  - Initiate and maintain comfort rounds  - Make Fall Risk Sign visible to staff  - Offer Toileting every 4 Hours, in advance of need  - Initiate/Maintain bed alarm  - Obtain necessary fall risk management equipment:   - Apply yellow socks and bracelet for high fall risk patients  - Consider moving patient to room near nurses station  Outcome: Progressing  Goal: Maintain or return to baseline ADL function  Description: INTERVENTIONS:  -  Assess patient's ability to carry out ADLs; assess patient's baseline for ADL function and identify physical deficits which impact ability to perform ADLs (bathing, care of mouth/teeth, toileting, grooming, dressing, etc.)  - Assess/evaluate cause of self-care deficits   - Assess range of motion  - Assess patient's mobility; develop plan if impaired  - Assess patient's need for assistive devices and provide as appropriate  - Encourage maximum independence but intervene and supervise when necessary  - Involve family in performance of ADLs  - Assess for home care needs following discharge   - Consider OT consult to assist with ADL evaluation and planning for discharge  - Provide patient education as appropriate  - Monitor functional capacity and physical performance, use of AM PAC & JH-HLM   - Monitor gait, balance and fatigue with ambulation    Outcome: Progressing  Goal: Maintains/Returns to pre admission functional level  Description: INTERVENTIONS:  - Perform AM-PAC 6 Click Basic Mobility/ Daily Activity assessment daily.  - Set and communicate daily mobility goal to care team and patient/family/caregiver.   - Collaborate with rehabilitation services on mobility goals if  consulted  - Perform Range of Motion 3 times a day.  - Reposition patient every 3 hours.  - Dangle patient 3 times a day  - Stand patient 3 times a day  - Ambulate patient 3 times a day  - Out of bed to chair 3 times a day   - Out of bed for meals 3 times a day  - Out of bed for toileting  - Record patient progress and toleration of activity level   Outcome: Progressing     Problem: DISCHARGE PLANNING  Goal: Discharge to home or other facility with appropriate resources  Description: INTERVENTIONS:  - Identify barriers to discharge w/patient and caregiver  - Arrange for needed discharge resources and transportation as appropriate  - Identify discharge learning needs (meds, wound care, etc.)  - Arrange for interpretive services to assist at discharge as needed  - Refer to Case Management Department for coordinating discharge planning if the patient needs post-hospital services based on physician/advanced practitioner order or complex needs related to functional status, cognitive ability, or social support system  Outcome: Progressing     Problem: Knowledge Deficit  Goal: Patient/family/caregiver demonstrates understanding of disease process, treatment plan, medications, and discharge instructions  Description: Complete learning assessment and assess knowledge base.  Interventions:  - Provide teaching at level of understanding  - Provide teaching via preferred learning methods  Outcome: Progressing

## 2025-06-10 NOTE — ANESTHESIA POSTPROCEDURE EVALUATION
Post-Op Assessment Note    CV Status:  Stable  Pain Score: 3    Pain management: adequate       Mental Status:  Awake and sleepy   Hydration Status:  Stable   PONV Controlled:  None   Airway Patency:  Patent     Post Op Vitals Reviewed: Yes    No anethesia notable event occurred.    Staff: Anesthesiologist, CRNA           Last Filed PACU Vitals:  Vitals Value Taken Time   Temp 97.5 °F (36.4 °C) 06/10/25 16:45   Pulse 78 06/10/25 16:45   /76    Resp 18 06/10/25 16:45   SpO2 100

## 2025-06-10 NOTE — ASSESSMENT & PLAN NOTE
Suggest the following postoperative pain regimen:  Acetaminophen 975 mg p.o. every 6 hours scheduled.  OxyContin 80 mg p.o. every morning and 60 mg p.o. every afternoon.  This is equivalent to the patient's current Xtampza ER dosing.  Oxycodone 10 mg p.o. every 4 hours as needed moderate pain or 15 mg p.o. every 4 hours as needed severe pain.  If pain not controlled on this regimen can consider increasing to 15 mg p.o. every 4 hours as needed moderate pain or 20 mg p.o. every 4 hours as needed severe pain.  Hydromorphone 0.5 mg IV every 2 hours as needed breakthrough pain.  If pain not controlled on this regimen, can consider increasing hydromorphone 1 mg IV every 2 hours as needed breakthrough pain.  Will plan for postoperative methadone taper as patient is receiving 20 mg IV methadone intraoperatively.  Methocarbamol 750 mg p.o. every 6 hours scheduled.  Pregabalin 75 mg p.o. 3 times daily scheduled.  As needed Narcan in the event that opioid reversal becomes necessary.  Bowel regimen to avoid opioid-induced constipation while on opioid pain medication.  Aqua K-pad as needed.     If patient NPO, suggest the following:  Then 1000 mg IV every 6 hours scheduled.  Methocarbamol 1000 mg IV every 8 hours scheduled.  Hydromorphone PCA with basal rate 0.1 mg/h, bolus dose 0.3 mg, lockout 6 minutes and 1 hour maximum 3 mg.

## 2025-06-10 NOTE — ASSESSMENT & PLAN NOTE
With long history of chronic lower back pain.  Is followed by outpatient pain management outside of [facility].  Patient is on the following outpatient pain regimen:  Xtampza ER 36 mg p.o. times 2 in the AM.  Xtampza ER 36 mg and 18 mg p.o. at bedtime.  Methocarbamol 750 mg p.o. every 6 hours as needed muscle spasm.  Pregabalin 75 mg p.o. 3 times daily scheduled.  Due to significant opioid tolerance, patient would likely require higher than usual doses of opioid pain medications postoperatively.  May consider ketamine infusion postoperatively.  Discussed this with patient and she is in agreement.  Will maintain patient on equivalent OxyContin dosing as this is her baseline.  Follow-up with outpatient pain management provider soon as possible following discharge.

## 2025-06-10 NOTE — ASSESSMENT & PLAN NOTE
Has longstanding history of chronic lower back pain.  Had spinal cord stimulator placed in 2019 which still functions.  Follows with pain management provider outside of [facility] network.  Is prescribed chronic long-acting opioids and nonopioid pain medications.  Patient should follow-up with her outpatient pain management provider soon as possible following discharge.

## 2025-06-10 NOTE — ASSESSMENT & PLAN NOTE
Suggest the following postoperative pain regimen:  Discontinue hydromorphone PCA.  Acetaminophen 975 mg p.o. every 6 hours scheduled.  OxyContin 80 mg p.o. every morning and 60 mg p.o. every afternoon.  This is equivalent to the patient's current Xtampza ER dosing.  Oxycodone 10 mg p.o. every 4 hours as needed moderate pain or 15 mg p.o. every 4 hours as needed severe pain.  Hydromorphone 0.5 mg IV every 2 hours as needed breakthrough pain.  Methocarbamol 750 mg p.o. every 6 hours scheduled.  Pregabalin 75 mg p.o. 3 times daily scheduled.  As needed Narcan in the event that opioid reversal becomes necessary.  Bowel regimen to avoid opioid-induced constipation while on opioid pain medication.  Aqua K-pad as needed.   Will avoid methadone taper for now as patient is on high dose long-acting opioids and high-dose short acting opioids and pain is well-controlled currently.

## 2025-06-10 NOTE — CONSULTS
Consultation - Acute Pain   Name: Celia Rivera 64 y.o. female I MRN: [id number]  Unit/Bed#: OR [place] I Date of Admission: 6/10/2025   Date of Service: 6/10/2025 I Hospital Day: 0   Inpatient consult to Acute Pain Service  Consult performed by: Jose Herrera PA-C  Consult ordered by: Carola Rush MD        Physician Requesting Evaluation: GERALD Larson MD   Reason for Evaluation / Principal Problem: Postop abdominal pain in setting of chronic lower back pain, opioid tolerance    Assessment & Plan  Postoperative abdominal pain  Suggest the following postoperative pain regimen:  Acetaminophen 975 mg p.o. every 6 hours scheduled.  OxyContin 80 mg p.o. every morning and 60 mg p.o. every afternoon.  This is equivalent to the patient's current Xtampza ER dosing.  Oxycodone 10 mg p.o. every 4 hours as needed moderate pain or 15 mg p.o. every 4 hours as needed severe pain.  If pain not controlled on this regimen can consider increasing to 15 mg p.o. every 4 hours as needed moderate pain or 20 mg p.o. every 4 hours as needed severe pain.  Hydromorphone 0.5 mg IV every 2 hours as needed breakthrough pain.  If pain not controlled on this regimen, can consider increasing hydromorphone 1 mg IV every 2 hours as needed breakthrough pain.  Will plan for postoperative methadone taper as patient is receiving 20 mg IV methadone intraoperatively.  Methocarbamol 750 mg p.o. every 6 hours scheduled.  Pregabalin 75 mg p.o. 3 times daily scheduled.  As needed Narcan in the event that opioid reversal becomes necessary.  Bowel regimen to avoid opioid-induced constipation while on opioid pain medication.  Aqua K-pad as needed.     If patient NPO, suggest the following:  Then 1000 mg IV every 6 hours scheduled.  Methocarbamol 1000 mg IV every 8 hours scheduled.  Hydromorphone PCA with basal rate 0.1 mg/h, bolus dose 0.3 mg, lockout 6 minutes and 1 hour maximum 3 mg.  Chronic low back pain  Has longstanding history of chronic  lower back pain.  Had spinal cord stimulator placed in 2019 which still functions.  Follows with pain management provider outside of [facility].  Is prescribed chronic long-acting opioids and nonopioid pain medications.  Patient should follow-up with her outpatient pain management provider soon as possible following discharge.  Opioid dependence (HCC)  With long history of chronic lower back pain.  Is followed by outpatient pain management outside of [facility].  Patient is on the following outpatient pain regimen:  Xtampza ER 36 mg p.o. times 2 in the AM.  Xtampza ER 36 mg and 18 mg p.o. at bedtime.  Methocarbamol 750 mg p.o. every 6 hours as needed muscle spasm.  Pregabalin 75 mg p.o. 3 times daily scheduled.  Due to significant opioid tolerance, patient would likely require higher than usual doses of opioid pain medications postoperatively.  May consider ketamine infusion postoperatively.  Discussed this with patient and she is in agreement.  Will maintain patient on equivalent OxyContin dosing as this is her baseline.  Follow-up with outpatient pain management provider soon as possible following discharge.  I have discussed the above management plan in detail with the primary service.   Please contact the [facility] role for the Acute Pain service with any questions/concerns.      [facility] will continue to follow. Please contact Acute Pain Service - via [facility] from [phone] with additional questions or concerns. See [facility] or [facility] for additional contacts and after hours information.     History of Present Illness    HPI: Celia Rivera is a 64 y.o. year old female who presents with history of chronic back pain, spinal cord stimulator, chronic long-acting opioid use here for elective laparoscopic low anterior resection with robotics for rectosigmoid cancer.  Patient has a longstanding history of chronic lower back pain with spinal cord stimulator placed in 2019.  Since that time, patient  states that her opioid requirement has decreased however she does remain on a significant amount of long-acting opioid prescribed by a pain management provider outside of [facility] network.  Patient's home pain regimen is noted above.  Patient is currently lying in the stretcher in the preop holding area complaining of 7-8 out of 10 lower back pain which is baseline for this patient.  Patient with no other current complaints.  She denies current tobacco use and quit smoking approximately 7 years ago.  She denies any alcohol use whatsoever and admits to previous medical marijuana use however stopped this approximately 1 year ago due to expense.  She denies any other drug use.    Due to patient's likely significant opioid tolerance and use of long-acting opioids, patient will likely require higher than usual doses of postoperative opioids.  Every attempt should be made to maintain the patient's home regimen with additional as needed opioids.  Did discuss potential for ketamine infusion postoperatively including risks and benefits and patient is in agreement with this should it be necessary.    Current pain location(s): Pain Score: 8  Pain Location/Orientation: Location: Back  Pain Scale: Pain Assessment Tool: 0-10  Current Analgesic regimen:  None currently.    Pain History: Chronic lower back pain  Pain Management Physician:    [facility] [address] [place], [place] [id number]     I have reviewed the patient's controlled substance dispensing history in the Prescription Drug Monitoring Program in compliance with the [facility] regulations before prescribing any controlled substances.     Review of Systems   Musculoskeletal:  Positive for back pain.   All other systems reviewed and are negative.    Historical Information   Past Medical History[1]  Past Surgical History[2]  Social History[3]  E-Cigarette/Vaping    E-Cigarette Use Former User      E-Cigarette/Vaping Substances    Nicotine Yes     THC  Yes     CBD No     Flavoring Yes     Other No     Unknown No      Family History[4]  Social History[5]    Current Facility-Administered Medications:     ceFAZolin (ANCEF) IVPB (premix in dextrose) 2,000 mg 50 mL, Once **AND** metroNIDAZOLE (FLAGYL) IVPB (premix) 500 mg 100 mL, Once    lactated ringers infusion, Continuous, Last Rate: 20 mL/hr (06/10/25 1150)    methadone (DOLOPHINE) injection 20 mg, Once    naloxone (NARCAN) 0.04 mg/mL syringe 0.04 mg, Q1MIN PRN  Prior to Admission Medications   Prescriptions Last Dose Informant Patient Reported? Taking?   VITAMIN D, CHOLECALCIFEROL, PO 6/3/2025  Yes Yes   Sig: Take by mouth   Xtampza ER 18 MG C12A 6/9/2025 Evening  Yes Yes   Sig: daily at bedtime   Xtampza ER 36 MG extended release capsule 6/9/2025 Evening  Yes Yes   Sig: in the morning   dicyclomine (BENTYL) 20 mg tablet   No No   Sig: Take 1 tablet (20 mg total) by mouth 2 (two) times a day   Patient not taking: Reported on 5/5/2025   methocarbamol (ROBAXIN) 750 mg tablet More than a month  Yes No   Sig: Take 750 mg by mouth every 6 (six) hours as needed for muscle spasms   metroNIDAZOLE (FLAGYL) 500 mg tablet 6/9/2025  No Yes   Sig: Take 1 tablet at 1:00pm, 1 tablet at 2pm, and 1 tablet at 10:00pm the day prior to surgery. Do not start before June 9, 2025.   multivitamin (THERAGRAN) TABS 6/3/2025  Yes Yes   Sig: Take 1 tablet by mouth in the morning.   neomycin (MYCIFRADIN) 500 mg tablet 6/9/2025  No Yes   Sig: Take 2 tablets at 1:00pm, 2 tablets at 2pm, and 2 tablets at 10:00pm the day prior to surgery. Do not start before June 9, 2025.   pantoprazole (PROTONIX) 40 mg tablet More than a month  No No   Sig: Take 1 tablet (40 mg total) by mouth daily   Patient taking differently: Take 40 mg by mouth if needed   pregabalin (LYRICA) 75 mg capsule 6/9/2025 at 10:00 PM Self Yes Yes   Sig: Take 75 mg by mouth in the morning and 75 mg in the evening and 75 mg before bedtime.   senna-docusate sodium (SENOKOT-S) 8.6-50  mg per tablet Past Month  No Yes   Sig: Take 1 tablet by mouth daily   sodium chloride, PF, 0.9 %   No No   Sig: 10 mL by Intracatheter route daily for 90 doses   Patient not taking: Reported on 5/5/2025      Facility-Administered Medications: None     Patient has no known allergies.    Objective :  Temp:  [97.4 °F (36.3 °C)] 97.4 °F (36.3 °C)  HR:  [88] 88  BP: (115)/(87) 115/87  Resp:  [20] 20  SpO2:  [96 %] 96 %  O2 Device: None (Room air)    Physical Exam  Vitals and nursing note reviewed.   Constitutional:       General: She is awake. She is not in acute distress.     Appearance: She is not ill-appearing, toxic-appearing or diaphoretic.     Skin:     General: Skin is warm and dry.     Neurological:      Mental Status: She is alert and oriented to person, place, and time.      GCS: GCS eye subscore is 4. GCS verbal subscore is 5. GCS motor subscore is 6.     Psychiatric:         Attention and Perception: Attention normal.         Speech: Speech normal.         Behavior: Behavior normal. Behavior is cooperative.          Lab Results: I have reviewed the following results:  CrCl cannot be calculated (Patient's most recent lab result is older than the maximum 7 days allowed.).  Lab Results   Component Value Date    WBC 5.73 05/23/2025    HGB 13.6 05/23/2025    HCT 42.8 05/23/2025     05/23/2025         Component Value Date/Time    K 4.7 05/23/2025 1509     05/23/2025 1509    CO2 30 05/23/2025 1509    BUN 11 05/23/2025 1509    CREATININE 1.07 05/23/2025 1509         Component Value Date/Time    CALCIUM 9.1 05/23/2025 1509    ALKPHOS 114 (H) 05/23/2025 1509    AST 20 05/23/2025 1509    ALT 14 05/23/2025 1509    TP 6.8 05/23/2025 1509    ALB 3.8 05/23/2025 1509       Imaging Results Review: No pertinent imaging studies reviewed.  Other Study Results Review: No additional pertinent studies reviewed.    Administrative Statements   I have spent a total time of 52 minutes in caring for this patient on the day  of the visit/encounter including Risks and benefits of tx options, Instructions for management, Patient and family education, Importance of tx compliance, Risk factor reductions, Impressions, Counseling / Coordination of care, Documenting in the medical record, Reviewing/placing orders in the medical record (including tests, medications, and/or procedures), Obtaining or reviewing history  , and Communicating with other healthcare professionals .         [1]   Past Medical History:  Diagnosis Date    Chronic pain disorder     History of transfusion     Hyperlipidemia     Hypertension    [2]   Past Surgical History:  Procedure Laterality Date    BACK SURGERY      L3-S1    CHOLECYSTECTOMY LAPAROSCOPIC N/A 02/10/2022    Procedure: CHOLECYSTECTOMY LAPAROSCOPIC;  Surgeon: Eulalio Gross DO;  Location: [facility];  Service: General    EGD      ELBOW SURGERY Right     IR DRAINAGE TUBE CHECK/CHANGE/REPOSITION/REINSERTION/UPSIZE  2025    IR DRAINAGE TUBE CHECK/CHANGE/REPOSITION/REINSERTION/UPSIZE  2025    IR DRAINAGE TUBE PLACEMENT  2025    IR PICC PLACEMENT SINGLE LUMEN  04/10/2025    SPINAL STIMULATOR PLACEMENT  2019   [3]   Social History  Tobacco Use    Smoking status: Former     Current packs/day: 0.00     Types: Cigarettes     Quit date: 2018     Years since quittin.4    Smokeless tobacco: Never   Vaping Use    Vaping status: Former    Substances: Nicotine, THC, Flavoring   Substance and Sexual Activity    Alcohol use: Never    Drug use: Not Currently   [4] No family history on file.  [5]   Social History  Tobacco Use    Smoking status: Former     Current packs/day: 0.00     Types: Cigarettes     Quit date: 2018     Years since quittin.4    Smokeless tobacco: Never   Vaping Use    Vaping status: Former    Substances: Nicotine, THC, Flavoring   Substance and Sexual Activity    Alcohol use: Never    Drug use: Not Currently

## 2025-06-10 NOTE — INTERVAL H&P NOTE
H&P reviewed. After examining the patient I find no changes in the patients condition since the H&P had been written.  Head and all 4s ok.    Vitals:    06/10/25 1121   BP: 115/87   Pulse: 88   Resp: 20   Temp: (!) 97.4 °F (36.3 °C)   SpO2: 96%

## 2025-06-10 NOTE — ANESTHESIA PREPROCEDURE EVALUATION
Procedure:  RESECTION COLON LOW ANTERIOR LAPAROSCOPIC WITH ROBOTICS (Abdomen)    Relevant Problems   CARDIO   (+) Hypertension      GI/HEPATIC   (+) GERD (gastroesophageal reflux disease)   (+) Liver abscess   (+) Rectosigmoid cancer (HCC)      HEMATOLOGY   (+) Anemia      MUSCULOSKELETAL   (+) Chronic low back pain      NEURO/PSYCH   (+) Chronic low back pain        Hyperlipidemia    Hypertension    Chronic pain disorder    History of transfusion      Left Ventricle: Left ventricular cavity size is normal. Wall thickness  is normal. The left ventricular ejection fraction is 55%. Systolic  function is normal. Although no diagnostic regional wall motion  abnormality was identified, this possibility cannot be completely excluded  on the basis of this study.  This is a technically difficult study.  Consider transesophageal echo to rule out endocarditis if clinical index  of suspicion is high. Diastolic function is normal.    Left Atrium: The atrium is mildly dilated.    Mitral Valve: There is mild annular calcification.      Physical Exam    Airway     Mallampati score: II  TM Distance: >3 FB  Neck ROM: full      Cardiovascular  Cardiovascular exam normal    Dental       Pulmonary  Pulmonary exam normal     Neurological      Other Findings  post-pubertal.      Anesthesia Plan  ASA Score- 2     Anesthesia Type- general with ASA Monitors.         Additional Monitors:     Airway Plan: Oral ETT.           Plan Factors-Exercise tolerance (METS): >4 METS.    Chart reviewed. EKG reviewed. Imaging results reviewed. Existing labs reviewed. Patient summary reviewed.                  Induction- intravenous.    Postoperative Plan- Plan for postoperative opioid use.   Monitoring Plan -   Post Operative Pain Plan - plan for postoperative opioid use        Informed Consent- Anesthetic plan and risks discussed with patient.  I personally reviewed this patient with the CRNA. Discussed and agreed on the Anesthesia Plan with the  CRNA..      NPO Status:  Vitals Value Taken Time   Date of last liquid 06/09/25 06/10/25 11:32   Time of last liquid 2350 06/10/25 11:32   Date of last solid 06/08/25 06/10/25 11:32   Time of last solid

## 2025-06-10 NOTE — OP NOTE
OPERATIVE REPORT  PATIENT NAME: Celia Rivera    :  1960  MRN: [id number]  Pt Location: [facility] OR ROOM 14    SURGERY DATE: 6/10/2025    Surgeons and Role:     * GERALD Larson MD - Primary     * Octavia Malcolm PA-C - Assisting   * Wanda Romayne Stengel Hohenshilt, PA-C - Assisting--A PA WAS REQUIRED FOR EXPERT RETRACTION. NO QUALIFIED RESIDENT WAS AVAILABLE FOR THE PROCEDURE.     * Deonte Schaffer MD - Assisting    Preop Diagnosis:  Rectosigmoid cancer (HCC) [C19]    Post-Op Diagnosis Codes:     * Rectosigmoid cancer (HCC) [C19]    Procedure(s):  RESECTION COLON LOW ANTERIOR LAPAROSCOPIC WITH ROBOTICS    Specimen(s):  ID Type Source Tests Collected by Time Destination   1 : sigmoid colon and portion of rectum Tissue Large Intestine, Sigmoid Colon TISSUE EXAM GERALD Larson MD 6/10/2025 1544        Estimated Blood Loss:   Minimal    Drains:  Urethral Catheter Double-lumen;Latex 16 Fr. (Active)   Number of days: 0       Anesthesia Type:   General    Operative Indications:  Rectosigmoid cancer (HCC) [C19]    Operative Findings:  Rectosigmoid mass with erythema in the antimesenteric serosa of the area of the tumor. No gross invasion of the serosa by tumor.       Complications:   None    Procedure and Technique:  The patient was positioned supine on a pink pad.  Her arms were cushioned with pink pad material and wrapped at her sides.  Her legs were in Mervin boots.  A chest wrap was positioned.  The abdomen was shaved widely using ChloraPrep and allowed to dry completely.  A timeout was done.    Visiport technique was used to enter the abdominal cavity through a small infraumbilical incision using a 5 mm laparoscopic trocar.  The abdomen was entered without any difficulty and air was insufflated.  Examination after insufflation revealed no adhesions in the abdomen despite a prior laparotomy.  A 12 mm trocar was placed in the right lower quadrant.  3 more trocars that were 8 mm in diameter were  positioned diagonally from the original trocar to the right epigastrium.  They were evenly spaced.  A 5 mm lateral trocar was positioned for assistant positioning.  The robot was docked.    Robotic technique was used to perform a resection of the tumor that could be seen at the area just proximal to the sacral promontory when lifted out of the pelvis.  The sigmoid and rectum were extraordinarily redundant, as was the descending colon.  The tumor, when lifted, was proximal to the sacral promontory.  The tattoos were distal to the sacral promontory.    Medial dissection of the sigmoid mesentery was performed.  This allowed lifting of the superior rectal artery and mesentery away from the retroperitoneal structures in the avascular plane.  This was carried into the retrorectal down to the level of the cul-de-sac.  Proximal dissection was carried up to the base of the inferior mesenteric artery.  The artery was surrounded superiorly and inferiorly.  Lateral dissection was done to protect the inferior mesenteric artery from retroperitoneal structures and to protect the ureter from our dissection.  Lateral dissection was then performed.  This allowed for rotating the sigmoid medially, lysing the avascular plane medial to the white line of Toldt and reentering our previous dissection plane.  Our dissection was carried up the descending colon, lifting the sigmoid/descending colon mesentery anteriorly.  We ceased our dissection around the area of the inferior pole of the kidney because the bowel was quite redundant and I could see that we could reach the descending/sigmoid colon junction to the pelvis without tension or difficulty.    The inferior mesenteric artery was preserved with the left colic artery.  The superior rectal artery was divided at its takeoff from the inferior mesenteric artery using multiple fires of the vessel sealer.  A portion of the sigmoid mesentery was divided and we then stopped that dissection.   The rectum was then lifted and the mesentery of the rectum was divided at the level approximately 2 cm proximal to the cul-de-sac.  The bowel was then transected at that level using a single fire of a 60 mm green loaded robotic stapler.  A clean staple line was identified with that single fire and the bowel  easily.  The staple line was at least 5 cm distal to the tumor.  The bowel was supple and well-vascularized distal to the staple line.    We then undocked the robot.  A Pfannenstiel incision was created.  The skin was incised using cautery.  Dissection was carried down to the fascia and the fascia was incised horizontally.  Fascial flaps were then used to separate the fascia from the muscle.  The midline was entered bluntly and the GelPort was positioned for continued use.  The bowel was delivered into the wound and the site of our previous dissection of the mesentery was identified.  The mesentery was divided and ligated and we began to initiate transection of the bowel.  The bowel was transected but it was felt that that area was somewhat thickened and brittle.  We moved proximally approximately 8 more centimeters, redivided the mesentery out to that level and ligated the vessels.  The bowel was circumferentially skeletonized and a pursestring device was used to position a 2-0 Prolene suture pursestring.  The anvil of a 28 mm EEA stapler device was positioned and tied into place using 2 wraps of the 2-0 Prolene around full-thickness wall of supple, otherwise unremarkable and well-vascularized descending colon.  The second specimen was sent for pathologic evaluation.  The anvil was securely tied in the position, the bowel was cleansed, and it was returned to the abdominal cavity.  Gloves were changed.  No colonic content spillage occurred during this portion of the operation or at any point.    A double stapled anastomosis was then undertaken without any difficulty.  There was no tension on the  anastomosis.  There was circumferential donuts identified on both sides with full-thickness.  The proximal and distal bowel were well-vascularized.  This was confirmed with flexible sigmoidoscopy.  The anastomosis was circumferentially intact.  Air test with the flexible sigmoidoscope in place revealed no leak of air bubbles at the anastomosis.  The bowel was pink proximally and distally.    All the fluids in the abdomen were suctioned or dried with gauze laparotomy pads.  The dissection planes were inspected and were seen to be dry.  The GelPort was removed.  Trocars were removed.    The right lower quadrant trocar site was closed at the fascial level using an 0 Vicryl suture.  The Pfannenstiel incision was closed at the peritoneum using an 0 Vicryl suture.  The fascia was then closed using a running 1 PDS suture.  The subcutaneous tissue was closed using interrupted 2-0 Vicryl.  The skin was then closed using a running subcuticular 4-0 Monocryl suture.  All of these closures were done with irrigation at each level to cleanse the wound.    The trocar sites were closed using interrupted 4-0 Monocryl suture in the subcuticular layer.  Wounds were cleansed and dried.  Exofin was applied to the wounds.    Sponge needle and instrument counts were correct.  Wand technique revealed no retained gauze.     I was present for the entire procedure.    Patient Disposition:  PACU     Colon Resection  Operation performed with curative intent Yes   Tumor Location (select all that apply) Rectosigmoid junction   Extent of colon and vascular resection (select all that apply) Other low anterior resection of the proximal rectum and sigmoid colon with mid rectal anastomosis.          SIGNATURE: MARQUEZ Larson MD  DATE: Katty 10, 2025  TIME: 4:14 PM

## 2025-06-11 LAB
ANION GAP SERPL CALCULATED.3IONS-SCNC: 5 MMOL/L (ref 4–13)
BUN SERPL-MCNC: 12 MG/DL (ref 5–25)
CALCIUM SERPL-MCNC: 8.3 MG/DL (ref 8.4–10.2)
CHLORIDE SERPL-SCNC: 107 MMOL/L (ref 96–108)
CO2 SERPL-SCNC: 26 MMOL/L (ref 21–32)
CREAT SERPL-MCNC: 0.82 MG/DL (ref 0.6–1.3)
ERYTHROCYTE [DISTWIDTH] IN BLOOD BY AUTOMATED COUNT: 13.6 % (ref 11.6–15.1)
GFR SERPL CREATININE-BSD FRML MDRD: 76 ML/MIN/1.73SQ M
GLUCOSE SERPL-MCNC: 135 MG/DL (ref 65–140)
HCT VFR BLD AUTO: 35.6 % (ref 34.8–46.1)
HGB BLD-MCNC: 11.9 G/DL (ref 11.5–15.4)
MAGNESIUM SERPL-MCNC: 1.7 MG/DL (ref 1.9–2.7)
MCH RBC QN AUTO: 29.8 PG (ref 26.8–34.3)
MCHC RBC AUTO-ENTMCNC: 33.4 G/DL (ref 31.4–37.4)
MCV RBC AUTO: 89 FL (ref 82–98)
PHOSPHATE SERPL-MCNC: 2.9 MG/DL (ref 2.3–4.1)
PLATELET # BLD AUTO: 212 THOUSANDS/UL (ref 149–390)
PMV BLD AUTO: 9.2 FL (ref 8.9–12.7)
POTASSIUM SERPL-SCNC: 3.5 MMOL/L (ref 3.5–5.3)
RBC # BLD AUTO: 3.99 MILLION/UL (ref 3.81–5.12)
SODIUM SERPL-SCNC: 138 MMOL/L (ref 135–147)
WBC # BLD AUTO: 8.2 THOUSAND/UL (ref 4.31–10.16)

## 2025-06-11 PROCEDURE — 80048 BASIC METABOLIC PNL TOTAL CA: CPT

## 2025-06-11 PROCEDURE — 99024 POSTOP FOLLOW-UP VISIT: CPT | Performed by: COLON & RECTAL SURGERY

## 2025-06-11 PROCEDURE — 84100 ASSAY OF PHOSPHORUS: CPT

## 2025-06-11 PROCEDURE — 85027 COMPLETE CBC AUTOMATED: CPT

## 2025-06-11 PROCEDURE — 97166 OT EVAL MOD COMPLEX 45 MIN: CPT

## 2025-06-11 PROCEDURE — 99233 SBSQ HOSP IP/OBS HIGH 50: CPT | Performed by: PHYSICIAN ASSISTANT

## 2025-06-11 PROCEDURE — 97163 PT EVAL HIGH COMPLEX 45 MIN: CPT

## 2025-06-11 PROCEDURE — 83735 ASSAY OF MAGNESIUM: CPT

## 2025-06-11 RX ORDER — MAGNESIUM SULFATE HEPTAHYDRATE 40 MG/ML
2 INJECTION, SOLUTION INTRAVENOUS ONCE
Status: COMPLETED | OUTPATIENT
Start: 2025-06-11 | End: 2025-06-11

## 2025-06-11 RX ORDER — OXYCODONE HCL 20 MG/1
80 TABLET, FILM COATED, EXTENDED RELEASE ORAL DAILY
Refills: 0 | Status: DISCONTINUED | OUTPATIENT
Start: 2025-06-11 | End: 2025-06-12 | Stop reason: HOSPADM

## 2025-06-11 RX ORDER — METHOCARBAMOL 750 MG/1
750 TABLET, FILM COATED ORAL EVERY 6 HOURS SCHEDULED
Status: DISCONTINUED | OUTPATIENT
Start: 2025-06-11 | End: 2025-06-12 | Stop reason: HOSPADM

## 2025-06-11 RX ORDER — OXYCODONE HCL 20 MG/1
60 TABLET, FILM COATED, EXTENDED RELEASE ORAL EVERY 12 HOURS SCHEDULED
Refills: 0 | Status: DISCONTINUED | OUTPATIENT
Start: 2025-06-11 | End: 2025-06-12

## 2025-06-11 RX ORDER — SODIUM CHLORIDE, SODIUM LACTATE, POTASSIUM CHLORIDE, CALCIUM CHLORIDE 600; 310; 30; 20 MG/100ML; MG/100ML; MG/100ML; MG/100ML
100 INJECTION, SOLUTION INTRAVENOUS CONTINUOUS
Status: DISCONTINUED | OUTPATIENT
Start: 2025-06-11 | End: 2025-06-12

## 2025-06-11 RX ORDER — HYDROMORPHONE HCL/PF 1 MG/ML
0.5 SYRINGE (ML) INJECTION EVERY 2 HOUR PRN
Status: DISCONTINUED | OUTPATIENT
Start: 2025-06-11 | End: 2025-06-12

## 2025-06-11 RX ORDER — OXYCODONE HYDROCHLORIDE 10 MG/1
10 TABLET ORAL EVERY 4 HOURS PRN
Refills: 0 | Status: DISCONTINUED | OUTPATIENT
Start: 2025-06-11 | End: 2025-06-12 | Stop reason: HOSPADM

## 2025-06-11 RX ORDER — MAGNESIUM SULFATE 1 G/100ML
1 INJECTION INTRAVENOUS ONCE
Status: COMPLETED | OUTPATIENT
Start: 2025-06-11 | End: 2025-06-11

## 2025-06-11 RX ORDER — ACETAMINOPHEN 325 MG/1
975 TABLET ORAL EVERY 8 HOURS SCHEDULED
Status: DISCONTINUED | OUTPATIENT
Start: 2025-06-11 | End: 2025-06-12 | Stop reason: HOSPADM

## 2025-06-11 RX ORDER — POTASSIUM CHLORIDE 1500 MG/1
40 TABLET, EXTENDED RELEASE ORAL ONCE
Status: COMPLETED | OUTPATIENT
Start: 2025-06-11 | End: 2025-06-11

## 2025-06-11 RX ORDER — DEXTROSE MONOHYDRATE, SODIUM CHLORIDE, AND POTASSIUM CHLORIDE 50; 1.49; 4.5 G/1000ML; G/1000ML; G/1000ML
84 INJECTION, SOLUTION INTRAVENOUS CONTINUOUS
Status: DISCONTINUED | OUTPATIENT
Start: 2025-06-11 | End: 2025-06-12

## 2025-06-11 RX ADMIN — SODIUM CHLORIDE, SODIUM LACTATE, POTASSIUM CHLORIDE, AND CALCIUM CHLORIDE 125 ML/HR: .6; .31; .03; .02 INJECTION, SOLUTION INTRAVENOUS at 02:47

## 2025-06-11 RX ADMIN — ACETAMINOPHEN 975 MG: 325 TABLET ORAL at 22:15

## 2025-06-11 RX ADMIN — ACETAMINOPHEN 1000 MG: 10 INJECTION INTRAVENOUS at 05:40

## 2025-06-11 RX ADMIN — HEPARIN SODIUM 5000 UNITS: 5000 INJECTION INTRAVENOUS; SUBCUTANEOUS at 14:47

## 2025-06-11 RX ADMIN — MAGNESIUM SULFATE HEPTAHYDRATE 2 G: 40 INJECTION, SOLUTION INTRAVENOUS at 10:02

## 2025-06-11 RX ADMIN — ACETAMINOPHEN 1000 MG: 10 INJECTION INTRAVENOUS at 00:07

## 2025-06-11 RX ADMIN — PREGABALIN 75 MG: 75 CAPSULE ORAL at 22:15

## 2025-06-11 RX ADMIN — ACETAMINOPHEN 975 MG: 325 TABLET ORAL at 14:46

## 2025-06-11 RX ADMIN — HEPARIN SODIUM 5000 UNITS: 5000 INJECTION INTRAVENOUS; SUBCUTANEOUS at 22:16

## 2025-06-11 RX ADMIN — DEXTROSE, SODIUM CHLORIDE, AND POTASSIUM CHLORIDE 84 ML/HR: 5; .45; .15 INJECTION INTRAVENOUS at 10:11

## 2025-06-11 RX ADMIN — METHOCARBAMOL 750 MG: 750 TABLET ORAL at 12:02

## 2025-06-11 RX ADMIN — METHOCARBAMOL 750 MG: 100 INJECTION INTRAMUSCULAR; INTRAVENOUS at 05:56

## 2025-06-11 RX ADMIN — PREGABALIN 75 MG: 75 CAPSULE ORAL at 10:02

## 2025-06-11 RX ADMIN — OXYCODONE HYDROCHLORIDE 80 MG: 20 TABLET, FILM COATED, EXTENDED RELEASE ORAL at 12:01

## 2025-06-11 RX ADMIN — HEPARIN SODIUM 5000 UNITS: 5000 INJECTION INTRAVENOUS; SUBCUTANEOUS at 05:40

## 2025-06-11 RX ADMIN — PREGABALIN 75 MG: 75 CAPSULE ORAL at 17:15

## 2025-06-11 RX ADMIN — METHOCARBAMOL 750 MG: 750 TABLET ORAL at 17:15

## 2025-06-11 RX ADMIN — METHOCARBAMOL 750 MG: 750 TABLET ORAL at 23:01

## 2025-06-11 RX ADMIN — OXYCODONE HYDROCHLORIDE 60 MG: 20 TABLET, FILM COATED, EXTENDED RELEASE ORAL at 22:15

## 2025-06-11 RX ADMIN — PANTOPRAZOLE SODIUM 40 MG: 40 TABLET, DELAYED RELEASE ORAL at 05:40

## 2025-06-11 RX ADMIN — MAGNESIUM SULFATE HEPTAHYDRATE 1 G: 1 INJECTION, SOLUTION INTRAVENOUS at 12:29

## 2025-06-11 RX ADMIN — OXYCODONE HYDROCHLORIDE 15 MG: 10 TABLET ORAL at 19:31

## 2025-06-11 RX ADMIN — DEXTROSE, SODIUM CHLORIDE, AND POTASSIUM CHLORIDE 84 ML/HR: 5; .45; .15 INJECTION INTRAVENOUS at 20:14

## 2025-06-11 RX ADMIN — POTASSIUM CHLORIDE 40 MEQ: 1500 TABLET, EXTENDED RELEASE ORAL at 10:02

## 2025-06-11 NOTE — QUICK NOTE
"Post Op Check Note - Surgery Resident  Celia Rivera 64 y.o. female MRN: [id number]  Unit/Bed#: [facility] 812-01 Encounter: [id number]    ASSESSMENT:  Celia Rivera is a 64 y.o. female who is status post LAR for rectosigmoid cancer.    Plan:  - CLD, advance as tolerated  - anti-emetics PRN  - DVT ppx  - PCA  - Encourage IS      Subjective: Patient is doing well post-op. Tolerating clear liquids, denies N/V. Abdominal pain well-controlled with PCA. Pulling 2000 on incentive spirometer.    Physical Exam:  GEN: NAD  CV: RRR  Lung: Normal effort  Ab: Soft, mildly tender RLQ, non-distended  Neuro: A+Ox3  Incisions: CDI    Blood pressure 124/75, pulse 83, temperature 98.3 °F (36.8 °C), resp. rate 16, height 5' 7\" (1.702 m), weight 84.1 kg (185 lb 8 oz), SpO2 95%.,Body mass index is 29.05 kg/m².      Intake/Output Summary (Last 24 hours) at 6/10/2025 2010  Last data filed at 6/10/2025 1801  Gross per 24 hour   Intake 2850 ml   Output 530 ml   Net 2320 ml       Invasive Devices       Peripheral Intravenous Line  Duration             Peripheral IV 06/10/25 Left Hand <1 day    Peripheral IV 06/10/25 Right Wrist <1 day              Drain  Duration             Urethral Catheter Double-lumen;Latex 16 Fr. <1 day                    VTE Pharmacologic Prophylaxis: Heparin            "

## 2025-06-11 NOTE — ASSESSMENT & PLAN NOTE
Has longstanding history of chronic lower back pain.  Had spinal cord stimulator placed in 2019 which still functions.  Follows with pain management provider outside of [facility] network.  Is prescribed chronic long-acting opioids and nonopioid pain medications.  Patient should follow-up with her outpatient pain management provider soon as possible following discharge.

## 2025-06-11 NOTE — OCCUPATIONAL THERAPY NOTE
Occupational Therapy Evaluation     Patient Name: Celia Rivera  Today's Date: 6/11/2025  Problem List  Principal Problem:    Rectosigmoid cancer (HCC)  Active Problems:    Chronic low back pain    Opioid dependence (HCC)    Postoperative abdominal pain    Past Medical History  Past Medical History[1]  Past Surgical History  Past Surgical History[2]          06/11/25 1149   OT Last Visit   OT Visit Date 06/11/25   Note Type   Note type Evaluation   Pain Assessment   Pain Assessment Tool 0-10   Pain Score 8   Pain Location/Orientation Location: Abdomen;Location: Back  (reports 5/10 back pain, 8/10 abdominal pain)   Pain Onset/Description Onset: Ongoing;Frequency: Constant/Continuous   Patient's Stated Pain Goal No pain   Hospital Pain Intervention(s) Repositioned;Ambulation/increased activity;Emotional support   Restrictions/Precautions   Weight Bearing Precautions Per Order No   Other Precautions Multiple lines;Fall Risk;Pain   Home Living   Type of Home House   Home Layout Two level;1/2 bath on main level;Bed/bath upstairs;Stairs to enter with rails  (1/2 bath with raised toilet on first floor. Full bath/bed on 2nd floor)   Bathroom Shower/Tub Walk-in shower   Bathroom Toilet Standard   Bathroom Equipment Grab bars in shower;Built-in shower seat;Shower chair;Commode   Home Equipment Walker;Cane  (+rollator)   Additional Comments 2SH, 4-5STE, 1/2 bath on first floor, able to sleep on the couch if needed. SPC used PRN PTA   Prior Function   Level of [place] Independent with ADLs;Independent with functional mobility;Independent with IADLS  (Shares IADLs with spouse)   Lives With Spouse   Receives Help From Family   IADLs Independent with driving;Independent with medication management;Family/Friend/Other provides meals  (Shares IADLs, spouse makes most meals)   Falls in the last 6 months 0   Vocational On disability   Lifestyle   Autonomy IND PTA with all ADLs. Shares IADLs with spouse. SPC used PRN but has  "RW if needed. +.   Reciprocal Relationships Lives with supportive spouse who can assist PRN upon d/c.   Service to Others On disability   Intrinsic Gratification Enjoys being active   Subjective   Subjective \"It feels good to get up and walk\"   ADL   Where Assessed Chair   Eating Assistance 7  Independent   Grooming Assistance 7  Independent   UB Bathing Assistance 7  Independent   LB Bathing Assistance 5  Supervision/Setup   UB Dressing Assistance 7  Independent   LB Dressing Assistance 5  Supervision/Setup   Toileting Assistance  5  Supervision/Setup   Bed Mobility   Supine to Sit Unable to assess   Sit to Supine Unable to assess   Additional Comments Pt OOB in recliner pre/post session, all needs met, call bell within reach.   Transfers   Sit to Stand 5  Supervision   Additional items Increased time required   Stand to Sit 5  Supervision   Additional items Increased time required   Additional Comments RW in stance   Functional Mobility   Functional Mobility 4  Minimal assistance   Additional Comments Ax1, short household distances, RW   Additional items Rolling walker   Balance   Static Sitting Good   Dynamic Sitting Fair +   Static Standing Fair   Dynamic Standing Fair -   Ambulatory Poor +   Activity Tolerance   Activity Tolerance Patient limited by fatigue;Patient limited by pain   Medical Staff Made Aware Co-eval with PT 2/2 increased medical complexity and comorbidities   Nurse Made Aware RN cleared for therapy   RUE Assessment   RUE Assessment WFL   LUE Assessment   LUE Assessment WFL   Hand Function   Gross Motor Coordination Functional   Fine Motor Coordination Functional   Cognition   Overall Cognitive Status WFL   Arousal/Participation Alert;Cooperative   Attention Within functional limits   Orientation Level Oriented X4  (Uncertain of June vs July, but knew date, year.)   Memory Within functional limits   Following Commands Follows all commands and directions without difficulty   Comments Pt very " pleasant and cooperative, motivated to participate.   Assessment   Limitation Decreased ADL status;Decreased endurance   Prognosis Good   Assessment 64 year old pt seen today for an OT evaluation following admission to [facility] 2/2 rectosigmoid cancer, s/p LAR 6/10 with present symptoms significant for pain, fatigue, weakness, decreased ADL status, decreased functional mobility. Pt  has a past medical history of Chronic pain disorder, History of transfusion, Hyperlipidemia, and Hypertension. Pt reported living with spouse and son in a 2SH, 4-5 GARRY. Pt reports that she can stay on the first floor with 1/2 bath and sleeping on the couch if needed. Pt reports being IND with all ADLs PTA and shares IADLs with spouse. +. SPC used PRN PTA. Spouse is able to assist PRN and son is home at times during weekends. Pt very pleasant and cooperative t/o session. Pt completed functional STS txfs with SUP, Min A functional mobility with RW. Pt was IND for UB ADLs and SUP for LB ADLs. The patient's raw score on the -PAC Daily Activity Inpatient Short Form is 20. A raw score of greater than or equal to 19 suggests the patient may benefit from discharge to home. Please refer to the recommendation of the Occupational Therapist for safe discharge planning. Pt is functioning at or near baseline level of function and no further skilled OT needs are identified. At this time, current OT recommendation is Level 4 resources - increased family support and no OT needs. Will remain available if skilled acute OT needs arise. D/c OT.   Goals   Patient Goals to go home   Plan   OT Frequency Eval only   Discharge Recommendation   Rehab Resource Intensity Level, OT No post-acute rehabilitation needs  (increased family support)   AM-[facility] Daily Activity Inpatient   Lower Body Dressing 3   Bathing 3   Toileting 3   Upper Body Dressing 3   Grooming 4   Eating 4   Daily Activity Raw Score 20   Daily Activity Standardized Score  (Calc for Raw Score >=11) 42.03   AM-PAC Applied Cognition Inpatient   Following a Speech/Presentation 4   Understanding Ordinary Conversation 4   Taking Medications 4   Remembering Where Things Are Placed or Put Away 4   Remembering List of 4-5 Errands 4   Taking Care of Complicated Tasks 4   Applied Cognition Raw Score 24   Applied Cognition Standardized Score 62.21   End of Consult   Education Provided Yes   Patient Position at End of Consult Bedside chair;All needs within reach   Nurse Communication Nurse aware of consult         Kyle Manzano MS, OTR/L     MOCA CERTIFIED RATER ID [id number]           [1]   Past Medical History:  Diagnosis Date    Chronic pain disorder     History of transfusion     Hyperlipidemia     Hypertension    [2]   Past Surgical History:  Procedure Laterality Date    BACK SURGERY      L3-S1    CHOLECYSTECTOMY LAPAROSCOPIC N/A 02/10/2022    Procedure: CHOLECYSTECTOMY LAPAROSCOPIC;  Surgeon: Eulalio Gross DO;  Location: [facility];  Service: General    EGD      ELBOW SURGERY Right     IR DRAINAGE TUBE CHECK/CHANGE/REPOSITION/REINSERTION/UPSIZE  04/18/2025    IR DRAINAGE TUBE CHECK/CHANGE/REPOSITION/REINSERTION/UPSIZE  04/23/2025    IR DRAINAGE TUBE PLACEMENT  04/05/2025    IR PICC PLACEMENT SINGLE LUMEN  04/10/2025    ND LAPS COLECTOMY PRTL W/COLOPXTSTMY LW ANAST N/A 6/10/2025    Procedure: RESECTION COLON LOW ANTERIOR LAPAROSCOPIC WITH ROBOTICS;  Surgeon: GERALD Larson MD;  Location:  MAIN OR;  Service: Colorectal    SPINAL STIMULATOR PLACEMENT  2019

## 2025-06-11 NOTE — UTILIZATION REVIEW
NOTIFICATION OF INPATIENT ADMISSION   AUTHORIZATION REQUEST   SERVICING FACILITY:   [facility]  Address: [address] [place] [address]  Tax ID: [id number]  NPI: [id number] ATTENDING PROVIDER:  Attending Name and NPI#: GERALD Larson Md [[id number]]  Address: [address] [place] [address]  Phone: [phone]   ADMISSION INFORMATION:  Place of Service: Inpatient [facility]  Place of Service Code: 21  Inpatient Admission Date/Time: 6/10/25 11:41 AM  Discharge Date/Time: No discharge date for patient encounter.  Admitting Diagnosis Code/Description:  Rectosigmoid cancer (HCC) [C19]     UTILIZATION REVIEW CONTACT:  Sunny Morgan Utilization   Network Utilization Review Department  Phone: [phone]  Fax: [phone]  Email: [email or web address]  Contact for approvals/pending authorizations, clinical reviews, and discharge.     PHYSICIAN ADVISORY SERVICES:  Medical Necessity Denial & Ljct-ep-Fgfx Review  Phone: [phone]  Fax: [phone]  Email: [email or web address]     DISCHARGE SUPPORT TEAM:  For Patients Discharge Needs & Updates  Phone: [phone] opt. 2 Fax: [phone]  Email: [email or web address]

## 2025-06-11 NOTE — ASSESSMENT & PLAN NOTE
64-year-old female with rectosigmoid colon cancer status post LAR 6/10, recovering well, awaiting return of bowel function    Afebrile, vital signs stable within normal limits on room air    A.m. labs pending    Plan  -Advance to clears toast crackers  -Maintenance IV fluids  -Monitor for return of bowel function  -Continue PCA, follow-up APS recommendations for pain regimen  -Encourage ambulation/out of bed, 3 times daily  -Encourage incentive spirometer use, denies per hour  -Restart all home meds

## 2025-06-11 NOTE — PROGRESS NOTES
Progress Note - Acute Pain   Name: Celia Rivera 64 y.o. female I MRN: [id number]  [facility] Date of Admission: 6/10/2025   Date of Service: 6/11/2025 I Hospital Day: 1    Assessment & Plan  Postoperative abdominal pain  Suggest the following postoperative pain regimen:  Discontinue hydromorphone PCA.  Acetaminophen 975 mg p.o. every 6 hours scheduled.  OxyContin 80 mg p.o. every morning and 60 mg p.o. every afternoon.  This is equivalent to the patient's current Xtampza ER dosing.  Oxycodone 10 mg p.o. every 4 hours as needed moderate pain or 15 mg p.o. every 4 hours as needed severe pain.  Hydromorphone 0.5 mg IV every 2 hours as needed breakthrough pain.  Methocarbamol 750 mg p.o. every 6 hours scheduled.  Pregabalin 75 mg p.o. 3 times daily scheduled.  As needed Narcan in the event that opioid reversal becomes necessary.  Bowel regimen to avoid opioid-induced constipation while on opioid pain medication.  Aqua K-pad as needed.   Will avoid methadone taper for now as patient is on high dose long-acting opioids and high-dose short acting opioids and pain is well-controlled currently.  Chronic low back pain  Has longstanding history of chronic lower back pain.  Had spinal cord stimulator placed in 2019 which still functions.  Follows with pain management provider outside of [facility].  Is prescribed chronic long-acting opioids and nonopioid pain medications.  Patient should follow-up with her outpatient pain management provider soon as possible following discharge.  Opioid dependence (HCC)  With long history of chronic lower back pain.  Is followed by outpatient pain management outside of [facility].  Patient is on the following outpatient pain regimen:  Xtampza ER 36 mg p.o. times 2 in the AM.  Xtampza ER 36 mg and 18 mg p.o. at bedtime.  Methocarbamol 750 mg p.o. every 6 hours as needed muscle spasm.  Pregabalin 75 mg p.o. 3 times daily scheduled.  Due to significant opioid  tolerance, patient would likely require higher than usual doses of opioid pain medications postoperatively.  May consider ketamine infusion postoperatively.  Discussed this with patient and she is in agreement.  Will maintain patient on equivalent OxyContin dosing as this is her baseline.  Follow-up with outpatient pain management provider soon as possible following discharge.    APS will continue to follow. Please contact Acute Pain Service - via SecureChat from [phone] with additional questions or concerns. See SecureChat or Amion for additional contacts and after hours information.     Subjective   Celia Rivera is a 64 y.o. female admitted 6/10/2025 for elective laparoscopic robotic assisted low anterior resection in setting of opioid tolerance for chronic pain.    Pain History  Current pain location(s):  Pain Score: 6  Pain Location/Orientation: Location: Abdomen  Pain Scale: Pain Assessment Tool: 0-10  24 hour history: Patient sitting up in bed complaining of mild to most moderate pain in the abdomen.  States her back pain is slightly worse than at baseline but tolerable.  Has used the PCA minimally and feels her pain is well-controlled.  Denies any nausea or vomiting and is tolerating clear liquids this morning.  Is passing gas but no BM yet.    Opioid requirement previous 24 hours: Hydromorphone 11 mg IV via PCA, hydromorphone 2 mg IV in PACU, fentanyl 200 mcg IV in PACU, methadone 20 mg IV intraoperatively.  Meds/Allergies   all current active meds have been reviewed, current meds:   Current Facility-Administered Medications:     acetaminophen (TYLENOL) tablet 975 mg, Q8H KEVIN    dextrose 5 % and sodium chloride 0.45 % with KCl 20 mEq/L infusion, Continuous, Last Rate: 84 mL/hr (06/11/25 1011)    heparin (porcine) subcutaneous injection 5,000 Units, Q8H KEVIN **AND** [COMPLETED] Platelet count, Once    HYDROmorphone (DILAUDID) injection 0.5 mg, Q2H PRN    lactated ringers bolus 1,000 mL, Once PRN **AND**  lactated ringers bolus 1,000 mL, Once PRN    magnesium sulfate 2 g/50 mL IVPB (premix) 2 g, Once, Last Rate: 2 g (06/11/25 1002)    magnesium sulfate IVPB (premix) SOLN 1 g, Once    methocarbamol (ROBAXIN) tablet 750 mg, Q6H KEVIN    naloxone (NARCAN) 0.04 mg/mL syringe 0.04 mg, Q1MIN PRN    ondansetron (ZOFRAN) injection 4 mg, Q6H PRN    oxyCODONE (OxyCONTIN) 12 hr tablet 60 mg, Q12H KEVIN    oxyCODONE (OxyCONTIN) 12 hr tablet 80 mg, Daily    oxyCODONE (ROXICODONE) immediate release tablet 10 mg, Q4H PRN **OR** oxyCODONE (ROXICODONE) IR tablet 15 mg, Q4H PRN    pantoprazole (PROTONIX) EC tablet 40 mg, Daily    pregabalin (LYRICA) capsule 75 mg, TID    sodium chloride 0.9 % bolus 1,000 mL, Once PRN **AND** sodium chloride 0.9 % bolus 1,000 mL, Once PRN, and PTA meds:   Prior to Admission Medications   Prescriptions Last Dose Informant Patient Reported? Taking?   VITAMIN D, CHOLECALCIFEROL, PO 6/3/2025  Yes Yes   Sig: Take by mouth   Xtampza ER 18 MG C12A 6/9/2025 Evening  Yes Yes   Sig: daily at bedtime   Xtampza ER 36 MG extended release capsule 6/9/2025 Evening  Yes Yes   Sig: in the morning   dicyclomine (BENTYL) 20 mg tablet   No No   Sig: Take 1 tablet (20 mg total) by mouth 2 (two) times a day   Patient not taking: Reported on 5/5/2025   methocarbamol (ROBAXIN) 750 mg tablet More than a month  Yes No   Sig: Take 750 mg by mouth every 6 (six) hours as needed for muscle spasms   metroNIDAZOLE (FLAGYL) 500 mg tablet 6/9/2025  No Yes   Sig: Take 1 tablet at 1:00pm, 1 tablet at 2pm, and 1 tablet at 10:00pm the day prior to surgery. Do not start before June 9, 2025.   multivitamin (THERAGRAN) TABS 6/3/2025  Yes Yes   Sig: Take 1 tablet by mouth in the morning.   neomycin (MYCIFRADIN) 500 mg tablet 6/9/2025  No Yes   Sig: Take 2 tablets at 1:00pm, 2 tablets at 2pm, and 2 tablets at 10:00pm the day prior to surgery. Do not start before June 9, 2025.   pantoprazole (PROTONIX) 40 mg tablet More than a month  No No   Sig:  Take 1 tablet (40 mg total) by mouth daily   Patient taking differently: Take 40 mg by mouth if needed   pregabalin (LYRICA) 75 mg capsule 6/9/2025 at 10:00 PM Self Yes Yes   Sig: Take 75 mg by mouth in the morning and 75 mg in the evening and 75 mg before bedtime.   senna-docusate sodium (SENOKOT-S) 8.6-50 mg per tablet Past Month  No Yes   Sig: Take 1 tablet by mouth daily   sodium chloride, PF, 0.9 %   No No   Sig: 10 mL by Intracatheter route daily for 90 doses   Patient not taking: Reported on 5/5/2025      Facility-Administered Medications: None     Allergies[1]  Objective :  Temp:  [97.5 °F (36.4 °C)-98.8 °F (37.1 °C)] 98.1 °F (36.7 °C)  HR:  [63-84] 63  BP: (110-188)/(57-81) 112/62  Resp:  [15-20] 15  SpO2:  [92 %-100 %] 95 %  O2 Device: None (Room air)    Physical Exam  Vitals and nursing note reviewed.   Constitutional:       General: She is awake. She is not in acute distress.     Appearance: She is not ill-appearing, toxic-appearing or diaphoretic.     Skin:     General: Skin is warm and dry.     Neurological:      Mental Status: She is alert and oriented to person, place, and time.      GCS: GCS eye subscore is 4. GCS verbal subscore is 5. GCS motor subscore is 6.     Psychiatric:         Attention and Perception: Attention normal.         Speech: Speech normal.         Behavior: Behavior normal. Behavior is cooperative.            Lab Results: I have reviewed the following results:  Estimated Creatinine Clearance: 79.3 mL/min (by C-G formula based on SCr of 0.82 mg/dL).  Lab Results   Component Value Date    WBC 8.20 06/11/2025    HGB 11.9 06/11/2025    HCT 35.6 06/11/2025     06/11/2025         Component Value Date/Time    K 3.5 06/11/2025 0728     06/11/2025 0728    CO2 26 06/11/2025 0728    BUN 12 06/11/2025 0728    CREATININE 0.82 06/11/2025 0728         Component Value Date/Time    CALCIUM 8.3 (L) 06/11/2025 0728    ALKPHOS 114 (H) 05/23/2025 1509    AST 20 05/23/2025 1509    ALT 14  05/23/2025 1509    TP 6.8 05/23/2025 1509    ALB 3.8 05/23/2025 1509       Imaging Results Review: No pertinent imaging studies reviewed.  Other Study Results Review: No additional pertinent studies reviewed.     Administrative Statements   I have spent a total time of 36 minutes in caring for this patient on the day of the visit/encounter including Risks and benefits of tx options, Instructions for management, Patient and family education, Importance of tx compliance, Risk factor reductions, Impressions, Counseling / Coordination of care, Documenting in the medical record, Reviewing/placing orders in the medical record (including tests, medications, and/or procedures), Obtaining or reviewing history  , and Communicating with other healthcare professionals .       [1] No Known Allergies

## 2025-06-11 NOTE — ASSESSMENT & PLAN NOTE
64-year-old female with rectosigmoid colon cancer status post LAR 6/10, recovering well, awaiting return of bowel function    Afebrile, vital signs stable within normal limits on room air    Urine output 2300+1 unmeasured  Stool x 2    WBC 6.8 from 8.2  Hemoglobin 11.5 from 11.9  Creatinine 0.89 from 0.82    Plan  -Advance to low residue diet  - Discontinue IV fluids  - Monitor for return of bowel function  - APS on board, appreciate evaluation recommendations, currently on home regimen and oral plus IV PRNs  - Encourage ambulation/at bed, 3 times daily  - Encourage incentive spirometer use, 10 times per hour  - PT/OT: No rehab needs  - Disposition planning

## 2025-06-11 NOTE — PROGRESS NOTES
Progress Note - Surgery-General   Name: Celia Rivera 64 y.o. female I MRN: [id number]  [facility] Date of Admission: 6/10/2025   Date of Service: 6/11/2025 I Hospital Day: 1     Assessment & Plan  Rectosigmoid cancer (HCC)  64-year-old female with rectosigmoid colon cancer status post LAR 6/10, recovering well, awaiting return of bowel function    Afebrile, vital signs stable within normal limits on room air    A.m. labs pending    Plan  -Advance to clears toast crackers  -Maintenance IV fluids  -Monitor for return of bowel function  -Continue PCA, follow-up APS recommendations for pain regimen  -Encourage ambulation/out of bed, 3 times daily  -Encourage incentive spirometer use, denies per hour  -Restart all home meds  Chronic low back pain  Opioid dependence (HCC)  Postoperative abdominal pain  History of opiate use at home for chronic back pain, APS consulted on admission    Plan  - Postoperative PCA  - Follow-up APS recommendations      Subjective   Patient seen and examined at bedside.  Patient expressed no acute concerns.  Pain was well-controlled overnight.  Patient slept well.  No bowel function.  No nausea vomiting fevers chills or shortness of breath.  Patient tolerated clear liquid diet.    Objective :  Temp:  [97.4 °F (36.3 °C)-98.8 °F (37.1 °C)] 98.3 °F (36.8 °C)  HR:  [66-88] 67  BP: (110-188)/(57-87) 110/57  Resp:  [15-20] 16  SpO2:  [92 %-100 %] 93 %  O2 Device: None (Room air)    I/O         06/09 0701  06/10 0700 06/10 0701  06/11 0700    I.V. (mL/kg)  3830.1 (45.5)    IV Piggyback  350    Total Intake(mL/kg)  4180.1 (49.7)    Urine (mL/kg/hr)  1040    Total Output  1040    Net  +3140.1                Lines/Drains/Airways       Active Status       Name Placement date Placement time Site Days    Urethral Catheter Double-lumen;Latex 16 Fr. 06/10/25  1330  Double-lumen;Latex  less than 1                  Physical Exam  Vitals and nursing note reviewed.   Constitutional:       General:  She is not in acute distress.     Appearance: Normal appearance. She is obese. She is not ill-appearing or toxic-appearing.   HENT:      Head: Normocephalic and atraumatic.     Eyes:      General: No scleral icterus.      Cardiovascular:      Rate and Rhythm: Normal rate.   Pulmonary:      Effort: Pulmonary effort is normal.   Abdominal:      General: Abdomen is flat. There is no distension.      Palpations: Abdomen is soft.      Tenderness: There is abdominal tenderness.     Musculoskeletal:      Right lower leg: No edema.      Left lower leg: No edema.     Skin:     General: Skin is warm and dry.     Neurological:      Mental Status: She is alert and oriented to person, place, and time.     Psychiatric:         Mood and Affect: Mood normal.         Lab Results: I have reviewed the following results:  Recent Labs     06/10/25  1911          Imaging Results Review: No pertinent imaging studies reviewed.  Other Study Results Review: No additional pertinent studies reviewed.    VTE Pharmacologic Prophylaxis: VTE covered by:  heparin (porcine), Subcutaneous, 5,000 Units at 06/11/25 0540     VTE Mechanical Prophylaxis: sequential compression device

## 2025-06-11 NOTE — RESTORATIVE TECHNICIAN NOTE
Restorative Technician Note      Patient Name: Celia Rivera     Restorative Tech Visit Date: 06/11/25  Note Type: Mobility  Patient Position Upon Consult: Supine  Activity Performed: Ambulated; Transferred (To/from bathroom)  Assistive Device: Other (Comment) (HHA)  Patient Position at End of Consult: Bedside chair; All needs within reach  Nurse Communication: -- (Nurse aware of consult)      Arely Apodaca Restorative Tech

## 2025-06-11 NOTE — ASSESSMENT & PLAN NOTE
History of opiate use at home for chronic back pain, APS consulted on admission    Plan  - Postoperative PCA  - Follow-up APS recommendations

## 2025-06-11 NOTE — PHYSICAL THERAPY NOTE
Physical Therapy Evaluation     Patient's Name: Celia Rivera    Admitting Diagnosis  Rectosigmoid cancer (HCC) [C19]    Problem List  Problem List[1]    Past Medical History  Past Medical History[2]    Past Surgical History  Past Surgical History[3]       06/11/25 1145   PT Last Visit   PT Visit Date 06/11/25   Note Type   Note type Evaluation   Pain Assessment   Pain Assessment Tool 0-10   Pain Score 8   Pain Location/Orientation Location: Abdomen   Pain Onset/Description Onset: Ongoing   Effect of Pain on Daily Activities Increased time fo ractivity   Patient's Stated Pain Goal No pain   Hospital Pain Intervention(s) Repositioned;Ambulation/increased activity;Emotional support   Multiple Pain Sites Yes   Pain 2   Pain Score 2 5   Pain Location/Orientation 2 Location: Back   Pain Onset/Description 2 Descriptor: Aching  (Chronic back pain)   Hospital Pain Intervention(s) 2 Ambulation/increased activity;Repositioned   Restrictions/Precautions   Weight Bearing Precautions Per Order No   Other Precautions Multiple lines;Fall Risk;Pain  (IV , [place])   Home Living   Type of Home House   Home Layout Two level;1/2 bath on main level;Bed/bath upstairs;Stairs to enter with rails   Bathroom Shower/Tub Walk-in shower   Bathroom Toilet Standard  (in 2nd floor bath + Raised in 1/2 bath)   Bathroom Equipment Grab bars in shower;Built-in shower seat;Shower chair  (was not using SC pta)   Home Equipment Walker;Cane  (Multiple canes, Rollator walker and RW)   Additional Comments 2STH with FFOS to bed/bath. 4-5 GARRY   Prior Function   Level of [place] Independent with ADLs;Independent with functional mobility;Independent with IADLS   Lives With Spouse;Son   Receives Help From Family   IADLs Independent with driving;Independent with meal prep;Independent with medication management  (Spouse does most meals)   Falls in the last 6 months 0   Vocational Retired   Comments Pt was using SPC PRN for mobility, was Ind   Cognition    Overall Cognitive Status WFL   Arousal/Participation Alert   Attention Within functional limits   Orientation Level Oriented X4   Following Commands Follows one step commands without difficulty   Comments Pt cooperative durign session   Subjective   Subjective Agreeable to mobilize   RLE Assessment   RLE Assessment WFL   LLE Assessment   LLE Assessment WFL   Bed Mobility   Supine to Sit Unable to assess   Sit to Supine Unable to assess   Additional Comments Pt OOB in chair upon arrival.   Transfers   Sit to Stand 5  Supervision   Additional items Increased time required;Verbal cues;Armrests   Stand to Sit 5  Supervision   Additional items Armrests;Increased time required;Verbal cues   Additional Comments w/RW   Ambulation/Elevation   Gait pattern Excessively slow;Short stride;Foward flexed   Gait Assistance 4  Minimal assist   Additional items Assist x 1;Verbal cues   Assistive Device Rolling walker   Distance 50 ft   Stair Management Assistance Not tested   Ambulation/Elevation Additional Comments Pt ambualtes with FFposture, difficulty correcting 2* abd and chronic back pain. Slow gait speed, does require VCs for appropriate use of AD , placement during ait   Balance   Static Sitting Good   Dynamic Sitting Fair +   Static Standing Fair   Dynamic Standing Fair -   Ambulatory Poor +   Endurance Deficit   Endurance Deficit Yes   Activity Tolerance   Activity Tolerance Patient limited by fatigue;Patient limited by pain   Medical Staff Made Aware Co-eval with OT 2* medical complexity   Nurse Made Aware RN cleared pt for therapy   Assessment   Prognosis Good   Problem List Decreased endurance;Decreased mobility;Pain   Assessment Pt is a 64 y.o. female seen for PT evaluation s/p admit to [facility] on [facility]/10[facility]. Pt was admitted with a primary dx of: Rectosigmoid cancer now s/p Low anterior laparoscopic colon resection on 6/10, chronic low back pain, Post operative abd pain.Pt has a past medical history of  Chronic pain disorder, History of transfusion, Hyperlipidemia, and Hypertension.   PT now consulted for assessment of mobility and d/c needs. Pt with Ambulate orders.  Pts current clinical presentation is Unstable/ Unpredictable (high complexity) due to Ongoing medical management for primary dx, Increased reliance on more restrictive AD compared to baseline, Decreased activity tolerance compared to baseline, Fall risk, s/p surgical intervention. Prior to admission, pt was Ind for mobility with SPC prn, pt lives in [facility] with spouse and son, with son available only on weekends, but spouse is retired. Upon evaluation, pt currently is requiring supervision for transfers and min assist for ambulation, used RW for stability.  Pt limited today 2* abd and back pain , gen fatigue.  Pt will continue to benefit from skilled acute PT interventions to address stated impairments; to maximize functional mobility; for ongoing pt/ family training; and DME needs. At conclusion of PT session all needs in reach, RN notified of session findings/recommendations, and pt returned back in recliner chair with phone and call bell within reach. Pt denies any further questions at this time. The patient's AM-PAC Basic Mobility Inpatient Short Form Raw Score is 18. A Raw score of greater than 16 suggests the patient may benefit from discharge to home. Please also refer to the recommendation of the Physical Therapist for safe discharge planning. Recommend No Post Acute Rehab services  upon hospital D/C pending further progress, stair trial, anticipate pt to improve in mobility through hsopital course.   Barriers to Discharge Other (Comment)  (stair trial)   Goals   Patient Goals to get bettre   STG Expiration Date 06/25/25   Short Term Goal #1 STG 1. Pt will be able to perform bed mobility tasks with sup in order to improve overall functional mobility and assist in safe d/c. STG 2. Pt with sit EOB for at least 25 minutes at sup level in order to  strengthen abdominal musculature and assist in future transfers/ ambulation. STG 3. Pt will be able to perform functional transfer with Sup in order to improve overall functional mobility and assist in safe d/c. STG 4. Pt will be able to ambulate at least 200 feet with least restrictive device with Sup A in order to improve overall functional mobility and assist in safe d/c. STG 5. Pt will improve sitting/standing static/dynamic balance 1/2 grade in order to improve functional mobility and assist in safe d/c. STG 6. Pt will be able to negotiate at least 12 stairs with least restrictive device with Sup A in order to improve overall functional mobility and assist in safe d/c.   PT Treatment Day 0   Plan   Treatment/Interventions Functional transfer training;Elevations;Therapeutic exercise;Gait training;Bed mobility;Spoke to nursing;Spoke to case management;OT   PT Frequency 3-5x/wk   Discharge Recommendation   Rehab Resource Intensity Level, PT No post-acute rehabilitation needs   Equipment Recommended Walker  (pt owns)   AM-PAC Basic Mobility Inpatient   Turning in Flat Bed Without Bedrails 3   Lying on Back to Sitting on Edge of Flat Bed Without Bedrails 3   Moving Bed to Chair 3   Standing Up From Chair Using Arms 3   Walk in Room 3   Climb 3-5 Stairs With Railing 3   Basic Mobility Inpatient Raw Score 18   Basic Mobility Standardized Score 41.05   [facility] Highest Level Of Mobility   -HLM Goal 6: Walk 10 steps or more   -HLM Achieved 7: Walk 25 feet or more   Modified [place] Scale   Modified [place] Scale 4   End of Consult   Patient Position at End of Consult All needs within reach;Bedside chair  (Chair alarm not required per RN)         Chandrika Laurent, PT DPT        [1]   Patient Active Problem List  Diagnosis    Right upper quadrant pain    Choledocholithiasis    Chronic low back pain    Opioid dependence (HCC)    Hypertension    Transaminitis    GERD (gastroesophageal reflux disease)    Liver abscess     Cholelithiasis with choledocholithiasis    Elevated LFTs    Anemia    Streptococcal bacteremia    Rectosigmoid cancer (HCC)    Postoperative abdominal pain   [2]   Past Medical History:  Diagnosis Date    Chronic pain disorder     History of transfusion     Hyperlipidemia     Hypertension    [3]   Past Surgical History:  Procedure Laterality Date    BACK SURGERY      L3-S1    CHOLECYSTECTOMY LAPAROSCOPIC N/A 02/10/2022    Procedure: CHOLECYSTECTOMY LAPAROSCOPIC;  Surgeon: Eulalio Gross DO;  Location: [facility];  Service: General    EGD      ELBOW SURGERY Right     IR DRAINAGE TUBE CHECK/CHANGE/REPOSITION/REINSERTION/UPSIZE  04/18/2025    IR DRAINAGE TUBE CHECK/CHANGE/REPOSITION/REINSERTION/UPSIZE  04/23/2025    IR DRAINAGE TUBE PLACEMENT  04/05/2025    IR PICC PLACEMENT SINGLE LUMEN  04/10/2025    NC LAPS COLECTOMY PRTL W/COLOPXTSTMY LW ANAST N/A 6/10/2025    Procedure: RESECTION COLON LOW ANTERIOR LAPAROSCOPIC WITH ROBOTICS;  Surgeon: GERALD Larson MD;  Location:  MAIN OR;  Service: Colorectal    SPINAL STIMULATOR PLACEMENT  2019

## 2025-06-11 NOTE — RESTORATIVE TECHNICIAN NOTE
Restorative Technician Note      Patient Name: Celia Rivera     Restorative Tech Visit Date: 06/11/25  Note Type: Mobility  Patient Position Upon Consult: Bedside chair  Activity Performed: Ambulated  Assistive Device: Roller walker  Patient Position at End of Consult: Bedside chair; All needs within reach  Nurse Communication: -- (Nurse aware of consult)    Arely Apodaca Restorative Tech

## 2025-06-11 NOTE — UTILIZATION REVIEW
Initial Clinical Review    Elective Inpatient surgical procedure  Age/Sex: 64 y.o. female  Surgery Date: 6/10/25  Procedure: RESECTION COLON LOW ANTERIOR LAPAROSCOPIC WITH ROBOTICS   Anesthesia: general  Operative Findings: Rectosigmoid mass with erythema in the antimesenteric serosa of the area of the tumor. No gross invasion of the serosa by tumor.     POD#1 Progress Note:  Patient expressed no acute concerns. Pain was well-controlled overnight. Patient slept well. No bowel function. No nausea vomiting fevers chills or shortness of breath. Patient tolerated clear liquid diet. Abdominal tenderness noted. Advance to clears toast crackers. Maintenance IV fluids. Monitor for return of bowel function. Continue PCA, follow-up APS recommendations for pain regimen. Encourage ambulation/out of bed, 3 times daily. Encourage incentive spirometer use, denies per hour. Restart all home meds.    Admission Orders: Date/Time/Statement:   Admission Orders (From admission, onward)       Ordered        06/10/25 1141  Inpatient Admission  Once                          Orders Placed This Encounter   Procedures    Inpatient Admission     Standing Status:   Standing     Number of Occurrences:   1     Level of Care:   Med Surg [16]     Estimated length of stay:   More than 2 Midnights     Certification:   I certify that inpatient services are medically necessary for this patient for a duration of greater than two midnights. See H&P and MD Progress Notes for additional information about the patient's course of treatment.     Diet: clears with toast and crackers  Mobility: OOB and ambulatory  DVT Prophylaxis: heparin, scd, ambulation    Medications/Pain Control:   Scheduled Medications:  acetaminophen, 975 mg, Oral, Q8H [facility]  heparin (porcine), 5,000 Units, Subcutaneous, Q8H KEVIN  magnesium sulfate, 2 g, Intravenous, Once  magnesium sulfate, 1 g, Intravenous, Once  methocarbamol, 750 mg, Oral, Q6H KEVIN  oxyCODONE, 60 mg, Oral, Q12H  [facility]  oxyCODONE, 80 mg, Oral, Daily  pantoprazole, 40 mg, Oral, Daily  pregabalin, 75 mg, Oral, TID      Continuous IV Infusions:  dextrose 5 % and sodium chloride 0.45 % with KCl 20 mEq/L, 84 mL/hr, Intravenous, Continuous      PRN Meds:  HYDROmorphone, 0.5 mg, Intravenous, Q2H PRN  lactated ringers, 1,000 mL, Intravenous, Once PRN   And  lactated ringers, 1,000 mL, Intravenous, Once PRN  naloxone, 0.04 mg, Intravenous, Q1MIN PRN  ondansetron, 4 mg, Intravenous, Q6H PRN  oxyCODONE, 10 mg, Oral, Q4H PRN   Or  oxyCODONE, 15 mg, Oral, Q4H PRN  sodium chloride, 1,000 mL, Intravenous, Once PRN   And  sodium chloride, 1,000 mL, Intravenous, Once PRN      Vital Signs (last 3 days)       Date/Time Temp Pulse Resp BP MAP (mmHg) SpO2 O2 Flow Rate (L/min) O2 Device Cardiac (WDL) Patient Position - Orthostatic VS Pain    06/11/25 1127 -- -- -- -- -- -- -- -- -- -- 6    06/11/25 07:31:55 98.1 °F (36.7 °C) 63 15 112/62 79 95 % -- None (Room air) -- Lying --    06/11/25 0700 -- -- 16 -- -- -- -- None (Room air) -- -- 7    06/11/25 0300 -- -- -- -- -- -- -- -- -- -- 4    06/11/25 02:45:16 98.3 °F (36.8 °C) 67 16 110/57 75 93 % -- -- -- -- --    06/11/25 00:10:31 98.8 °F (37.1 °C) 73 16 110/58 75 93 % -- None (Room air) -- Lying 5    06/10/25 22:00:28 98.1 °F (36.7 °C) 66 16 118/70 86 94 % -- -- -- -- --    06/10/25 21:01:39 98.5 °F (36.9 °C) 77 16 121/69 86 94 % -- -- -- -- --    06/10/25 2010 -- 73 -- 124/75 91 94 % -- -- -- -- --    06/10/25 20:05:28 98.3 °F (36.8 °C) 83 16 124/75 91 95 % -- -- -- -- --    06/10/25 2005 98.3 °F (36.8 °C) 82 -- 124/75 91 93 % -- -- -- -- --    06/10/25 1922 -- -- -- -- -- -- -- -- -- -- 8    06/10/25 1920 -- -- -- -- -- -- -- -- -- -- 8    06/10/25 18:59:17 98.1 °F (36.7 °C) 76 18 130/75 93 95 % -- -- -- -- --    06/10/25 1815 -- 78 18 157/79 112 -- -- -- -- -- --    06/10/25 1800 -- 80 18 153/77 106 96 % -- None (Room air) -- Lying 9    06/10/25 1745 -- 84 20 150/81 109 96 % -- None (Room air) --  Lying 10 - Worst Possible Pain    06/10/25 1730 -- 77 15 151/72 104 92 % -- None (Room air) -- Lying 10 - Worst Possible Pain    06/10/25 1716 -- -- -- -- -- -- -- -- -- -- 10 - Worst Possible Pain    06/10/25 1715 -- 79 20 188/80 115 99 % -- None (Room air) -- Lying 10 - Worst Possible Pain    06/10/25 1708 -- -- -- -- -- -- -- -- -- -- 10 - Worst Possible Pain    06/10/25 1701 -- -- -- -- -- -- -- -- -- -- 10 - Worst Possible Pain    06/10/25 1700 -- 73 20 130/73 94 98 % -- None (Room air) -- Lying 10 - Worst Possible Pain    06/10/25 1658 -- 73 18 -- -- 99 % -- -- -- -- 10 - Worst Possible Pain    06/10/25 1655 -- 75 16 164/76 109 98 % -- None (Room air) -- Lying 10 - Worst Possible Pain    06/10/25 1645 97.5 °F (36.4 °C) 78 18 164/76 109 100 % 6 L/min Simple mask WDL Lying --    06/10/25 1121 97.4 °F (36.3 °C) 88 20 115/87 -- 96 % -- None (Room air) -- -- 8          Weight (last 2 days)       Date/Time Weight    06/10/25 1900 84.1 (185.5)    06/10/25 1121 90.3 (199)            Pertinent Labs/Diagnostic Test Results:   Radiology:  No orders to display     Cardiology:  No orders to display     GI:  No orders to display           Results from last 7 days   Lab Units 06/11/25  0728 06/10/25  1911   WBC Thousand/uL 8.20  --    HEMOGLOBIN g/dL 11.9  --    HEMATOCRIT % 35.6  --    PLATELETS Thousands/uL 212 251         Results from last 7 days   Lab Units 06/11/25  0728   SODIUM mmol/L 138   POTASSIUM mmol/L 3.5   CHLORIDE mmol/L 107   CO2 mmol/L 26   ANION GAP mmol/L 5   BUN mg/dL 12   CREATININE mg/dL 0.82   EGFR ml/min/1.73sq m 76   CALCIUM mg/dL 8.3*   MAGNESIUM mg/dL 1.7*   PHOSPHORUS mg/dL 2.9         Results from last 7 days   Lab Units 06/10/25  1146   POC GLUCOSE mg/dl 116     Results from last 7 days   Lab Units 06/11/25  0728   GLUCOSE RANDOM mg/dL 135     Network Utilization Review Department  ATTENTION: Please call with any questions or concerns to [phone] and carefully listen to the prompts so that  you are directed to the right person. All voicemails are confidential.   For Discharge needs, contact Care Management DC Support Team at [phone] opt. 2  Send all requests for admission clinical reviews, approved or denied determinations and any other requests to dedicated fax number below belonging to the campus where the patient is receiving treatment. List of dedicated fax numbers for the Facilities:  FACILITY NAME UR FAX NUMBER   ADMISSION DENIALS (Administrative/Medical Necessity) [phone]   DISCHARGE SUPPORT TEAM (NETWORK) [phone]   PARENT CHILD HEALTH (Maternity/NICU/Pediatrics) [phone]   [facility] [phone]   [facility] [phone]   [facility] [phone]   [facility] [phone]   [facility] [phone]   [facility] [phone]   [facility] [phone]   [facility] [phone]   [facility] [phone]   [facility] [phone]   [facility] [phone]   [facility] [phone]

## 2025-06-11 NOTE — PLAN OF CARE
Problem: PHYSICAL THERAPY ADULT  Goal: Performs mobility at highest level of function for planned discharge setting.  See evaluation for individualized goals.  Description: Treatment/Interventions: Functional transfer training, Elevations, Therapeutic exercise, Gait training, Bed mobility, Spoke to nursing, Spoke to case management, OT  Equipment Recommended: Walker (pt owns)       See flowsheet documentation for full assessment, interventions and recommendations.  Note: Prognosis: Good  Problem List: Decreased endurance, Decreased mobility, Pain  Assessment: Pt is a 64 y.o. female seen for PT evaluation s/p admit to [facility] on 6/10/2025. Pt was admitted with a primary dx of: Rectosigmoid cancer now s/p Low anterior laparoscopic colon resection on 6/10, chronic low back pain, Post operative abd pain.Pt has a past medical history of Chronic pain disorder, History of transfusion, Hyperlipidemia, and Hypertension.   PT now consulted for assessment of mobility and d/c needs. Pt with Ambulate orders.  Pts current clinical presentation is Unstable/ Unpredictable (high complexity) due to Ongoing medical management for primary dx, Increased reliance on more restrictive AD compared to baseline, Decreased activity tolerance compared to baseline, Fall risk, s/p surgical intervention. Prior to admission, pt was Ind for mobility with SPC prn, pt lives in [facility] with spouse and son, with son available only on weekends, but spouse is retired. Upon evaluation, pt currently is requiring supervision for transfers and min assist for ambulation, used RW for stability.  Pt limited today 2* abd and back pain , gen fatigue.  Pt will continue to benefit from skilled acute PT interventions to address stated impairments; to maximize functional mobility; for ongoing pt/ family training; and DME needs. At conclusion of PT session all needs in reach, RN notified of session findings/recommendations, and pt returned back in recliner  Patient here today for nurse blood pressure check.    BP  P  BP    Last dose of BP medication taken:        BP Readings from Last 1 Encounters:   05/12/25 1125 (!) 172/96   05/12/25 1109 (!) 164/102     Pulse Readings from Last 1 Encounters:   05/12/25 64     Patient Reported Vitals           No data to display                 Last Clinician Visit for this condition: 05/12/25    Per that visit, plan of care:   - Blood pressure readings are consistently quite elevated.  He is advised that it may take some time to adapt to a normal blood pressure as his blood pressure has been quite elevated for some time.  - Lisinopril dosage will be increased to 30 mg. He is advised to take three 10 mg tablets until the current supply is exhausted, after which he should transition to a single 30 mg tablet.  - Adequate hydration and regular meal consumption are recommended. He is also encouraged to engage in regular physical activity and maintain a healthy weight.  - A follow-up appointment will be scheduled in 2 weeks to monitor his blood pressure and assess his response to the increased lisinopril dosage. If necessary, a second antihypertensive medication may be introduced.    Next office visit is scheduled for: Visit date not found    Current BP Medications are:   Lisinopril 30 mg daily    Please review and advise if there are any changes to current plan of care.    Next Nurse BP visit scheduled: {YES/NO, NO DEFAULT:[id number]}     chair with phone and call bell within reach. Pt denies any further questions at this time. The patient's AM-PAC Basic Mobility Inpatient Short Form Raw Score is 18. A Raw score of greater than 16 suggests the patient may benefit from discharge to home. Please also refer to the recommendation of the Physical Therapist for safe discharge planning. Recommend No Post Acute Rehab services  upon hospital D/C pending further progress, yesi trial, anticipate pt to improve in mobility through hsopital course.  Barriers to Discharge: Other (Comment) (stair trial)     Rehab Resource Intensity Level, PT: No post-acute rehabilitation needs    See flowsheet documentation for full assessment.

## 2025-06-11 NOTE — ASSESSMENT & PLAN NOTE
History of opiate use at home for chronic back pain, APS consulted on admission    Plan  - Follow-up APS recommendations

## 2025-06-11 NOTE — PROGRESS NOTES
Progress Note - Surgery-General   Name: Celia Rivera 64 y.o. female I MRN: [id number]  Unit/Bed#: [facility] 812-01 I Date of Admission: 6/10/2025   Date of Service: 6/11/2025 I Hospital Day: 1     Assessment & Plan  Rectosigmoid cancer (HCC)  64-year-old female with rectosigmoid colon cancer status post LAR 6/10, recovering well, awaiting return of bowel function    Afebrile, vital signs stable within normal limits on room air    Urine output 2300+1 unmeasured  Stool x 2    WBC 6.8 from 8.2  Hemoglobin 11.5 from 11.9  Creatinine 0.89 from 0.82    Plan  -Advance to low residue diet  - Discontinue IV fluids  - Monitor for return of bowel function  - APS on board, appreciate evaluation recommendations, currently on home regimen and oral plus IV PRNs  - Encourage ambulation/at bed, 3 times daily  - Encourage incentive spirometer use, 10 times per hour  - PT/OT: No rehab needs  - Disposition planning  Chronic low back pain  Opioid dependence (HCC)  Postoperative abdominal pain  History of opiate use at home for chronic back pain, APS consulted on admission    Plan  - Follow-up APS recommendations      Subjective   Patient seen and examined at bedside.  Pain well-controlled.  Patient expressed no acute concerns.  No nausea vomiting fevers chills or shortness of breath on room air.  Patient ambulating.  Patient having bowel movements.    Objective :  Temp:  [98.1 °F (36.7 °C)-98.8 °F (37.1 °C)] 98.4 °F (36.9 °C)  HR:  [63-83] 66  BP: (110-130)/(57-75) 113/62  Resp:  [15-18] 18  SpO2:  [93 %-97 %] 97 %  O2 Device: None (Room air)    I/O         06/09 0701  06/10 0700 06/10 0701  06/11 0700 06/11 0701 06/12 0700    I.V. (mL/kg)  3832.1 (45.6) 3.9 (0)    IV Piggyback  350     Total Intake(mL/kg)  4182.1 (49.7) 3.9 (0)    Urine (mL/kg/hr)  1040 1000 (1)    Total Output  1040 1000    Net  +3142.1 -996.1                 Lines/Drains/Airways       Active Status       Name Placement date Placement time Site Days    Urethral Catheter  Double-lumen;Latex 16 Fr. 06/10/25  1330  Double-lumen;Latex  1                  Physical Exam  Vitals and nursing note reviewed.   Constitutional:       General: She is not in acute distress.     Appearance: Normal appearance. She is normal weight. She is not ill-appearing or toxic-appearing.   HENT:      Head: Normocephalic and atraumatic.     Eyes:      General: No scleral icterus.      Cardiovascular:      Rate and Rhythm: Normal rate.   Pulmonary:      Effort: Pulmonary effort is normal.   Abdominal:      General: Abdomen is flat. There is no distension.      Palpations: Abdomen is soft.      Tenderness: There is no abdominal tenderness.      Comments: Incisions clean dry and intact with skin glue     Musculoskeletal:      Right lower leg: No edema.      Left lower leg: No edema.     Skin:     General: Skin is warm.     Neurological:      Mental Status: She is alert and oriented to person, place, and time.     Psychiatric:         Mood and Affect: Mood normal.           Lab Results: I have reviewed the following results:  Recent Labs     06/11/25  0728   WBC 8.20   HGB 11.9   HCT 35.6      SODIUM 138   K 3.5      CO2 26   BUN 12   CREATININE 0.82   GLUC 135   MG 1.7*   PHOS 2.9       Imaging Results Review: No pertinent imaging studies reviewed.  Other Study Results Review: No additional pertinent studies reviewed.    VTE Pharmacologic Prophylaxis: VTE covered by:  heparin (porcine), Subcutaneous, 5,000 Units at 06/11/25 1447     VTE Mechanical Prophylaxis: sequential compression device

## 2025-06-11 NOTE — ASSESSMENT & PLAN NOTE
With long history of chronic lower back pain.  Is followed by outpatient pain management outside of [facility].  Patient is on the following outpatient pain regimen:  Xtampza ER 36 mg p.o. times 2 in the AM.  Xtampza ER 36 mg and 18 mg p.o. at bedtime.  Methocarbamol 750 mg p.o. every 6 hours as needed muscle spasm.  Pregabalin 75 mg p.o. 3 times daily scheduled.  Due to significant opioid tolerance, patient would likely require higher than usual doses of opioid pain medications postoperatively.  May consider ketamine infusion postoperatively.  Discussed this with patient and she is in agreement.  Will maintain patient on equivalent OxyContin dosing as this is her baseline.  Follow-up with outpatient pain management provider soon as possible following discharge.

## 2025-06-12 VITALS
WEIGHT: 185.5 LBS | OXYGEN SATURATION: 96 % | TEMPERATURE: 98.2 F | BODY MASS INDEX: 29.11 KG/M2 | HEART RATE: 58 BPM | HEIGHT: 67 IN | SYSTOLIC BLOOD PRESSURE: 119 MMHG | RESPIRATION RATE: 17 BRPM | DIASTOLIC BLOOD PRESSURE: 66 MMHG

## 2025-06-12 PROBLEM — N28.9 ABNORMAL RENAL FUNCTION FINDING: Status: ACTIVE | Noted: 2025-06-12

## 2025-06-12 LAB
ANION GAP SERPL CALCULATED.3IONS-SCNC: 4 MMOL/L (ref 4–13)
BASOPHILS # BLD AUTO: 0.05 THOUSANDS/ÂΜL (ref 0–0.1)
BASOPHILS NFR BLD AUTO: 1 % (ref 0–1)
BUN SERPL-MCNC: 5 MG/DL (ref 5–25)
CALCIUM SERPL-MCNC: 8.4 MG/DL (ref 8.4–10.2)
CHLORIDE SERPL-SCNC: 109 MMOL/L (ref 96–108)
CO2 SERPL-SCNC: 27 MMOL/L (ref 21–32)
CREAT SERPL-MCNC: 0.89 MG/DL (ref 0.6–1.3)
EOSINOPHIL # BLD AUTO: 0.07 THOUSAND/ÂΜL (ref 0–0.61)
EOSINOPHIL NFR BLD AUTO: 1 % (ref 0–6)
ERYTHROCYTE [DISTWIDTH] IN BLOOD BY AUTOMATED COUNT: 13.7 % (ref 11.6–15.1)
GFR SERPL CREATININE-BSD FRML MDRD: 69 ML/MIN/1.73SQ M
GLUCOSE SERPL-MCNC: 104 MG/DL (ref 65–140)
HCT VFR BLD AUTO: 36.1 % (ref 34.8–46.1)
HGB BLD-MCNC: 11.5 G/DL (ref 11.5–15.4)
IMM GRANULOCYTES # BLD AUTO: 0.02 THOUSAND/UL (ref 0–0.2)
IMM GRANULOCYTES NFR BLD AUTO: 0 % (ref 0–2)
LYMPHOCYTES # BLD AUTO: 1.62 THOUSANDS/ÂΜL (ref 0.6–4.47)
LYMPHOCYTES NFR BLD AUTO: 24 % (ref 14–44)
MAGNESIUM SERPL-MCNC: 2 MG/DL (ref 1.9–2.7)
MCH RBC QN AUTO: 29.3 PG (ref 26.8–34.3)
MCHC RBC AUTO-ENTMCNC: 31.9 G/DL (ref 31.4–37.4)
MCV RBC AUTO: 92 FL (ref 82–98)
MONOCYTES # BLD AUTO: 0.69 THOUSAND/ÂΜL (ref 0.17–1.22)
MONOCYTES NFR BLD AUTO: 10 % (ref 4–12)
NEUTROPHILS # BLD AUTO: 4.33 THOUSANDS/ÂΜL (ref 1.85–7.62)
NEUTS SEG NFR BLD AUTO: 64 % (ref 43–75)
NRBC BLD AUTO-RTO: 0 /100 WBCS
PLATELET # BLD AUTO: 218 THOUSANDS/UL (ref 149–390)
PMV BLD AUTO: 9.6 FL (ref 8.9–12.7)
POTASSIUM SERPL-SCNC: 3.9 MMOL/L (ref 3.5–5.3)
RBC # BLD AUTO: 3.93 MILLION/UL (ref 3.81–5.12)
SODIUM SERPL-SCNC: 140 MMOL/L (ref 135–147)
WBC # BLD AUTO: 6.78 THOUSAND/UL (ref 4.31–10.16)

## 2025-06-12 PROCEDURE — 99232 SBSQ HOSP IP/OBS MODERATE 35: CPT | Performed by: PHYSICIAN ASSISTANT

## 2025-06-12 PROCEDURE — 99024 POSTOP FOLLOW-UP VISIT: CPT | Performed by: COLON & RECTAL SURGERY

## 2025-06-12 PROCEDURE — 85025 COMPLETE CBC W/AUTO DIFF WBC: CPT | Performed by: PHYSICIAN ASSISTANT

## 2025-06-12 PROCEDURE — 80048 BASIC METABOLIC PNL TOTAL CA: CPT | Performed by: PHYSICIAN ASSISTANT

## 2025-06-12 PROCEDURE — 83735 ASSAY OF MAGNESIUM: CPT | Performed by: PHYSICIAN ASSISTANT

## 2025-06-12 PROCEDURE — 99222 1ST HOSP IP/OBS MODERATE 55: CPT | Performed by: INTERNAL MEDICINE

## 2025-06-12 RX ORDER — OXYCODONE HCL 20 MG/1
60 TABLET, FILM COATED, EXTENDED RELEASE ORAL
Refills: 0 | Status: DISCONTINUED | OUTPATIENT
Start: 2025-06-12 | End: 2025-06-12 | Stop reason: HOSPADM

## 2025-06-12 RX ORDER — ACETAMINOPHEN 325 MG/1
975 TABLET ORAL EVERY 8 HOURS SCHEDULED
COMMUNITY
Start: 2025-06-12

## 2025-06-12 RX ORDER — ENOXAPARIN SODIUM 100 MG/ML
40 INJECTION SUBCUTANEOUS
Qty: 10.4 ML | Refills: 0 | Status: SHIPPED | OUTPATIENT
Start: 2025-06-12 | End: 2025-07-08

## 2025-06-12 RX ORDER — OXYCODONE HYDROCHLORIDE 5 MG/1
5 TABLET ORAL EVERY 4 HOURS PRN
Qty: 16 TABLET | Refills: 0 | Status: SHIPPED | OUTPATIENT
Start: 2025-06-12 | End: 2025-06-15

## 2025-06-12 RX ORDER — ENOXAPARIN SODIUM 100 MG/ML
40 INJECTION SUBCUTANEOUS
Status: DISCONTINUED | OUTPATIENT
Start: 2025-06-12 | End: 2025-06-12 | Stop reason: HOSPADM

## 2025-06-12 RX ADMIN — PREGABALIN 75 MG: 75 CAPSULE ORAL at 16:52

## 2025-06-12 RX ADMIN — METHOCARBAMOL 750 MG: 750 TABLET ORAL at 17:03

## 2025-06-12 RX ADMIN — OXYCODONE HYDROCHLORIDE 15 MG: 10 TABLET ORAL at 16:55

## 2025-06-12 RX ADMIN — METHOCARBAMOL 750 MG: 750 TABLET ORAL at 05:05

## 2025-06-12 RX ADMIN — ENOXAPARIN SODIUM 40 MG: 40 INJECTION SUBCUTANEOUS at 12:33

## 2025-06-12 RX ADMIN — ACETAMINOPHEN 975 MG: 325 TABLET ORAL at 05:05

## 2025-06-12 RX ADMIN — OXYCODONE HYDROCHLORIDE 80 MG: 20 TABLET, FILM COATED, EXTENDED RELEASE ORAL at 08:28

## 2025-06-12 RX ADMIN — HEPARIN SODIUM 5000 UNITS: 5000 INJECTION INTRAVENOUS; SUBCUTANEOUS at 05:05

## 2025-06-12 RX ADMIN — OXYCODONE HYDROCHLORIDE 15 MG: 10 TABLET ORAL at 12:33

## 2025-06-12 RX ADMIN — OXYCODONE HYDROCHLORIDE 10 MG: 10 TABLET ORAL at 08:29

## 2025-06-12 RX ADMIN — METHOCARBAMOL 750 MG: 750 TABLET ORAL at 12:33

## 2025-06-12 RX ADMIN — ACETAMINOPHEN 975 MG: 325 TABLET ORAL at 15:10

## 2025-06-12 RX ADMIN — PANTOPRAZOLE SODIUM 40 MG: 40 TABLET, DELAYED RELEASE ORAL at 05:05

## 2025-06-12 RX ADMIN — OXYCODONE HYDROCHLORIDE 10 MG: 10 TABLET ORAL at 01:21

## 2025-06-12 RX ADMIN — PREGABALIN 75 MG: 75 CAPSULE ORAL at 08:29

## 2025-06-12 NOTE — RESTORATIVE TECHNICIAN NOTE
Restorative Technician Note      Patient Name: Celia Rivera     Restorative Tech Visit Date: 06/12/25  Note Type: Mobility  Patient Position Upon Consult: Bedside chair  Activity Performed: Ambulated  Assistive Device: Roller walker  Patient Position at End of Consult: Bedside chair; All needs within reach  Nurse Communication: -- (Nurse aware of consult.)    Arely Apodaca Restorative Tech

## 2025-06-12 NOTE — CONSULTS
NEPHROLOGY HOSPITAL CONSULTATION   Celia Rivera 64 y.o. female MRN: [id number]  Unit/Bed#: [facility] 812-01 Encounter: [id number]    Brief History of Admission -64-year-old female with a history of rectosigmoid colon cancer admitted electively for LAR nephrology consult for postoperative management to reduce the risk of GARY.    Assessment & Plan  Abnormal renal function finding  -- Patient had a low GFR reading of 55 mL/min back on May 23, 2025 and in the past she has had GFR readings less than 60 mL/min, but more recently her GFR's have been persistently greater than 60 mL/min, due to a risk of GARY neurosurgery nephrology was consulted.  --Renal function currently stable postoperatively, creatinine at 0.89 mg/dL GFR greater than 60 mL/min  --Electrolytes are normal  --Renal function stable and at baseline  --Off IV fluid  --Jackson catheter removed voiding well.  --Stable from the standpoint for discharge renal function stable postoperatively can follow-up in the future if needed.  --Nothing further to add from renal standpoint will sign off call for questions or concerns.  Thank you.  Rectosigmoid cancer (HCC)  -- Status post LAR on Katty 10  --Management as per surgery  Chronic low back pain  -- Management as per the primary team  Opioid dependence (HCC)    Postoperative abdominal pain  -- Management as per the surgical team    I have reviewed the nephrology recommendations including assessment and plan, with patient, and we are in agreement with renal plan including the information outlined above.    HISTORY OF PRESENT ILLNESS:  Requesting Physician: GERALD Larson MD  Reason for Consult: High risk for GARY postsurgery, nephrology risk assessment    Celia Rivera is a 64 y.o. female who was admitted to [facility] after presenting with elective surgery for rectosigmoid cancer. A renal consultation is requested today for assistance in the management of reduction of GARY.  Patient's renal function  appears to be around 0.8 to 1 mg/dL with a GFR persistently above 60 mL/min though in the past she has had GFR's that have fallen below 60 mL/min.  More recently being back in May.    Her renal function is stable postoperatively she has no complaints at this time.    PAST MEDICAL HISTORY:  Past Medical History[1]    PAST SURGICAL HISTORY:  Past Surgical History[2]    ALLERGIES:  Allergies[3]    SOCIAL HISTORY:  Social History     Substance and Sexual Activity   Alcohol Use Never     Social History     Substance and Sexual Activity   Drug Use Not Currently     Tobacco Use History[4]    FAMILY HISTORY:  Family History[5]    MEDICATIONS:  Current Medications[6]    REVIEW OF SYSTEMS:  Constitutional: Negative for fatigue, anorexia, fever, chills, diaphoresis  HENT: Negative for postnasal drip  Eyes: Negative for visual disturbance.   Respiratory: Negative for cough, shortness of breath and wheezing.   Cardiovascular: Negative for chest pain, palpitations and leg swelling.   Gastrointestinal: Negative for abdominal pain, constipation, diarrhea, nausea and vomiting.   Genitourinary: No dysuria, hematuria  Endocrine: Negative for polyuria.   Musculoskeletal: Negative for arthralgias, back pain and joint swelling.   Skin: Negative for rash.   Neurological: Negative for focal weakness, headaches, dizziness.  Hematological: Negative for easy bruising or bleeding.  Psychiatric/Behavioral: Negative for confusion and sleep disturbance.   All the systems were reviewed and were negative except as documented on the HPI.    PHYSICAL EXAM:  Current Weight: Weight - Scale: 84.1 kg (185 lb 8 oz)  First Weight: Weight - Scale: 90.3 kg (199 lb)  Vitals:    06/11/25 1900 06/11/25 1931 06/11/25 2303 06/12/25 0727   BP:   118/65 119/66   BP Location:       Pulse:   58 58   Resp: 18  18 17   Temp:   97.7 °F (36.5 °C) 98.2 °F (36.8 °C)   TempSrc: Oral      SpO2:  96% 97% 96%   Weight:       Height:           Intake/Output Summary (Last 24  "hours) at 6/12/2025 0953  Last data filed at 6/12/2025 0830  Gross per 24 hour   Intake 2993.7 ml   Output 2300 ml   Net 693.7 ml     Physical Exam  Constitutional:       General: She is not in acute distress.     Appearance: She is well-developed.   HENT:      Head: Normocephalic and atraumatic.     Eyes:      General: No scleral icterus.     Conjunctiva/sclera: Conjunctivae normal.      Pupils: Pupils are equal, round, and reactive to light.       Cardiovascular:      Rate and Rhythm: Normal rate and regular rhythm.      Heart sounds: S1 normal and S2 normal. No murmur heard.     No friction rub. No gallop.   Pulmonary:      Effort: Pulmonary effort is normal. No respiratory distress.      Breath sounds: Normal breath sounds. No wheezing or rales.   Abdominal:      General: Bowel sounds are normal.      Palpations: Abdomen is soft.      Tenderness: There is no abdominal tenderness. There is no rebound.     Musculoskeletal:         General: Normal range of motion.      Cervical back: Normal range of motion and neck supple.     Skin:     Findings: No rash.     Neurological:      Mental Status: She is alert and oriented to person, place, and time.     Psychiatric:         Behavior: Behavior normal.           Invasive Devices:      Lab Results:   Results from last 7 days   Lab Units 06/12/25  0410 06/12/25  0409 06/11/25  0728 06/10/25  1911   WBC Thousand/uL  --  6.78 8.20  --    HEMOGLOBIN g/dL  --  11.5 11.9  --    HEMATOCRIT %  --  36.1 35.6  --    PLATELETS Thousands/uL  --  218 212 251   POTASSIUM mmol/L 3.9  --  3.5  --    CHLORIDE mmol/L 109*  --  107  --    CO2 mmol/L 27  --  26  --    BUN mg/dL 5  --  12  --    CREATININE mg/dL 0.89  --  0.82  --    CALCIUM mg/dL 8.4  --  8.3*  --    MAGNESIUM mg/dL 2.0  --  1.7*  --    PHOSPHORUS mg/dL  --   --  2.9  --          Portions of the record may have been created with voice recognition software. Occasional wrong word or \"sound a like\" substitutions may have " occurred due to the inherent limitations of voice recognition software. Read the chart carefully and recognize, using context, where substitutions have occurred.If you have any questions, please contact the dictating provider.\       [1]   Past Medical History:  Diagnosis Date    Chronic pain disorder     History of transfusion     Hyperlipidemia     Hypertension    [2]   Past Surgical History:  Procedure Laterality Date    BACK SURGERY      L3-S1    CHOLECYSTECTOMY LAPAROSCOPIC N/A 02/10/2022    Procedure: CHOLECYSTECTOMY LAPAROSCOPIC;  Surgeon: Eulalio Gross DO;  Location: [facility];  Service: General    EGD      ELBOW SURGERY Right     IR DRAINAGE TUBE CHECK/CHANGE/REPOSITION/REINSERTION/UPSIZE  2025    IR DRAINAGE TUBE CHECK/CHANGE/REPOSITION/REINSERTION/UPSIZE  2025    IR DRAINAGE TUBE PLACEMENT  2025    IR PICC PLACEMENT SINGLE LUMEN  04/10/2025    TN LAPS COLECTOMY PRTL W/COLOPXTSTMY LW ANAST N/A 6/10/2025    Procedure: RESECTION COLON LOW ANTERIOR LAPAROSCOPIC WITH ROBOTICS;  Surgeon: GERALD Larson MD;  Location: [facility];  Service: Colorectal    SPINAL STIMULATOR PLACEMENT     [3] No Known Allergies  [4]   Social History  Tobacco Use   Smoking Status Former    Current packs/day: 0.00    Types: Cigarettes    Quit date: 2018    Years since quittin.4   Smokeless Tobacco Never   [5] No family history on file.  [6]   Current Facility-Administered Medications:     acetaminophen (TYLENOL) tablet 975 mg, 975 mg, Oral, Q8H KEVIN, Loni Wilkinson PA-C, 975 mg at 25 0505    heparin (porcine) subcutaneous injection 5,000 Units, 5,000 Units, Subcutaneous, Q8H KEVIN, 5,000 Units at 25 0505 **AND** [COMPLETED] Platelet count, , , Once, Deonte Schaffer MD    HYDROmorphone (DILAUDID) injection 0.5 mg, 0.5 mg, Intravenous, Q2H PRN, Jose Herrera PA-C    methocarbamol (ROBAXIN) tablet 750 mg, 750 mg, Oral, Q6H KEVIN, Jose Herrera PA-C, 750 mg at 25 0505     naloxone (NARCAN) 0.04 mg/mL syringe 0.04 mg, 0.04 mg, Intravenous, Q1MIN PRN, Deonte Schaffer MD    ondansetron (ZOFRAN) injection 4 mg, 4 mg, Intravenous, Q6H PRN, Deonte Schaffer MD    oxyCODONE (OxyCONTIN) 12 hr tablet 60 mg, 60 mg, Oral, HS, Jose Herrera PA-C    oxyCODONE (OxyCONTIN) 12 hr tablet 80 mg, 80 mg, Oral, Daily, Jose Herrera PA-C, 80 mg at 06/12/25 0828    oxyCODONE (ROXICODONE) immediate release tablet 10 mg, 10 mg, Oral, Q4H PRN, 10 mg at 06/12/25 0829 **OR** oxyCODONE (ROXICODONE) IR tablet 15 mg, 15 mg, Oral, Q4H PRN, Jose Herrera PA-C, 15 mg at 06/11/25 1931    pantoprazole (PROTONIX) EC tablet 40 mg, 40 mg, Oral, Daily, Deonte Schaffer MD, 40 mg at 06/12/25 0507    pregabalin (LYRICA) capsule 75 mg, 75 mg, Oral, TID, Deonte Schaffer MD, 75 mg at 06/12/25 0892

## 2025-06-12 NOTE — ANESTHESIA POSTPROCEDURE EVALUATION
Post-Op Assessment Note            No anethesia notable event occurred.            Last Filed PACU Vitals:  Vitals Value Taken Time   Temp 98.1 °F (36.7 °C) 06/10/25 18:59   Pulse 75 06/10/25 18:59   /75 06/10/25 18:59   Resp 18 06/10/25 18:59   SpO2 93 % 06/10/25 18:59   Vitals shown include unfiled device data.    Modified Carrie:     Vitals Value Taken Time   Activity 2 06/10/25 18:00   Respiration 2 06/10/25 18:00   Circulation 2 06/10/25 18:00   Consciousness 1 06/10/25 18:00   Oxygen Saturation 2 06/10/25 18:00     Modified Carrie Score: 9

## 2025-06-12 NOTE — DISCHARGE SUMMARY
Discharge Summary - Colorectal  Name: Celia Rivera 64 y.o. female I MRN: [id number]  [facility] I Date of Admission: 6/10/2025   Date of Service: 6/12/2025 I Hospital Day: 2      Admission Date: 6/10/2025 1035      Discharge Date: 06/12/25  Admitting Diagnosis: Rectosigmoid cancer (HCC) [C19]    Discharge Diagnosis: Same  Medical Problems       Resolved Problems  Date Reviewed: 6/9/2025   None         HPI: Celia is a 64-year-old woman who was found to have a rectosigmoid cancer.  Patient has a history of chronic pain.  She is being admitted for surgical resection.    Procedures Performed: No orders of the defined types were placed in this encounter.      Summary of Hospital Course: Patient has done extremely well postoperatively.  On postop day 2 she is now tolerating a low residue diet without nausea or vomiting.  She is voiding well since her Jackson was removed on postop day #1.  Patient has been pain controlled.  We had the acute pain service see her and gave us recommendations.  Patient has been up and ambulating the halls.  She is having bowel movements.  Her abdominal incision and trocar sites are clean and dry.  Her abdomen is nondistended and soft.  She does have incisional tenderness.  Patient has stayed afebrile and normotensive throughout the admission.  She was initially kept on subcu heparin for DVT prophylaxis and will be discharged on Lovenox 40 mg subcu daily for the next 26 days.  Patient has an appointment to follow-up with Dr. Larson at the [facility] office on 7/14/2025 at 4 PM in the afternoon.  Discharge instructions were given to the patient by myself.  Scripts were sent to [facility] pharmacy in [place] for the oxycodone 5 mg she can take 1-2 tabs every 4-6 hours as needed for pain.  20 pills were prescribed with no refills.  She is to resume her Robaxin 750 mg every 6 hours as needed as well as her Lyrica 75 mg every morning, 75 mg in the evening, and 75 mg before bed she is also to  continue her XTampza 18 mg at bedtime and 36 mg every morning.  If there is any fevers of 101.5 or higher or increasing abdominal pain or vomiting she is not to hesitate to call the office.    Pathology pending    Complications: None    Condition at Discharge: Good    Discharge instructions/Information to patient and family:   See After Visit Summary (AVS) for information provided to patient and family.      Provisions for Follow-Up Care:  See after visit summary for information related to follow-up care and any pertinent home health orders.      PCP: Harvey Jordan MD    Disposition: Home    Planned Readmission: No    Discharge Medications:  See after visit summary for reconciled discharge medications provided to patient and family.      Discharge Statement:  I have spent a total time of 30 minutes in caring for this patient on the day of the visit/encounter. .

## 2025-06-12 NOTE — ASSESSMENT & PLAN NOTE
Has longstanding history of chronic lower back pain.  Had spinal cord stimulator placed in 2019 which still functions.  Follows with pain management provider outside of [facility] network.  Is prescribed chronic long-acting opioids and nonopioid pain medications.  Patient should follow-up with her outpatient pain management provider soon as possible following discharge.

## 2025-06-12 NOTE — ASSESSMENT & PLAN NOTE
Suggest the following postoperative pain regimen:  Acetaminophen 975 mg p.o. every 6 hours scheduled.  OxyContin 80 mg p.o. every morning and 60 mg p.o. every afternoon.  This is equivalent to the patient's current Xtampza ER dosing.  Oxycodone 10 mg p.o. every 4 hours as needed moderate pain or 15 mg p.o. every 4 hours as needed severe pain.  Discontinue IV hydromorphone  Methocarbamol 750 mg p.o. every 6 hours scheduled.  Pregabalin 75 mg p.o. 3 times daily scheduled.  As needed Narcan in the event that opioid reversal becomes necessary.  Bowel regimen to avoid opioid-induced constipation while on opioid pain medication.  Aqua K-pad as needed.   Will avoid methadone taper for now as patient is on high dose long-acting opioids and high-dose short acting opioids and pain is well-controlled currently.    Discharge, suggest the following:  Acetaminophen 975 mg p.o. every 8 hours as needed mild pain.  Methocarbamol 750 mg p.o. every 6 hours scheduled.  Resume previous Xtampza dosing, 36 mg x 2 every morning and 36 mg and 18 mg at bedtime.  Pregabalin 75 mg p.o. 3 times daily.  Oxycodone 10 mg p.o. every 4 hours as needed severe pain x 3 days.  Intranasal Narcan in the event that opioid reversal becomes necessary.  Bowel regimen while on opioid pain medication.  Follow-up with outpatient pain management provider soon as possible following discharge.

## 2025-06-12 NOTE — RESTORATIVE TECHNICIAN NOTE
Restorative Technician Note      Patient Name: Celia Rivera     Restorative Tech Visit Date: 06/12/25  Note Type: Mobility  Patient Position Upon Consult: Standing  Mobility / Activity Provided: Patient ambulating halls by herself.  Activity Performed: Ambulated  Assistive Device: Roller walker  Patient Position at End of Consult: Standing  Nurse Communication: -- (Nurse aware of consult)    Arely Apodaca Restorative Tech

## 2025-06-12 NOTE — PROGRESS NOTES
Progress Note - Acute Pain   Name: Celia Rivera 64 y.o. female I MRN: [id number]  Unit/Bed#: [facility] 812-01 I Date of Admission: 6/10/2025   Date of Service: 6/12/2025 I Hospital Day: 2    Assessment & Plan  Postoperative abdominal pain  Suggest the following postoperative pain regimen:  Acetaminophen 975 mg p.o. every 6 hours scheduled.  OxyContin 80 mg p.o. every morning and 60 mg p.o. every afternoon.  This is equivalent to the patient's current Xtampza ER dosing.  Oxycodone 10 mg p.o. every 4 hours as needed moderate pain or 15 mg p.o. every 4 hours as needed severe pain.  Discontinue IV hydromorphone  Methocarbamol 750 mg p.o. every 6 hours scheduled.  Pregabalin 75 mg p.o. 3 times daily scheduled.  As needed Narcan in the event that opioid reversal becomes necessary.  Bowel regimen to avoid opioid-induced constipation while on opioid pain medication.  Aqua K-pad as needed.   Will avoid methadone taper for now as patient is on high dose long-acting opioids and high-dose short acting opioids and pain is well-controlled currently.    Discharge, suggest the following:  Acetaminophen 975 mg p.o. every 8 hours as needed mild pain.  Methocarbamol 750 mg p.o. every 6 hours scheduled.  Resume previous Xtampza dosing, 36 mg x 2 every morning and 36 mg and 18 mg at bedtime.  Pregabalin 75 mg p.o. 3 times daily.  Oxycodone 10 mg p.o. every 4 hours as needed severe pain x 3 days.  Intranasal Narcan in the event that opioid reversal becomes necessary.  Bowel regimen while on opioid pain medication.  Follow-up with outpatient pain management provider soon as possible following discharge.  Chronic low back pain  Has longstanding history of chronic lower back pain.  Had spinal cord stimulator placed in 2019 which still functions.  Follows with pain management provider outside of [facility] network.  Is prescribed chronic long-acting opioids and nonopioid pain medications.  Patient should follow-up with her outpatient pain  management provider soon as possible following discharge.  Opioid dependence (HCC)  With long history of chronic lower back pain.  Is followed by outpatient pain management outside of [facility].  Patient is on the following outpatient pain regimen:  Xtampza ER 36 mg p.o. times 2 in the AM.  Xtampza ER 36 mg and 18 mg p.o. at bedtime.  Methocarbamol 750 mg p.o. every 6 hours as needed muscle spasm.  Pregabalin 75 mg p.o. 3 times daily scheduled.  Due to significant opioid tolerance, patient would likely require higher than usual doses of opioid pain medications postoperatively.  May consider ketamine infusion postoperatively.  Discussed this with patient and she is in agreement.  Will maintain patient on equivalent OxyContin dosing as this is her baseline.  Follow-up with outpatient pain management provider soon as possible following discharge.  Abnormal renal function finding      APS will sign off at this time. Thank you for the consult. All opioids and other analgesics to be written at discretion of primary team. Please contact Acute Pain Service - via [employer] from [phone] with additional questions or concerns. See [employer] or [employer] for additional contacts and after hours information.    Subjective   Celia Rivera is a 64 y.o. female admitted 6/10/2025 for elective laparoscopic robotic assisted low anterior resection in setting of opioid tolerance for chronic pain.     Pain History  Current pain location(s):  Pain Score: 10  Pain Location/Orientation: Location: Abdomen  Pain Scale: Pain Assessment Tool: 0-10  24 hour history: Patient sitting up in chair and states her pain is very well-controlled.  Has required no IV breakthrough opioids in 24 hours.  Is anxious for discharge home.  Is passing flatus and having BMs.    Opioid requirement previous 24 hours: OxyContin 80 mg p.o. x 2, OxyContin 60 mg p.o. x 1, oxycodone 15 mg p.o. x 1, oxycodone 10 mg p.o. x 2.  Meds/Allergies   all current active  meds have been reviewed, current meds:   Current Facility-Administered Medications:     acetaminophen (TYLENOL) tablet 975 mg, Q8H KEVIN    enoxaparin (LOVENOX) subcutaneous injection 40 mg, Q24H KEVIN    HYDROmorphone (DILAUDID) injection 0.5 mg, Q2H PRN    methocarbamol (ROBAXIN) tablet 750 mg, Q6H KEVIN    naloxone (NARCAN) 0.04 mg/mL syringe 0.04 mg, Q1MIN PRN    ondansetron (ZOFRAN) injection 4 mg, Q6H PRN    oxyCODONE (OxyCONTIN) 12 hr tablet 60 mg, HS    oxyCODONE (OxyCONTIN) 12 hr tablet 80 mg, Daily    oxyCODONE (ROXICODONE) immediate release tablet 10 mg, Q4H PRN **OR** oxyCODONE (ROXICODONE) IR tablet 15 mg, Q4H PRN    pantoprazole (PROTONIX) EC tablet 40 mg, Daily    pregabalin (LYRICA) capsule 75 mg, TID, and PTA meds:   Prior to Admission Medications   Prescriptions Last Dose Informant Patient Reported? Taking?   VITAMIN D, CHOLECALCIFEROL, PO 6/3/2025  Yes Yes   Sig: Take by mouth   Xtampza ER 18 MG C12A 6/9/2025 Evening  Yes Yes   Sig: daily at bedtime   Xtampza ER 36 MG extended release capsule 6/9/2025 Evening  Yes Yes   Sig: in the morning   dicyclomine (BENTYL) 20 mg tablet   No No   Sig: Take 1 tablet (20 mg total) by mouth 2 (two) times a day   Patient not taking: Reported on 5/5/2025   methocarbamol (ROBAXIN) 750 mg tablet More than a month  Yes No   Sig: Take 750 mg by mouth every 6 (six) hours as needed for muscle spasms   metroNIDAZOLE (FLAGYL) 500 mg tablet 6/9/2025  No Yes   Sig: Take 1 tablet at 1:00pm, 1 tablet at 2pm, and 1 tablet at 10:00pm the day prior to surgery. Do not start before June 9, 2025.   multivitamin (THERAGRAN) TABS 6/3/2025  Yes Yes   Sig: Take 1 tablet by mouth in the morning.   neomycin (MYCIFRADIN) 500 mg tablet 6/9/2025  No Yes   Sig: Take 2 tablets at 1:00pm, 2 tablets at 2pm, and 2 tablets at 10:00pm the day prior to surgery. Do not start before June 9, 2025.   pantoprazole (PROTONIX) 40 mg tablet More than a month  No No   Sig: Take 1 tablet (40 mg total) by mouth  daily   Patient taking differently: Take 40 mg by mouth if needed   pregabalin (LYRICA) 75 mg capsule 6/9/2025 at 10:00 PM Self Yes Yes   Sig: Take 75 mg by mouth in the morning and 75 mg in the evening and 75 mg before bedtime.   senna-docusate sodium (SENOKOT-S) 8.6-50 mg per tablet Past Month  No Yes   Sig: Take 1 tablet by mouth daily   sodium chloride, PF, 0.9 %   No No   Sig: 10 mL by Intracatheter route daily for 90 doses   Patient not taking: Reported on 5/5/2025      Facility-Administered Medications: None     Allergies[1]  Objective :  Temp:  [97.7 °F (36.5 °C)-98.7 °F (37.1 °C)] 98.2 °F (36.8 °C)  HR:  [58-67] 58  BP: (113-119)/(62-66) 119/66  Resp:  [17-18] 17  SpO2:  [96 %-97 %] 96 %  O2 Device: None (Room air)    Physical Exam  Vitals and nursing note reviewed.   Constitutional:       General: She is awake. She is not in acute distress.     Appearance: She is not ill-appearing, toxic-appearing or diaphoretic.     Skin:     General: Skin is warm and dry.     Neurological:      Mental Status: She is alert and oriented to person, place, and time.      GCS: GCS eye subscore is 4. GCS verbal subscore is 5. GCS motor subscore is 6.     Psychiatric:         Attention and Perception: Attention normal.         Speech: Speech normal.         Behavior: Behavior normal. Behavior is cooperative.            Lab Results: I have reviewed the following results:  Estimated Creatinine Clearance: 73 mL/min (by C-G formula based on SCr of 0.89 mg/dL).  Lab Results   Component Value Date    WBC 6.78 06/12/2025    HGB 11.5 06/12/2025    HCT 36.1 06/12/2025     06/12/2025         Component Value Date/Time    K 3.9 06/12/2025 0410     (H) 06/12/2025 0410    CO2 27 06/12/2025 0410    BUN 5 06/12/2025 0410    CREATININE 0.89 06/12/2025 0410         Component Value Date/Time    CALCIUM 8.4 06/12/2025 0410    ALKPHOS 114 (H) 05/23/2025 1509    AST 20 05/23/2025 1509    ALT 14 05/23/2025 1509    TP 6.8 05/23/2025 1509     ALB 3.8 05/23/2025 1509       Imaging Results Review: No pertinent imaging studies reviewed.  Other Study Results Review: No additional pertinent studies reviewed.     Administrative Statements   I have spent a total time of 28 minutes in caring for this patient on the day of the visit/encounter including Risks and benefits of tx options, Instructions for management, Patient and family education, Importance of tx compliance, Risk factor reductions, Impressions, Counseling / Coordination of care, Documenting in the medical record, Reviewing/placing orders in the medical record (including tests, medications, and/or procedures), Obtaining or reviewing history  , and Communicating with other healthcare professionals .       [1] No Known Allergies

## 2025-06-12 NOTE — PLAN OF CARE
Problem: PAIN - ADULT  Goal: Verbalizes/displays adequate comfort level or baseline comfort level  Description: Interventions:  - Encourage patient to monitor pain and request assistance  - Assess pain using appropriate pain scale  - Administer analgesics as ordered based on type and severity of pain and evaluate response  - Implement non-pharmacological measures as appropriate and evaluate response  - Consider cultural and social influences on pain and pain management  - Notify physician/advanced practitioner if interventions unsuccessful or patient reports new pain  - Educate patient/family on pain management process including their role and importance of  reporting pain   - Provide non-pharmacologic/complimentary pain relief interventions  Outcome: Progressing     Problem: INFECTION - ADULT  Goal: Absence or prevention of progression during hospitalization  Description: INTERVENTIONS:  - Assess and monitor for signs and symptoms of infection  - Monitor lab/diagnostic results  - Monitor all insertion sites, i.e. indwelling lines, tubes, and drains  - Monitor endotracheal if appropriate and nasal secretions for changes in amount and color  - [place] appropriate cooling/warming therapies per order  - Administer medications as ordered  - Instruct and encourage patient and family to use good hand hygiene technique  - Identify and instruct in appropriate isolation precautions for identified infection/condition  Outcome: Progressing  Goal: Absence of fever/infection during neutropenic period  Description: INTERVENTIONS:  - Monitor WBC  - Perform strict hand hygiene  - Limit to healthy visitors only  - No plants, dried, fresh or silk flowers with givens in patient room  Outcome: Progressing     Problem: SAFETY ADULT  Goal: Patient will remain free of falls  Description: INTERVENTIONS:  - Educate patient/family on patient safety including physical limitations  - Instruct patient to call for assistance with activity   -  Consider consulting OT/PT to assist with strengthening/mobility based on AM PAC & JH-HLM score  - Consult OT/PT to assist with strengthening/mobility   - Keep Call bell within reach  - Keep bed low and locked with side rails adjusted as appropriate  - Keep care items and personal belongings within reach  - Initiate and maintain comfort rounds  - Make Fall Risk Sign visible to staff  - Offer Toileting every 2 Hours, in advance of need  - Initiate/Maintain bed alarm  - Obtain necessary fall risk management equipment:   - Apply yellow socks and bracelet for high fall risk patients  - Consider moving patient to room near nurses station  Outcome: Progressing  Goal: Maintain or return to baseline ADL function  Description: INTERVENTIONS:  -  Assess patient's ability to carry out ADLs; assess patient's baseline for ADL function and identify physical deficits which impact ability to perform ADLs (bathing, care of mouth/teeth, toileting, grooming, dressing, etc.)  - Assess/evaluate cause of self-care deficits   - Assess range of motion  - Assess patient's mobility; develop plan if impaired  - Assess patient's need for assistive devices and provide as appropriate  - Encourage maximum independence but intervene and supervise when necessary  - Involve family in performance of ADLs  - Assess for home care needs following discharge   - Consider OT consult to assist with ADL evaluation and planning for discharge  - Provide patient education as appropriate  - Monitor functional capacity and physical performance, use of AM PAC & JH-HLM   - Monitor gait, balance and fatigue with ambulation    Outcome: Progressing  Goal: Maintains/Returns to pre admission functional level  Description: INTERVENTIONS:  - Perform AM-PAC 6 Click Basic Mobility/ Daily Activity assessment daily.  - Set and communicate daily mobility goal to care team and patient/family/caregiver.   - Collaborate with rehabilitation services on mobility goals if  consulted  - Perform Range of Motion 2 times a day.  - Reposition patient every 2 hours.  - Dangle patient 2 times a day  - Stand patient 2 times a day  - Ambulate patient 2 times a day  - Out of bed to chair 2 times a day   - Out of bed for meals 2 times a day  - Out of bed for toileting  - Record patient progress and toleration of activity level   Outcome: Progressing     Problem: DISCHARGE PLANNING  Goal: Discharge to home or other facility with appropriate resources  Description: INTERVENTIONS:  - Identify barriers to discharge w/patient and caregiver  - Arrange for needed discharge resources and transportation as appropriate  - Identify discharge learning needs (meds, wound care, etc.)  - Arrange for interpretive services to assist at discharge as needed  - Refer to Case Management Department for coordinating discharge planning if the patient needs post-hospital services based on physician/advanced practitioner order or complex needs related to functional status, cognitive ability, or social support system  Outcome: Progressing     Problem: Knowledge Deficit  Goal: Patient/family/caregiver demonstrates understanding of disease process, treatment plan, medications, and discharge instructions  Description: Complete learning assessment and assess knowledge base.  Interventions:  - Provide teaching at level of understanding  - Provide teaching via preferred learning methods  Outcome: Progressing

## 2025-06-12 NOTE — ASSESSMENT & PLAN NOTE
-- Patient had a low GFR reading of 55 mL/min back on May 23, 2025 and in the past she has had GFR readings less than 60 mL/min, but more recently her GFR's have been persistently greater than 60 mL/min, due to a risk of GARY neurosurgery nephrology was consulted.  --Renal function currently stable postoperatively, creatinine at 0.89 mg/dL GFR greater than 60 mL/min  --Electrolytes are normal  --Renal function stable and at baseline  --Off IV fluid  --Jackson catheter removed voiding well.  --Stable from the standpoint for discharge renal function stable postoperatively can follow-up in the future if needed.  --Nothing further to add from renal standpoint will sign off call for questions or concerns.  Thank you.

## 2025-06-12 NOTE — ASSESSMENT & PLAN NOTE
With long history of chronic lower back pain.  Is followed by outpatient pain management outside of [facility].  Patient is on the following outpatient pain regimen:  Xtampza ER 36 mg p.o. times 2 in the AM.  Xtampza ER 36 mg and 18 mg p.o. at bedtime.  Methocarbamol 750 mg p.o. every 6 hours as needed muscle spasm.  Pregabalin 75 mg p.o. 3 times daily scheduled.  Due to significant opioid tolerance, patient would likely require higher than usual doses of opioid pain medications postoperatively.  May consider ketamine infusion postoperatively.  Discussed this with patient and she is in agreement.  Will maintain patient on equivalent OxyContin dosing as this is her baseline.  Follow-up with outpatient pain management provider soon as possible following discharge.

## 2025-06-13 ENCOUNTER — TRANSITIONAL CARE MANAGEMENT (OUTPATIENT)
Dept: FAMILY MEDICINE CLINIC | Facility: CLINIC | Age: 65
End: 2025-06-13

## 2025-06-13 PROCEDURE — 88309 TISSUE EXAM BY PATHOLOGIST: CPT | Performed by: PATHOLOGY

## 2025-06-13 NOTE — UTILIZATION REVIEW
NOTIFICATION OF ADMISSION DISCHARGE   This is a Notification of Discharge from [facility]. Please be advised that this patient has been discharge from our facility. Below you will find the admission and discharge date and time including the patient’s disposition.   UTILIZATION REVIEW CONTACT:  Utilization Review Assistants  Network Utilization Review Department  Phone: [phone] x carefully listen to the prompts. All voicemails are confidential.  Email: [email or web address]     ADMISSION INFORMATION  PRESENTATION DATE: 6/10/2025 10:35 AM  OBERVATION ADMISSION DATE: N/A  INPATIENT ADMISSION DATE: 6/10/25 11:41 AM   DISCHARGE DATE: 6/12/2025  6:09 PM   DISPOSITION:Home/Self Care    Network Utilization Review Department  ATTENTION: Please call with any questions or concerns to [phone] and carefully listen to the prompts so that you are directed to the right person. All voicemails are confidential.   For Discharge needs, contact Care Management DC Support Team at [phone] opt. 2  Send all requests for admission clinical reviews, approved or denied determinations and any other requests to dedicated fax number below belonging to the campus where the patient is receiving treatment. List of dedicated fax numbers for the Facilities:  FACILITY NAME UR FAX NUMBER   ADMISSION DENIALS (Administrative/Medical Necessity) [phone]   DISCHARGE SUPPORT TEAM ([facility]) [phone]   PARENT CHILD HEALTH (Maternity/NICU/Pediatrics) [phone]   [facility] [phone]   [facility] [phone]   Counts include 234 beds at the [facility] [phone]   [facility] [phone]   [facility] [phone]   [facility] [phone]   [facility] [phone]   [facility] [phone]   [facility]  [facility] [phone]   [facility] [phone]   [facility] [phone]   [facility] [phone]

## 2025-06-16 ENCOUNTER — RESULTS FOLLOW-UP (OUTPATIENT)
Dept: OTHER | Facility: HOSPITAL | Age: 65
End: 2025-06-16

## 2025-06-21 ENCOUNTER — HOSPITAL ENCOUNTER (OUTPATIENT)
Facility: HOSPITAL | Age: 65
Discharge: HOME | End: 2025-06-21
Attending: INTERNAL MEDICINE
Payer: COMMERCIAL

## 2025-06-21 DIAGNOSIS — Z12.31 ENCOUNTER FOR SCREENING MAMMOGRAM FOR MALIGNANT NEOPLASM OF BREAST: ICD-10-CM

## 2025-06-21 DIAGNOSIS — Z00.00 HEALTH CARE MAINTENANCE: ICD-10-CM

## 2025-06-21 PROCEDURE — 77063 BREAST TOMOSYNTHESIS BI: CPT

## 2025-06-21 PROCEDURE — 77067 SCR MAMMO BI INCL CAD: CPT

## 2025-06-25 ENCOUNTER — OFFICE VISIT (OUTPATIENT)
Dept: FAMILY MEDICINE CLINIC | Facility: CLINIC | Age: 65
End: 2025-06-25
Payer: COMMERCIAL

## 2025-06-25 VITALS
HEIGHT: 67 IN | WEIGHT: 190.2 LBS | OXYGEN SATURATION: 97 % | HEART RATE: 94 BPM | DIASTOLIC BLOOD PRESSURE: 84 MMHG | BODY MASS INDEX: 29.85 KG/M2 | TEMPERATURE: 97.8 F | SYSTOLIC BLOOD PRESSURE: 116 MMHG

## 2025-06-25 DIAGNOSIS — N28.9 ABNORMAL RENAL FUNCTION FINDING: ICD-10-CM

## 2025-06-25 DIAGNOSIS — Z13.220 LIPID SCREENING: ICD-10-CM

## 2025-06-25 DIAGNOSIS — D50.8 OTHER IRON DEFICIENCY ANEMIA: ICD-10-CM

## 2025-06-25 DIAGNOSIS — R79.89 ELEVATED LFTS: ICD-10-CM

## 2025-06-25 DIAGNOSIS — C19 RECTOSIGMOID CANCER (HCC): Primary | ICD-10-CM

## 2025-06-25 PROBLEM — F11.20 OPIOID DEPENDENCE (HCC): Status: RESOLVED | Noted: 2022-02-07 | Resolved: 2025-06-25

## 2025-06-25 PROCEDURE — 99496 TRANSJ CARE MGMT HIGH F2F 7D: CPT | Performed by: FAMILY MEDICINE

## 2025-06-25 NOTE — PROGRESS NOTES
Transition of Care Visit:  Name: Celia Rivera      : 1960      MRN: [id number]  Encounter Provider: Harvey Jordan MD  Encounter Date: 2025   Encounter department: [facility]    Assessment & Plan  Rectosigmoid cancer (HCC)  Patient's status post colon resection for rectosigmoid cancer  Please follow-up with hematology oncology and colorectal surgery  Monitor for worsening pain, blood in your stool, significantly increased abdominal pain  Follow-up for annual Medicare wellness in approximately 4 weeks         Abnormal renal function finding  Renal function stable       Other iron deficiency anemia  No evidence of anemia postop based on most recent blood work         Elevated LFTs    Orders:  •  Comprehensive metabolic panel; Future    Lipid screening    Orders:  •  Lipid panel; Future         History of Present Illness     Transitional Care Management Review:   Celia Rivera is a 64 y.o. female here for TCM follow up.     During the TCM phone call patient stated:  TCM Call (since 2025)       Date and time call was made  2025  9:09 AM    Hospital care reviewed  Records reviewed    Patient was hospitialized at  [facility]    Date of Admission  06/10/25    Date of discharge  25    Diagnosis  Rectosigmoid cancer (HCC)    Disposition  Home    Were the patients medications reviewed and updated  Yes          TCM Call (since 2025)       Scheduled for follow up?  Yes    Patients specialists  Other (comment)    Other specialists names  Surgeon    Did you obtain your prescribed medications  Yes    Do you need help managing your prescriptions or medications  No    Is transportation to your appointment needed  No    I have advised the patient to call PCP with any new or worsening symptoms  Abby Moody MA    Living Arrangements  Spouse or Significiant other    Are you recieving home care services  No          HPI    Corazon is a 64-year-old female patient  "presents for transitional care visit.  Patient was seen at [facility] for laparoscopic colon resection due to rectosigmoid cancer.  Patient was hospitalized between 6/10/2025 until 6/12/2025.  According to discharge summary patient did extremely well postoperatively, however her Jackson catheter removed on postop day 1 and on postop day 2 she is tolerating low residue diet without nausea or vomiting.  Patient has been ambulating with a cane.  She reports her incision is healing well, pain is well-controlled. Patient is already finished with oxycodone for pain.  Currently taking Xtampza 18 mg and 36 mg.  Patient has appointment for follow-up with colorectal surgery hematology scheduled for 7/7/2025 and 7/14/2025.  Denies any blood in the stool.  Currently anticoagulated using Lovenox.  Most recent blood work from 6/12/2025 including CBC BMP magnesium reviewed.  Previous GARY resolved.    Review of Systems   Constitutional:  Positive for fatigue. Negative for chills and fever.   HENT:  Negative for congestion, rhinorrhea and sore throat.    Respiratory:  Negative for shortness of breath.    Cardiovascular:  Negative for chest pain.   Gastrointestinal:  Positive for abdominal pain.   Neurological:  Negative for dizziness, light-headedness and headaches.       Objective   /84 (BP Location: Right arm, Patient Position: Sitting, Cuff Size: Standard)   Pulse 94   Temp 97.8 °F (36.6 °C)   Ht 5' 7\" (1.702 m)   Wt 86.3 kg (190 lb 3.2 oz)   LMP  (LMP Unknown)   SpO2 97%   Breastfeeding No   BMI 29.79 kg/m²     Physical Exam  Vitals reviewed.   Constitutional:       General: She is not in acute distress.     Appearance: Normal appearance. She is not ill-appearing.     Cardiovascular:      Rate and Rhythm: Normal rate.      Pulses: Normal pulses.   Pulmonary:      Effort: Pulmonary effort is normal.     Musculoskeletal:      Comments: Ambulating using a cane     Skin:     Capillary Refill: Capillary refill " takes less than 2 seconds.      Comments: Incision site appears to be healing well, well-approximated, no significant erythema surrounding and no discharge     Neurological:      General: No focal deficit present.      Mental Status: She is alert.     Psychiatric:         Mood and Affect: Mood normal.         Medications have been reviewed by provider in current encounter

## 2025-06-25 NOTE — ASSESSMENT & PLAN NOTE
Patient's status post colon resection for rectosigmoid cancer  Please follow-up with hematology oncology and colorectal surgery  Monitor for worsening pain, blood in your stool, significantly increased abdominal pain  Follow-up for annual Medicare wellness in approximately 4 weeks

## 2025-06-27 DIAGNOSIS — C19 RECTOSIGMOID CANCER (HCC): Primary | ICD-10-CM

## 2025-06-30 ENCOUNTER — PATIENT OUTREACH (OUTPATIENT)
Dept: CASE MANAGEMENT | Facility: OTHER | Age: 65
End: 2025-06-30

## 2025-07-03 NOTE — PROGRESS NOTES
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    Newly diagnosed at colonoscopic biopsy from May 01, 2025  Surgical resection scheduled for Katty 10, 2025     Orders:    CBC and differential; Future    Comprehensive metabolic panel; Future    CEA; Future     Liver abscess due to bacteria  Status post drainage and antimicrobial care     Health care maintenance     Orders:    Mammo screening bilateral w 3d and cad; Future    DXA bone density spine hip and pelvis; Future     Encounter for screening mammogram for malignant neoplasm of breast     Orders:    Mammo screening bilateral w 3d and cad; Future     Postmenopausal     Orders:    DXA bone density spine hip and pelvis; Future     Class 1 obesity due to excess calories with body mass index (BMI) of 30.0 to 30.9 in adult, unspecified whether serious comorbidity present                         Clinical/Pathologic Stage  Cancer Staging   No matching staging information was found for the patient.        Current Therapy  Pending surgical reviews     Discussion  This delightful postmenopausal female reports to medical oncology in connection around her newly diagnosed diagnosis of invasive adenocarcinoma of the rectosigmoid colon.  Her diagnosis was serendipitously discovered at the time of imaging related to a bacterial hepatic abscess review for care..  The patient subsequently had colonoscopic reviews with biopsies x 2 and is set to undergo definitive surgical resection of disease on Katty 10, 2025.     This patient's risk factors for the development of solid tumor malignancy include age, gender, obesity, lifestyle including inconsistent regular exercise and poor adherence to screening by age and gender     On Katty 10, 2025 the patient completed a low anterior resection of the sigmoid colon  "with portion of rectum.  She was diagnosed with grade 2 adenocarcinoma measuring 3.0 cm x 2.7 x 1.2 cm with no evidence of tumor perforation, lymphatic or vascular invasion or perineural invasion.  Her tumor budding score was low (0-4).  Margins were negative for tumor, high-grade dysplasia or intramucosal carcinoma low-grade dysplasia.  0 out of 25 lymph nodes were negative for tumor spread.  pT2 pN0    The patient has no apparent high risk features.         Special Studies  05/01/2025 MMR Proteins by IHC  INTERPRETATION: No loss of nuclear expression of MMR proteins: low probability of MSI-H        RESULTS:  Antibody            Clone                 Description                                 Results  MLH1                 G168                  Mismatch repair protein  Intact nuclear expression  MSH2                [id number]        Mismatch repair protein  Intact nuclear expression  MSH6                SP93                  Mismatch repair protein  Intact nuclear expression  PMS2                 EP51                  Mismatch repair protein  Intact nuclear expression     Note: A positive control for each antibody have been reviewed and accepted.      No results found for: \"GENETIC\", \"INTERP\", \"GENES\"        History of Present Illness     This postmenopausal female comes for medical oncology reviews around a new diagnosis of adenocarcinoma of the rectosigmoid colon.     On April 5, 2025 the patient had placement of drainage tube to due to possible liver abscess in the form of a complex fluid collection in the dome of the liver.  Fluid was positive for bacteria, white blood cells, gram-positive cocci in pairs chains and clusters with growth of Streptococcus intermedius.     On April 15, 2025 the patient completed her first colonoscopy in the setting of right upper quadrant pain, liver abscess and strep bacteremia history.  Please see [facility] surgical pathology number  S [id number] for confirmation and review.  the " patient was found to have 1 sessile polyp smaller than 5 mm in the proximal sigmoid colon which was removed completely by cold snare.  She was noted to have a single malignant appearing fungating and multilobular polypoid mass measuring 4 cm x 5 cm in the proximal rectum at about 10 cm from the anal verge.  Mass extended from approximately 10 cm - 15 cm from the anal verge.  The mass comfort one half of the luminal circumference.  The remaining surfaces from the cecum, ascending colon, hepatic flexure, transverse colon, splenic flexure, descending colon and rectosigmoid colon appeared normal.     Final pathologic diagnosis ( S 25 [id number]) revealed a hyperplastic polyp involving the proximal sigmoid colon.  There is no dysplasia or evidence of malignancy.  Biopsy of the colorectal mass confirmed adenocarcinoma moderately differentiated.  On review of immunohistochemical analysis for mismatch repair protein loss there is no loss of nuclear expression of MMR proteins and therefore a low probability of MSI high high.        On May 9, 2025 the patient was reviewed by colorectal surgery with flexible sigmoidoscopy plus colonoscopy.  The patient was found to have 2 sessile polyps measuring smaller 5 mm in the proximal rectum 14 cm from the anal verge.  Rigid proctoscope was done and measured at the rectal cancer at 17 cm from the anal verge.  The tumor was therefore deemed to be rectosigmoid versus rectal.     By review of systems I note that the patient has not had prior mammography, recent GYN reviews or prior bone density scans.  She also has declined COVID vaccination over time.     Pertinent History from May 2025     Cancer Screening and Prevention  Mammography: Not on file   GI/Colonoscopy: 05/01/2025   GYN: Not on file   Bone Density: Not on file   Covid 19 Vaccination Status: Never     Oncology Therapeutic Summary:     Katty 10, 2025 S 25 [id number] status post low anterior resection sigmoid colon and portion of rectum         Review of Systems  Medical History Reviewed by provider this encounter:     .     [Medications Ordered Prior to Encounter]     [Medications Ordered Prior to Encounter]         Current Outpatient Medications on File Prior to Visit   Medication Sig Dispense Refill    methocarbamol (ROBAXIN) 750 mg tablet Take 750 mg by mouth every 6 (six) hours as needed for muscle spasms        multivitamin (THERAGRAN) TABS Take 1 tablet by mouth in the morning.        pregabalin (LYRICA) 75 mg capsule Take 75 mg by mouth in the morning and 75 mg in the evening and 75 mg before bedtime.        senna-docusate sodium (SENOKOT-S) 8.6-50 mg per tablet Take 1 tablet by mouth daily 7 tablet 0    VITAMIN D, CHOLECALCIFEROL, PO Take by mouth        Xtampza ER 18 MG C12A daily at bedtime        Xtampza ER 36 MG extended release capsule in the morning        dicyclomine (BENTYL) 20 mg tablet Take 1 tablet (20 mg total) by mouth 2 (two) times a day (Patient not taking: Reported on 2025) 20 tablet 0    [START ON 2025] metroNIDAZOLE (FLAGYL) 500 mg tablet Take 1 tablet at 1:00pm, 1 tablet at 2pm, and 1 tablet at 10:00pm the day prior to surgery. Do not start before 2025. 3 tablet 0    [START ON 2025] neomycin (MYCIFRADIN) 500 mg tablet Take 2 tablets at 1:00pm, 2 tablets at 2pm, and 2 tablets at 10:00pm the day prior to surgery. Do not start before 2025. 6 tablet 0    pantoprazole (PROTONIX) 40 mg tablet Take 1 tablet (40 mg total) by mouth daily (Patient taking differently: Take 40 mg by mouth if needed) 90 tablet 0    sodium chloride, PF, 0.9 % 10 mL by Intracatheter route daily for 90 doses (Patient not taking: Reported on 2025) 300 mL 2      No current facility-administered medications on file prior to visit.     Social History   Social History            Tobacco Use    Smoking status: Former       Current packs/day: 0.00       Types: Cigarettes       Quit date: 2018       Years since quittin.3     "Smokeless tobacco: Never   Vaping Use    Vaping status: Former    Substances: Nicotine, THC, Flavoring   Substance and Sexual Activity    Alcohol use: Never    Drug use: Not Currently             Objective     BP (!) 148/102 (BP Location: Left arm, Patient Position: Sitting, Cuff Size: Adult)   Pulse 73   Temp 98.2 °F (36.8 °C) (Temporal)   Resp 16   Ht 5' 7\" (1.702 m)   Wt 87.1 kg (192 lb)   LMP  (LMP Unknown)   SpO2 100%   BMI 30.07 kg/m²      Pain Screening:  Pain Score:   8  ECOG   1  Physical Exam  Constitutional:       General: She is not in acute distress.     Appearance: Normal appearance. She is obese.   Neck:      Trachea: Trachea normal.      Cardiovascular:      Rate and Rhythm: Normal rate and regular rhythm.      Heart sounds: Murmur heard.      Systolic murmur is present with a grade of 2/6.      No diastolic murmur is present.      No gallop. No S3 or S4 sounds.   Pulmonary:      Breath sounds: Normal breath sounds. No decreased air movement or transmitted upper airway sounds. No decreased breath sounds, wheezing or rhonchi.   Chest:      Chest wall: No swelling or tenderness. There is no dullness to percussion.   Abdominal:      General: Abdomen is protuberant. Bowel sounds are normal.      Palpations: Abdomen is soft. There is no hepatomegaly, splenomegaly or mass.      Tenderness: There is no abdominal tenderness. There is no right CVA tenderness or left CVA tenderness.      Musculoskeletal:      Right lower leg: No edema.      Left lower leg: No edema.   Lymphadenopathy:      Cervical:      Right cervical: No superficial, deep or posterior cervical adenopathy.     Left cervical: No superficial, deep or posterior cervical adenopathy.      Neurological:      Mental Status: She is alert.            Labs: I have reviewed the following labs:        Lab Results   Component Value Date/Time     WBC 4.34 04/21/2025 07:42 AM     RBC 3.94 04/21/2025 07:42 AM     Hemoglobin 11.3 (L) 04/21/2025 07:42 " AM     Hematocrit 36.3 04/21/2025 07:42 AM     MCV 92 04/21/2025 07:42 AM     MCH 28.7 04/21/2025 07:42 AM     RDW 12.5 04/21/2025 07:42 AM     Platelets 331 04/21/2025 07:42 AM     Segmented % 57 04/21/2025 07:42 AM     Lymphocytes % 24 04/21/2025 07:42 AM     Monocytes % 11 04/21/2025 07:42 AM     Eosinophils Relative 5 04/21/2025 07:42 AM     Basophils Relative 3 (H) 04/21/2025 07:42 AM     Immature Grans % 0 04/21/2025 07:42 AM     Absolute Neutrophils 2.50 04/21/2025 07:42 AM            Lab Results   Component Value Date/Time     Potassium 4.1 05/01/2025 11:39 AM     Chloride 103 05/01/2025 11:39 AM     CO2 29 05/01/2025 11:39 AM     BUN 11 05/01/2025 11:39 AM     Creatinine 0.97 05/01/2025 11:39 AM     Glucose, Fasting 123 (H) 05/01/2025 11:39 AM     Calcium 9.1 05/01/2025 11:39 AM     Corrected Calcium 9.3 04/15/2025 09:04 AM     AST 16 04/21/2025 07:42 AM     ALT 9 04/21/2025 07:42 AM     Alkaline Phosphatase 150 (H) 04/21/2025 07:42 AM     Total Protein 6.9 04/21/2025 07:42 AM     Albumin 3.5 04/21/2025 07:42 AM     Total Bilirubin 0.30 04/21/2025 07:42 AM     eGFR 62 05/01/2025 11:39 AM            Pathology:   Katty 10/2025 S 25 [id number] status post low anterior resection sigmoid colon and portion of rectum  Final Diagnosis   A. Sigmoid colon and portion of rectum, low anterior resection:  -   Invasive adenocarcinoma of rectosigmoid colon, moderately differentiated.  -   MMR (MLH1, MSH2, MSH6 and PMS2) studies performed on biopsy specimen [id number].  -   [facility] for MI Profile Testing is pending.    -   See synoptic report.     Synoptic Checklist   COLON AND RECTUM: Resection   8th Edition - Protocol posted: 6/19/2024COLON AND RECTUM: RESECTION - All Specimens  SPECIMEN   Procedure  Low anterior resection   TUMOR   Tumor Site  Rectosigmoid   Histologic Type  Adenocarcinoma   Histologic Grade  G2, moderately differentiated   Tumor Size  Greatest dimension (Centimeters): 3.0 cm   Additional  Dimension (Centimeters)  2.7 cm     1.2 cm   Tumor Extent  Invades into muscularis propria   Macroscopic Tumor Perforation  Not identified   Lymphatic and / or Vascular Invasion  Not identified   Perineural Invasion  Not identified   Tumor Budding Score  Low (0-4)   Type of Polyp in which Invasive Carcinoma Arose  Tubulovillous adenoma   Treatment Effect  No known presurgical therapy   MARGINS   Margin Status for Invasive Carcinoma  All margins negative for invasive carcinoma   Closest Margin(s) to Invasive Carcinoma  Mesenteric   Distance from Invasive Carcinoma to Closest Margin  3.0 cm   Margin Status for Non-Invasive Tumor  All margins negative for high-grade dysplasia / intramucosal carcinoma and low-grade dysplasia   REGIONAL LYMPH NODES   Regional Lymph Node Status  All regional lymph nodes negative for tumor   Number of Lymph Nodes Examined  25   Tumor Deposits  Not identified   pTNM CLASSIFICATION (AJCC 8th Edition)   Reporting of pT, pN, and (when applicable) pM categories is based on information available to the pathologist at the time the report is issued. As per the AJCC (Chapter 1, 8th Ed.) it is the managing physician's responsibility to establish the final pathologic stage based upon all pertinent information, including but potentially not limited to this pathology report.   pT Category  pT2   pN Category  pN0   ADDITIONAL FINDINGS   Additional Findings  Diverticulosis   Comment(s)  MMR (MLH1, MSH2, MSH6 and PMS2) studies performed on biopsy specimen [id number]. [place] for MI Profile Testing is pending.     .      Gross Description             May 9, 2025 S 25 [id number] status post biopsy/cold snare polypectomy via endoscopy/colonoscopy  Final Diagnosis   A. Polyp, Colorectal, Proximal rectum polyp x2, cold snare:  - Fragments of hyperplastic polyp(s).  - Separate fragment of granulation tissue.  - Negative for dysplasia and malignancy.          May 1, 2025 S 25 [id number]  status post  colonoscopy  Final Diagnosis      A. Polyp, Colorectal, proximal sigmoid: - Hyperplastic polyp. - No epithelial dysplasia and no evidence of malignancy.      B. Colon, rectal mass: - Adenocarcinoma, moderately differentiated. Comment: Immunohistochemistry     RESULTS OF IMMUNOHISTOCHEMICAL ANALYSIS FOR MISMATCH REPAIR PROTEIN LOSS  INTERPRETATION: No loss of nuclear expression of MMR proteins: low probability of MSI-H        RESULTS:  Antibody            Clone                 Description                                 Results  MLH1                 G168                  Mismatch repair protein  Intact nuclear expression  MSH2                [id number]        Mismatch repair protein  Intact nuclear expression  MSH6                SP93                  Mismatch repair protein  Intact nuclear expression  PMS2                 EP51                  Mismatch repair protein  Intact nuclear expression     Note: A positive control for each antibody have been reviewed and accepted.          Imaging:   June 21, 2025 bilateral screening mammography  FINDINGS:   There are no suspicious masses, grouped microcalcifications or areas of architectural distortion. The skin and nipple areolar complex are unremarkable.     IMPRESSION:  No mammographic evidence of malignancy.           ASSESSMENT/BI-RADS CATEGORY:  Left: 1 - Negative  Right: 1 - Negative  Overall: 1 - Negative     RECOMMENDATION:       - Routine screening mammogram in 1 year for both breasts.         June 4, 2025 bone density DEXA scan  RESULTS:     LUMBAR SPINE  Level: L1-L2  (L3, L4 vertebrae excluded from analysis due to local structural abnormalities or artifact):  BMD: 1.142 gm/cm2  T-score: 1.5        LEFT  TOTAL HIP:  BMD: 0.854 gm/cm2  T-score: -0.7     LEFT  FEMORAL NECK:  BMD: 0.673 gm/cm2  T score: -1.6     LEFT  FOREARM:  33% RADIUS BMD: 0.516 gm/cm2  T-score: -3.0        IMPRESSION:     1. Osteoporosis. Based on the left radius     2.  The 10 year risk of hip  fracture is 0.8% and the 10 year risk of major osteoporotic fracture is 16% as calculated by the [employer] fracture risk assessment tool (FRAX, which is based on data generated by the WHO Collaborating [place] for   Metabolic Bone Diseases).     3.  The current Bone Health and Osteoporosis Foundation (BHOF) guidelines recommend treating patients with a T-score of -2.5 or less in the lumbar spine or hips, or in post-menopausal women and men over the age of 50 with low bone mass (osteopenia;   T-score between -1.0 and -2.5) and a FRAX 10 year risk score of > 3% for hip fracture and/or > 20% for major osteoporosis-related fracture (clinical vertebral, hip, forearm, or proximal humerus).    May 20, 2025 CT scan chest abdomen pelvis with contrast  Results pending  No results found for this or any previous visit.

## 2025-07-07 ENCOUNTER — OFFICE VISIT (OUTPATIENT)
Dept: HEMATOLOGY ONCOLOGY | Facility: CLINIC | Age: 65
End: 2025-07-07
Payer: COMMERCIAL

## 2025-07-07 VITALS
BODY MASS INDEX: 30.92 KG/M2 | TEMPERATURE: 97.5 F | OXYGEN SATURATION: 98 % | HEART RATE: 63 BPM | RESPIRATION RATE: 16 BRPM | DIASTOLIC BLOOD PRESSURE: 78 MMHG | WEIGHT: 197 LBS | SYSTOLIC BLOOD PRESSURE: 116 MMHG | HEIGHT: 67 IN

## 2025-07-07 DIAGNOSIS — C19 RECTOSIGMOID CANCER (HCC): Primary | ICD-10-CM

## 2025-07-07 PROCEDURE — 99215 OFFICE O/P EST HI 40 MIN: CPT | Performed by: INTERNAL MEDICINE

## 2025-07-10 NOTE — PROGRESS NOTES
Colon and Rectal Surgery   Ceila Rivera 64 y.o. female MRN [id number]  Encounter: [id number]  07/10/25 2:24 PM            Assessment: Celia Rivera is a 64 y.o. female who ***      Plan:   No problem-specific Assessment & Plan notes found for this encounter.      Subjective     HPI    Celia Rivera is a 64 y.o. female who is here today for a post op .    She is status post LAR on 6/10/2025 the pathology reveals:    A. Sigmoid colon and portion of rectum, low anterior resection:  -   Invasive adenocarcinoma of rectosigmoid colon, moderately differentiated.  -   MMR (MLH1, MSH2, MSH6 and PMS2) studies performed on biopsy specimen [id number].  -   [facility] for MI Profile Testing is pending.    -   See synoptic report.    Historical Information   Past Medical History[1]  Past Surgical History[2]    Meds/Allergies     Current Medications[3]  Allergies[4]    Social History   Social History     Substance and Sexual Activity   Drug Use Not Currently     Tobacco Use History[5]      Family History[6]      Review of Systems    Objective   Current Vitals:  There were no vitals filed for this visit.      Physical Exam               [1]   Past Medical History:  Diagnosis Date    Chronic pain disorder     History of transfusion     Hyperlipidemia     Hypertension    [2]   Past Surgical History:  Procedure Laterality Date    BACK SURGERY      L3-S1    CHOLECYSTECTOMY LAPAROSCOPIC N/A 02/10/2022    Procedure: CHOLECYSTECTOMY LAPAROSCOPIC;  Surgeon: Eulalio Gross DO;  Location: [facility];  Service: General    EGD      ELBOW SURGERY Right     IR DRAINAGE TUBE CHECK/CHANGE/REPOSITION/REINSERTION/UPSIZE  04/18/2025    IR DRAINAGE TUBE CHECK/CHANGE/REPOSITION/REINSERTION/UPSIZE  04/23/2025    IR DRAINAGE TUBE PLACEMENT  04/05/2025    IR PICC PLACEMENT SINGLE LUMEN  04/10/2025    AK LAPS COLECTOMY PRTL W/COLOPXTSTMY LW ANAST N/A 6/10/2025    Procedure: RESECTION COLON LOW ANTERIOR LAPAROSCOPIC WITH ROBOTICS;  Surgeon: GERALD  Gonzalez Larson MD;  Location: [facility];  Service: Colorectal    SPINAL STIMULATOR PLACEMENT     [3]   Current Outpatient Medications:     acetaminophen (TYLENOL) 325 mg tablet, Take 3 tablets (975 mg total) by mouth every 8 (eight) hours (Patient not taking: Reported on 2025), Disp: , Rfl:     enoxaparin (LOVENOX) 40 mg/0.4 mL, Inject 0.4 mL (40 mg total) under the skin every 24 hours for 26 days, Disp: 10.4 mL, Rfl: 0    methocarbamol (ROBAXIN) 750 mg tablet, Take 750 mg by mouth every 6 (six) hours as needed for muscle spasms, Disp: , Rfl:     multivitamin (THERAGRAN) TABS, Take 1 tablet by mouth in the morning., Disp: , Rfl:     pantoprazole (PROTONIX) 40 mg tablet, Take 1 tablet (40 mg total) by mouth daily (Patient not taking: Reported on 2025), Disp: 90 tablet, Rfl: 0    pregabalin (LYRICA) 75 mg capsule, Take 75 mg by mouth in the morning and 75 mg in the evening and 75 mg before bedtime., Disp: , Rfl:     VITAMIN D, CHOLECALCIFEROL, PO, Take by mouth (Patient not taking: Reported on 2025), Disp: , Rfl:     Xtampza ER 18 MG C12A, daily at bedtime, Disp: , Rfl:     Xtampza ER 36 MG extended release capsule, in the morning, Disp: , Rfl:   [4] No Known Allergies  [5]   Social History  Tobacco Use   Smoking Status Former    Current packs/day: 0.00    Types: Cigarettes    Quit date: 2018    Years since quittin.5   Smokeless Tobacco Never   [6]   Family History  Problem Relation Name Age of Onset    Rectal cancer Brother

## 2025-07-11 LAB
CARIS GENOMIC LOH - EXOME: NORMAL
CARIS HER2/NEU: NEGATIVE
CARIS HLA-A: NORMAL
CARIS HLA-B: NORMAL
CARIS HLA-C: NORMAL
CARIS MSI - EXOME: NORMAL
CARIS PD-L1 (SP142): NEGATIVE
CARIS TMB - EXOME: NORMAL

## 2025-07-11 NOTE — PROGRESS NOTES
"Colon and Rectal Surgery   Celia Rivera 64 y.o. female MRN [id number]  Encounter: [id number]  07/14/25 3:54 PM            Assessment: Celia Rivera is a 64 y.o. female who had a stage I colon cancer.      Plan:   Rectosigmoid cancer (HCC)  The patient is doing well after a low anterior resection of the rectosigmoid cancer.  This was a stage I tumor.  I recommend she return in 4 months for reevaluation.      Subjective     HPI    Celia Rivera is a 64 y.o. female who is here today for a post op .    Today she reports she is feeling well. She states that she does feel fatigued, but it is improving daily. She does report minor \"twinges\" of pain.     She states she is eating a normal diet, and having normal bowel movements.    She is status post LAR on 6/10/2025 the pathology reveals:    A. Sigmoid colon and portion of rectum, low anterior resection:  -   Invasive adenocarcinoma of rectosigmoid colon, moderately differentiated.  -   MMR (MLH1, MSH2, MSH6 and PMS2) studies performed on biopsy specimen [id number].  -   Pocketbook for MI Profile Testing is pending.    -   See synoptic report.    Historical Information   Past Medical History[1]  Past Surgical History[2]    Meds/Allergies     Current Medications[3]  Allergies[4]    Social History   Social History     Substance and Sexual Activity   Drug Use Not Currently     Tobacco Use History[5]      Family History[6]      Review of Systems    Objective   Current Vitals:  Vitals:    07/14/25 1544   Weight: 88.3 kg (194 lb 9.6 oz)         Physical Exam  Constitutional:       Appearance: Normal appearance.   Abdominal:      General: Abdomen is flat.      Palpations: Abdomen is soft. There is no mass.      Tenderness: There is no abdominal tenderness. There is no guarding.     Neurological:      General: No focal deficit present.      Mental Status: She is alert and oriented to person, place, and time.                    [1]   Past Medical History:  Diagnosis " Date    Chronic pain disorder     History of transfusion     Hyperlipidemia     Hypertension    [2]   Past Surgical History:  Procedure Laterality Date    BACK SURGERY      L3-S1    CHOLECYSTECTOMY LAPAROSCOPIC N/A 02/10/2022    Procedure: CHOLECYSTECTOMY LAPAROSCOPIC;  Surgeon: Eulalio Gross DO;  Location: [facility];  Service: General    EGD      ELBOW SURGERY Right     IR DRAINAGE TUBE CHECK/CHANGE/REPOSITION/REINSERTION/UPSIZE  04/18/2025    IR DRAINAGE TUBE CHECK/CHANGE/REPOSITION/REINSERTION/UPSIZE  04/23/2025    IR DRAINAGE TUBE PLACEMENT  04/05/2025    IR PICC PLACEMENT SINGLE LUMEN  04/10/2025    MT LAPS COLECTOMY PRTL W/COLOPXTSTMY LW ANAST N/A 6/10/2025    Procedure: RESECTION COLON LOW ANTERIOR LAPAROSCOPIC WITH ROBOTICS;  Surgeon: GERALD Larson MD;  Location: [facility];  Service: Colorectal    SPINAL STIMULATOR PLACEMENT  2019   [3]   Current Outpatient Medications:     acetaminophen (TYLENOL) 325 mg tablet, Take 3 tablets (975 mg total) by mouth every 8 (eight) hours (Patient not taking: Reported on 7/7/2025), Disp: , Rfl:     enoxaparin (LOVENOX) 40 mg/0.4 mL, Inject 0.4 mL (40 mg total) under the skin every 24 hours for 26 days, Disp: 10.4 mL, Rfl: 0    methocarbamol (ROBAXIN) 750 mg tablet, Take 750 mg by mouth every 6 (six) hours as needed for muscle spasms, Disp: , Rfl:     multivitamin (THERAGRAN) TABS, Take 1 tablet by mouth in the morning., Disp: , Rfl:     pantoprazole (PROTONIX) 40 mg tablet, Take 1 tablet (40 mg total) by mouth daily (Patient not taking: Reported on 7/7/2025), Disp: 90 tablet, Rfl: 0    pregabalin (LYRICA) 75 mg capsule, Take 75 mg by mouth in the morning and 75 mg in the evening and 75 mg before bedtime., Disp: , Rfl:     VITAMIN D, CHOLECALCIFEROL, PO, Take by mouth (Patient not taking: Reported on 7/7/2025), Disp: , Rfl:     Xtampza ER 18 MG C12A, daily at bedtime, Disp: , Rfl:     Xtampza ER 36 MG extended release capsule, in the morning, Disp: , Rfl:   [4] No  Known Allergies  [5]   Social History  Tobacco Use   Smoking Status Former    Current packs/day: 0.00    Types: Cigarettes    Quit date: 2018    Years since quittin.5   Smokeless Tobacco Never   [6]   Family History  Problem Relation Name Age of Onset    Rectal cancer Brother

## 2025-07-14 ENCOUNTER — OFFICE VISIT (OUTPATIENT)
Age: 65
End: 2025-07-14

## 2025-07-14 VITALS — BODY MASS INDEX: 30.48 KG/M2 | WEIGHT: 194.6 LBS

## 2025-07-14 DIAGNOSIS — C19 RECTOSIGMOID CANCER (HCC): Primary | ICD-10-CM

## 2025-07-14 PROCEDURE — 99024 POSTOP FOLLOW-UP VISIT: CPT | Performed by: COLON & RECTAL SURGERY

## 2025-07-14 NOTE — ASSESSMENT & PLAN NOTE
The patient is doing well after a low anterior resection of the rectosigmoid cancer.  This was a stage I tumor.  I recommend she return in 4 months for reevaluation.

## 2025-07-28 ENCOUNTER — PATIENT OUTREACH (OUTPATIENT)
Dept: CASE MANAGEMENT | Facility: OTHER | Age: 65
End: 2025-07-28

## 2025-07-29 ENCOUNTER — OFFICE VISIT (OUTPATIENT)
Dept: HEMATOLOGY ONCOLOGY | Facility: CLINIC | Age: 65
End: 2025-07-29
Payer: COMMERCIAL

## 2025-07-29 VITALS
DIASTOLIC BLOOD PRESSURE: 86 MMHG | SYSTOLIC BLOOD PRESSURE: 122 MMHG | RESPIRATION RATE: 16 BRPM | TEMPERATURE: 97.3 F | WEIGHT: 195 LBS | BODY MASS INDEX: 30.61 KG/M2 | OXYGEN SATURATION: 97 % | HEART RATE: 96 BPM | HEIGHT: 67 IN

## 2025-07-29 DIAGNOSIS — M81.0 AGE-RELATED OSTEOPOROSIS WITHOUT CURRENT PATHOLOGICAL FRACTURE: Primary | ICD-10-CM

## 2025-07-29 PROCEDURE — 99214 OFFICE O/P EST MOD 30 MIN: CPT | Performed by: INTERNAL MEDICINE

## 2025-08-04 ENCOUNTER — APPOINTMENT (OUTPATIENT)
Dept: LAB | Facility: MEDICAL CENTER | Age: 65
End: 2025-08-04
Payer: COMMERCIAL

## 2025-08-04 DIAGNOSIS — Z79.899 ENCOUNTER FOR LONG-TERM (CURRENT) USE OF MEDICATIONS: ICD-10-CM

## 2025-08-04 DIAGNOSIS — R79.89 ELEVATED LFTS: ICD-10-CM

## 2025-08-04 DIAGNOSIS — Z13.220 LIPID SCREENING: ICD-10-CM

## 2025-08-04 DIAGNOSIS — Z79.891 LONG-TERM CURRENT USE OF OPIATE ANALGESIC: ICD-10-CM

## 2025-08-04 LAB
ALBUMIN SERPL BCG-MCNC: 4.2 G/DL (ref 3.5–5)
ALP SERPL-CCNC: 120 U/L (ref 34–104)
ALT SERPL W P-5'-P-CCNC: 15 U/L (ref 7–52)
ANION GAP SERPL CALCULATED.3IONS-SCNC: 6 MMOL/L (ref 4–13)
AST SERPL W P-5'-P-CCNC: 19 U/L (ref 13–39)
BILIRUB SERPL-MCNC: 0.74 MG/DL (ref 0.2–1)
BUN SERPL-MCNC: 12 MG/DL (ref 5–25)
CALCIUM SERPL-MCNC: 9.7 MG/DL (ref 8.4–10.2)
CHLORIDE SERPL-SCNC: 104 MMOL/L (ref 96–108)
CHOLEST SERPL-MCNC: 212 MG/DL (ref ?–200)
CO2 SERPL-SCNC: 30 MMOL/L (ref 21–32)
CREAT SERPL-MCNC: 0.98 MG/DL (ref 0.6–1.3)
GFR SERPL CREATININE-BSD FRML MDRD: 62 ML/MIN/1.73SQ M
GLUCOSE P FAST SERPL-MCNC: 105 MG/DL (ref 65–99)
HDLC SERPL-MCNC: 55 MG/DL
LDLC SERPL CALC-MCNC: 133 MG/DL (ref 0–100)
NONHDLC SERPL-MCNC: 157 MG/DL
POTASSIUM SERPL-SCNC: 4.3 MMOL/L (ref 3.5–5.3)
PROT SERPL-MCNC: 7.1 G/DL (ref 6.4–8.4)
SODIUM SERPL-SCNC: 140 MMOL/L (ref 135–147)
TRIGL SERPL-MCNC: 119 MG/DL (ref ?–150)

## 2025-08-04 PROCEDURE — 80365 DRUG SCREENING OXYCODONE: CPT

## 2025-08-04 PROCEDURE — 80053 COMPREHEN METABOLIC PANEL: CPT

## 2025-08-04 PROCEDURE — 80307 DRUG TEST PRSMV CHEM ANLYZR: CPT

## 2025-08-04 PROCEDURE — 36415 COLL VENOUS BLD VENIPUNCTURE: CPT

## 2025-08-04 PROCEDURE — 80361 OPIATES 1 OR MORE: CPT

## 2025-08-04 PROCEDURE — 80061 LIPID PANEL: CPT

## 2025-08-06 ENCOUNTER — OFFICE VISIT (OUTPATIENT)
Dept: FAMILY MEDICINE CLINIC | Facility: CLINIC | Age: 65
End: 2025-08-06
Payer: COMMERCIAL

## 2025-08-06 VITALS
HEART RATE: 76 BPM | OXYGEN SATURATION: 97 % | BODY MASS INDEX: 30.29 KG/M2 | DIASTOLIC BLOOD PRESSURE: 76 MMHG | TEMPERATURE: 97.8 F | WEIGHT: 193 LBS | HEIGHT: 67 IN | SYSTOLIC BLOOD PRESSURE: 118 MMHG

## 2025-08-06 DIAGNOSIS — Z11.59 NEED FOR HEPATITIS C SCREENING TEST: ICD-10-CM

## 2025-08-06 DIAGNOSIS — E55.9 VITAMIN D DEFICIENCY: ICD-10-CM

## 2025-08-06 DIAGNOSIS — Z00.00 WELCOME TO MEDICARE PREVENTIVE VISIT: Primary | ICD-10-CM

## 2025-08-06 PROCEDURE — G0438 PPPS, INITIAL VISIT: HCPCS | Performed by: FAMILY MEDICINE

## 2025-08-07 LAB
6-ACETYLMORPHINE IA: NEGATIVE NG/ML
ACCEPTABLE CREAT UR QL: 101 MG/DL
AMPHET UR QL SCN: NEGATIVE NG/ML
BARBITURATES UR QL SCN: NEGATIVE NG/ML
BENZODIAZ UR QL SCN: NEGATIVE NG/ML
BUPRENORPHINE UR QL CFM: NEGATIVE NG/ML
CANNABINOIDS UR QL SCN: NEGATIVE NG/ML
CARISOPRODOL UR QL: NEGATIVE NG/ML
COCAINE+BZE UR QL SCN: NEGATIVE NG/ML
CODEINE UR QL CFM: NOT DETECTED NG/MG CREAT
DHC UR CFM-MCNC: NOT DETECTED NG/MG CREAT
ETHYL GLUCURONIDE UR QL SCN: NEGATIVE NG/ML
FENTANYL UR QL SCN: NEGATIVE NG/ML
GABAPENTIN SERPLBLD QL SCN: NEGATIVE UG/ML
HYDROCODONE UR QL CFM: NOT DETECTED NG/MG CREAT
HYDROMORPHONE UR QL CFM: NOT DETECTED NG/MG CREAT
METHADONE UR QL SCN: NEGATIVE NG/ML
MORPHINE UR QL CFM: NOT DETECTED NG/MG CREAT
NITRITE UR QL STRIP: NEGATIVE UG/ML
NORCODEINE/CREAT UR CFM: NOT DETECTED NG/MG CREAT
NORHYDROCODONE UR CFM-MCNC: NOT DETECTED NG/MG CREAT
NORMORPHINE: NOT DETECTED NG/MG CREAT
NOROXYCODONE UR CFM-MCNC: >9901 NG/MG CREAT
NOROXYMORPHONE/CREAT UR CFM: 2145 NG/MG CREAT
OPIATE CLASS: NEGATIVE
OPIATES UR QL SCN: NORMAL NG/ML
OXYCODONE CLASS: ABNORMAL
OXYCODONE UR QL CFM: >9901 NG/MG CREAT
OXYCODONE+OXYMORPHONE UR QL SCN: NORMAL NG/ML
OXYMORPHONE UR QL CFM: 8155 NG/MG CREAT
PCP UR QL SCN: NEGATIVE NG/ML
PROPOXYPH UR QL SCN: NEGATIVE NG/ML
SPECIMEN PH ACCEPTABLE UR: 6.3 (ref 4.5–8.9)
TAPENTADOL UR QL SCN: NEGATIVE NG/ML
TRAMADOL UR QL SCN: NEGATIVE NG/ML

## (undated) DEVICE — [HIGH FLOW INSUFFLATOR,  DO NOT USE IF PACKAGE IS DAMAGED,  KEEP DRY,  KEEP AWAY FROM SUNLIGHT,  PROTECT FROM HEAT AND RADIOACTIVE SOURCES.]: Brand: PNEUMOSURE

## (undated) DEVICE — LIGHT HANDLE COVER SLEEVE DISP BLUE STELLAR

## (undated) DEVICE — TROCAR: Brand: KII FIOS FIRST ENTRY

## (undated) DEVICE — POOLE SUCTION HANDLE: Brand: CARDINAL HEALTH

## (undated) DEVICE — SUT PDS PLUS 1 CTX 36IN PDP371T

## (undated) DEVICE — CHLORAPREP HI-LITE 26ML ORANGE

## (undated) DEVICE — COLUMN DRAPE

## (undated) DEVICE — DRAPE EQUIPMENT RF WAND

## (undated) DEVICE — ENDOPATH 5MM CURVED SCISSORS WITH MONOPOLAR CAUTERY: Brand: ENDOPATH

## (undated) DEVICE — PAD GROUNDING ADULT

## (undated) DEVICE — ELECTRODE LAP L WIRE E-Z CLEAN 33CM -0100

## (undated) DEVICE — TUBING SUCTION 5MM X 12 FT

## (undated) DEVICE — GLOVE SRG BIOGEL 7

## (undated) DEVICE — SYRINGE 10ML LL

## (undated) DEVICE — EXOFIN PRECISION PEN HIGH VISCOSITY TOPICAL SKIN ADHESIVE: Brand: EXOFIN PRECISION PEN, 1G

## (undated) DEVICE — VISUALIZATION SYSTEM: Brand: CLEARIFY

## (undated) DEVICE — INTENDED FOR TISSUE SEPARATION, AND OTHER PROCEDURES THAT REQUIRE A SHARP SURGICAL BLADE TO PUNCTURE OR CUT.: Brand: BARD-PARKER SAFETY BLADES SIZE 15, STERILE

## (undated) DEVICE — VESSEL SEALER EXTEND: Brand: ENDOWRIST

## (undated) DEVICE — ALLENTOWN LAP CHOLE APP PACK: Brand: CARDINAL HEALTH

## (undated) DEVICE — SUT SILK 2-0 TIES 144 IN LA55G

## (undated) DEVICE — ACCESS PLATFORM FOR MINIMALLY INVASIVE SURGERY.: Brand: GELPORT® LAPAROSCOPIC  SYSTEM

## (undated) DEVICE — GLOVE INDICATOR PI UNDERGLOVE SZ 7 BLUE

## (undated) DEVICE — GLOVE INDICATOR PI UNDERGLOVE SZ 8 BLUE

## (undated) DEVICE — ARM DRAPE

## (undated) DEVICE — TROCAR: Brand: KII SLEEVE

## (undated) DEVICE — KIT ENDO BUTTON

## (undated) DEVICE — PENCIL ELECTROSURG E-Z CLEAN -0035H

## (undated) DEVICE — SCD SEQUENTIAL COMPRESSION COMFORT SLEEVE MEDIUM KNEE LENGTH: Brand: KENDALL SCD

## (undated) DEVICE — IRRIG ENDO FLO TUBING

## (undated) DEVICE — REM POLYHESIVE ADULT PATIENT RETURN ELECTRODE: Brand: VALLEYLAB

## (undated) DEVICE — REDUCER: Brand: ENDOWRIST

## (undated) DEVICE — STAPLER 60: Brand: SUREFORM

## (undated) DEVICE — LIGAMAX 5 MM ENDOSCOPIC MULTIPLE CLIP APPLIER: Brand: LIGAMAX

## (undated) DEVICE — GAUZE SPONGES,8 PLY: Brand: CURITY

## (undated) DEVICE — TIP COVER ACCESSORY

## (undated) DEVICE — ELECTRO LUBE IS A SINGLE PATIENT USE DEVICE THAT IS INTENDED TO BE USED ON ELECTROSURGICAL ELECTRODES TO REDUCE STICKING.: Brand: KEY SURGICAL ELECTRO LUBE

## (undated) DEVICE — DRAPE SHEET X-LG

## (undated) DEVICE — KIT, BETHLEHEM ROBOTIC PROST: Brand: CARDINAL HEALTH

## (undated) DEVICE — STAPLER 60 RELOAD GREEN: Brand: SUREFORM

## (undated) DEVICE — BLACK CIRCULAR STAPLER WITH TRI-STAPLE TECHNOLOGY: Brand: EEA

## (undated) DEVICE — INTENDED FOR TISSUE SEPARATION, AND OTHER PROCEDURES THAT REQUIRE A SHARP SURGICAL BLADE TO PUNCTURE OR CUT.: Brand: BARD-PARKER SAFETY BLADES SIZE 11, STERILE

## (undated) DEVICE — AIRSEAL TUBE SMOKE EVAC LUMENX3 FILTERED

## (undated) DEVICE — TUBING SMOKE EVAC W/FILTRATION DEVICE PLUMEPORT ACTIV

## (undated) DEVICE — SUT VICRYL 0 UR-6 27 IN J603H

## (undated) DEVICE — ENDOPOUCH RETRIEVER SPECIMEN RETRIEVAL BAGS: Brand: ENDOPOUCH RETRIEVER

## (undated) DEVICE — PREMIUM DRY TRAY LF: Brand: MEDLINE INDUSTRIES, INC.

## (undated) DEVICE — TIP-UP FENESTRATED GRASPER: Brand: ENDOWRIST

## (undated) DEVICE — TROCAR: Brand: KII® SLEEVE

## (undated) DEVICE — FENESTRATED BIPOLAR FORCEPS: Brand: ENDOWRIST

## (undated) DEVICE — 2, DISPOSABLE SUCTION/IRRIGATOR WITHOUT DISPOSABLE TIP: Brand: STRYKEFLOW

## (undated) DEVICE — SUT VICRYL PLUS 2-0 54IN VCP286G

## (undated) DEVICE — SUT MONOCRYL 4-0 PS-2 18 IN Y496G

## (undated) DEVICE — GLOVE SRG BIOGEL 7.5

## (undated) DEVICE — 3M™ STERI-STRIP™ REINFORCED ADHESIVE SKIN CLOSURES, R1541, 1/4 IN X 3 IN (6 MM X 75 MM), 3 STRIPS/ENVELOPE: Brand: 3M™ STERI-STRIP™

## (undated) DEVICE — SEAL

## (undated) DEVICE — 3M™ TEGADERM™ TRANSPARENT FILM DRESSING FRAME STYLE, 1624W, 2-3/8 IN X 2-3/4 IN (6 CM X 7 CM), 100/CT 4CT/CASE: Brand: 3M™ TEGADERM™

## (undated) DEVICE — 40595 XL TRENDELENBURG POSITIONING KIT: Brand: 40595 XL TRENDELENBURG POSITIONING KIT

## (undated) DEVICE — TROCAR PORT ACCESS 5 X120MML W/BLDLS OPTICAL TIP AIRSEAL

## (undated) DEVICE — ANTIBACTERIAL UNDYED BRAIDED (POLYGLACTIN 910), SYNTHETIC ABSORBABLE SUTURE: Brand: COATED VICRYL